# Patient Record
Sex: FEMALE | Race: WHITE | NOT HISPANIC OR LATINO | Employment: FULL TIME | URBAN - METROPOLITAN AREA
[De-identification: names, ages, dates, MRNs, and addresses within clinical notes are randomized per-mention and may not be internally consistent; named-entity substitution may affect disease eponyms.]

---

## 2017-01-25 ENCOUNTER — OFFICE VISIT (OUTPATIENT)
Dept: DERMATOLOGY | Facility: CLINIC | Age: 32
End: 2017-01-25
Payer: COMMERCIAL

## 2017-01-25 DIAGNOSIS — Z85.820 HISTORY OF MELANOMA: ICD-10-CM

## 2017-01-25 DIAGNOSIS — D22.9 MULTIPLE NEVI: ICD-10-CM

## 2017-01-25 DIAGNOSIS — Z12.83 SCREENING, MALIGNANT NEOPLASM, SKIN: ICD-10-CM

## 2017-01-25 DIAGNOSIS — D23.9 DERMATOFIBROMA: ICD-10-CM

## 2017-01-25 DIAGNOSIS — L90.5 SCAR CONDITIONS/SKIN FIBROSIS: Primary | ICD-10-CM

## 2017-01-25 PROCEDURE — 99213 OFFICE O/P EST LOW 20 MIN: CPT | Mod: S$GLB,,, | Performed by: DERMATOLOGY

## 2017-01-25 PROCEDURE — 99999 PR PBB SHADOW E&M-EST. PATIENT-LVL II: CPT | Mod: PBBFAC,,, | Performed by: DERMATOLOGY

## 2017-01-25 NOTE — PATIENT INSTRUCTIONS
Monitoring Moles  Moles, also called nevi, are small, pigmented (colored) marks on the skin. They have no known purpose. Many moles appear before age 30, but they also increase frequently as people age. Moles most often are benign (not cancer) and harmless. But some become cancerous. Thats why you need to watch the moles on your body and tell your health care provider about any concern you.    What are moles?  Moles are a type of pigmented mayelin. Freckles, which often are sprinkled across the bridge of the nose, the cheeks, and the arms, are another type of pigmented mayelin. Moles can appear on any part of the body. There are many types, sizes, and shapes of moles. Most moles are solid brown. In most cases, they are flat or dome-shaped, smooth, and have well-defined edges. Freckles are flat.  Why worry about moles?  Most moles are benign and dont require treatment. You can have moles removed if you dont like the way they look or feel. But moles that appear after you are 30 or that change in certain ways may become a problem. These moles may turn into melanoma, a type of skin cancer. Melanoma is one of the fastest growing cancers in the [place], but it is often curable if caught early. But this disease can be life-threatening, particularly when not diagnosed early. The more moles someone has, the higher the risk. Risk is also higher for those who have had more lifetime exposure to the sun, severe blistering sunburns, exposure to tanning beds, a prior personal history of cancer, and those with a family history of skin cancer. To manage your risk, its smart to check your moles for changes and ask your health care provider to perform a thorough skin exam when you have a physical exam. To do this, you first need to learn where your moles are. Then, be sure to check your moles each month.    Checking your moles  You can check many of your moles each month. You can do this right after you shower and before you get  dressed. Check your body from head to toe. Then, make a list of your moles. If you find any new moles or changes in your moles, call your health care provider. To check your moles, youll need:  · A full-length mirror  · A stool or chair to sit on while you check your feet  If you have a lot of moles, take digital photos of them each month. Make sure to take photos both up close and from a distance. These can help you see if any moles change over time.  When to seek medical treatment  See your health care provider if your moles hurt, itch, ooze, bleed, thicken, become crusty, or show other changes. Also, be sure to call your health care provider if your moles show any of the following signs of melanoma:  · A change in size, shape, color, or elevation  · Asymmetry (when the sides dont match)  · Ragged, notched, or blurred borders  · Varied colors within the same mole  · Size is larger than 5 mm or 6 mm in diameter (the size of a pencil eraser)  © 3937-8764 [employer]. [address] [place] [address]. All rights reserved. This information is not intended as a substitute for professional medical care. Always follow your healthcare professional's instructions.

## 2017-01-25 NOTE — MR AVS SNAPSHOT
[facility] - Dermatology  [address] [facility] [place] [address]  Phone: [phone]  Fax: [phone]                  Samanta Zimmerman   2017 1:15 PM   Office Visit    Description:  Female : 1985   Provider:  Lala Dangelo MD   Department:  [facility] - Dermatology           Reason for Visit     Follow-up           Diagnoses this Visit        Comments    Scar conditions/skin fibrosis    -  Primary     History of melanoma         Screening, malignant neoplasm, skin         Multiple nevi         Dermatofibroma                To Do List           Goals (5 Years of Data)     None      Follow-Up and Disposition     Return pt is planning to move, recommend pt f/u for skin exam in 3 months.  Pt acknowledged understanding. .      [facility]     [facility] On Call Nurse Care Line -  Assistance  Registered nurses in the [facility] provide clinical advisement, health education, appointment booking, and other advisory services.  Call for this free service at [phone].             Medications           Message regarding Medications     Verify the changes and/or additions to your medication regime listed below are the same as discussed with your clinician today.  If any of these changes or additions are incorrect, please notify your healthcare provider.             Verify that the below list of medications is an accurate representation of the medications you are currently taking.  If none reported, the list may be blank. If incorrect, please contact your healthcare provider. Carry this list with you in case of emergency.           Current Medications     alprazolam (XANAX) 0.25 MG tablet Take 1-2 tablets (0.25-0.5 mg total) by mouth daily as needed for Anxiety.    fish oil-omega-3 fatty acids 300-1,000 mg capsule Take 2 g by mouth once daily.    fluticasone (FLONASE) 50 mcg/actuation nasal spray 2 sprays by Each Nare route once daily.    hydrocodone-chlorpheniramine (TUSSIONEX) 10-8  mg/5 mL suspension Take 5 mLs by mouth every 12 (twelve) hours as needed.    iron-vitamin C 100-250 mg, ICAR-C, (ICAR-C) 100-250 mg Tab Take 1 tablet by mouth once daily.    L norgest/e.estradiol-e.estrad 0.15 mg-30 mcg (84)/10 mcg (7) 3MPk Take 1 tablet by mouth once daily.    lisinopril (PRINIVIL,ZESTRIL) 2.5 MG tablet Take 1 tablet (2.5 mg total) by mouth once daily.    naratriptan (AMERGE) 2.5 MG tablet 2.5 mg at onset of headache, may repeat in 4 hours if needed    omeprazole (PRILOSEC) 20 MG capsule TAKE 1 CAPSULE BY MOUTH EVERY DAY    ondansetron (ZOFRAN) 4 MG tablet TAKE ONE TABLET BY MOUTH EVERY 12 HOURS AS NEEDED FOR NAUSEA    topiramate (TOPAMAX) 50 MG tablet Take 1 tablet (50 mg total) by mouth 2 (two) times daily.           Clinical Reference Information           Vital Signs - Last Recorded     LMP                   10/11/2016           Allergies as of 1/25/2017     No Known Allergies      Immunizations Administered on Date of Encounter - 1/25/2017     None      Instructions      Monitoring Moles  Moles, also called nevi, are small, pigmented (colored) marks on the skin. They have no known purpose. Many moles appear before age 30, but they also increase frequently as people age. Moles most often are benign (not cancer) and harmless. But some become cancerous. Thats why you need to watch the moles on your body and tell your health care provider about any concern you.    What are moles?  Moles are a type of pigmented mayelin. Freckles, which often are sprinkled across the bridge of the nose, the cheeks, and the arms, are another type of pigmented mayelin. Moles can appear on any part of the body. There are many types, sizes, and shapes of moles. Most moles are solid brown. In most cases, they are flat or dome-shaped, smooth, and have well-defined edges. Freckles are flat.  Why worry about moles?  Most moles are benign and dont require treatment. You can have moles removed if you dont like the way they look  or feel. But moles that appear after you are 30 or that change in certain ways may become a problem. These moles may turn into melanoma, a type of skin cancer. Melanoma is one of the fastest growing cancers in the [place], but it is often curable if caught early. But this disease can be life-threatening, particularly when not diagnosed early. The more moles someone has, the higher the risk. Risk is also higher for those who have had more lifetime exposure to the sun, severe blistering sunburns, exposure to tanning beds, a prior personal history of cancer, and those with a family history of skin cancer. To manage your risk, its smart to check your moles for changes and ask your health care provider to perform a thorough skin exam when you have a physical exam. To do this, you first need to learn where your moles are. Then, be sure to check your moles each month.    Checking your moles  You can check many of your moles each month. You can do this right after you shower and before you get dressed. Check your body from head to toe. Then, make a list of your moles. If you find any new moles or changes in your moles, call your health care provider. To check your moles, youll need:  · A full-length mirror  · A stool or chair to sit on while you check your feet  If you have a lot of moles, take digital photos of them each month. Make sure to take photos both up close and from a distance. These can help you see if any moles change over time.  When to seek medical treatment  See your health care provider if your moles hurt, itch, ooze, bleed, thicken, become crusty, or show other changes. Also, be sure to call your health care provider if your moles show any of the following signs of melanoma:  · A change in size, shape, color, or elevation  · Asymmetry (when the sides dont match)  · Ragged, notched, or blurred borders  · Varied colors within the same mole  · Size is larger than 5 mm or 6 mm in diameter (the size of  a pencil eraser)  © 6064-8619 [employer]. [address] [place] [address], [place] [address]. All rights reserved. This information is not intended as a substitute for professional medical care. Always follow your healthcare professional's instructions.

## 2017-01-25 NOTE — PROGRESS NOTES
Subjective:       Patient ID:  Samanta Zimmerman is a 31 y.o. 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 Brian on 6/10/16, last seen on 9/8/16. This is a high risk patient here to check for the development of new lesions. Denies lymphadenopathy, weight loss, or night sweats.         Review of Systems   Constitutional: Negative for fever, chills, weight loss, fatigue, night sweats and malaise.   Gastrointestinal: Negative for indigestion.   Skin: Positive for daily sunscreen use and activity-related sunscreen use.   Hematologic/Lymphatic: Negative for adenopathy. Does not bruise/bleed easily.        Objective:    Physical Exam   Constitutional: She appears well-developed and well-nourished. No distress.   Lymphadenopathy:     She has no cervical adenopathy.     She has no axillary adenopathy.        Right: No inguinal adenopathy present.        Left: No inguinal adenopathy present.   Neurological: She is alert and oriented to person, place, and time. She is not disoriented.   Psychiatric: She has a normal mood and affect.   Skin:   Areas Examined (abnormalities noted in diagram):   Scalp / Hair Palpated and Inspected  Head / Face Inspection Performed  Neck Inspection Performed  Chest / Axilla Inspection Performed  Abdomen Inspection Performed  Genitals / Buttocks / Groin Inspection Performed  Back Inspection Performed  RUE Inspected  LUE Inspection Performed  RLE Inspected  LLE Inspection Performed  Nails and Digits Inspection Performed                   Diagram Legend     Firm pink to brown papule c/w dermatofibroma      Evenly pigmented macule c/w junctional nevus     Flesh colored to evenly pigmented papule c/w intradermal nevus      Surgical scar with no sign of skin cancer recurrence       Assessment / Plan:        Scar conditions/skin  fibrosis  History of melanoma  Screening, malignant neoplasm, skin  Scar of the left superior helix, 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 Brian on 6/10/16 . No evidence of recurrence on physical exam today. No cervical, axillary or inguinal lymphadenopathy appreciated on physical exam. No fevers, chills, weight loss, lymphadenopathy or night sweats subjectively per patient. Continue routine skin surveillance. Daily sunscreen advised.    Multiple nevi  Reassurance given.  Discussed ABCDEF of melanoma and changes for patient to look for.  AAD Handout given. Discussed importance of daily use of sunscreen.      Dermatofibroma  Reassurance given.  Lesions are benign.           Return pt is planning to move, recommend pt f/u for skin exam in 3 months.  Pt acknowledged understanding. .

## 2017-01-26 RX ORDER — ALPRAZOLAM 0.25 MG/1
.25-.5 TABLET ORAL DAILY PRN
Qty: 20 TABLET | Refills: 0 | OUTPATIENT
Start: 2017-01-26

## 2017-01-30 ENCOUNTER — PATIENT MESSAGE (OUTPATIENT)
Dept: INTERNAL MEDICINE | Facility: CLINIC | Age: 32
End: 2017-01-30

## 2017-01-30 RX ORDER — ALPRAZOLAM 0.25 MG/1
.25-.5 TABLET ORAL DAILY PRN
Qty: 30 TABLET | Refills: 0 | Status: SHIPPED | OUTPATIENT
Start: 2017-01-30 | End: 2018-06-08 | Stop reason: SDUPTHER

## 2017-01-31 DIAGNOSIS — G43.919 INTRACTABLE MIGRAINE WITHOUT STATUS MIGRAINOSUS, UNSPECIFIED MIGRAINE TYPE: ICD-10-CM

## 2017-02-01 RX ORDER — TOPIRAMATE 50 MG/1
50 TABLET, FILM COATED ORAL 2 TIMES DAILY
Qty: 180 TABLET | Refills: 3 | Status: SHIPPED | OUTPATIENT
Start: 2017-02-01 | End: 2019-01-09 | Stop reason: CLARIF

## 2017-08-31 DIAGNOSIS — N94.6 DYSMENORRHEA: ICD-10-CM

## 2017-09-01 RX ORDER — LEVONORGESTREL / ETHINYL ESTRADIOL AND ETHINYL ESTRADIOL 150-30(84)
KIT ORAL
Qty: 84 EACH | Refills: 0 | Status: SHIPPED | OUTPATIENT
Start: 2017-09-01 | End: 2018-06-29 | Stop reason: SDUPTHER

## 2018-01-30 ENCOUNTER — TELEPHONE (OUTPATIENT)
Dept: OPHTHALMOLOGY | Facility: CLINIC | Age: 33
End: 2018-01-30

## 2018-02-21 ENCOUNTER — OFFICE VISIT (OUTPATIENT)
Dept: DERMATOLOGY | Facility: CLINIC | Age: 33
End: 2018-02-21
Payer: COMMERCIAL

## 2018-02-21 DIAGNOSIS — Z85.820 HISTORY OF MELANOMA: ICD-10-CM

## 2018-02-21 DIAGNOSIS — R19.09 SWELLING OF INGUINAL REGION: ICD-10-CM

## 2018-02-21 DIAGNOSIS — L90.5 SCAR CONDITION AND FIBROSIS OF SKIN: Primary | ICD-10-CM

## 2018-02-21 DIAGNOSIS — Z12.83 SCREENING, MALIGNANT NEOPLASM, SKIN: ICD-10-CM

## 2018-02-21 PROCEDURE — 99999 PR PBB SHADOW E&M-EST. PATIENT-LVL III: CPT | Mod: PBBFAC,,, | Performed by: DERMATOLOGY

## 2018-02-21 PROCEDURE — 99213 OFFICE O/P EST LOW 20 MIN: CPT | Mod: S$GLB,,, | Performed by: DERMATOLOGY

## 2018-02-21 NOTE — PROGRESS NOTES
Subjective:       Patient ID:  Samanta Zimmerman is a 33 y.o. 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 Brian on 6/10/16, last seen on 1/25/17. She was seen by a dermatologist at [facility] in the interim. This is a high risk patient here to check for the development of new lesions. Denies lymphadenopathy, weight loss, or night sweats.        Review of Systems   Constitutional: Negative for fever, chills, weight loss, fatigue, night sweats and malaise.   HENT: Negative for headaches.    Respiratory: Negative for cough and shortness of breath.    Gastrointestinal: Negative for indigestion.   Skin: Positive for daily sunscreen use and activity-related sunscreen use. Negative for recent sunburn and wears hat.   Neurological: Negative for headaches.   Hematologic/Lymphatic: Negative for adenopathy. Does not bruise/bleed easily.        Objective:    Physical Exam   Constitutional: She appears well-developed and well-nourished. No distress.   Lymphadenopathy:     She has no cervical adenopathy.     She has no axillary adenopathy.        Right: Inguinal (approx 2 cm swelling noted, possibly of inguinal ligament) adenopathy present.        Left: No inguinal adenopathy present.   Neurological: She is alert and oriented to person, place, and time. She is not disoriented.   Psychiatric: She has a normal mood and affect.   Skin:   Areas Examined (abnormalities noted in diagram):   Scalp / Hair Palpated and Inspected  Head / Face Inspection Performed  Neck Inspection Performed  Chest / Axilla Inspection Performed  Abdomen Inspection Performed  Genitals / Buttocks / Groin Inspection Performed  Back Inspection Performed  RUE Inspected  LUE Inspection Performed  RLE Inspected  LLE Inspection Performed  Nails and Digits Inspection Performed               Diagram Legend     Erythematous scaling macule/papule c/w actinic keratosis       Vascular papule c/w angioma      Pigmented verrucoid papule/plaque c/w seborrheic keratosis      Yellow umbilicated papule c/w sebaceous hyperplasia      Irregularly shaped tan macule c/w lentigo     1-2 mm smooth white papules consistent with Milia      Movable subcutaneous cyst with punctum c/w epidermal inclusion cyst      Subcutaneous movable cyst c/w pilar cyst      Firm pink to brown papule c/w dermatofibroma      Pedunculated fleshy papule(s) c/w skin tag(s)      Evenly pigmented macule c/w junctional nevus     Mildly variegated pigmented, slightly irregular-bordered macule c/w mildly atypical nevus      Flesh colored to evenly pigmented papule c/w intradermal nevus       Pink pearly papule/plaque c/w basal cell carcinoma      Erythematous hyperkeratotic cursted plaque c/w SCC      Surgical scar with no sign of skin cancer recurrence      Open and closed comedones      Inflammatory papules and pustules      Verrucoid papule consistent consistent with wart     Erythematous eczematous patches and plaques     Dystrophic onycholytic nail with subungual debris c/w onychomycosis     Umbilicated papule    Erythematous-base heme-crusted tan verrucoid plaque consistent with inflamed seborrheic keratosis     Erythematous Silvery Scaling Plaque c/w Psoriasis     See annotation      Assessment / Plan:        Scar condition and fibrosis of skin  History of melanoma  Screening, malignant neoplasm, skin  Scar of the left superior helix, 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 Brian on 6/10/16 . No evidence of recurrence on physical exam today. No cervical, axillary or inguinal lymphadenopathy appreciated on physical exam. No fevers, chills, weight loss, lymphadenopathy or night sweats subjectively per patient. Continue routine skin surveillance. Daily sunscreen  advised.    Swelling of inguinal region  -     Ambulatory Referral to Hematology / Oncology  -     Will refer to hem-onc for further eval.              Follow-up in about 6 months (around 8/21/2018).

## 2018-02-21 NOTE — PATIENT INSTRUCTIONS
Monitoring Moles  Moles, also called nevi, are small, pigmented (colored) marks on the skin. They have no known purpose. Many moles appear before age 30, but they also increase frequently as people age. Moles most often are benign (not cancer) and harmless. But some become cancerous. Thats why you need to watch the moles on your body and tell your health care provider about any concern you.    What are moles?  Moles are a type of pigmented mayelin. Freckles, which often are sprinkled across the bridge of the nose, the cheeks, and the arms, are another type of pigmented mayelin. Moles can appear on any part of the body. There are many types, sizes, and shapes of moles. Most moles are solid brown. In most cases, they are flat or dome-shaped, smooth, and have well-defined edges. Freckles are flat.  Why worry about moles?  Most moles are benign and dont require treatment. You can have moles removed if you dont like the way they look or feel. But moles that appear after you are 30 or that change in certain ways may become a problem. These moles may turn into melanoma, a type of skin cancer. Melanoma is one of the fastest growing cancers in the [place], but it is often curable if caught early. But this disease can be life-threatening, particularly when not diagnosed early. The more moles someone has, the higher the risk. Risk is also higher for those who have had more lifetime exposure to the sun, severe blistering sunburns, exposure to tanning beds, a prior personal history of cancer, and those with a family history of skin cancer. To manage your risk, its smart to check your moles for changes and ask your health care provider to perform a thorough skin exam when you have a physical exam. To do this, you first need to learn where your moles are. Then, be sure to check your moles each month.    Checking your moles  You can check many of your moles each month. You can do this right after you shower and before you get  dressed. Check your body from head to toe. Then, make a list of your moles. If you find any new moles or changes in your moles, call your health care provider. To check your moles, youll need:  · A full-length mirror  · A stool or chair to sit on while you check your feet  If you have a lot of moles, take digital photos of them each month. Make sure to take photos both up close and from a distance. These can help you see if any moles change over time.  When to seek medical treatment  See your health care provider if your moles hurt, itch, ooze, bleed, thicken, become crusty, or show other changes. Also, be sure to call your health care provider if your moles show any of the following signs of melanoma:  · A change in size, shape, color, or elevation  · Asymmetry (when the sides dont match)  · Ragged, notched, or blurred borders  · Varied colors within the same mole  · Size is larger than 5 mm or 6 mm in diameter (the size of a pencil eraser)  © 5096-1765 [employer]. [address] [place] [address]. All rights reserved. This information is not intended as a substitute for professional medical care. Always follow your healthcare professional's instructions.

## 2018-03-07 ENCOUNTER — INITIAL CONSULT (OUTPATIENT)
Dept: HEMATOLOGY/ONCOLOGY | Facility: CLINIC | Age: 33
End: 2018-03-07
Payer: COMMERCIAL

## 2018-03-07 ENCOUNTER — LAB VISIT (OUTPATIENT)
Dept: LAB | Facility: HOSPITAL | Age: 33
End: 2018-03-07
Attending: INTERNAL MEDICINE
Payer: COMMERCIAL

## 2018-03-07 ENCOUNTER — PATIENT MESSAGE (OUTPATIENT)
Dept: NEPHROLOGY | Facility: CLINIC | Age: 33
End: 2018-03-07

## 2018-03-07 VITALS
OXYGEN SATURATION: 100 % | HEIGHT: 62 IN | WEIGHT: 179.44 LBS | SYSTOLIC BLOOD PRESSURE: 120 MMHG | RESPIRATION RATE: 18 BRPM | BODY MASS INDEX: 33.02 KG/M2 | HEART RATE: 84 BPM | TEMPERATURE: 99 F | DIASTOLIC BLOOD PRESSURE: 80 MMHG

## 2018-03-07 DIAGNOSIS — C43.9 DESMOPLASTIC MALIGNANT MELANOMA: ICD-10-CM

## 2018-03-07 DIAGNOSIS — C43.9 DESMOPLASTIC MALIGNANT MELANOMA: Primary | ICD-10-CM

## 2018-03-07 DIAGNOSIS — Q61.19 AUTOSOMAL RECESSIVE POLYCYSTIC KIDNEYS: ICD-10-CM

## 2018-03-07 LAB
ALBUMIN SERPL BCP-MCNC: 3.8 G/DL
ALP SERPL-CCNC: 39 U/L
ALT SERPL W/O P-5'-P-CCNC: 7 U/L
ANION GAP SERPL CALC-SCNC: 8 MMOL/L
AST SERPL-CCNC: 14 U/L
BASOPHILS # BLD AUTO: 0.02 K/UL
BASOPHILS NFR BLD: 0.4 %
BILIRUB SERPL-MCNC: 0.4 MG/DL
BUN SERPL-MCNC: 10 MG/DL
CALCIUM SERPL-MCNC: 9 MG/DL
CHLORIDE SERPL-SCNC: 109 MMOL/L
CO2 SERPL-SCNC: 22 MMOL/L
CREAT SERPL-MCNC: 0.8 MG/DL
DIFFERENTIAL METHOD: ABNORMAL
EOSINOPHIL # BLD AUTO: 0.1 K/UL
EOSINOPHIL NFR BLD: 1.5 %
ERYTHROCYTE [DISTWIDTH] IN BLOOD BY AUTOMATED COUNT: 12.7 %
EST. GFR  (AFRICAN AMERICAN): >60 ML/MIN/1.73 M^2
EST. GFR  (NON AFRICAN AMERICAN): >60 ML/MIN/1.73 M^2
GLUCOSE SERPL-MCNC: 98 MG/DL
HCT VFR BLD AUTO: 38.7 %
HGB BLD-MCNC: 13.2 G/DL
LYMPHOCYTES # BLD AUTO: 2 K/UL
LYMPHOCYTES NFR BLD: 37.5 %
MCH RBC QN AUTO: 28.4 PG
MCHC RBC AUTO-ENTMCNC: 34.1 G/DL
MCV RBC AUTO: 83 FL
MONOCYTES # BLD AUTO: 0.2 K/UL
MONOCYTES NFR BLD: 4.3 %
NEUTROPHILS # BLD AUTO: 3 K/UL
NEUTROPHILS NFR BLD: 56.3 %
PLATELET # BLD AUTO: 277 K/UL
PMV BLD AUTO: 9.6 FL
POTASSIUM SERPL-SCNC: 3.9 MMOL/L
PROT SERPL-MCNC: 7.4 G/DL
RBC # BLD AUTO: 4.65 M/UL
SODIUM SERPL-SCNC: 139 MMOL/L
WBC # BLD AUTO: 5.3 K/UL

## 2018-03-07 PROCEDURE — 36415 COLL VENOUS BLD VENIPUNCTURE: CPT

## 2018-03-07 PROCEDURE — 99244 OFF/OP CNSLTJ NEW/EST MOD 40: CPT | Mod: S$GLB,,, | Performed by: INTERNAL MEDICINE

## 2018-03-07 PROCEDURE — 99999 PR PBB SHADOW E&M-EST. PATIENT-LVL III: CPT | Mod: PBBFAC,,, | Performed by: INTERNAL MEDICINE

## 2018-03-07 PROCEDURE — 80053 COMPREHEN METABOLIC PANEL: CPT

## 2018-03-07 PROCEDURE — 85025 COMPLETE CBC W/AUTO DIFF WBC: CPT

## 2018-03-07 NOTE — PROGRESS NOTES
OUTPATIENT    MAIN COMPLAINT:  We are asked by Dr. Lala Dangelo to evaluate this 33-year-old    lady in regards to her history of operated melanoma of the left ear.    HISTORY OF PRESENT ILLNESS:  This is a 33-year-old  lady who had a   desmoplastic melanoma removed from the left superior helix (Breslow depth of   0.67 mm) by Dr. Brian in [facility] on 2016.    The patient also had a sentinel lymph node biopsy.  [place] lymph node biopsy   were negative.  The patient also has been seen by Dermatology at [facility].    The patient was seen on 2018.  On her physical examination, Dr. Dangelo felt   that may have been some swelling of the right groin.  The patient herself feels   that the area is unchanged.    ALLERGIES:  None.    MEDICATIONS:  See MedCard.    PREVIOUS SURGERIES:  Removal of melanoma of the left ear in 2016, deviated   septum.    SOCIAL HISTORY:  She does not smoke or drink.  She is not .  She denies   drinking.  She lives in [place].  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    FAMILY HISTORY:  Maternal uncle had cancer of the pancreas?  Maternal   grandmother had cancer of some kind.  Paternal grandmother had diabetes.  No   heart attacks.    PAST MEDICAL HISTORY:  1.  Status post removal of melanoma of the left ear in 2016.  2.  History of polycystic kidney disease.  3.  Migraine.    REVIEW OF SYSTEMS:  GENERAL:  No weight loss or fatigue.  EYES:  No vision problems or excessive lacrimation.  OROPHARYNX:  No difficulty or pain on swallowing.  ENDOCRINE:  No heat or cold intolerance.  LUNGS:  No shortness of breath or coughing.  CARDIOVASCULAR:  No chest pains or palpitation.  GASTROINTESTINAL:  No nausea, vomiting, or diarrhea.  GENITOURINARY:   0.  Has a history of polycystic kidney disease and is on   lisinopril as a protective measures.  She says scans in the past have been done   without IV dye.   She denies any gynecological lesions.  SKIN:  No blisters or ecchymosis, status post removal of melanoma, left ear.  MUSCULOSKELETAL:  No neck, hip, or back pain.  NEUROLOGIC:  Chronic headaches and migraines.    PHYSICAL EXAMINATION:  GENERAL:  She was well groomed.  She interacted normally during the interview.  VITAL SIGNS:  Blood pressure 120/80, weight 179 pounds, height 62 inches, pulse   84, respirations 18, temperature 98.7, and oxygen saturation 100%.  EYES:  No jaundice or paleness.  OROPHARYNX:  No ulcers.  No exudates.  ENDOCRINE:  No palpable thyroid.  LYMPHATICS:  No cervical or supraclavicular adenopathy.  NECK:  No jugular venous distension or masses.  LUNGS:  Clear and well ventilated without rales, wheezes, or rhonchi.  CARDIOVASCULAR:  The heart was rhythmic.  No murmurs or gallops.  ABDOMEN:  Soft.  No obvious hepatosplenomegaly, guarding, or rebound.  BREASTS:  The right inguinal area was examined carefully and I was unable to   feel any enlarged lymph nodes.  No obvious hernias.  EXTREMITIES:  No edema.  Good dorsalis pedis and posterior tibialis pulses.  SKIN:  Examination of the left ear show changes from the previous surgery.  No   suspicious moles.    DISCUSSION:  The patient has been told that my examination is essentially   unremarkable.  However, I think it would be reasonable to do a CT of the chest,   abdomen, and pelvis with interest in the right groin.    The patient will discuss with her nephrologist if she can receive IV contrast.    If not, we will do without contrast.  CBC, CMP, and pregnancy test today.      RDF/HN  dd: 03/07/2018 08:49:49 (CST)  td: 03/07/2018 10:53:54 (CST)  Doc ID   #[id number]  Job ID #[id number]    CC: Lala Jacob M.D.

## 2018-03-07 NOTE — LETTER
March 7, 2018      Lala Dangelo MD  [address]  [facility] [address]           [place] [facility]  [address] [place] [id number]  Phone: [phone]  Fax: [phone]          Patient: Samanta Zimmerman   MR Number: [id number]   YOB: 1985   Date of Visit: 3/7/2018       Dear Dr. Lala Dangelo:    Thank you for referring Samanta Zimmerman to me for evaluation. Attached you will find relevant portions of my assessment and plan of care.    If you have questions, please do not hesitate to call me. I look forward to following Samanta Zimmerman along with you.    Sincerely,    Buddy Go MD    Enclosure  CC:  No Recipients    If you would like to receive this communication electronically, please contact [email or web address] or [phone] to request more information on [facility] access.    For providers and/or their staff who would like to refer a patient to [facility], please contact us through our one-stop-shop provider referral line, [facility], at [phone].    If you feel you have received this communication in error or would no longer like to receive these types of communications, please e-mail [email or web address]

## 2018-06-06 ENCOUNTER — OFFICE VISIT (OUTPATIENT)
Dept: INTERNAL MEDICINE | Facility: CLINIC | Age: 33
End: 2018-06-06
Payer: COMMERCIAL

## 2018-06-06 VITALS
WEIGHT: 172.63 LBS | SYSTOLIC BLOOD PRESSURE: 126 MMHG | TEMPERATURE: 98 F | DIASTOLIC BLOOD PRESSURE: 70 MMHG | OXYGEN SATURATION: 97 % | HEART RATE: 96 BPM | HEIGHT: 62 IN | BODY MASS INDEX: 31.77 KG/M2

## 2018-06-06 DIAGNOSIS — G43.009 MIGRAINE WITHOUT AURA AND WITHOUT STATUS MIGRAINOSUS, NOT INTRACTABLE: ICD-10-CM

## 2018-06-06 DIAGNOSIS — Z00.00 WELLNESS EXAMINATION: Primary | ICD-10-CM

## 2018-06-06 DIAGNOSIS — E78.1 HYPERTRIGLYCERIDEMIA: ICD-10-CM

## 2018-06-06 DIAGNOSIS — Z23 NEED FOR VACCINATION AGAINST STREPTOCOCCUS PNEUMONIAE: ICD-10-CM

## 2018-06-06 DIAGNOSIS — N94.6 DYSMENORRHEA: ICD-10-CM

## 2018-06-06 DIAGNOSIS — Q61.3 POLYCYSTIC KIDNEY DISEASE: ICD-10-CM

## 2018-06-06 DIAGNOSIS — Q61.19 AUTOSOMAL RECESSIVE POLYCYSTIC KIDNEYS: ICD-10-CM

## 2018-06-06 PROCEDURE — 99999 PR PBB SHADOW E&M-EST. PATIENT-LVL V: CPT | Mod: PBBFAC,,, | Performed by: FAMILY MEDICINE

## 2018-06-06 PROCEDURE — 99395 PREV VISIT EST AGE 18-39: CPT | Mod: S$GLB,,, | Performed by: FAMILY MEDICINE

## 2018-06-06 NOTE — PROGRESS NOTES
Subjective:       Patient ID: Samanta Zimmerman is a 33 y.o. female.    Chief Complaint: Annual Exam    32 yo female with polycystic kidney disease, h/o melanoma of the left ear with no complications since removal. She recently saw Dr. Go for eval of inguinal lymphadenopathy; she reports he was not impressed with exam findings and ordered a CT abd/pelvis which she did not get due to expense of co-pay (around $700). She has been seen by renal physician in [place] where she was living previously and will be due for follow-up in August of this year. No new recommendations were made at that time. She has chronic migraines and tension headaches and takes topomax daily but with bothersome side effects--she has decreased from twice daily to once daily and is interested in stopping because she does not find it helpful. Headaches seem to have worsened with start of OCPs for treatment of menorrhagia and dysmenorrhea.       does not have any pertinent problems on file.  Past Medical History:   Diagnosis Date    Autosomal recessive polycystic kidneys     GERD (gastroesophageal reflux disease)     Melanoma 04/25/2016    left superior helix (breslow dept 0.67mm)    Migraine headache      Past Surgical History:   Procedure Laterality Date    LYMPH NODE BIOPSY      MALIGNANT SKIN LESION EXCISION  06/10/2016    NASAL SEPTUM SURGERY      RENAL EXPLORATION  2011     Family History   Problem Relation Age of Onset    Hypertension Father     Kidney disease Father     Diabetes Paternal Grandmother     Thrombophilia Neg Hx     Breast cancer Neg Hx     Colon cancer Neg Hx     Ovarian cancer Neg Hx     Eczema Neg Hx     Lupus Neg Hx     Psoriasis Neg Hx     Melanoma Neg Hx      Social History     Social History    Marital status: Single     Spouse name: N/A    Number of children: 0    Years of education: N/A     Occupational History    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 No    Breast-Feeding No     Social History Narrative    No narrative on file     Review of Systems   Constitutional: Negative for activity change and unexpected weight change.   HENT: Positive for postnasal drip and sneezing. Negative for hearing loss, rhinorrhea and trouble swallowing.    Eyes: Negative for discharge and visual disturbance.   Respiratory: Negative for chest tightness and wheezing.    Cardiovascular: Negative for chest pain and palpitations.   Gastrointestinal: Negative for blood in stool, constipation, diarrhea and vomiting.   Endocrine: Negative for polydipsia and polyuria.   Genitourinary: Negative for difficulty urinating, dysuria, hematuria and menstrual problem.   Musculoskeletal: Negative for arthralgias, joint swelling and neck pain.   Allergic/Immunologic: Positive for environmental allergies.        Has had allergy injections, takes Zyrtec daily. Has had sinus surgery with symptom improvement.   Neurological: Positive for headaches. Negative for weakness.   Psychiatric/Behavioral: Negative for confusion and dysphoric mood.       Objective:     Vitals:    06/06/18 1304   BP: 126/70   Pulse: 96   Temp: 98.1 °F (36.7 °C)        Physical Exam   Constitutional: She is oriented to person, place, and time. She appears well-developed and well-nourished.   HENT:   Head: Normocephalic and atraumatic.   Right Ear: External ear normal.   Left Ear: External ear normal.   Nose: Nose normal.   Eyes: Conjunctivae and EOM are normal. Pupils are equal, round, and reactive to light. No scleral icterus.   Cardiovascular: Normal rate, regular rhythm, normal heart sounds and intact distal pulses.    Pulmonary/Chest: Effort normal and breath sounds normal. She has no wheezes.  She has no rales.   Abdominal: Soft. Bowel sounds are normal. She exhibits no mass. There is no tenderness.   Musculoskeletal: Normal range of motion. She exhibits no edema or deformity.   Neurological: She is alert and oriented to person, place, and time.   Normal gait. No speech abnormality   Skin: Skin is warm and dry. No erythema.   Psychiatric: She has a normal mood and affect. Her behavior is normal. Judgment and thought content normal.   Nursing note and vitals reviewed.      Assessment:       1. Wellness examination    2. Hypertriglyceridemia    3. Polycystic kidney disease    4. Need for vaccination against Streptococcus pneumoniae    5. Migraine without aura and without status migrainosus, not intractable    6. Autosomal recessive polycystic kidneys    7. Dysmenorrhea        Plan:           Problem List Items Addressed This Visit     Autosomal recessive polycystic kidneys    Current Assessment & Plan     Will schedule follow-up with her nephrologist; stable renal function on recent labs. On low dose lisinopril         Dysmenorrhea    Current Assessment & Plan     Improved on OCP therapy; scheduled for annual exam with Dr. Park and she will discuss with him possibility of changing OCP due to headaches         Migraine headache    Current Assessment & Plan     Discussed gradual wean from topomax for trial off of this medication. Also discussed changing OCP since she thinks this may have contributed to increased headache frequency. Will discuss with Dr. Park. Referred for wellness visit.         Hypertriglyceridemia    Current Assessment & Plan     Recently had insurance physical with elevation in triglycerides noted (200s); she was not completely fasting--had coffee with creamer that morning. Will repeat--briefly discussed lifestyle measures to improve cholesterol profile.          Relevant Orders    Lipid panel    Wellness examination - Primary    Relevant Orders    TSH    Vitamin D    Ambulatory  referral to Obstetrics / Gynecology    Need for vaccination against Streptococcus pneumoniae    Current Assessment & Plan     Pneum13 today and pneum23 in 2 months.         Relevant Orders    (In Office Administered) Pneumococcal Conjugate Vaccine (13 Valent) (IM)    (In Office Administered) Pneumococcal Polysaccharide Vaccine (23 Valent) (SQ/IM)      Other Visit Diagnoses     Polycystic kidney disease        Relevant Orders    Ambulatory referral to Nephrology

## 2018-06-06 NOTE — ASSESSMENT & PLAN NOTE
Discussed gradual wean from topomax for trial off of this medication. Also discussed changing OCP since she thinks this may have contributed to increased headache frequency. Will discuss with Dr. Park. Referred for wellness visit.

## 2018-06-06 NOTE — PATIENT INSTRUCTIONS
"Look into the DASH diet; consider adding weight training to your fitness routine. I like www.[employer] which is a free home work-out web-site.     Your cholesterol results show that your triglycerides are mildly elevated. The same lifestyle modifications that will improve your blood sugar will also improve this. I have included information on this below:    What are high triglycerides? - Triglycerides are fat-like substances in the blood. Everyone has them, but some people have too much of them. This can cause high levels of triglycerides in the blood, also called "high triglycerides."  Compared with people who have normal triglycerides, people with high triglycerides can have a higher risk of heart attacks, strokes, and other health problems.  People with very high triglycerides can get inflammation in the pancreas. The pancreas is an organ that makes hormones and fluids to help the body break down food. When the pancreas gets inflamed, it can cause serious health problems.  What should my triglyceride level be? - Ask your doctor or nurse what your triglyceride level should be. In general, levels are:  -Normal - Less than 150 mg/dL (If you live outside the [place], triglycerides are measured differently. The normal level is less than 1.7 mmol/L.)  -A little bit high - 150 to 499 mg/dL (1.7 to 5.6 mmol/L)   -Moderately high - 500 to 886 mg/dL (5.6 to 10.0 mmol/L)  -Very high - Greater than 886 mg/dL (10.0 mmol/L)  Am I at higher risk for heart attack or stroke?   - Yes. Having high triglycerides increases your risk of heart attacks and stroke. But this is just 1 of many things that can increase your risk. You are also at higher risk if you:  -Smoke cigarettes  -Have high blood pressure  -Are overweight  -Have a parent, sister, or brother who got heart disease at a young age (Young, in this case, means younger than 55 for men and younger than 65 for women.)  -Are a man - Women are at risk too, but men " have a higher risk.  -Are older  -Have diabetes - Especially if you cannot control your blood sugar well.  Your doctor can talk to you about your personal risk of having a heart attack or stroke. There are things you can do to lower your risk.   Should I take medicine to lower my triglycerides? - Not everyone who has high triglycerides needs medicines to lower them. Your doctor will decide if you need medicine. It depends on your age, family history, and other health concerns.   Medicines can include:  -Medicines to lower triglyceride levels - These include fenofibrate (sample brand names: Antara, Fenoglide), nicotinic acid (sample brand names: Niacor, Niaspan), or fish oil (brand name: Lovaza).  -Statins - These medicines can reduce the risk of a heart attack or stroke. They are used to lower cholesterol levels in the body. Many people with high triglycerides also have high cholesterol.  The medicine you take will depend on your triglyceride levels and other factors. If your triglycerides are very high, you might need more than 1 medicine.  Can I lower my triglycerides without medicines? - Yes, you might be able to lower high triglycerides if you:  -Lose weight (if you are overweight)  -Get regular exercise  -Avoid foods and drinks with a lot of sugar and carbohydrates - These include white bread, fruit juice, soda, and sweets.  -Avoid red meat, butter, fried foods, cheese, oils, and nuts - This can help if your triglycerides are over 500.  -Limit alcohol - In you are a man, have 2 drinks a day or less. If you are a woman, have 1 drink a day or less. If your triglycerides are over 500, ask your doctor or nurse if it is safe to drink alcohol

## 2018-06-06 NOTE — ASSESSMENT & PLAN NOTE
Recently had insurance physical with elevation in triglycerides noted (200s); she was not completely fasting--had coffee with creamer that morning. Will repeat--briefly discussed lifestyle measures to improve cholesterol profile.

## 2018-06-06 NOTE — ASSESSMENT & PLAN NOTE
Improved on OCP therapy; scheduled for annual exam with Dr. Park and she will discuss with him possibility of changing OCP due to headaches

## 2018-06-07 ENCOUNTER — TELEPHONE (OUTPATIENT)
Dept: INTERNAL MEDICINE | Facility: CLINIC | Age: 33
End: 2018-06-07

## 2018-06-07 ENCOUNTER — PATIENT MESSAGE (OUTPATIENT)
Dept: INTERNAL MEDICINE | Facility: CLINIC | Age: 33
End: 2018-06-07

## 2018-06-08 RX ORDER — ALPRAZOLAM 0.25 MG/1
.25-.5 TABLET ORAL DAILY PRN
Qty: 30 TABLET | Refills: 0 | Status: SHIPPED | OUTPATIENT
Start: 2018-06-08 | End: 2019-04-17

## 2018-06-08 RX ORDER — OMEPRAZOLE 20 MG/1
20 CAPSULE, DELAYED RELEASE ORAL DAILY
Qty: 30 CAPSULE | Refills: 6 | Status: SHIPPED | OUTPATIENT
Start: 2018-06-08 | End: 2019-02-10 | Stop reason: SDUPTHER

## 2018-06-29 ENCOUNTER — PATIENT MESSAGE (OUTPATIENT)
Dept: OBSTETRICS AND GYNECOLOGY | Facility: CLINIC | Age: 33
End: 2018-06-29

## 2018-06-29 DIAGNOSIS — N94.6 DYSMENORRHEA: ICD-10-CM

## 2018-06-29 RX ORDER — LEVONORGESTREL / ETHINYL ESTRADIOL AND ETHINYL ESTRADIOL 150-30(84)
1 KIT ORAL DAILY
Qty: 84 EACH | Refills: 0 | Status: SHIPPED | OUTPATIENT
Start: 2018-06-29 | End: 2018-08-01 | Stop reason: ALTCHOICE

## 2018-06-29 NOTE — TELEPHONE ENCOUNTER
Attempted to contact patient. No answer. Left message for patient to return call to clinic to [phone] to let her know that her medication has been refilled. This is the last refill before annual.

## 2018-07-20 DIAGNOSIS — Q61.3 POLYCYSTIC KIDNEY DISEASE: ICD-10-CM

## 2018-07-20 RX ORDER — LISINOPRIL 2.5 MG/1
2.5 TABLET ORAL DAILY
Qty: 90 TABLET | Refills: 3 | Status: SHIPPED | OUTPATIENT
Start: 2018-07-20 | End: 2019-01-09 | Stop reason: CLARIF

## 2018-08-01 ENCOUNTER — OFFICE VISIT (OUTPATIENT)
Dept: OBSTETRICS AND GYNECOLOGY | Facility: CLINIC | Age: 33
End: 2018-08-01
Payer: COMMERCIAL

## 2018-08-01 VITALS
HEIGHT: 62 IN | WEIGHT: 169.75 LBS | DIASTOLIC BLOOD PRESSURE: 80 MMHG | SYSTOLIC BLOOD PRESSURE: 110 MMHG | BODY MASS INDEX: 31.24 KG/M2

## 2018-08-01 DIAGNOSIS — Z12.4 PAP SMEAR FOR CERVICAL CANCER SCREENING: Primary | ICD-10-CM

## 2018-08-01 DIAGNOSIS — N94.6 DYSMENORRHEA: ICD-10-CM

## 2018-08-01 PROCEDURE — 88175 CYTOPATH C/V AUTO FLUID REDO: CPT

## 2018-08-01 PROCEDURE — 99395 PREV VISIT EST AGE 18-39: CPT | Mod: S$GLB,,, | Performed by: OBSTETRICS & GYNECOLOGY

## 2018-08-01 PROCEDURE — 99999 PR PBB SHADOW E&M-EST. PATIENT-LVL III: CPT | Mod: PBBFAC,,, | Performed by: OBSTETRICS & GYNECOLOGY

## 2018-08-01 RX ORDER — NORETHINDRONE ACETATE AND ETHINYL ESTRADIOL .02; 1 MG/1; MG/1
1 TABLET ORAL DAILY
Qty: 63 TABLET | Refills: 3 | Status: SHIPPED | OUTPATIENT
Start: 2018-08-01 | End: 2018-10-11 | Stop reason: SINTOL

## 2018-08-01 NOTE — PROGRESS NOTES
Subjective:       Patient ID: Samanta Zimmerman is a 33 y.o. female.    Chief Complaint:  Annual Exam      History of Present Illness  HPI  Annual Exam-Premenopausal  Patient presents for annual exam. The patient has no complaints today. The patient is sexually active. GYN screening history: last pap: approximate date  and was normal. The patient wears seatbelts: yes. The patient participates in regular exercise: yes. Has the patient ever been transfused or tattooed?: no. The patient reports that there is not domestic violence in her life.  Pt has been doing well on current OCP.  However, pt would like to discuss switching to a lower dose OCP.      GYN & OB History  Patient's last menstrual period was 2018.   Date of Last Pap: 2014    OB History    Para Term  AB Living   0 0 0 0 0 0   SAB TAB Ectopic Multiple Live Births   0 0 0 0               Review of Systems  Review of Systems   Constitutional: Negative for activity change, appetite change, fatigue, fever and unexpected weight change.   Respiratory: Negative for shortness of breath.    Cardiovascular: Negative for chest pain, palpitations and leg swelling.   Gastrointestinal: Negative for abdominal pain, bloating, blood in stool, constipation, diarrhea, nausea and vomiting.   Genitourinary: Negative for dyspareunia, dysuria, flank pain, frequency, genital sores, hematuria, menorrhagia, menstrual problem, pelvic pain, vaginal bleeding, vaginal discharge, vaginal pain, dysmenorrhea, urinary incontinence, postcoital bleeding and vaginal odor.   Musculoskeletal: Negative for back pain.   Neurological: Positive for headaches. Negative for syncope.   Breast: Negative for breast mass, breast pain, nipple discharge and skin changes          Objective:    Physical Exam:   Constitutional: She is oriented to person, place, and time. She appears well-developed and well-nourished. No distress.    HENT:   Head: Normocephalic and  atraumatic.    Eyes: EOM are normal. Pupils are equal, round, and reactive to light.    Neck: Normal range of motion. Neck supple.    Cardiovascular: Normal rate, regular rhythm and normal heart sounds.     Pulmonary/Chest: Effort normal and breath sounds normal. Right breast exhibits no inverted nipple, no mass, no nipple discharge, no skin change, no tenderness, no bleeding and no swelling. Left breast exhibits no inverted nipple, no mass, no nipple discharge, no skin change, no tenderness, no bleeding and no swelling. Breasts are symmetrical.        Abdominal: Soft. Bowel sounds are normal. She exhibits no distension. There is no tenderness.     Genitourinary: Vagina normal and uterus normal. Pelvic exam was performed with patient supine. There is no rash, tenderness, lesion or injury on the right labia. There is no rash, tenderness, lesion or injury on the left labia. Uterus is not deviated, not enlarged and not tender. Cervix is normal. Right adnexum displays no mass, no tenderness and no fullness. Left adnexum displays no mass, no tenderness and no fullness. No erythema, tenderness or bleeding in the vagina. No foreign body in the vagina. No signs of injury around the vagina. No vaginal discharge found. Cervix exhibits no motion tenderness, no discharge and no friability. Additional cervical findings: pap smear done          Musculoskeletal: Normal range of motion and moves all extremeties. She exhibits no edema or tenderness.       Neurological: She is alert and oriented to person, place, and time.    Skin: Skin is warm and dry.    Psychiatric: She has a normal mood and affect. Her behavior is normal. Thought content normal.          Assessment:        1. Pap smear for cervical cancer screening    2. Dysmenorrhea             Plan:      Pap smear for cervical cancer screening  -     Liquid-based pap smear, screening  -     Pt was counseled on cervical/vaginal screening guidelines and recommendations.  Last pap  W[facility]ON on 2014.  If today's pap smear result is negative, next pap smear will be due in 2021.  -     Pt was advised on current breast cancer screening recommendations.  Pt requests to proceed with breast exam today.  -     Follow up with PCP for routine health maintenance needs.    Dysmenorrhea  -     norethindrone-ethinyl estradiol (MICROGESTIN 1/20) 1-20 mg-mcg per tablet; Take 1 tablet by mouth once daily. Skip inactive pills in first two packs and allow for period every 3 months.  Dispense: 90 tablet; Refill: 3  -     Pt doing well on OCP but desires to switch to a lower dose.  Pt was counseled on options and pt desires Microgestin.      Follow-up in about 1 year (around 8/1/2019).

## 2018-09-07 ENCOUNTER — PATIENT MESSAGE (OUTPATIENT)
Dept: OBSTETRICS AND GYNECOLOGY | Facility: CLINIC | Age: 33
End: 2018-09-07

## 2018-09-10 ENCOUNTER — PATIENT MESSAGE (OUTPATIENT)
Dept: NEPHROLOGY | Facility: CLINIC | Age: 33
End: 2018-09-10

## 2018-09-10 DIAGNOSIS — Q61.3 POLYCYSTIC KIDNEY DISEASE: Primary | ICD-10-CM

## 2018-09-10 PROBLEM — Z00.00 WELLNESS EXAMINATION: Status: RESOLVED | Noted: 2018-06-06 | Resolved: 2018-09-10

## 2018-09-11 ENCOUNTER — LAB VISIT (OUTPATIENT)
Dept: LAB | Facility: HOSPITAL | Age: 33
End: 2018-09-11
Attending: INTERNAL MEDICINE
Payer: COMMERCIAL

## 2018-09-11 DIAGNOSIS — Q61.3 POLYCYSTIC KIDNEY DISEASE: ICD-10-CM

## 2018-09-11 LAB
ALBUMIN SERPL BCP-MCNC: 3.8 G/DL
ANION GAP SERPL CALC-SCNC: 9 MMOL/L
BUN SERPL-MCNC: 11 MG/DL
CALCIUM SERPL-MCNC: 9.4 MG/DL
CHLORIDE SERPL-SCNC: 107 MMOL/L
CO2 SERPL-SCNC: 21 MMOL/L
CREAT SERPL-MCNC: 0.6 MG/DL
EST. GFR  (AFRICAN AMERICAN): >60 ML/MIN/1.73 M^2
EST. GFR  (NON AFRICAN AMERICAN): >60 ML/MIN/1.73 M^2
GLUCOSE SERPL-MCNC: 103 MG/DL
PHOSPHATE SERPL-MCNC: 2.3 MG/DL
POTASSIUM SERPL-SCNC: 3.6 MMOL/L
SODIUM SERPL-SCNC: 137 MMOL/L

## 2018-09-11 PROCEDURE — 80069 RENAL FUNCTION PANEL: CPT

## 2018-09-11 PROCEDURE — 36415 COLL VENOUS BLD VENIPUNCTURE: CPT | Mod: PO

## 2018-09-12 ENCOUNTER — OFFICE VISIT (OUTPATIENT)
Dept: NEPHROLOGY | Facility: CLINIC | Age: 33
End: 2018-09-12
Payer: COMMERCIAL

## 2018-09-12 VITALS
WEIGHT: 170.19 LBS | DIASTOLIC BLOOD PRESSURE: 80 MMHG | BODY MASS INDEX: 31.32 KG/M2 | SYSTOLIC BLOOD PRESSURE: 110 MMHG | HEART RATE: 83 BPM | HEIGHT: 62 IN

## 2018-09-12 DIAGNOSIS — Q61.3 POLYCYSTIC KIDNEY DISEASE: Primary | ICD-10-CM

## 2018-09-12 PROCEDURE — 3008F BODY MASS INDEX DOCD: CPT | Mod: CPTII,S$GLB,, | Performed by: INTERNAL MEDICINE

## 2018-09-12 PROCEDURE — 99999 PR PBB SHADOW E&M-EST. PATIENT-LVL III: CPT | Mod: PBBFAC,,, | Performed by: INTERNAL MEDICINE

## 2018-09-12 PROCEDURE — 99214 OFFICE O/P EST MOD 30 MIN: CPT | Mod: S$GLB,,, | Performed by: INTERNAL MEDICINE

## 2018-09-12 NOTE — PROGRESS NOTES
PROGRESS NOTE FOR ESTABLISHED PATIENT     PHYSICIAN REQUESTING THE CONSULT: Self-referred, PMD: Dr. Kathy Jacob    REASON FOR VISIT: Polycystic kidney disease, autosomal recessive      33 y.o. female with history of autosomal recessive polycystic kidney disease, GERD, dysmenorrhea, IBS presents to the renal clinic for evaluation of PCKD (autosomal recessive).      Patient has no complaints today.       Past Medical History   Diagnosis Date    Autosomal recessive polycystic kidneys     GERD (gastroesophageal reflux disease)        Past Surgical History   Procedure Laterality Date    Renal exploration  2011       No Known Allergies    Current Outpatient Medications   Medication Sig Dispense Refill    ALPRAZolam (XANAX) 0.25 MG tablet Take 1-2 tablets (0.25-0.5 mg total) by mouth daily as needed for Anxiety. 30 tablet 0    fish oil-omega-3 fatty acids 300-1,000 mg capsule Take 2 g by mouth once daily.      lisinopril (PRINIVIL,ZESTRIL) 2.5 MG tablet Take 1 tablet (2.5 mg total) by mouth once daily. 90 tablet 3    MAGNESIUM ORAL Take by mouth.      norethindrone-ethinyl estradiol (MICROGESTIN 1/20) 1-20 mg-mcg per tablet Take 1 tablet by mouth once daily. Skip inactive pills in first two packs and allow for period every 3 months. 63 tablet 3    omeprazole (PRILOSEC) 20 MG capsule Take 1 capsule (20 mg total) by mouth once daily. 30 capsule 6    ondansetron (ZOFRAN) 4 MG tablet TAKE ONE TABLET BY MOUTH EVERY 12 HOURS AS NEEDED FOR NAUSEA 12 tablet 1    topiramate (TOPAMAX) 50 MG tablet Take 1 tablet (50 mg total) by mouth 2 (two) times daily. 180 tablet 3     No current facility-administered medications for this visit.        Family History   Problem Relation Age of Onset    Hypertension Father     Kidney disease Father     Diabetes Paternal Grandmother     Thrombophilia Neg Hx     Breast cancer Neg Hx     Colon cancer Neg Hx     Ovarian cancer Neg Hx        History     Social History    Marital  "Status: Single     Spouse Name: N/A     Number of Children: 0    Years of Education: N/A     Occupational History    Optometry       [facility]-      Social History Main Topics    Smoking status: Never Smoker     Smokeless tobacco: Not on file    Alcohol Use: Yes      Comment: socially    Drug Use: No    Sexually Active: Not Currently     Other Topics Concern    Not on file     Social History Narrative       Review of Systems:  1. GENERAL: patient denies any fever, weight changes, generalized weakness, dizziness.  2. HEENT: patient denies headaches, she denies visual disturbances, swallowing problems, sinus pain, nasal congestion.  3. CARDIOVASCULAR: patient denies chest pain, palpitations.  4. PULMONARY: patient denies SOB, coughing, hemoptysis, wheezing.  5. GASTROINTESTINAL: patient denies abdominal pain, vomiting, nausea, diarrhea.  6. GENITOURINARY: patient denies dysuria, hematuria, hesitancy, frequency.  7. EXTREMITIES: patient denies LE edema, LE cramping.  8. DERMATOLOGY: patient denies rashes, ulcers.  9. NEURO: patient denies tremors, extremity weakness, extremity numbness/tingling.  10. MUSCULOSKELETAL: patient denies joint pain, joint swelling.  11. HEMATOLOGY: patient denies rectal or gum bleeding.  12: PSYCH: patient denies anxiety, depression.      PHYSICAL EXAM:  /80   Pulse 83   Ht 5' 2" (1.575 m)   Wt 77.2 kg (170 lb 3.1 oz)   LMP 08/28/2018 (Exact Date)   BMI 31.13 kg/m²     GENERAL: Pleasant young lady presents to clinic with non-labored breathing.  HEENT: PERRL, no nasal discharge, no icterus, no oral exudates, moist mucosal membranes.  NECK: no thyroid mass, no lymphadenopathy.  HEART: RRR S1/S2, no rubs, good peripheral pulses.  LUNGS: CTA bilaterally, no wheezing, breathing is nonlabored.  ABDOMEN: soft, nontender, not distended, bowel sounds are present, no abdominal hernia.  EXTREM: no LE edema.  SKIN: no rashes, skin is warm and dry.  NEURO: A & O x 3, no obvious " focal signs.    LABORATORY RESULTS:  Lab Results   Component Value Date    CREATININE 0.6 09/11/2018    BUN 11 09/11/2018     09/11/2018    K 3.6 09/11/2018     09/11/2018    CO2 21 (L) 09/11/2018     Lab Results   Component Value Date    PTH 44 08/13/2014    CALCIUM 9.4 09/11/2018    PHOS 2.3 (L) 09/11/2018     Lab Results   Component Value Date    ALBUMIN 3.8 09/11/2018     Lab Results   Component Value Date    WBC 5.30 03/07/2018    HGB 13.2 03/07/2018    HCT 38.7 03/07/2018    MCV 83 03/07/2018     03/07/2018     Renal ultrasound from 9/2/14:  Right kidney 11.4 cm, left kidney 14 cm, bilateral renal cysts and liver cysts.     Urinalysis (9/11/18): no protein, no blood.       ASSESSMENT AND PLAN:  31 y.o. year old female with history of autosomal recessive polycystic kidney disease presents to the renal clinic for evaluation of renal insufficiency.    1. Polycystic kidney disease, autosomal recessive: Patient's renal function has remained stable with creatinine at 0.6. There is no evidence of proteinuria/hematuria.  Patient was advised to hydrate with 60-70 ounces of water per day and to avoid NSAID pain medications such as advil, aleve, motrin, ibuprofen, naprosyn, meloxicam, diclofenac, mobic. Continue Lisinopril. Jynarque addition was discussed and patient will let us know whether she would like to try this medication.  Her renal function will be monitored closely and she will return to the clinic in 6 months for follow up.   Repeat renal ultrasound will be done in about 12-18 months.     2. Electrolytes: Within normal limits.    3. Acid base status: mild acidosis, monitor.     4. Volume: Euvolemic.    5. Hypertension: Good BP control.     6. Medications: Reviewed. Continue Lisinopril.

## 2018-10-08 ENCOUNTER — PATIENT MESSAGE (OUTPATIENT)
Dept: OBSTETRICS AND GYNECOLOGY | Facility: CLINIC | Age: 33
End: 2018-10-08

## 2018-10-08 DIAGNOSIS — N94.6 DYSMENORRHEA: ICD-10-CM

## 2018-10-08 NOTE — TELEPHONE ENCOUNTER
Please advise.    It looks like pt. used to get L norgest/e.estradiol-e.estrad 0.15 mg-30 mcg (84)/10 mcg (7)

## 2018-10-10 ENCOUNTER — OFFICE VISIT (OUTPATIENT)
Dept: DERMATOLOGY | Facility: CLINIC | Age: 33
End: 2018-10-10
Payer: COMMERCIAL

## 2018-10-10 VITALS — BODY MASS INDEX: 31.32 KG/M2 | WEIGHT: 170.19 LBS | HEIGHT: 62 IN

## 2018-10-10 DIAGNOSIS — L90.5 SCAR CONDITION AND FIBROSIS OF SKIN: Primary | ICD-10-CM

## 2018-10-10 DIAGNOSIS — Z12.83 SCREENING, MALIGNANT NEOPLASM, SKIN: ICD-10-CM

## 2018-10-10 DIAGNOSIS — D22.9 MULTIPLE NEVI: ICD-10-CM

## 2018-10-10 DIAGNOSIS — Z85.820 HISTORY OF MELANOMA: ICD-10-CM

## 2018-10-10 PROCEDURE — 99999 PR PBB SHADOW E&M-EST. PATIENT-LVL III: CPT | Mod: PBBFAC,,, | Performed by: DERMATOLOGY

## 2018-10-10 PROCEDURE — 99213 OFFICE O/P EST LOW 20 MIN: CPT | Mod: S$GLB,,, | Performed by: DERMATOLOGY

## 2018-10-10 PROCEDURE — 3008F BODY MASS INDEX DOCD: CPT | Mod: CPTII,S$GLB,, | Performed by: DERMATOLOGY

## 2018-10-10 NOTE — PATIENT INSTRUCTIONS
Monitoring Moles  Moles, also called nevi, are small, pigmented (colored) marks on the skin. They have no known purpose. Many moles appear before age 30, but they also increase frequently as people age. Moles most often are benign (not cancer) and harmless. But some become cancerous. Thats why you need to watch the moles on your body and tell your health care provider about any concern you.    What are moles?  Moles are a type of pigmented mayelin. Freckles, which often are sprinkled across the bridge of the nose, the cheeks, and the arms, are another type of pigmented mayelin. Moles can appear on any part of the body. There are many types, sizes, and shapes of moles. Most moles are solid brown. In most cases, they are flat or dome-shaped, smooth, and have well-defined edges. Freckles are flat.  Why worry about moles?  Most moles are benign and dont require treatment. You can have moles removed if you dont like the way they look or feel. But moles that appear after you are 30 or that change in certain ways may become a problem. These moles may turn into melanoma, a type of skin cancer. Melanoma is one of the fastest growing cancers in the [place], but it is often curable if caught early. But this disease can be life-threatening, particularly when not diagnosed early. The more moles someone has, the higher the risk. Risk is also higher for those who have had more lifetime exposure to the sun, severe blistering sunburns, exposure to tanning beds, a prior personal history of cancer, and those with a family history of skin cancer. To manage your risk, its smart to check your moles for changes and ask your health care provider to perform a thorough skin exam when you have a physical exam. To do this, you first need to learn where your moles are. Then, be sure to check your moles each month.    Checking your moles  You can check many of your moles each month. You can do this right after you shower and before you get  dressed. Check your body from head to toe. Then, make a list of your moles. If you find any new moles or changes in your moles, call your health care provider. To check your moles, youll need:  · A full-length mirror  · A stool or chair to sit on while you check your feet  If you have a lot of moles, take digital photos of them each month. Make sure to take photos both up close and from a distance. These can help you see if any moles change over time.  When to seek medical treatment  See your health care provider if your moles hurt, itch, ooze, bleed, thicken, become crusty, or show other changes. Also, be sure to call your health care provider if your moles show any of the following signs of melanoma:  · A change in size, shape, color, or elevation  · Asymmetry (when the sides dont match)  · Ragged, notched, or blurred borders  · Varied colors within the same mole  · Size is larger than 5 mm or 6 mm in diameter (the size of a pencil eraser)  © 4421-3649 [employer]. [address] [place] [address]. All rights reserved. This information is not intended as a substitute for professional medical care. Always follow your healthcare professional's instructions.

## 2018-10-10 NOTE — PROGRESS NOTES
Subjective:       Patient ID:  Samanta Zimmerman is a 33 y.o. 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 Brian on 6/10/16), last seen on 2/21/18. This is a high risk patient here to check for the development of new lesions. Denies lymphadenopathy, weight loss, or night sweats.        Review of Systems   Constitutional: Negative for fever, chills, weight loss, fatigue, night sweats and malaise.   HENT: Negative for headaches.    Respiratory: Negative for cough and shortness of breath.    Gastrointestinal: Negative for nausea and indigestion.   Skin: Positive for activity-related sunscreen use. Negative for recent sunburn and wears hat.   Neurological: Negative for headaches.   Hematologic/Lymphatic: Negative for adenopathy. Does not bruise/bleed easily.        Objective:    Physical Exam   Constitutional: She appears well-developed and well-nourished. No distress.   Lymphadenopathy:     She has no cervical adenopathy.     She has no axillary adenopathy.        Right: No inguinal adenopathy present.        Left: No inguinal adenopathy present.   Neurological: She is alert and oriented to person, place, and time. She is not disoriented.   Psychiatric: She has a normal mood and affect.   Skin:   Areas Examined (abnormalities noted in diagram):   Scalp / Hair Palpated and Inspected  Head / Face Inspection Performed  Neck Inspection Performed  Chest / Axilla Inspection Performed  Abdomen Inspection Performed  Genitals / Buttocks / Groin Inspection Performed  Back Inspection Performed  RUE Inspected  LUE Inspection Performed  RLE Inspected  LLE Inspection Performed  Nails and Digits Inspection Performed                   Diagram Legend     Erythematous scaling macule/papule c/w actinic keratosis       Vascular papule c/w angioma       Pigmented verrucoid papule/plaque c/w seborrheic keratosis      Yellow umbilicated papule c/w sebaceous hyperplasia      Irregularly shaped tan macule c/w lentigo     1-2 mm smooth white papules consistent with Milia      Movable subcutaneous cyst with punctum c/w epidermal inclusion cyst      Subcutaneous movable cyst c/w pilar cyst      Firm pink to brown papule c/w dermatofibroma      Pedunculated fleshy papule(s) c/w skin tag(s)      Evenly pigmented macule c/w junctional nevus     Mildly variegated pigmented, slightly irregular-bordered macule c/w mildly atypical nevus      Flesh colored to evenly pigmented papule c/w intradermal nevus       Pink pearly papule/plaque c/w basal cell carcinoma      Erythematous hyperkeratotic cursted plaque c/w SCC      Surgical scar with no sign of skin cancer recurrence      Open and closed comedones      Inflammatory papules and pustules      Verrucoid papule consistent consistent with wart     Erythematous eczematous patches and plaques     Dystrophic onycholytic nail with subungual debris c/w onychomycosis     Umbilicated papule    Erythematous-base heme-crusted tan verrucoid plaque consistent with inflamed seborrheic keratosis     Erythematous Silvery Scaling Plaque c/w Psoriasis     See annotation      Assessment / Plan:        Scar condition and fibrosis of skin  Screening, malignant neoplasm, skin  History of melanoma  Scar of the left superior helix, hx of MM (Breslow depth 0.67 mm, excised on 6/10/16). No evidence of recurrence on physical exam today. No lymphadenopathy appreciated of cervical, axillary, or inguinal lymph nodes. Continue routine skin surveillance (q 6 months). Reviewed ABCDEF of melanoma. Daily sunscreen advised    Multiple nevi  Reassurance given.  Discussed ABCDEF of melanoma and changes for patient to look for.  AAD Handout given. Discussed importance of daily use of sunscreen which is broad-spectrum and has a minimum SPF of 30.           Follow-up  in about 6 months (around 4/10/2019).

## 2018-10-11 RX ORDER — LEVONORGESTREL / ETHINYL ESTRADIOL AND ETHINYL ESTRADIOL 150-30(84)
1 KIT ORAL DAILY
Qty: 91 EACH | Refills: 3 | Status: SHIPPED | OUTPATIENT
Start: 2018-10-11 | End: 2019-03-20

## 2018-11-21 ENCOUNTER — HOSPITAL ENCOUNTER (OUTPATIENT)
Dept: RADIOLOGY | Facility: HOSPITAL | Age: 33
Discharge: HOME | End: 2018-11-21
Attending: PODIATRIST
Payer: COMMERCIAL

## 2018-11-21 ENCOUNTER — OFFICE VISIT (OUTPATIENT)
Dept: PODIATRY | Facility: CLINIC | Age: 33
End: 2018-11-21
Payer: COMMERCIAL

## 2018-11-21 VITALS
HEIGHT: 62 IN | WEIGHT: 179.88 LBS | HEART RATE: 88 BPM | BODY MASS INDEX: 33.1 KG/M2 | SYSTOLIC BLOOD PRESSURE: 122 MMHG | DIASTOLIC BLOOD PRESSURE: 88 MMHG

## 2018-11-21 DIAGNOSIS — M21.41 PES PLANUS OF BOTH FEET: ICD-10-CM

## 2018-11-21 DIAGNOSIS — M77.8 EXTENSOR TENDONITIS OF FOOT: Primary | ICD-10-CM

## 2018-11-21 DIAGNOSIS — M79.672 PAIN IN BOTH FEET: ICD-10-CM

## 2018-11-21 DIAGNOSIS — M79.671 PAIN IN BOTH FEET: ICD-10-CM

## 2018-11-21 DIAGNOSIS — M21.42 PES PLANUS OF BOTH FEET: ICD-10-CM

## 2018-11-21 PROCEDURE — 3008F BODY MASS INDEX DOCD: CPT | Mod: CPTII,S$GLB,, | Performed by: PODIATRIST

## 2018-11-21 PROCEDURE — 99999 PR PBB SHADOW E&M-EST. PATIENT-LVL III: CPT | Mod: PBBFAC,,, | Performed by: PODIATRIST

## 2018-11-21 PROCEDURE — 73630 X-RAY EXAM OF FOOT: CPT | Mod: 26,50,, | Performed by: RADIOLOGY

## 2018-11-21 PROCEDURE — 73630 X-RAY EXAM OF FOOT: CPT | Mod: 50,TC,FY,PO

## 2018-11-21 PROCEDURE — 99203 OFFICE O/P NEW LOW 30 MIN: CPT | Mod: S$GLB,,, | Performed by: PODIATRIST

## 2018-11-28 ENCOUNTER — OFFICE VISIT (OUTPATIENT)
Dept: OTOLARYNGOLOGY | Facility: CLINIC | Age: 33
End: 2018-11-28
Payer: COMMERCIAL

## 2018-11-28 VITALS
WEIGHT: 180.31 LBS | DIASTOLIC BLOOD PRESSURE: 72 MMHG | BODY MASS INDEX: 32.98 KG/M2 | SYSTOLIC BLOOD PRESSURE: 112 MMHG | HEART RATE: 75 BPM | RESPIRATION RATE: 17 BRPM | TEMPERATURE: 98 F

## 2018-11-28 DIAGNOSIS — J30.89 NON-SEASONAL ALLERGIC RHINITIS, UNSPECIFIED TRIGGER: Primary | ICD-10-CM

## 2018-11-28 PROCEDURE — 3008F BODY MASS INDEX DOCD: CPT | Mod: CPTII,S$GLB,, | Performed by: ORTHOPAEDIC SURGERY

## 2018-11-28 PROCEDURE — 99999 PR PBB SHADOW E&M-EST. PATIENT-LVL III: CPT | Mod: PBBFAC,,, | Performed by: ORTHOPAEDIC SURGERY

## 2018-11-28 PROCEDURE — 99214 OFFICE O/P EST MOD 30 MIN: CPT | Mod: S$GLB,,, | Performed by: ORTHOPAEDIC SURGERY

## 2018-11-28 RX ORDER — AZELASTINE 1 MG/ML
1 SPRAY, METERED NASAL 2 TIMES DAILY
Qty: 30 ML | Refills: 12 | Status: SHIPPED | OUTPATIENT
Start: 2018-11-28 | End: 2019-04-17

## 2018-11-28 RX ORDER — MUPIROCIN 20 MG/G
OINTMENT TOPICAL 2 TIMES DAILY
Qty: 15 G | Refills: 3 | Status: SHIPPED | OUTPATIENT
Start: 2018-11-28 | End: 2018-12-13

## 2018-11-28 NOTE — PROGRESS NOTES
Subjective:       Patient ID: Samanta Zimmerman is a 33 y.o. female.    Chief Complaint: Sinusitis (congestion, nose bleeds, sinus headaches)    Patient is a very pleasant 33 year old female here to see me today in followup for evaluation of allergies and chronic sinusitis.  She has a history of FESS 12/2015 and started immunotherapy 10/2015 and continued through 12/2016.  She did note an improvement in her allergy symptoms especially when she first stopped her injections, but they are now returning.  She does find improvement with taking Zyrtec, but had to stop her Flonase due to elevated IOP.  She also has a history of melanoma of the ear, and was treated with local excision and neck dissection, nodes negative.      Review of Systems   Constitutional: Negative for chills, fatigue, fever and unexpected weight change.   HENT: Positive for congestion, nosebleeds, postnasal drip and rhinorrhea. Negative for dental problem, ear discharge, ear pain, facial swelling, hearing loss, sinus pressure, sneezing, sore throat, tinnitus, trouble swallowing and voice change.    Eyes: Negative for redness, itching and visual disturbance.   Respiratory: Negative for cough, choking, shortness of breath and wheezing.    Cardiovascular: Negative for chest pain and palpitations.   Gastrointestinal: Negative for abdominal pain.        No reflux.   Musculoskeletal: Negative for gait problem.   Skin: Negative for rash.   Allergic/Immunologic: Positive for environmental allergies.   Neurological: Negative for dizziness, light-headedness and headaches.       Objective:      Physical Exam   Constitutional: She is oriented to person, place, and time. She appears well-developed and well-nourished. No distress.   HENT:   Head: Normocephalic and atraumatic.   Right Ear: Tympanic membrane, external ear and ear canal normal.   Left Ear: Tympanic membrane, external ear and ear canal normal.   Nose: Mucosal edema and rhinorrhea present. No  nasal deformity or septal deviation. No epistaxis. Right sinus exhibits no maxillary sinus tenderness and no frontal sinus tenderness. Left sinus exhibits no maxillary sinus tenderness and no frontal sinus tenderness.   Mouth/Throat: Uvula is midline, oropharynx is clear and moist and mucous membranes are normal. Mucous membranes are not pale and not dry. No dental caries. No oropharyngeal exudate or posterior oropharyngeal erythema.   Dried blood on head of inferior turbinates bilaterally, site of excision left ear well healed with no evidence of residual or recurrent disease   Eyes: Conjunctivae, EOM and lids are normal. Pupils are equal, round, and reactive to light. Right eye exhibits no chemosis. Left eye exhibits no chemosis. Right conjunctiva is not injected. Left conjunctiva is not injected. No scleral icterus. Right eye exhibits normal extraocular motion and no nystagmus. Left eye exhibits normal extraocular motion and no nystagmus.   Neck: Trachea normal and phonation normal. No tracheal tenderness present. No tracheal deviation present. No thyroid mass and no thyromegaly present.   Cardiovascular: Intact distal pulses.   Pulmonary/Chest: Effort normal. No stridor. No respiratory distress.   Abdominal: She exhibits no distension.   Lymphadenopathy:        Head (right side): No submental, no submandibular, no preauricular, no posterior auricular and no occipital adenopathy present.        Head (left side): No submental, no submandibular, no preauricular, no posterior auricular and no occipital adenopathy present.     She has no cervical adenopathy.   Neurological: She is alert and oriented to person, place, and time. No cranial nerve deficit.   Skin: Skin is warm and dry. No rash noted. No erythema.   Psychiatric: She has a normal mood and affect. Her behavior is normal.       Assessment:       1. Non-seasonal allergic rhinitis, unspecified trigger        Plan:       1.  Allergic rhinitis:  She did note  benefit from Flonase, but unfortunately had to stop that medication due to elevated intra-ocular pressure.  I would recommend a trial of intranasal Astelin.  Prescription sent to her pharmacy.  I would also recommend she start with saline spray to her nose several times daily to help with moisturization.  I did send a prescription for Bactroban to be used twice daily to both nostrils for the next 2 weeks.  She is also considering resuming immunotherapy, though at this point she thinks she would prefer to do sublingual rather than subcutaneous.  Risks and benefits of sublingual immunotherapy were discussed in detail, including the fact that sublingual as an off-label use of FDA approved ingredients.  She will consider her options, and will call if she would like to proceed with sublingual immunotherapy.

## 2018-12-03 NOTE — PROGRESS NOTES
Subjective:     Patient ID: Smaanta Zimmerman is a 33 y.o. female.    Chief Complaint: Foot Pain (both feet left foot hurts the worst )    Samanta is a 33 y.o. female who presents to the podiatry clinic  with complaint of  bilateral foot pain. Onset of the symptoms was several months ago. Precipitating event: none known. Current symptoms include: ability to bear weight, but with some pain. Aggravating factors: any weight bearing, standing and walking. Symptoms have been intermittent. Patient has had no prior foot problems. Evaluation to date: none. Treatment to date: tennis shoes. Patients rates pain 4/10 on pain scale.            Patient Active Problem List   Diagnosis    Autosomal recessive polycystic kidneys    GERD (gastroesophageal reflux disease)    Dysmenorrhea    Liver cyst    Migraine headache    Allergic rhinitis    Pinealoma    Myofascial pain    Arm weakness-rotator cuff weakness    Chronic maxillary sinusitis    Nasal septal deviation    Desmoplastic malignant melanoma    Maxillary sinusitis    Cough    Hypertriglyceridemia    Need for vaccination against Streptococcus pneumoniae          Medication List           Accurate as of 11/21/18 11:59 PM. If you have any questions, ask your nurse or doctor.               CONTINUE taking these medications    ALPRAZolam 0.25 MG tablet  Commonly known as:  XANAX  Take 1-2 tablets (0.25-0.5 mg total) by mouth daily as needed for Anxiety.     CAMRESE 0.15 mg-30 mcg (84)/10 mcg (7) 3mpk  Generic drug:  L norgest/e.estradiol-e.estrad  Take 1 tablet by mouth once daily.     fish oil-omega-3 fatty acids 300-1,000 mg capsule     lisinopril 2.5 MG tablet  Commonly known as:  PRINIVIL,ZESTRIL  Take 1 tablet (2.5 mg total) by mouth once daily.     MAGNESIUM ORAL     omeprazole 20 MG capsule  Commonly known as:  PRILOSEC  Take 1 capsule (20 mg total) by mouth once daily.     ondansetron 4 MG tablet  Commonly known as:  ZOFRAN  TAKE ONE TABLET BY  MOUTH EVERY 12 HOURS AS NEEDED FOR NAUSEA     topiramate 50 MG tablet  Commonly known as:  TOPAMAX  Take 1 tablet (50 mg total) by mouth 2 (two) times daily.            Review of patient's allergies indicates:   Allergen Reactions    Singulair [montelukast]      Itching all over       Past Surgical History:   Procedure Laterality Date    BIOPSY-LYMPH NODE Left 6/10/2016    [facility]    ETHMOIDECTOMY Bilateral 12/11/2015    [facility]    EXCISION-SKIN Left 6/10/2016    [facility]    LYMPH NODE BIOPSY      MALIGNANT SKIN LESION EXCISION  06/10/2016    NASAL SEPTUM SURGERY      RENAL EXPLORATION  2011    SEPTOPLASTY N/A 12/11/2015    [facility]    SINUS SURGERY FUNCTIONAL ENDOSCOPIC Bilateral 12/11/2015    [facility] OR       Family History   Problem Relation Age of Onset    Hypertension Father     Kidney disease Father     Diabetes Paternal Grandmother     Thrombophilia Neg Hx     Breast cancer Neg Hx     Colon cancer Neg Hx     Ovarian cancer Neg Hx     Eczema Neg Hx     Lupus Neg Hx     Psoriasis Neg Hx     Melanoma Neg Hx        Social History     Socioeconomic History    Marital status:      Spouse name: Not on file    Number of children: 0    Years of education: Not on file    Highest education level: Not on file   Social Needs    Financial resource strain: Not on file    Food insecurity - worry: Not on file    Food insecurity - inability: Not on file    Transportation needs - medical: Not on file    Transportation needs - non-medical: Not on file   Occupational History    Occupation: Optometry      Employer: [employer]     Comment: [employer] [place]   Tobacco Use    Smoking status: Never Smoker    Smokeless tobacco: Never Used   Substance and Sexual Activity    Alcohol use: Yes     Comment:  "socially    Drug use: No    Sexual activity: Not Currently   Other Topics Concern    Are you pregnant or think you may be? No    Breast-feeding No   Social History Narrative    Not on file       Vitals:    11/21/18 1349   BP: 122/88   Pulse: 88   Weight: 81.6 kg (179 lb 14.3 oz)   Height: 5' 2" (1.575 m)   PainSc: 0-No pain       Hemoglobin A1C   Date Value Ref Range Status   02/16/2015 5.1 4.5 - 6.2 % Final       Review of Systems   Constitutional: Negative for chills and fever.   Respiratory: Negative for shortness of breath.    Cardiovascular: Negative for chest pain, palpitations, orthopnea, claudication and leg swelling.   Gastrointestinal: Negative for diarrhea, nausea and vomiting.   Musculoskeletal: Negative for joint pain.   Skin: Negative for rash.   Neurological: Negative for dizziness, tingling, sensory change, focal weakness and weakness.   Psychiatric/Behavioral: Negative.              Objective:       PHYSICAL EXAM: Apperance: Alert and orient in no distress,well developed, and with good attention to grooming and body habits  Patient presents ambulating in tennis shoes.   Lower Extremity Physical Exam:  VASCULAR: Dorsalis pedis pulses 2/4 bilateral and Posterior Tibial pulses 2/4 bilateral. Capillary fill time <4 seconds bilateral. No edema observed bilateral. Varicosities absent bilateral. Skin temperature of the lower extremities is warm to warm, proximal to distal. Hair growth WNL bilateral.  DERMATOLOGICAL: No skin rashes, subcutaneous nodules, lesions, or ulcers observed bilateral.  NEUROLOGICAL: Light touch, sharp-dull, proprioception all present and equal bilaterally.    MUSCULOSKELETAL: Muscle strength is 5/5 for foot inverters, everters, plantarflexors, and dorsiflexors. Muscle tone is normal. (+) tenderness on palpation of bilateral dorsal midfoot. Early pes planus noted bilateral.         Assessment:       Encounter Diagnoses   Name Primary?    Extensor tendonitis of foot Yes    Pain " in both feet     Pes planus of both feet          Plan:   Extensor tendonitis of foot    Pain in both feet  -     X-Ray Foot Complete Bilateral; Future; Expected date: 11/21/2018    Pes planus of both feet      I counseled the patient on her conditions, regarding findings of my examination, my impressions, and usual treatment plan.   I explained to the patient that etiology and treatment options for arch pain including ice message, new shoegear, orthotic inserts, NSAIDs, rest, strappings and tapings, stretching/strengthening including number and amounts of time each application.    Patient shown proper execution of stretching /strengthening exercise and instructed on correct number and amounts of time each stretch.    Ordered bilateral foot x-rays to be completed today.   The patient and I reviewed the types of shoes she should be wearing, my recommendation includes generally the best time of the day for a shoe fitting is the afternoon, shoes with a wide toe box, very good cushion, and tennis shoes with removable inner soles.  The patient and I reviewed my recommendations for over-the-counter orthotic inserts. Patient instructed to try over-the-counter orthotics before custom orthotics.   Patient to return in 2 month(s) or sooner if needed.                  Kaylin Fox DPM  [facility]

## 2018-12-12 DIAGNOSIS — Z31.49 PROCREATION MANAGEMENT INVESTIGATION AND TESTING: ICD-10-CM

## 2018-12-12 DIAGNOSIS — Z11.9 ENCOUNTER FOR SCREENING EXAMINATION FOR INFECTIOUS DISEASE: ICD-10-CM

## 2018-12-12 DIAGNOSIS — Z11.59 SPECIAL SCREENING EXAMINATION FOR VIRAL DISEASE: Primary | ICD-10-CM

## 2018-12-14 ENCOUNTER — LAB VISIT (OUTPATIENT)
Dept: LAB | Facility: HOSPITAL | Age: 33
End: 2018-12-14
Attending: OBSTETRICS & GYNECOLOGY
Payer: COMMERCIAL

## 2018-12-14 DIAGNOSIS — Z11.59 SPECIAL SCREENING EXAMINATION FOR VIRAL DISEASE: ICD-10-CM

## 2018-12-14 DIAGNOSIS — Z31.49 PROCREATION MANAGEMENT INVESTIGATION AND TESTING: ICD-10-CM

## 2018-12-14 DIAGNOSIS — Z11.9 ENCOUNTER FOR SCREENING EXAMINATION FOR INFECTIOUS DISEASE: ICD-10-CM

## 2018-12-14 LAB
25(OH)D3+25(OH)D2 SERPL-MCNC: 68 NG/ML
ABO + RH BLD: NORMAL
BASOPHILS # BLD AUTO: 0.03 K/UL
BASOPHILS NFR BLD: 0.5 %
C TRACH RRNA SPEC QL PROBE: NEGATIVE
DIFFERENTIAL METHOD: NORMAL
EOSINOPHIL # BLD AUTO: 0.1 K/UL
EOSINOPHIL NFR BLD: 1.8 %
ERYTHROCYTE [DISTWIDTH] IN BLOOD BY AUTOMATED COUNT: 12 %
HBV SURFACE AG SERPL QL IA: NEGATIVE
HCT VFR BLD AUTO: 38 % (ref 36–46)
HCT VFR BLD AUTO: 38.1 %
HGB BLD-MCNC: 12.7 G/DL
HGB BLD-MCNC: 12.7 G/DL (ref 12–16)
HIV 1+2 AB+HIV1 P24 AG SERPL QL IA: NEGATIVE
IMM GRANULOCYTES # BLD AUTO: 0.02 K/UL
IMM GRANULOCYTES NFR BLD AUTO: 0.4 %
LYMPHOCYTES # BLD AUTO: 2.2 K/UL
LYMPHOCYTES NFR BLD: 39.3 %
MCH RBC QN AUTO: 28.7 PG
MCHC RBC AUTO-ENTMCNC: 33.3 G/DL
MCV RBC AUTO: 86 FL
MCV RBC AUTO: 86 FL (ref 82–108)
MONOCYTES # BLD AUTO: 0.3 K/UL
MONOCYTES NFR BLD: 4.8 %
N GONORRHOEAE, AMPLIFIED DNA: NEGATIVE
NEUTROPHILS # BLD AUTO: 2.9 K/UL
NEUTROPHILS NFR BLD: 53.2 %
NRBC BLD-RTO: 0 /100 WBC
PLATELET # BLD AUTO: 296 K/?L (ref 150–399)
PLATELET # BLD AUTO: 296 K/UL
PMV BLD AUTO: 9.9 FL
PROLACTIN SERPL IA-MCNC: 7.8 NG/ML
RBC # BLD AUTO: 4.43 M/UL
RH BLD: NORMAL
RPR: NORMAL
RUBELLA IMMUNE STATUS: REACTIVE
T4 FREE SERPL-MCNC: 0.88 NG/DL
THYROPEROXIDASE IGG SERPL-ACNC: <6 IU/ML
TSH SERPL DL<=0.005 MIU/L-ACNC: 2.13 UIU/ML
TSH SERPL DL<=0.005 MIU/L-ACNC: 2.14 UIU/ML (ref 0.41–5.9)
WBC # BLD AUTO: 5.47 K/UL

## 2018-12-14 PROCEDURE — 84443 ASSAY THYROID STIM HORMONE: CPT

## 2018-12-14 PROCEDURE — 86901 BLOOD TYPING SEROLOGIC RH(D): CPT | Mod: 91

## 2018-12-14 PROCEDURE — 87340 HEPATITIS B SURFACE AG IA: CPT

## 2018-12-14 PROCEDURE — 82306 VITAMIN D 25 HYDROXY: CPT

## 2018-12-14 PROCEDURE — 86645 CMV ANTIBODY IGM: CPT

## 2018-12-14 PROCEDURE — 85025 COMPLETE CBC W/AUTO DIFF WBC: CPT

## 2018-12-14 PROCEDURE — 86644 CMV ANTIBODY: CPT

## 2018-12-14 PROCEDURE — 86901 BLOOD TYPING SEROLOGIC RH(D): CPT

## 2018-12-14 PROCEDURE — 84439 ASSAY OF FREE THYROXINE: CPT

## 2018-12-14 PROCEDURE — 86787 VARICELLA-ZOSTER ANTIBODY: CPT

## 2018-12-14 PROCEDURE — 36415 COLL VENOUS BLD VENIPUNCTURE: CPT | Mod: PO

## 2018-12-14 PROCEDURE — 83520 IMMUNOASSAY QUANT NOS NONAB: CPT

## 2018-12-14 PROCEDURE — 86762 RUBELLA ANTIBODY: CPT

## 2018-12-14 PROCEDURE — 86705 HEP B CORE ANTIBODY IGM: CPT

## 2018-12-14 PROCEDURE — 84146 ASSAY OF PROLACTIN: CPT

## 2018-12-14 PROCEDURE — 86703 HIV-1/HIV-2 1 RESULT ANTBDY: CPT

## 2018-12-14 PROCEDURE — 86376 MICROSOMAL ANTIBODY EACH: CPT

## 2018-12-14 PROCEDURE — 86803 HEPATITIS C AB TEST: CPT

## 2018-12-14 PROCEDURE — 86592 SYPHILIS TEST NON-TREP QUAL: CPT

## 2018-12-15 LAB
RPR SER QL: NORMAL
VARICELLA INTERPRETATION: POSITIVE
VARICELLA ZOSTER IGG: 2.2 ISR

## 2018-12-17 LAB
CMV IGM SERPL IA-ACNC: <8 U/ML
HBV CORE IGM SERPL QL IA: NEGATIVE
HBV SURFACE AG SERPL QL IA: NEGATIVE
HCV AB SERPL QL IA: NEGATIVE
HIV 1+2 AB+HIV1 P24 AG SERPL QL IA: NEGATIVE
RUBV IGG SER-ACNC: 23.9 IU/ML
RUBV IGG SER-IMP: REACTIVE

## 2018-12-18 LAB — CMV IGG SERPL QL IA: REACTIVE

## 2018-12-19 LAB — MIS SERPL-MCNC: 3.8 NG/ML (ref 0.9–9.5)

## 2019-01-03 ENCOUNTER — TELEPHONE (OUTPATIENT)
Dept: PHARMACY | Facility: CLINIC | Age: 34
End: 2019-01-03

## 2019-01-08 NOTE — TELEPHONE ENCOUNTER
Patient informed of approval for Progesterone x 2 vials ($45) and Crinone ($15.90).  Patient needs meds to be shipped out by 1/16/19.  Will follow up and set up shipment as soon as inventory in confirmed.  Pt verbalized understanding.

## 2019-01-08 NOTE — TELEPHONE ENCOUNTER
Progesterone x 2 vials ($45) and Crinone ($15.90) scheduled to ship on 1/10/19 to arrive at patient's home on 19.  Name and  confirmed.  [facility] and address verified.  Patient declined consultation stating provider had reviewed the medications with her already.   All questions answered and addressed to patients satisfaction.  Advised to call [facility] and provider if any issues arise.  Pt verbalized understanding.

## 2019-01-10 ENCOUNTER — ANESTHESIA (OUTPATIENT)
Dept: SURGERY | Facility: OTHER | Age: 34
End: 2019-01-10
Payer: COMMERCIAL

## 2019-01-10 ENCOUNTER — HOSPITAL ENCOUNTER (OUTPATIENT)
Facility: OTHER | Age: 34
Discharge: HOME | End: 2019-01-10
Attending: OBSTETRICS & GYNECOLOGY | Admitting: OBSTETRICS & GYNECOLOGY
Payer: COMMERCIAL

## 2019-01-10 ENCOUNTER — ANESTHESIA EVENT (OUTPATIENT)
Dept: SURGERY | Facility: OTHER | Age: 34
End: 2019-01-10
Payer: COMMERCIAL

## 2019-01-10 VITALS
HEIGHT: 62 IN | OXYGEN SATURATION: 95 % | TEMPERATURE: 98 F | WEIGHT: 180 LBS | DIASTOLIC BLOOD PRESSURE: 75 MMHG | RESPIRATION RATE: 16 BRPM | HEART RATE: 92 BPM | BODY MASS INDEX: 33.13 KG/M2 | SYSTOLIC BLOOD PRESSURE: 120 MMHG

## 2019-01-10 DIAGNOSIS — N84.0 UTERINE POLYP: Primary | ICD-10-CM

## 2019-01-10 DIAGNOSIS — Z98.890 STATUS POST HYSTEROSCOPY: ICD-10-CM

## 2019-01-10 LAB
B-HCG UR QL: NEGATIVE
CTP QC/QA: YES

## 2019-01-10 PROCEDURE — 37000009 HC ANESTHESIA EA ADD 15 MINS: Performed by: OBSTETRICS & GYNECOLOGY

## 2019-01-10 PROCEDURE — 81025 URINE PREGNANCY TEST: CPT | Performed by: STUDENT IN AN ORGANIZED HEALTH CARE EDUCATION/TRAINING PROGRAM

## 2019-01-10 PROCEDURE — 27201423 OPTIME MED/SURG SUP & DEVICES STERILE SUPPLY: Performed by: OBSTETRICS & GYNECOLOGY

## 2019-01-10 PROCEDURE — 88305 TISSUE EXAM BY PATHOLOGIST: CPT | Mod: 26,,, | Performed by: PATHOLOGY

## 2019-01-10 PROCEDURE — 63600175 PHARM REV CODE 636 W HCPCS: Performed by: NURSE ANESTHETIST, CERTIFIED REGISTERED

## 2019-01-10 PROCEDURE — 71000015 HC POSTOP RECOV 1ST HR: Performed by: OBSTETRICS & GYNECOLOGY

## 2019-01-10 PROCEDURE — 36000706: Performed by: OBSTETRICS & GYNECOLOGY

## 2019-01-10 PROCEDURE — 88305 TISSUE SPECIMEN TO PATHOLOGY - SURGERY: ICD-10-PCS | Mod: 26,,, | Performed by: PATHOLOGY

## 2019-01-10 PROCEDURE — 37000008 HC ANESTHESIA 1ST 15 MINUTES: Performed by: OBSTETRICS & GYNECOLOGY

## 2019-01-10 PROCEDURE — 36000707: Performed by: OBSTETRICS & GYNECOLOGY

## 2019-01-10 PROCEDURE — 71000016 HC POSTOP RECOV ADDL HR: Performed by: OBSTETRICS & GYNECOLOGY

## 2019-01-10 PROCEDURE — 25000003 PHARM REV CODE 250: Performed by: NURSE ANESTHETIST, CERTIFIED REGISTERED

## 2019-01-10 PROCEDURE — 25000003 PHARM REV CODE 250: Performed by: ANESTHESIOLOGY

## 2019-01-10 PROCEDURE — 88305 TISSUE EXAM BY PATHOLOGIST: CPT | Performed by: PATHOLOGY

## 2019-01-10 PROCEDURE — 71000033 HC RECOVERY, INTIAL HOUR: Performed by: OBSTETRICS & GYNECOLOGY

## 2019-01-10 RX ORDER — OXYCODONE HYDROCHLORIDE 5 MG/1
5 TABLET ORAL
Status: DISCONTINUED | OUTPATIENT
Start: 2019-01-10 | End: 2019-01-10 | Stop reason: HOSPADM

## 2019-01-10 RX ORDER — PROPOFOL 10 MG/ML
VIAL (ML) INTRAVENOUS
Status: DISCONTINUED | OUTPATIENT
Start: 2019-01-10 | End: 2019-01-10

## 2019-01-10 RX ORDER — NAPROXEN 500 MG/1
500 TABLET ORAL 3 TIMES DAILY PRN
Qty: 30 TABLET | Refills: 0 | Status: SHIPPED | OUTPATIENT
Start: 2019-01-10 | End: 2019-03-20

## 2019-01-10 RX ORDER — SODIUM CHLORIDE 0.9 % (FLUSH) 0.9 %
3 SYRINGE (ML) INJECTION
Status: DISCONTINUED | OUTPATIENT
Start: 2019-01-10 | End: 2019-01-10 | Stop reason: HOSPADM

## 2019-01-10 RX ORDER — ACETAMINOPHEN 10 MG/ML
INJECTION, SOLUTION INTRAVENOUS
Status: DISCONTINUED | OUTPATIENT
Start: 2019-01-10 | End: 2019-01-10

## 2019-01-10 RX ORDER — SODIUM CHLORIDE, SODIUM LACTATE, POTASSIUM CHLORIDE, CALCIUM CHLORIDE 600; 310; 30; 20 MG/100ML; MG/100ML; MG/100ML; MG/100ML
INJECTION, SOLUTION INTRAVENOUS CONTINUOUS PRN
Status: DISCONTINUED | OUTPATIENT
Start: 2019-01-10 | End: 2019-01-10

## 2019-01-10 RX ORDER — HYDROCODONE BITARTRATE AND ACETAMINOPHEN 5; 325 MG/1; MG/1
1 TABLET ORAL EVERY 4 HOURS PRN
Qty: 5 TABLET | Refills: 0 | Status: SHIPPED | OUTPATIENT
Start: 2019-01-10 | End: 2019-03-20

## 2019-01-10 RX ORDER — MEPERIDINE HYDROCHLORIDE 50 MG/ML
12.5 INJECTION INTRAMUSCULAR; INTRAVENOUS; SUBCUTANEOUS ONCE AS NEEDED
Status: DISCONTINUED | OUTPATIENT
Start: 2019-01-10 | End: 2019-01-10 | Stop reason: HOSPADM

## 2019-01-10 RX ORDER — ONDANSETRON 2 MG/ML
INJECTION INTRAMUSCULAR; INTRAVENOUS
Status: DISCONTINUED | OUTPATIENT
Start: 2019-01-10 | End: 2019-01-10

## 2019-01-10 RX ORDER — FENTANYL CITRATE 50 UG/ML
INJECTION, SOLUTION INTRAMUSCULAR; INTRAVENOUS
Status: DISCONTINUED | OUTPATIENT
Start: 2019-01-10 | End: 2019-01-10

## 2019-01-10 RX ORDER — HYDROMORPHONE HYDROCHLORIDE 2 MG/ML
0.4 INJECTION, SOLUTION INTRAMUSCULAR; INTRAVENOUS; SUBCUTANEOUS EVERY 5 MIN PRN
Status: DISCONTINUED | OUTPATIENT
Start: 2019-01-10 | End: 2019-01-10 | Stop reason: HOSPADM

## 2019-01-10 RX ORDER — DEXAMETHASONE SODIUM PHOSPHATE 4 MG/ML
INJECTION, SOLUTION INTRA-ARTICULAR; INTRALESIONAL; INTRAMUSCULAR; INTRAVENOUS; SOFT TISSUE
Status: DISCONTINUED | OUTPATIENT
Start: 2019-01-10 | End: 2019-01-10

## 2019-01-10 RX ORDER — LIDOCAINE HCL/PF 100 MG/5ML
SYRINGE (ML) INTRAVENOUS
Status: DISCONTINUED | OUTPATIENT
Start: 2019-01-10 | End: 2019-01-10

## 2019-01-10 RX ORDER — ONDANSETRON 2 MG/ML
4 INJECTION INTRAMUSCULAR; INTRAVENOUS DAILY PRN
Status: DISCONTINUED | OUTPATIENT
Start: 2019-01-10 | End: 2019-01-10 | Stop reason: HOSPADM

## 2019-01-10 RX ORDER — MIDAZOLAM HYDROCHLORIDE 1 MG/ML
INJECTION INTRAMUSCULAR; INTRAVENOUS
Status: DISCONTINUED | OUTPATIENT
Start: 2019-01-10 | End: 2019-01-10

## 2019-01-10 RX ORDER — NAPROXEN 500 MG/1
500 TABLET ORAL ONCE
Status: COMPLETED | OUTPATIENT
Start: 2019-01-10 | End: 2019-01-10

## 2019-01-10 RX ORDER — FENTANYL CITRATE 50 UG/ML
25 INJECTION, SOLUTION INTRAMUSCULAR; INTRAVENOUS EVERY 5 MIN PRN
Status: DISCONTINUED | OUTPATIENT
Start: 2019-01-10 | End: 2019-01-10 | Stop reason: HOSPADM

## 2019-01-10 RX ADMIN — MIDAZOLAM HYDROCHLORIDE 2 MG: 1 INJECTION, SOLUTION INTRAMUSCULAR; INTRAVENOUS at 12:01

## 2019-01-10 RX ADMIN — ONDANSETRON 4 MG: 2 INJECTION INTRAMUSCULAR; INTRAVENOUS at 01:01

## 2019-01-10 RX ADMIN — DEXAMETHASONE SODIUM PHOSPHATE 8 MG: 4 INJECTION, SOLUTION INTRAMUSCULAR; INTRAVENOUS at 01:01

## 2019-01-10 RX ADMIN — PROPOFOL 40 MG: 10 INJECTION, EMULSION INTRAVENOUS at 01:01

## 2019-01-10 RX ADMIN — LIDOCAINE HYDROCHLORIDE 100 MG: 20 INJECTION, SOLUTION INTRAVENOUS at 01:01

## 2019-01-10 RX ADMIN — NAPROXEN 500 MG: 500 TABLET ORAL at 04:01

## 2019-01-10 RX ADMIN — FENTANYL CITRATE 50 MCG: 50 INJECTION, SOLUTION INTRAMUSCULAR; INTRAVENOUS at 01:01

## 2019-01-10 RX ADMIN — ACETAMINOPHEN 1000 MG: 10 INJECTION, SOLUTION INTRAVENOUS at 01:01

## 2019-01-10 RX ADMIN — PROPOFOL 50 MG: 10 INJECTION, EMULSION INTRAVENOUS at 01:01

## 2019-01-10 RX ADMIN — SODIUM CHLORIDE, SODIUM LACTATE, POTASSIUM CHLORIDE, AND CALCIUM CHLORIDE: 600; 310; 30; 20 INJECTION, SOLUTION INTRAVENOUS at 12:01

## 2019-01-10 RX ADMIN — FENTANYL CITRATE 100 MCG: 50 INJECTION, SOLUTION INTRAMUSCULAR; INTRAVENOUS at 01:01

## 2019-01-10 RX ADMIN — CARBOXYMETHYLCELLULOSE SODIUM 2 DROP: 2.5 SOLUTION/ DROPS OPHTHALMIC at 01:01

## 2019-01-10 RX ADMIN — PROPOFOL 200 MG: 10 INJECTION, EMULSION INTRAVENOUS at 01:01

## 2019-01-10 NOTE — H&P
[facility]  Obstetrics & Gynecology  History & Physical    Patient Name: Samanta Zimmerman  MRN: [id number]  Admission Date: 1/10/2019  Primary Care Provider: Kathy Jauregui MD    Subjective:     Chief Complaint/Reason for Admission: Surgical procedure    History of Present Illness:  Samanta Zimmerman is a 33 y.o.  who presents for scheduled hysteroscopy with Dr. Sifuentes.        Obstetric History       T0      L0     SAB0   TAB0   Ectopic0   Multiple0   Live Births0         Past Medical History:   Diagnosis Date    Autosomal recessive polycystic kidneys     GERD (gastroesophageal reflux disease)     Melanoma 2016    left superior helix (breslow dept 0.67mm)    Migraine headache     PONV (postoperative nausea and vomiting)     post op nausea     Past Surgical History:   Procedure Laterality Date    BIOPSY-LYMPH NODE Left 6/10/2016    Performed [facility] 2ND F[facility]    ETHMOIDECTOMY Bilateral 2015    [facility]    EXCISION-SKIN Left 6/10/2016    Performed by [facility] at [facility] OR 2ND FLR    LYMPH NODE BIOPSY      MALIGNANT SKIN LESION EXCISION  06/10/2016    NASAL SEPTUM SURGERY      RENAL EXPLORATION      SEPTOPLASTY N/A 2015    Performed [facility] at [facility]    SINUS SURGERY FUNCTIONAL ENDOSCOPIC Bilateral 2015    Performed by [facility] at [facility] OR       [facility] Medications   Medication Sig    prenat.vits,yonatan,min-iron-folic (PRENATAL VITAMIN) Tab Take by mouth once daily.    ALPRAZolam (XANAX) 0.25 MG tablet Take 1-2 tablets (0.25-0.5 mg total) by mouth daily as needed for Anxiety.    azelastine (ASTELIN) 137 mcg (0.1 %) nasal spray use 1 spray (137 mcg total) in each nostril 2 (two) times daily.    estradiol (ESTRACE) 2 MG tablet Take one tablet by mouth twice daily spaced 12 hours apart    fish oil-omega-3 fatty acids 300-1,000 mg  capsule Take 2 g by mouth once daily.    L norgest/e.estradiol-e.estrad (SEASONIQUE) 0.15 mg-30 mcg (84)/10 mcg (7) 3MPk Take 1 tablet by mouth once daily.    MAGNESIUM ORAL Take by mouth once daily.     norethindrone-ethinyl estradiol (ORTHO-NOVUM 1-35 TAB,NORTREL 1-35 TAB) 1-35 mg-mcg per tablet Take one tablet daily. Do not take placebos/sugar pills    omeprazole (PRILOSEC) 20 MG capsule Take 1 capsule (20 mg total) by mouth once daily.    ondansetron (ZOFRAN) 4 MG tablet TAKE ONE TABLET BY MOUTH EVERY 12 HOURS AS NEEDED FOR NAUSEA    progesterone (PROGESTERONE IN OIL) 50 mg/mL injection Inject 1 mL (50mg) into the muscle once daily.    progesterone micronized (CRINONE) 8 % Gel Use one application vaginally on the morning of embryo transfer       Review of patient's allergies indicates:   Allergen Reactions    Singulair [montelukast]      Itching all over        Family History     Problem Relation (Age of Onset)    Diabetes Paternal Grandmother    Hypertension Father    Kidney disease Father        Tobacco Use    Smoking status: Never Smoker    Smokeless tobacco: Never Used   Substance and Sexual Activity    Alcohol use: Yes     Comment: socially    Drug use: No    Sexual activity: Not Currently     Review of Systems   Constitutional: Negative for appetite change, chills, fatigue and fever.   Respiratory: Negative for cough and shortness of breath.    Cardiovascular: Negative for chest pain and leg swelling.   Gastrointestinal: Negative for abdominal pain, bloating, constipation, diarrhea, nausea and vomiting.   Genitourinary: Negative for dysuria, hematuria, pelvic pain, vaginal discharge and vaginal pain.   Breast: Negative for mass, nipple discharge and skin changes     Objective:     Vital Signs (Most Recent):    Vital Signs (24h Range):        Weight: 81.6 kg (180 lb)  Body mass index is 32.92 kg/m².    No LMP recorded.    Physical Exam:   Constitutional: She is oriented to person, place, and  time. She appears well-developed and well-nourished. No distress.    HENT:   Head: Normocephalic and atraumatic.    Eyes: Conjunctivae and EOM are normal.    Neck: Normal range of motion. Neck supple. No thyromegaly present.    Cardiovascular: Normal rate and regular rhythm.     Pulmonary/Chest: Effort normal. No respiratory distress.        Abdominal: Soft. She exhibits no distension. There is no tenderness.             Musculoskeletal: Normal range of motion and moves all extremeties. She exhibits no edema or tenderness.       Neurological: She is alert and oriented to person, place, and time.    Skin: Skin is warm and dry. No rash noted.    Psychiatric: She has a normal mood and affect. Her behavior is normal.       Laboratory:  UPT pending    Diagnostic Results:  N/A    Assessment/Plan:     * Uterine polyp    - To OR as scheduled for hysteroscopic polypectomy  - Risks, benefits, alternatives discussed; consents for procedure and blood products signed         Terri Alfaro MD  Obstetrics & Gynecology  [facility]

## 2019-01-10 NOTE — BRIEF OP NOTE
[facility]  Brief Operative Note     SUMMARY     Surgery Date: 1/10/2019     Surgeon(s) and Role:     * Jolanta Sifuentes MD - Primary    Assisting Surgeon: None    Pre-op Diagnosis:  Endometrial polyp [N84.0]    Post-op Diagnosis:  Post-Op Diagnosis Codes:     * Endometrial polyp [N84.0]    Procedure(s) (LRB):  POLYPECTOMY, UTERUS, HYSTEROSCOPIC (N/A)    Anesthesia: General    Findings/Key Components:   1. Retroverted uterus, bilateral ostea visualized   2. Cervix dilated to allow 5mm hysteroscope  3. Survey of uterine cavity revealed one small anterior fundal polyp, one small polyp near the right ostea, and one small posterior fundal poly, all removed using the polyp graspers under hysteroscopic visualization. Sent to pathology. Following removal of the polyps, uterine cavity appeared grossly normal.  4. Good hemostasis of the cervix and uterus following the procedure.    Estimated Blood Loss: minimal (< 5 cc)         Specimens:   Specimen (12h ago, onward)    Start     Ordered    01/10/19 1331  Specimen to Pathology - Surgery  Once     Comments:  1. Endometrial polyp     Start Status   01/10/19 1331 Collected (01/10/19 1337)       01/10/19 1335          Discharge Note    SUMMARY     Admit Date: 1/10/2019    Discharge Date and Time:  01/10/2019 1:56 PM    Hospital Course (synopsis of major diagnoses, care, treatment, and services provided during the course of the hospital stay): Patient presented for scheduled procedure. Patient was brought to OR for scheduled hysteroscopic polypectomy. Please see OP note for further details. Tolerated procedure well and patient was taken to recovery in a stable condition. Prior to discharge patient was able to void, ambulate, tolerate PO and pain was well controlled with PO meds. Patient was given routine post-op instructions for which patient voiced understanding. Patient was subsequently discharged home.     Final Diagnosis: Post-Op Diagnosis Codes:     *  Endometrial polyp [N84.0]    Disposition: Home or Self Care    Follow Up/Patient Instructions:     Medications:  Reconciled Home Medications:      Medication List      START taking these medications    HYDROcodone-acetaminophen 5-325 mg per tablet  Commonly known as:  NORCO  Take 1 tablet by mouth every 4 (four) hours as needed for Pain.     naproxen 500 MG tablet  Commonly known as:  NAPROSYN  Take 1 tablet (500 mg total) by mouth 3 (three) times daily as needed.        CONTINUE taking these medications    ALPRAZolam 0.25 MG tablet  Commonly known as:  XANAX  Take 1-2 tablets (0.25-0.5 mg total) by mouth daily as needed for Anxiety.     azelastine 137 mcg (0.1 %) nasal spray  Commonly known as:  ASTELIN  use 1 spray (137 mcg total) in each nostril 2 (two) times daily.     CAMRESE 0.15 mg-30 mcg (84)/10 mcg (7) 3mpk  Generic drug:  L norgest/e.estradiol-e.estrad  Take 1 tablet by mouth once daily.     CRINONE 8 % Gel  Generic drug:  progesterone micronized  Use one application vaginally on the morning of embryo transfer     estradiol 2 MG tablet  Commonly known as:  ESTRACE  Take one tablet by mouth twice daily spaced 12 hours apart     fish oil-omega-3 fatty acids 300-1,000 mg capsule  Take 2 g by mouth once daily.     MAGNESIUM ORAL  Take by mouth once daily.     NORTREL 1/35 (28) 1-35 mg-mcg per tablet  Generic drug:  norethindrone-ethinyl estradiol  Take one tablet daily. Do not take placebos/sugar pills     omeprazole 20 MG capsule  Commonly known as:  PRILOSEC  Take 1 capsule (20 mg total) by mouth once daily.     ondansetron 4 MG tablet  Commonly known as:  ZOFRAN  TAKE ONE TABLET BY MOUTH EVERY 12 HOURS AS NEEDED FOR NAUSEA     PRENATAL VITAMIN Tab  Generic drug:  prenat.vits,yonatan,min-iron-folic  Take by mouth once daily.     PROGESTERONE IN OIL 50 mg/mL injection  Generic drug:  progesterone  Inject 1 mL (50mg) into the muscle once daily.          Discharge Procedure Orders   Other restrictions (specify):    Order Comments: Pelvic rest for 1-2 weeks. Nothing in vagina -no sex, tampons, douching, etc.    No baths (showers only) for 1 weeks     Notify your health care provider if you experience any of the following:  temperature >100.4     Notify your health care provider if you experience any of the following:  persistent nausea and vomiting or diarrhea     Notify your health care provider if you experience any of the following:  severe uncontrolled pain     Notify your health care provider if you experience any of the following:   Order Comments: Vaginal bleeding soaking 1-2 pads per hour for 2 or more hours    Foul smelling vaginal discharge     Follow-up Information     Go to Jolanta Sifuentes MD.    Specialties:  Obstetrics, Obstetrics and Gynecology  Why:  Follow up visit (we will call you to set up your follow up in the next 1-2 days)  Contact information:  [address]  [place] [address] [phone]

## 2019-01-10 NOTE — DISCHARGE SUMMARY
[facility]  Brief Operative Note/ Discharge Summary     SUMMARY     Surgery Date: 1/10/2019     Surgeon(s) and Role:     * Jolanta Sifuentes MD - Primary    Assisting Surgeon: None    Pre-op Diagnosis:  Endometrial polyp [N84.0]    Post-op Diagnosis:  Post-Op Diagnosis Codes:     * Endometrial polyp [N84.0]    Procedure(s) (LRB):  POLYPECTOMY, UTERUS, HYSTEROSCOPIC (N/A)    Anesthesia: General    Findings/Key Components:   1. Retroverted uterus, bilateral ostea visualized   2. Cervix dilated to allow 5mm hysteroscope  3. Survey of uterine cavity revealed one small anterior fundal polyp, one small polyp near the right ostea, and one small posterior fundal poly, all removed using the polyp graspers under hysteroscopic visualization. Sent to pathology. Following removal of the polyps, uterine cavity appeared grossly normal.  4. Good hemostasis of the cervix and uterus following the procedure.    Estimated Blood Loss: minimal (< 5 cc)         Specimens:   Specimen (12h ago, onward)    Start     Ordered    01/10/19 1331  Specimen to Pathology - Surgery  Once     Comments:  1. Endometrial polyp     Start Status   01/10/19 1331 Collected (01/10/19 1337)       01/10/19 1335          Discharge Note    SUMMARY     Admit Date: 1/10/2019    Discharge Date and Time:  01/10/2019 1:56 PM    Hospital Course (synopsis of major diagnoses, care, treatment, and services provided during the course of the hospital stay): Patient presented for scheduled procedure. Patient was brought to OR for scheduled hysteroscopic polypectomy. Please see OP note for further details. Tolerated procedure well and patient was taken to recovery in a stable condition. Prior to discharge patient was able to void, ambulate, tolerate PO and pain was well controlled with PO meds. Patient was given routine post-op instructions for which patient voiced understanding. Patient was subsequently discharged home.     Final Diagnosis: Post-Op  Diagnosis Codes:     * Endometrial polyp [N84.0]    Disposition: Home or Self Care    Follow Up/Patient Instructions:     Medications:  Reconciled Home Medications:      Medication List      START taking these medications    HYDROcodone-acetaminophen 5-325 mg per tablet  Commonly known as:  NORCO  Take 1 tablet by mouth every 4 (four) hours as needed for Pain.     naproxen 500 MG tablet  Commonly known as:  NAPROSYN  Take 1 tablet (500 mg total) by mouth 3 (three) times daily as needed.        CONTINUE taking these medications    ALPRAZolam 0.25 MG tablet  Commonly known as:  XANAX  Take 1-2 tablets (0.25-0.5 mg total) by mouth daily as needed for Anxiety.     azelastine 137 mcg (0.1 %) nasal spray  Commonly known as:  ASTELIN  use 1 spray (137 mcg total) in each nostril 2 (two) times daily.     CAMRESE 0.15 mg-30 mcg (84)/10 mcg (7) 3mpk  Generic drug:  L norgest/e.estradiol-e.estrad  Take 1 tablet by mouth once daily.     CRINONE 8 % Gel  Generic drug:  progesterone micronized  Use one application vaginally on the morning of embryo transfer     estradiol 2 MG tablet  Commonly known as:  ESTRACE  Take one tablet by mouth twice daily spaced 12 hours apart     fish oil-omega-3 fatty acids 300-1,000 mg capsule  Take 2 g by mouth once daily.     MAGNESIUM ORAL  Take by mouth once daily.     NORTREL 1/35 (28) 1-35 mg-mcg per tablet  Generic drug:  norethindrone-ethinyl estradiol  Take one tablet daily. Do not take placebos/sugar pills     omeprazole 20 MG capsule  Commonly known as:  PRILOSEC  Take 1 capsule (20 mg total) by mouth once daily.     ondansetron 4 MG tablet  Commonly known as:  ZOFRAN  TAKE ONE TABLET BY MOUTH EVERY 12 HOURS AS NEEDED FOR NAUSEA     PRENATAL VITAMIN Tab  Generic drug:  prenat.vits,yonatan,min-iron-folic  Take by mouth once daily.     PROGESTERONE IN OIL 50 mg/mL injection  Generic drug:  progesterone  Inject 1 mL (50mg) into the muscle once daily.          Discharge Procedure Orders   Other  restrictions (specify):   Order Comments: Pelvic rest for 1-2 weeks. Nothing in vagina -no sex, tampons, douching, etc.    No baths (showers only) for 1 weeks     Notify your health care provider if you experience any of the following:  temperature >100.4     Notify your health care provider if you experience any of the following:  persistent nausea and vomiting or diarrhea     Notify your health care provider if you experience any of the following:  severe uncontrolled pain     Notify your health care provider if you experience any of the following:   Order Comments: Vaginal bleeding soaking 1-2 pads per hour for 2 or more hours    Foul smelling vaginal discharge     Follow-up Information     Go to Jolanta Sifuentes MD.    Specialties:  Obstetrics, Obstetrics and Gynecology  Why:  Follow up visit (we will call you to set up your follow up in the next 1-2 days)  Contact information:  [address]  [facility] [address] [phone]

## 2019-01-10 NOTE — OP NOTE
Operative Report     Procedure:  Hysteroscopic polypectomy     Date of Surgery: 01/10/2019     Surgeon: My Sifuentes MD     Assistant: Sushma K Reddy, MD - PGY-2     Pre-Op Diagnosis:   1. Uterine polyps    Post-op Diagnosis:  same     EBL: minimal (< 5 cc)      IVF: ~ 1000 cc     Urine Output: 0 cc (bladder not drained)     H-scope Fluid Deficit: 220 cc      Specimen: Endometrial polyp     Findings:   1. Retroverted uterus, bilateral ostea visualized   2. Cervix dilated to allow 5mm hysteroscope  3. Survey of uterine cavity revealed one small anterior fundal polyp, one small polyp near the right ostea, and one small posterior fundal polyp, all removed using the polyp graspers under hysteroscopic visualization. Sent to pathology. Following removal of the polyps, uterine cavity appeared grossly normal.  4. Good hemostasis of the cervix and uterus following the procedure.     Procedure in Detail:  The patient was taken to the operating room where general anesthesia was obtained, the patient was placed in the lithotomy position, with her legs in the Yellofin stirrups. The patient was then prepped and draped in the normal sterile fashion and her bladder was drained using in-and-out catheterization. The speculum was placed in the vagina and the cervix was visualized. A single-tooth tenaculum was placed on the anterior aspect of the cervix. The cervix was dilated using Jabier dilators to allow introduction of the 5mm hysteroscope. During dilation, the uterus was noted to be slightly retroverted. The hysteroscope was white balanced and the line cleared of air.. The hysteroscope was then inserted into the cervical os and the uterine cavity was directly visualized, as well as both ostea. Survey of uterine cavity revealed one small anterior fundal polyp, one small polyp near the right ostea, and one small posterior fundal polyp. All three polyps were removed using the polyp graspers under hysteroscopic visualization and  specimens were sent to pathology. Finally, a scratch biopsy was performed. The hysteroscope was then removed from the uterus and the uterus was noted to be hemostatic.  The single tooth tenaculum was then removed and the cervix was noted to be hemostatic.   Sponge, lap and needle counts were correct x 2.  The patient tolerated the procedure well and was taken to recovery in stable condition.     Sushma K. Reddy, MD  PGY-2, OBGYN

## 2019-01-10 NOTE — TRANSFER OF CARE
"Anesthesia Transfer of Care Note    Patient: Samanat Zimmerman    Procedure(s) Performed: Procedure(s) (LRB):  POLYPECTOMY, UTERUS, HYSTEROSCOPIC (N/A)    Patient location: PACU    Anesthesia Type: general    Transport from OR: Transported from OR on 2-3 L/min O2 by NC with adequate spontaneous ventilation. Continuous SpO2 monitoring in transport    Post pain: adequate analgesia    Post assessment: no apparent anesthetic complications    Post vital signs: stable    Level of consciousness: awake and alert    Nausea/Vomiting: no nausea/vomiting    Complications: none    Transfer of care protocol was followed      Last vitals:   Visit Vitals  /82 (BP Location: Left arm, Patient Position: Lying)   Pulse 88   Temp 36.7 °C (98 °F) (Oral)   Resp 16   Ht 5' 2" (1.575 m)   Wt 81.6 kg (179 lb 16 oz)   SpO2 100%   Breastfeeding? No   BMI 32.92 kg/m²     "

## 2019-01-10 NOTE — ANESTHESIA POSTPROCEDURE EVALUATION
"Anesthesia Post Evaluation    Patient: Samanta Zimmerman    Procedure(s) Performed: Procedure(s) (LRB):  POLYPECTOMY, UTERUS, HYSTEROSCOPIC (N/A)    Final Anesthesia Type: general  Patient location during evaluation: PACU  Patient participation: Yes- Able to Participate  Level of consciousness: awake and alert  Post-procedure vital signs: reviewed and stable  Pain management: adequate  Airway patency: patent  PONV status at discharge: No PONV  Anesthetic complications: no      Cardiovascular status: hemodynamically stable  Respiratory status: unassisted  Hydration status: euvolemic  Follow-up not needed.        Visit Vitals  /77   Pulse 83   Temp 36.8 °C (98.2 °F)   Resp 16   Ht 5' 2" (1.575 m)   Wt 81.6 kg (179 lb 16 oz)   SpO2 98%   Breastfeeding? No   BMI 32.92 kg/m²       Pain/Annette Score: Annette Score: 10 (1/10/2019  2:21 PM)        "

## 2019-01-10 NOTE — SUBJECTIVE & OBJECTIVE
Past Medical History:   Diagnosis Date    Autosomal recessive polycystic kidneys     GERD (gastroesophageal reflux disease)     Melanoma 04/25/2016    left superior helix (breslow dept 0.67mm)    Migraine headache     PONV (postoperative nausea and vomiting)     post op nausea     Past Surgical History:   Procedure Laterality Date    BIOPSY-LYMPH NODE Left 6/10/2016    [facility] 2ND FLR    ETHMOIDECTOMY Bilateral 12/11/2015    Performed by [facility]ya [facility]al [facility] at [facility] OR    EXCISION-SKIN Left 6/10/2016    Performed by [facility] at [facility] OR 2ND FLR    LYMPH NODE BIOPSY      MALIGNANT SKIN LESION EXCISION  06/10/2016    NASAL SEPTUM SURGERY      RENAL EXPLORATION  2011    SEPTOPLASTY N/A 12/11/2015    Performed [facility] at [facility] OR    SINUS SURGERY FUNCTIONAL ENDOSCOPIC Bilateral 12/11/2015    Performed by [facility] at [facility] OR     Family History     Problem Relation (Age of Onset)    Diabetes Paternal Grandmother    Hypertension Father    Kidney disease Father        Tobacco Use    Smoking status: Never Smoker    Smokeless tobacco: Never Used   Substance and Sexual Activity    Alcohol use: Yes     Comment: socially    Drug use: No    Sexual activity: Not Currently       PTA Medications   Medication Sig    prenat.vits,yonatan,min-iron-folic (PRENATAL VITAMIN) Tab Take by mouth once daily.    ALPRAZolam (XANAX) 0.25 MG tablet Take 1-2 tablets (0.25-0.5 mg total) by mouth daily as needed for Anxiety.    azelastine (ASTELIN) 137 mcg (0.1 %) nasal spray use 1 spray (137 mcg total) in each nostril 2 (two) times daily.    estradiol (ESTRACE) 2 MG tablet Take one tablet by mouth twice daily spaced 12 hours apart    fish oil-omega-3 fatty acids 300-1,000 mg capsule Take 2 g by mouth once daily.    L norgest/e.estradiol-e.estrad (SEASONIQUE) 0.15 mg-30 mcg (84)/10 mcg (7) 3MPk Take 1 tablet by mouth once daily.    MAGNESIUM ORAL  "Take by mouth once daily.     norethindrone-ethinyl estradiol (ORTHO-NOVUM 1-35 TAB,NORTREL 1-35 TAB) 1-35 mg-mcg per tablet Take one tablet daily. Do not take placebos/sugar pills    omeprazole (PRILOSEC) 20 MG capsule Take 1 capsule (20 mg total) by mouth once daily.    ondansetron (ZOFRAN) 4 MG tablet TAKE ONE TABLET BY MOUTH EVERY 12 HOURS AS NEEDED FOR NAUSEA    progesterone (PROGESTERONE IN OIL) 50 mg/mL injection Inject 1 mL (50mg) into the muscle once daily.    progesterone micronized (CRINONE) 8 % Gel Use one application vaginally on the morning of embryo transfer       Review of patient's allergies indicates:   Allergen Reactions    Singulair [montelukast]      Itching all over       Review of Systems   Objective:     Vital Signs (Most Recent):    Vital Signs (24h Range):        Weight: 81.6 kg (180 lb)  Body mass index is 32.92 kg/m².    Physical Exam    Laboratory:  {[facility]:02159}    Diagnostic Results:  {Results; Diagnostics:[id number]::"***"}  "

## 2019-01-10 NOTE — DISCHARGE INSTRUCTIONS

## 2019-01-10 NOTE — SUBJECTIVE & OBJECTIVE
Obstetric History       T0      L0     SAB0   TAB0   Ectopic0   Multiple0   Live Births0         Past Medical History:   Diagnosis Date    Autosomal recessive polycystic kidneys     GERD (gastroesophageal reflux disease)     Melanoma 2016    left superior helix (breslow dept 0.67mm)    Migraine headache     PONV (postoperative nausea and vomiting)     post op nausea     Past Surgical History:   Procedure Laterality Date    BIOPSY-LYMPH NODE Left 6/10/2016    Performed by [facility] at [facility] OR 2ND FLR    ETHMOIDECTOMY Bilateral 2015    [facility]    EXCISION-SKIN Left 6/10/2016    Performed by [facility] at [facility] OR 2ND FLR    LYMPH NODE BIOPSY      MALIGNANT SKIN LESION EXCISION  06/10/2016    NASAL SEPTUM SURGERY      RENAL EXPLORATION      SEPTOPLASTY N/A 2015    [facility] at [facility] OR    SINUS SURGERY FUNCTIONAL ENDOSCOPIC Bilateral 2015    Per[facility] by [facility] at [facility] OR       [facility] Medications   Medication Sig    prenat.vits,yonatan,min-iron-folic (PRENATAL VITAMIN) Tab Take by mouth once daily.    ALPRAZolam (XANAX) 0.25 MG tablet Take 1-2 tablets (0.25-0.5 mg total) by mouth daily as needed for Anxiety.    azelastine (ASTELIN) 137 mcg (0.1 %) nasal spray use 1 spray (137 mcg total) in each nostril 2 (two) times daily.    estradiol (ESTRACE) 2 MG tablet Take one tablet by mouth twice daily spaced 12 hours apart    fish oil-omega-3 fatty acids 300-1,000 mg capsule Take 2 g by mouth once daily.    L norgest/e.estradiol-e.estrad (SEASONIQUE) 0.15 mg-30 mcg (84)/10 mcg (7) 3MPk Take 1 tablet by mouth once daily.    MAGNESIUM ORAL Take by mouth once daily.     norethindrone-ethinyl estradiol (ORTHO-NOVUM 1-35 TAB,NORTREL 1-35 TAB) 1-35 mg-mcg per tablet Take one tablet daily. Do not take placebos/sugar pills    omeprazole (PRILOSEC) 20 MG capsule Take 1 capsule  (20 mg total) by mouth once daily.    ondansetron (ZOFRAN) 4 MG tablet TAKE ONE TABLET BY MOUTH EVERY 12 HOURS AS NEEDED FOR NAUSEA    progesterone (PROGESTERONE IN OIL) 50 mg/mL injection Inject 1 mL (50mg) into the muscle once daily.    progesterone micronized (CRINONE) 8 % Gel Use one application vaginally on the morning of embryo transfer       Review of patient's allergies indicates:   Allergen Reactions    Singulair [montelukast]      Itching all over        Family History     Problem Relation (Age of Onset)    Diabetes Paternal Grandmother    Hypertension Father    Kidney disease Father        Tobacco Use    Smoking status: Never Smoker    Smokeless tobacco: Never Used   Substance and Sexual Activity    Alcohol use: Yes     Comment: socially    Drug use: No    Sexual activity: Not Currently     Review of Systems   Constitutional: Negative for appetite change, chills, fatigue and fever.   Respiratory: Negative for cough and shortness of breath.    Cardiovascular: Negative for chest pain and leg swelling.   Gastrointestinal: Negative for abdominal pain, bloating, constipation, diarrhea, nausea and vomiting.   Genitourinary: Negative for dysuria, hematuria, pelvic pain, vaginal discharge and vaginal pain.   Breast: Negative for mass, nipple discharge and skin changes     Objective:     Vital Signs (Most Recent):    Vital Signs (24h Range):        Weight: 81.6 kg (180 lb)  Body mass index is 32.92 kg/m².    No LMP recorded.    Physical Exam:   Constitutional: She is oriented to person, place, and time. She appears well-developed and well-nourished. No distress.    HENT:   Head: Normocephalic and atraumatic.    Eyes: Conjunctivae and EOM are normal.    Neck: Normal range of motion. Neck supple. No thyromegaly present.    Cardiovascular: Normal rate and regular rhythm.     Pulmonary/Chest: Effort normal. No respiratory distress.        Abdominal: Soft. She exhibits no distension. There is no tenderness.              Musculoskeletal: Normal range of motion and moves all extremeties. She exhibits no edema or tenderness.       Neurological: She is alert and oriented to person, place, and time.    Skin: Skin is warm and dry. No rash noted.    Psychiatric: She has a normal mood and affect. Her behavior is normal.       Laboratory:  UPT pending    Diagnostic Results:  N/A

## 2019-01-10 NOTE — ASSESSMENT & PLAN NOTE
- To OR as scheduled for hysteroscopic polypectomy  - Risks, benefits, alternatives discussed; consents for procedure and blood products signed

## 2019-01-10 NOTE — PLAN OF CARE
Samanta Patricia Zimmerman has met all discharge criteria from Phase II. Vital Signs are stable, ambulating  without difficulty. Discharge instructions given, patient verbalized understanding. Discharged from facility via wheelchair in stable condition.

## 2019-01-10 NOTE — ANESTHESIA PREPROCEDURE EVALUATION
01/10/2019  Samanta Zimmerman is a 33 y.o., female.    Anesthesia Evaluation    I have reviewed the Patient Summary Reports.    I have reviewed the Nursing Notes.   I have reviewed the Medications.     Review of Systems  Anesthesia Hx:  Hx of Anesthetic complications (post op nausea)    Social:  Non-Smoker    Pulmonary:  Pulmonary Normal    Renal/:   Chronic Renal Disease, CRI    Hepatic/GI:   GERD, well controlled Liver Disease,    Neurological:   Headaches    Endocrine:  Endocrine Normal        Physical Exam  General:  Obesity    Airway/Jaw/Neck:  Airway Findings: Mouth Opening: Normal Tongue: Normal  General Airway Assessment: Adult  Mallampati: II  TM Distance: Normal, at least 6 cm  Jaw/Neck Findings:     Neck ROM: Normal ROM      Dental:  Dental Findings: In tact             Anesthesia Plan  Type of Anesthesia, risks & benefits discussed:  Anesthesia Type:  general  Patient's Preference:   Intra-op Monitoring Plan:   Intra-op Monitoring Plan Comments:   Post Op Pain Control Plan:   Post Op Pain Control Plan Comments:   Induction:   IV  Beta Blocker:         Informed Consent: Patient understands risks and agrees with Anesthesia plan.  Questions answered. Anesthesia consent signed with patient.  ASA Score: 2     Day of Surgery Review of History & Physical:    H&P update referred to the surgeon.         Ready For Surgery From Anesthesia Perspective.

## 2019-01-10 NOTE — HPI
Samanta Zimmerman is a  who presents for hysteroscopy D&C for polypectomy per Dr. Sifuentes. No complaints currently.

## 2019-01-23 ENCOUNTER — LAB VISIT (OUTPATIENT)
Dept: LAB | Facility: HOSPITAL | Age: 34
End: 2019-01-23
Attending: INTERNAL MEDICINE
Payer: COMMERCIAL

## 2019-01-23 ENCOUNTER — OFFICE VISIT (OUTPATIENT)
Dept: NEPHROLOGY | Facility: CLINIC | Age: 34
End: 2019-01-23
Payer: COMMERCIAL

## 2019-01-23 VITALS
BODY MASS INDEX: 34.16 KG/M2 | WEIGHT: 185.63 LBS | HEIGHT: 62 IN | DIASTOLIC BLOOD PRESSURE: 80 MMHG | SYSTOLIC BLOOD PRESSURE: 130 MMHG | HEART RATE: 70 BPM

## 2019-01-23 DIAGNOSIS — Q61.3 POLYCYSTIC KIDNEY DISEASE: ICD-10-CM

## 2019-01-23 DIAGNOSIS — Z71.89 ENCOUNTER FOR MEDICATION REVIEW AND COUNSELING: ICD-10-CM

## 2019-01-23 DIAGNOSIS — Q61.3 POLYCYSTIC KIDNEY DISEASE: Primary | ICD-10-CM

## 2019-01-23 DIAGNOSIS — I10 ESSENTIAL HYPERTENSION: ICD-10-CM

## 2019-01-23 LAB
CREAT UR-MCNC: 45 MG/DL
PROT UR-MCNC: 11 MG/DL
PROT/CREAT UR: 0.24 MG/G{CREAT}

## 2019-01-23 PROCEDURE — 3008F BODY MASS INDEX DOCD: CPT | Mod: CPTII,S$GLB,, | Performed by: INTERNAL MEDICINE

## 2019-01-23 PROCEDURE — 3008F PR BODY MASS INDEX (BMI) DOCUMENTED: ICD-10-PCS | Mod: CPTII,S$GLB,, | Performed by: INTERNAL MEDICINE

## 2019-01-23 PROCEDURE — 99215 OFFICE O/P EST HI 40 MIN: CPT | Mod: S$GLB,,, | Performed by: INTERNAL MEDICINE

## 2019-01-23 PROCEDURE — 99999 PR PBB SHADOW E&M-EST. PATIENT-LVL III: CPT | Mod: PBBFAC,,, | Performed by: INTERNAL MEDICINE

## 2019-01-23 PROCEDURE — 3075F PR MOST RECENT SYSTOLIC BLOOD PRESS GE 130-139MM HG: ICD-10-PCS | Mod: CPTII,S$GLB,, | Performed by: INTERNAL MEDICINE

## 2019-01-23 PROCEDURE — 3075F SYST BP GE 130 - 139MM HG: CPT | Mod: CPTII,S$GLB,, | Performed by: INTERNAL MEDICINE

## 2019-01-23 PROCEDURE — 3079F DIAST BP 80-89 MM HG: CPT | Mod: CPTII,S$GLB,, | Performed by: INTERNAL MEDICINE

## 2019-01-23 PROCEDURE — 99999 PR PBB SHADOW E&M-EST. PATIENT-LVL III: ICD-10-PCS | Mod: PBBFAC,,, | Performed by: INTERNAL MEDICINE

## 2019-01-23 PROCEDURE — 3079F PR MOST RECENT DIASTOLIC BLOOD PRESSURE 80-89 MM HG: ICD-10-PCS | Mod: CPTII,S$GLB,, | Performed by: INTERNAL MEDICINE

## 2019-01-23 PROCEDURE — 82570 ASSAY OF URINE CREATININE: CPT

## 2019-01-23 PROCEDURE — 99215 PR OFFICE/OUTPT VISIT, EST, LEVL V, 40-54 MIN: ICD-10-PCS | Mod: S$GLB,,, | Performed by: INTERNAL MEDICINE

## 2019-01-23 NOTE — PROGRESS NOTES
NEPHROLOGY CLINIC FOLLOWUP NOTE    REASON FOR FOLLOWUP AND CHIEF COMPLAINT:  History of polycystic kidney disease.    HISTORY OF PRESENT ILLNESS:  Ms. Samanta Zimmerman is a 33-year-old    female whom I am meeting for the first time today.  Previous records were   reviewed.  The patient was previously seen by Dr. Melgar in the Renal Clinic.    The patient pertinently has a history of polycystic kidney disease, which she   appears to have acquired through the paternal side of the family, her last serum   creatinine about four months ago was normal at 0.6 mg/dL.  She presents to the   clinic today essentially to report that she is planning to have in vitro   fertilization done using her significant other as the surrogate donor of the   egg.  Therefore, it is not expected that Ms. Zimmerman will use her own eggs in this   pregnancy in order to eliminate the chances of passing the genes responsible   for polycystic kidney disease.  Her question is that whether it is okay to stop   the ACE inhibitor she was on, I believe, lisinopril.      The above issues were discussed with the patient, the ACE inhibitor has already   been stopped.  I advised her that it is best that in anticipation of this   pregnancy, which will occur within the next two weeks.  The ACE inhibitor will   be stopped.  Her blood pressure is well controlled today and has been well   controlled under our care.  She reported that she had a systolic blood pressure   of 160 at [facility] this morning.  Her blood pressure today in   our clinic is well controlled at 130/80.      In addition to above, other issues were discussed today.  I advised the patient   that there was a report in the [place] Medical journal in late 2018 showing a   small 14% association with been taking ACE inhibitors and lung cancer.  This was   an observational study and not to cause an effect study.  Discussed in layman's   language and therefore I advised the  patient that after her pregnancy is done   with or if she is returning to ACE inhibitors.  It should be an angiotensin   receptor blocker rather than an ACE inhibitor that should be used.  We also   briefly discussed the availability of using Jynarque, which is available now to   treat polycystic kidney disease.  The mechanism of action of this medication   explained to her this would not be used during her pregnancy, but rather   afterwards the effect is to reduce the rate of growth of cysts in PCKD that   contribute to final renal demise.    PAST MEDICAL HISTORY:  1.  Polycystic kidney disease.  2.  Dysmenorrhea.  3.  Migraine headaches.  4.  Maxillary sinusitis.  5.  Hypertriglyceridemia.  6.  Liver cysts.  7.  GERD.  8.  Allergic rhinitis.    PAST SURGICAL HISTORY:  Reviewed.    FAMILY HISTORY:  Reviewed.    ALLERGIES:  Reviewed, to Singulair.    MEDICATIONS:  Fully reviewed, not on ACE inhibitors at this point.    REVIEW OF SYSTEMS:  No recent hospitalizations.  GENERAL:  Negative.  HEAD, EYES, EARS, NOSE, AND THROAT:  Negative.  CARDIAC:  Negative.  PULMONARY:  Negative.  GASTROINTESTINAL:  Negative.  GENITOURINARY:  Negative.  PSYCHOLOGICAL:  Negative.  NEUROLOGICAL:  Negative.  ENDOCRINE:  Negative.  HEMATOLOGIC AND ONCOLOGIC:  Negative.  INFECTIOUS DISEASE:  Negative.  The rest of the review of systems negative.    PHYSICAL EXAMINATION:  VITAL SIGNS:  Blood pressure is 130/80, blood pressure consistently has been   well controlled in our clinic including recent previous measurements of 120/75   and 112/72, pulse 70, weight is 84.2.  GENERAL:  She is cooperative, pleasant.  Ambulating by herself, in no acute   distress.    HEENT:  Mucous membranes moist.  NECK:  No JVD.  HEART:  Regular rate and rhythm, S1 and S2 audible.  No rubs.  CHEST:  Clear to auscultation.  No rales or wheezes.  Breathing symmetric,   unlabored.  ABDOMEN:  Soft and nontender.  EXTREMITIES:  No edema.    There are no recent labs.  Labs  was repeated today including BMP and urine   protein to creatinine ratio.     ASSESSMENT AND PLAN:  This is a 33-year-old female with polycystic kidney   disease presents for followup.  The impression is as follows:  1.  Renal.  As of four months ago serum creatinine was normal.  Labs will be   repeated today.  The patient appears to have polycystic kidney disease with   evidence of paternal inheritance including her father, paternal grandfather, her   sister, and paternal aunt all have had polycystic kidney disease.  2.  Long-term treatment with Jynarque/Tolvaptan after pregnancy was discussed   with the patient.  Opportunity for questions and discussion provided.  3.  Hypertension.  Blood pressure is well controlled.  I recommend continue to   hold ACE inhibitors and angiotensin receptor blockers throughout her pregnancy.    At this point, she does not appear to need an antihypertensive medication if   during her pregnancy her blood pressure is not controlled to the goal of   120/70-80, then methyldopa would be an appropriate medication at a dose of 250   mg p.o. b.i.d.   We will bring the patient back to our clinic in February 2019   for reevaluation of the need for blood pressure management.    PLAN AND RECOMMENDATION:  As detailed above.  Opportunity for question and   discussion provided.    Total time spent 40 minutes.  The patient was new to me.  Time was needed to   review the records, the patient evaluation, documentation and face-to-face   discussion with the patient.  More than 50% of the time was spent on counseling   and coordination of care.  Level V visit.      KRISTYN  dd: 01/23/2019 16:44:00 (CST)  td: 01/24/2019 03:49:59 (CST)  Doc ID   #[id number]  Job ID #[id number]    CC: Jolanta Sifuentes M.D.    [id number]

## 2019-01-30 DIAGNOSIS — Z32.00 PREGNANCY EXAMINATION OR TEST, PREGNANCY UNCONFIRMED: Primary | ICD-10-CM

## 2019-01-30 DIAGNOSIS — Z32.01 PREGNANCY EXAMINATION OR TEST, POSITIVE RESULT: ICD-10-CM

## 2019-01-30 PROBLEM — Z98.890 STATUS POST HYSTEROSCOPY: Status: RESOLVED | Noted: 2019-01-10 | Resolved: 2019-01-30

## 2019-01-30 PROBLEM — N84.0 UTERINE POLYP: Status: RESOLVED | Noted: 2019-01-10 | Resolved: 2019-01-30

## 2019-02-11 RX ORDER — OMEPRAZOLE 20 MG/1
20 CAPSULE, DELAYED RELEASE ORAL DAILY
Qty: 90 CAPSULE | Refills: 3 | Status: SHIPPED | OUTPATIENT
Start: 2019-02-11 | End: 2020-03-19 | Stop reason: SDUPTHER

## 2019-02-12 ENCOUNTER — TELEPHONE (OUTPATIENT)
Dept: PHARMACY | Facility: CLINIC | Age: 34
End: 2019-02-12

## 2019-02-13 ENCOUNTER — LAB VISIT (OUTPATIENT)
Dept: LAB | Facility: HOSPITAL | Age: 34
End: 2019-02-13
Attending: INTERNAL MEDICINE
Payer: COMMERCIAL

## 2019-02-13 DIAGNOSIS — Q61.3 POLYCYSTIC KIDNEY DISEASE: ICD-10-CM

## 2019-02-13 LAB
ALBUMIN SERPL BCP-MCNC: 3.5 G/DL
ANION GAP SERPL CALC-SCNC: 11 MMOL/L
BUN SERPL-MCNC: 14 MG/DL
CALCIUM SERPL-MCNC: 9.5 MG/DL
CHLORIDE SERPL-SCNC: 103 MMOL/L
CO2 SERPL-SCNC: 24 MMOL/L
CREAT SERPL-MCNC: 0.6 MG/DL
EST. GFR  (AFRICAN AMERICAN): >60 ML/MIN/1.73 M^2
EST. GFR  (NON AFRICAN AMERICAN): >60 ML/MIN/1.73 M^2
GLUCOSE SERPL-MCNC: 74 MG/DL
PHOSPHATE SERPL-MCNC: 3.6 MG/DL
POTASSIUM SERPL-SCNC: 3.6 MMOL/L
SODIUM SERPL-SCNC: 138 MMOL/L

## 2019-02-13 PROCEDURE — 80069 RENAL FUNCTION PANEL: CPT

## 2019-02-13 PROCEDURE — 36415 COLL VENOUS BLD VENIPUNCTURE: CPT

## 2019-02-15 ENCOUNTER — LAB VISIT (OUTPATIENT)
Dept: LAB | Facility: HOSPITAL | Age: 34
End: 2019-02-15
Attending: OBSTETRICS & GYNECOLOGY
Payer: COMMERCIAL

## 2019-02-15 DIAGNOSIS — Z32.00 PREGNANCY EXAMINATION OR TEST, PREGNANCY UNCONFIRMED: ICD-10-CM

## 2019-02-15 LAB
HCG INTACT+B SERPL-ACNC: 176 MIU/ML
PROGEST SERPL-MCNC: 22.4 NG/ML

## 2019-02-15 PROCEDURE — 84144 ASSAY OF PROGESTERONE: CPT

## 2019-02-15 PROCEDURE — 84702 CHORIONIC GONADOTROPIN TEST: CPT

## 2019-02-15 PROCEDURE — 36415 COLL VENOUS BLD VENIPUNCTURE: CPT

## 2019-02-18 ENCOUNTER — LAB VISIT (OUTPATIENT)
Dept: LAB | Facility: HOSPITAL | Age: 34
End: 2019-02-18
Attending: OBSTETRICS & GYNECOLOGY
Payer: COMMERCIAL

## 2019-02-18 DIAGNOSIS — Z32.00 PREGNANCY EXAMINATION OR TEST, PREGNANCY UNCONFIRMED: ICD-10-CM

## 2019-02-18 LAB
HCG INTACT+B SERPL-ACNC: 697 MIU/ML
PROGEST SERPL-MCNC: 18.5 NG/ML

## 2019-02-18 PROCEDURE — 36415 COLL VENOUS BLD VENIPUNCTURE: CPT

## 2019-02-18 PROCEDURE — 84144 ASSAY OF PROGESTERONE: CPT

## 2019-02-18 PROCEDURE — 84702 CHORIONIC GONADOTROPIN TEST: CPT

## 2019-03-07 ENCOUNTER — TELEPHONE (OUTPATIENT)
Dept: PHARMACY | Facility: CLINIC | Age: 34
End: 2019-03-07

## 2019-03-07 NOTE — TELEPHONE ENCOUNTER
Patient returned phone call back [facility] medication refill for Progesterone 50mg/mL (20mL/20) $45.00/004- Patient scheduled to have medication ship out on Fri 3/8 to arrive on Sat 3/9 address confirmed & [facility]. Patient informed no new medications, conditions or allergies since last talked to [facility]. Patient have only enough medication remaining for the weekend & no missed dose. Patient declined questions for the clinical pharmacist. Patient voiced understanding.     NEW ADDRESS:   [address] [place] [address]

## 2019-03-20 ENCOUNTER — OFFICE VISIT (OUTPATIENT)
Dept: PODIATRY | Facility: CLINIC | Age: 34
End: 2019-03-20
Payer: COMMERCIAL

## 2019-03-20 VITALS
HEIGHT: 62 IN | WEIGHT: 185.19 LBS | HEART RATE: 79 BPM | BODY MASS INDEX: 34.08 KG/M2 | DIASTOLIC BLOOD PRESSURE: 90 MMHG | SYSTOLIC BLOOD PRESSURE: 127 MMHG

## 2019-03-20 DIAGNOSIS — M54.50 LUMBAR SPINE PAIN: ICD-10-CM

## 2019-03-20 DIAGNOSIS — M25.561 ACUTE BILATERAL KNEE PAIN: ICD-10-CM

## 2019-03-20 DIAGNOSIS — M21.41 BILATERAL PES PLANUS: ICD-10-CM

## 2019-03-20 DIAGNOSIS — M24.572 CONTRACTURE, LEFT ANKLE: ICD-10-CM

## 2019-03-20 DIAGNOSIS — M21.42 BILATERAL PES PLANUS: ICD-10-CM

## 2019-03-20 DIAGNOSIS — M25.551 HIP PAIN, BILATERAL: ICD-10-CM

## 2019-03-20 DIAGNOSIS — M25.552 HIP PAIN, BILATERAL: ICD-10-CM

## 2019-03-20 DIAGNOSIS — M72.2 PLANTAR FASCIITIS, LEFT: Primary | ICD-10-CM

## 2019-03-20 DIAGNOSIS — M25.562 ACUTE BILATERAL KNEE PAIN: ICD-10-CM

## 2019-03-20 DIAGNOSIS — M79.672 PAIN IN LEFT FOOT: ICD-10-CM

## 2019-03-20 PROCEDURE — 99999 PR PBB SHADOW E&M-EST. PATIENT-LVL III: CPT | Mod: PBBFAC,,, | Performed by: PODIATRIST

## 2019-03-20 PROCEDURE — 3074F PR MOST RECENT SYSTOLIC BLOOD PRESSURE < 130 MM HG: ICD-10-PCS | Mod: CPTII,S$GLB,, | Performed by: PODIATRIST

## 2019-03-20 PROCEDURE — 3080F PR MOST RECENT DIASTOLIC BLOOD PRESSURE >= 90 MM HG: ICD-10-PCS | Mod: CPTII,S$GLB,, | Performed by: PODIATRIST

## 2019-03-20 PROCEDURE — 3074F SYST BP LT 130 MM HG: CPT | Mod: CPTII,S$GLB,, | Performed by: PODIATRIST

## 2019-03-20 PROCEDURE — 99214 PR OFFICE/OUTPT VISIT, EST, LEVL IV, 30-39 MIN: ICD-10-PCS | Mod: S$GLB,,, | Performed by: PODIATRIST

## 2019-03-20 PROCEDURE — 3080F DIAST BP >= 90 MM HG: CPT | Mod: CPTII,S$GLB,, | Performed by: PODIATRIST

## 2019-03-20 PROCEDURE — 3008F PR BODY MASS INDEX (BMI) DOCUMENTED: ICD-10-PCS | Mod: CPTII,S$GLB,, | Performed by: PODIATRIST

## 2019-03-20 PROCEDURE — 99214 OFFICE O/P EST MOD 30 MIN: CPT | Mod: S$GLB,,, | Performed by: PODIATRIST

## 2019-03-20 PROCEDURE — 3008F BODY MASS INDEX DOCD: CPT | Mod: CPTII,S$GLB,, | Performed by: PODIATRIST

## 2019-03-20 PROCEDURE — 99999 PR PBB SHADOW E&M-EST. PATIENT-LVL III: ICD-10-PCS | Mod: PBBFAC,,, | Performed by: PODIATRIST

## 2019-03-20 NOTE — PROGRESS NOTES
Subjective:       Patient ID: Samanta Zimmerman is a 34 y.o. female.    Chief Complaint: Foot Pain (left foot pain rated at 7/10 in the morning and after prolonged standing/walking.)    HPI: Samanta Zimmerman presents to the office today, with complaints of bilateral plantar foot pain, left greater than right.  Patient states her pains are approximately 7/10 left.  Patient is in [employer] employ the [facility] clinic.  Patient states her pains are recalcitrant toTylenol.  She is currently 8 weeks pregnant, and thus her medication Kyra is are limited.  She also has polycystic kidney disease, thus NSAIDs are not an option. Patient states antalgic gait pattern with prolonged walking standing. Pains are most pronounced in the morning.  Denies recent trauma. Lisbeth Crow MD is her primary care provider.  Also has a history of lumbar spine pathology with resultant chronic pain.  At this time, states ipsilateral, left, knee and hip pain.  Denies any recent trauma or strain.    Review of patient's allergies indicates:   Allergen Reactions    Singulair [montelukast]      Itching all over       Past Medical History:   Diagnosis Date    Autosomal recessive polycystic kidneys     GERD (gastroesophageal reflux disease)     Melanoma 04/25/2016    left superior helix (breslow dept 0.67mm)    Migraine headache     PONV (postoperative nausea and vomiting)     post op nausea       Family History   Problem Relation Age of Onset    Hypertension Father     Kidney disease Father     Diabetes Paternal Grandmother     Thrombophilia Neg Hx     Breast cancer Neg Hx     Colon cancer Neg Hx     Ovarian cancer Neg Hx     Eczema Neg Hx     Lupus Neg Hx     Psoriasis Neg Hx     Melanoma Neg Hx        Social History     Socioeconomic History    Marital status:      Spouse name: Not on file    Number of children: 0    Years of education: Not on file    Highest education level:  Not on file   Social Needs    Financial resource strain: Not on file    Food insecurity - worry: Not on file    Food insecurity - inability: Not on file    Transportation needs - medical: Not on file    Transportation needs - non-medical: Not on file   Occupational History    Occupation: Optometry      Employer: [employer]     Comment: [employer] [facility]-    Tobacco Use    Smoking status: Never Smoker    Smokeless tobacco: Never Used   Substance and Sexual Activity    Alcohol use: Yes     Comment: socially    Drug use: No    Sexual activity: Not Currently   Other Topics Concern    Are you pregnant or think you may be? No    Breast-feeding No   Social History Narrative    Not on file       Past Surgical History:   Procedure Laterality Date    BIOPSY-LYMPH NODE Left 6/10/2016    [facility] 2ND FLR    ETHMOIDECTOMY Bilateral 12/11/2015    [facility]    EXCISION-SKIN Left 6/10/2016    [facility] by [facility] at [facility] OR 2ND FLR    LYMPH NODE BIOPSY      MALIGNANT SKIN LESION EXCISION  06/10/2016    NASAL SEPTUM SURGERY      POLYPECTOMY, UTERUS, HYSTEROSCOPIC N/A 1/10/2019    [facility]    RENAL EXPLORATION  2011    SEPTOPLASTY N/A 12/11/2015    [facility]    SINUS SURGERY FUNCTIONAL ENDOSCOPIC Bilateral 12/11/2015    [facility] at [facility] OR       Review of Systems   Constitutional: Negative for chills, fatigue and fever.   HENT: Negative for hearing loss.    Eyes: Negative for photophobia and visual disturbance.   Respiratory: Negative for cough, chest tightness, shortness of breath and wheezing.    Cardiovascular: Negative for chest pain and palpitations.   Gastrointestinal: Negative for constipation, diarrhea, nausea and vomiting.   Endocrine: Negative for cold intolerance and heat intolerance.   Genitourinary: Negative for flank  "pain.   Musculoskeletal: Positive for gait problem. Negative for neck pain and neck stiffness.   Skin: Negative for wound.   Neurological: Negative for light-headedness and headaches.   Psychiatric/Behavioral: Negative for sleep disturbance.          Objective:   BP (!) 127/90   Pulse 79   Ht 5' 2" (1.575 m)   Wt 84 kg (185 lb 3 oz)   BMI 33.87 kg/m²     X-Ray Foot Complete Bilateral  Narrative: EXAMINATION:  XR FOOT COMPLETE 3 VIEW BILATERAL    CLINICAL HISTORY:  Pain in right foot    TECHNIQUE:  AP, lateral, and oblique views of both feet were performed.    COMPARISON:  None    FINDINGS:  No acute fracture or dislocation.  Small dorsal and plantar calcaneal enthesophytes noted bilaterally.  The joint spaces appear to be relatively well maintained bilaterally.  Impression: 1.  As above    Electronically signed by: Marco Clifton DO  Date:    11/21/2018  Time:    15:03         LOWER EXTREMITY PHYSICAL EXAMINATION  VASCULAR: On the left and right foot, the dorsalis pedis pulse is 2/4 and the posterior tibial pulse is 2/4. Capillary refill time is less than 3 seconds. Hair growth is present on the dorsum of the foot and at the digits. No rubor is present. Proximal to distal temperature is warm to warm.    DERMATOLOGY: Skin is supple, dry and intact. No ecchymosis is noted. No hypertrophic skin formation. No erythema or cellulitis is noted.     NEUROLOGY: Proprioception is intact, bilateral. Sensation to light touch is intact. Negative Tinel's Sign and negative Valleix sign. No neurological sensations with compression of the area of Zapata's Nerve in the area of the Abductor Hallucis muscle belly.    ORTHOPEDIC:  Mild tenderness to palpation of the area around the plantar medial calcaneal tubercle on the left and right foot. Pains are characterized as sharp and stabbing-like with direct palpation of the area. There is also no pain to palpation of the immediate plantar aspect of the heel, and no pains to the lateral " band of the fascia. No edema is noted. No fullness is noted. No pains or defects are noted within the plantar fascia at the arch. No plantar fibromas are noted. No defects are noted within the ligament. Dorsiflexion of the MTPJs with simultaneous palpation of the fascia at the arch, worsens the pains. No pains with medial to lateral compression of the heel.  Equinus contracture appreciated.  Antalgic gait pattern is noted.     Assessment:     1. Plantar fasciitis, left    2. Pain in left foot    3. Contracture, left ankle    4. Bilateral pes planus    5. Acute bilateral knee pain    6. Hip pain, bilateral    7. Lumbar spine pain        Plan:     Plantar fasciitis, left  Pain in left foot  Contracture, left ankle  -     Ambulatory consult to Physical Therapy    Thorough discussion is had with the patient today, concerning the diagnosis, its etiology, and the treatment algorithm at present.  XRAYS are reviewed in detail with the patient. All questions and concerns regarding findings and its/their implications are outlined and discussed.    Patient is 8 weeks pregnant, thus medication options are limited.  No cortisone injection at this time at patient request.    Did discuss proper and supportive shoe gear in detail and at length with the patient.  These are shoes with firm and robust arch support; medial counter.  Shoes which only bend at the metatarsophalangeal joint and which are rigid in the midfoot and hindfoot. Patient urged to purchase running type or cross training type shoes gear which are designed for pronation control.    Prescription is written for Orthotist evaluation at [facility], [address] [place] [id number], for lower extremity orthotic(s) management and/or shoe gear management.        Bilateral pes planus  -     Ambulatory consult to Physical Therapy    Did discuss proper and supportive shoe gear in detail and at length with the patient.  These are shoes with firm and robust arch  support; medial counter.  Shoes which only bend at the metatarsophalangeal joint and which are rigid in the midfoot and hindfoot. Patient urged to purchase running type or cross training type shoes gear which are designed for pronation control.    Prescription is written for Orthotist evaluation at [facility], [address] [place] [id number], for lower extremity orthotic(s) management and/or shoe gear management.     Acute bilateral knee pain  Hip pain, bilateral  Lumbar spine pain  -     Ambulatory consult to Physical Therapy        Future Appointments   Date Time Provider Department Center   4/3/2019  9:20 AM SPECIMEN, [facility] SPECLAB [facility]   4/3/2019 10:25 AM LABORATORY, [facility] LAB [facility]   4/10/2019  1:00 PM Lala Dangelo MD [facility] DERM [facility]   4/10/2019  2:30 PM Malvin Nagy MD [facility] NEPHRO [facility]   4/17/2019  2:40 PM Shagufta Padgett CNM [facility] OBGYN [facility]

## 2019-03-21 ENCOUNTER — PATIENT MESSAGE (OUTPATIENT)
Dept: PODIATRY | Facility: CLINIC | Age: 34
End: 2019-03-21

## 2019-03-26 ENCOUNTER — TELEPHONE (OUTPATIENT)
Dept: PHARMACY | Facility: CLINIC | Age: 34
End: 2019-03-26

## 2019-03-26 NOTE — TELEPHONE ENCOUNTER
Patient returned phone call back [facility] medication refill for Progesterone 50mg/mL (20mL/20) $45.00/004- Patient scheduled to have medication ship out on Wed 3/27 to arrive on Thurs 3/27 address confirmed & [facility]. Patient informed no new medications, conditions or allergies since last talked to [facility]. Patient has 2 doses left, no missed dose. Patient declined questions for the clinical pharmacist. Patient voiced understanding. - SO

## 2019-03-27 ENCOUNTER — CLINICAL SUPPORT (OUTPATIENT)
Dept: REHABILITATION | Facility: HOSPITAL | Age: 34
End: 2019-03-27
Attending: PODIATRIST
Payer: COMMERCIAL

## 2019-03-27 DIAGNOSIS — M24.572 CONTRACTURE OF LEFT ANKLE: ICD-10-CM

## 2019-03-27 DIAGNOSIS — M25.552 HIP PAIN, BILATERAL: ICD-10-CM

## 2019-03-27 DIAGNOSIS — M54.5 LOW BACK PAIN, UNSPECIFIED BACK PAIN LATERALITY, UNSPECIFIED CHRONICITY, WITH SCIATICA PRESENCE UNSPECIFIED: ICD-10-CM

## 2019-03-27 DIAGNOSIS — M21.41 FLAT FEET: ICD-10-CM

## 2019-03-27 DIAGNOSIS — M25.562 ACUTE PAIN OF BOTH KNEES: ICD-10-CM

## 2019-03-27 DIAGNOSIS — M79.672 LEFT FOOT PAIN: ICD-10-CM

## 2019-03-27 DIAGNOSIS — M25.561 ACUTE PAIN OF BOTH KNEES: ICD-10-CM

## 2019-03-27 DIAGNOSIS — M25.551 HIP PAIN, BILATERAL: ICD-10-CM

## 2019-03-27 DIAGNOSIS — M21.42 FLAT FEET: ICD-10-CM

## 2019-03-27 DIAGNOSIS — M72.2 PLANTAR FASCIITIS: Primary | ICD-10-CM

## 2019-03-27 PROCEDURE — 97161 PT EVAL LOW COMPLEX 20 MIN: CPT

## 2019-03-27 PROCEDURE — 97535 SELF CARE MNGMENT TRAINING: CPT

## 2019-03-27 NOTE — PROGRESS NOTES
"PHYSICAL THERAPY INITIAL OUTPATIENT EVALUATION    Referring Provider:  Dr. Yariel Galindo    Diagnosis:       ICD-10-CM ICD-9-CM    1. Plantar fasciitis M72.2 728.71    2. Left foot pain M79.672 729.5    3. Contracture of left ankle M24.572 718.47    4. Flat feet M21.41 734     M21.42     5. Acute pain of both knees M25.561 338.19     M25.562 719.46    6. Hip pain, bilateral M25.551 719.45     M25.552     7. Low back pain, unspecified back pain laterality, unspecified chronicity, with sciatica presence unspecified M54.5 724.2             Orders:  Evaluate and Treat    Date of Initial Evaluation:  3/27/19    Orders :  19    Visit # 1     History of Present Illness: Pt reports a history of left hip pain and B plantar fasciitis (L>R) which started last summer. Pt states that she has always had low arches B; however, has noticed that her left arch has collapsed significantly in the last year. Plantar fasciitis pain is worse first thing in the morning or after sitting for more than 10 minutes, states she feels like she has to stretch her foot back out again. She recently went to a podiatrist whom told her to get good arch supports for her foot, this seemed to make the pain worse, thus she went to another MD who prescribed PT and is going to give her cortisone shots in L foot. Pt states that she prefers to sleep on her stomach; however, she is no longer able to because she is 10 weeks pregnant. She currently alternates between sleeping on her back and sides; she has discomfort in her knee when sleeping on her sides, this eases some if she places a pillow between her knees. Pt also reports that she occasionally has low back pain, states that she "pulled a muscle" in her low back last week which caused shooting pain down into her L LE, this pain has eased some and is not as much of a concern at this point.       SUBJECTIVE:  Patient reports she is anxious to get her pain under control now that she is pregnant, " she would like to prevent her pain from worsening as her body changes and she gains more weight as the baby grows.     Past Medical History:   Diagnosis Date    Autosomal recessive polycystic kidneys     GERD (gastroesophageal reflux disease)     Melanoma 04/25/2016    left superior helix (breslow dept 0.67mm)    Migraine headache     PONV (postoperative nausea and vomiting)     post op nausea       Patient Active Problem List   Diagnosis    Autosomal recessive polycystic kidneys    GERD (gastroesophageal reflux disease)    Dysmenorrhea    Liver cyst    Migraine headache    Allergic rhinitis    Pinealoma    Myofascial pain    Arm weakness-rotator cuff weakness    Chronic maxillary sinusitis    Nasal septal deviation    Desmoplastic malignant melanoma    Maxillary sinusitis    Cough    Hypertriglyceridemia    Need for vaccination against Streptococcus pneumoniae         Current Outpatient Medications:     ALPRAZolam (XANAX) 0.25 MG tablet, Take 1-2 tablets (0.25-0.5 mg total) by mouth daily as needed for Anxiety., Disp: 30 tablet, Rfl: 0    azelastine (ASTELIN) 137 mcg (0.1 %) nasal spray, use 1 spray (137 mcg total) in each nostril 2 (two) times daily., Disp: 30 mL, Rfl: 12    estradiol (ESTRACE) 2 MG tablet, Take one tablet by mouth twice daily spaced 12 hours apart, Disp: 60 tablet, Rfl: 3    fish oil-omega-3 fatty acids 300-1,000 mg capsule, Take 2 g by mouth once daily., Disp: , Rfl:     MAGNESIUM ORAL, Take by mouth once daily. , Disp: , Rfl:     omeprazole (PRILOSEC) 20 MG capsule, Take 1 capsule (20 mg total) by mouth once daily., Disp: 90 capsule, Rfl: 3    ondansetron (ZOFRAN) 4 MG tablet, TAKE ONE TABLET BY MOUTH EVERY 12 HOURS AS NEEDED FOR NAUSEA, Disp: 12 tablet, Rfl: 1    prenat.vits,yonatan,min-iron-folic (PRENATAL VITAMIN) Tab, Take by mouth once daily., Disp: , Rfl:     progesterone (PROGESTERONE IN OIL) 50 mg/mL injection, Inject 1 mL (50mg) into the muscle once daily.,  "Disp: 20 mL, Rfl: 4    progesterone (PROGESTERONE IN OIL) 50 mg/mL injection, Inject 50 mg/ml into the muscle daily. Please send supplies for injection., Disp: 20 mL, Rfl: 4    OBJECTIVE:  Pain:   Hip pain described as deep aching in the front of her hip. Pain increases with sitting with L LE in figure 4 (L hip in flexion, abd and ER; with knee flexed, and L foot under the R thigh).     Medial knee pain described as achey and throbbing. Comes and goes throughout the day. IS aggravated with certain positions such as laying on her side in bed at night.     B foot pain described as tight and aching, located at the heel and medial side of plantar surface of foot. Pain is worst first thing in the morning or after sitting down for a duration of 10 or more minutes.     Sensation:  intact to light touch    Ankle ROM   Right (degrees) Left (degrees)   Plantar Flexion WFL WFL   Dorsiflexion WFL WFL   Inversion X X   Eversion X X   Hip ROM  WFL   Pain with full hip IR ("stretch") WFL   Pain will full hip IR ("stretch")   Knee ROM WFL WFL     Joint Mobility   Right    Left   Patella Medial Glide 3/6 3/6   Patella Lateral Glide  3/6 3/6   Patella Superior [place]  3/6 3/6   Patella Inferior [place]  3/6 3/6       Strength   Right    Left   Anterior Tibialis 5/5 5/5   Gastrocnemius/Soleus 20/20 heel riase 12/20 heel raise, hip pain    Posterior Tibialis X X   Peroneals X X   Psoas 4+/5 4-/5   Quadriceps 5/5 4+/5  discomfort   Hamstrings 5/5 5/5   Hip IR 4/5 4/5  discomfort   Hip ER 4/5 4/5  discomfort   Gluteus Medius 4-/5 4-/5   X= not tested, test next session             Function:      LOWER EXTREMITY FUNCTIONAL SCALE           EVAL    1. Any of your usual work, housework or school activities   3/4  2. Your usual hobbies, sporting     3/4  3. Getting in and out of tub      4/4  4. Walking between rooms      4/4  5. Putting on shoes or socks      3/4  6. Squatting        3/4  7. Lifting an object from the ground      4/4  8. " Performing light activities around the home   4/4  9. Performing heavy activities around the home   2/4  10. Getting in and out of car      2/4  11. Walking 2 blocks       3/4  12.Walking a mile       4/4  13. Getting up and down 1 flight of stairs    3/4  14. Standing for 1 hour      1/4  15. Sitting for an hour       3/4  16. Running on even ground      3/4  17. Running on uneven ground     2/4  18. Making sharp turns when running fast    3/4  19. Hopping        4/4  20. Rolling over in bed       3/4    Patient reports 76.25% ability based on score of the Lower Extremity Functional Scale.    Palpation:  Increased tone and tenderness noted in B intrinsics of foot, gastrocnemius, soleus, posterior tibialis, peroneus longus and brevis, hip adductors; L TFL and sartorius    Gait Analysis: Pt demonstrates trandelenberg B, flat arches (L>R), limited pronation and supination    Other: Pt demonstrates forward head and rounded shoulders posture in seated position     ASSESSMENT:  The patient is a 34 y.o. year old female who presents to physical therapy with complaints of B low back, L hip, L knee, and B plantarfasciitis (L>R).  The patient presents with impairments which include decreased LE strength, increased tone and tenderness of LE musculature, impaired gait mechanics .  These impairments are limiting patient's ability to perform routine exercise, get in and out of her car, stand for long periods of time, and perform housekeeping activities.  Patient's prognosis is good due to motivation to improve condition, age, as well as personal factors and comorbidities listed below.  Patient will benefit from skilled physical therapy intervention to improve LE strength, decrease tone and tenderness of LE musculature, and improve gait mechanics.    Co-morbidities which may impact the plan of care and potentially impede the patient's progress in therapy include: chronicity of condition and occupation.     Based on patient's  evolving clinical presentation, 2 co-morbidities, and examination of 1 body systems, patient presents with low complexity.    Short Term Goals:  (4 weeks)  1.  Recent s/s trend is improving in order to progress towards LTG's.  2.  Patient will improve AROM to 1/2 of LTG in order to progress towards functional activities.   3.  Patient will patient will be independent with HEP in order to further progress and return to maximal function.   4.  Improve postural awareness.  5.  Patient will report improved function indicated by score of 85% ability on the Lower Extremity Functional Scale.     Long Term Goals:  (8  weeks)  1.  Patient will return to normal ADL, recreational, and work related activities with less pain and limitation.   2.  Patient will improve AROM to stated goals in order to return to maximal functional potential.  3.  Patient will improve strength to stated goals of appropriate musculature in order to improve functional independence  4.  Patient will demonstrate normal gait mechanics in order to minimize any compensation and return to PLOF.  5. Patient will report improved function indicated by score of greater than 92% ability on the Lower Extremity Functional Scale    TREATMENT PROVIDED:    Initial evaluation completed.      Self Care:  (8 minutes)    Pt educated on their current condition, related impairments, and plan of care to improve function and QOL.     PLAN:  Patient will benefit from physical therapy (2) x/week for (8) weeks including manual therapy, neuromuscular re-education, therapeutic exercise, functional activities, modalities, and patient education.  Talk about cortisone shots     Thank you for this referral.    These services are reasonable and necessary for the conditions set forth above while under my care.

## 2019-03-28 ENCOUNTER — OFFICE VISIT (OUTPATIENT)
Dept: PODIATRY | Facility: CLINIC | Age: 34
End: 2019-03-28
Payer: COMMERCIAL

## 2019-03-28 VITALS
RESPIRATION RATE: 18 BRPM | SYSTOLIC BLOOD PRESSURE: 126 MMHG | DIASTOLIC BLOOD PRESSURE: 94 MMHG | HEART RATE: 91 BPM | BODY MASS INDEX: 34.3 KG/M2 | HEIGHT: 62 IN | WEIGHT: 186.38 LBS

## 2019-03-28 DIAGNOSIS — M79.672 PAIN IN LEFT FOOT: ICD-10-CM

## 2019-03-28 DIAGNOSIS — M24.572 CONTRACTURE, LEFT ANKLE: ICD-10-CM

## 2019-03-28 DIAGNOSIS — M72.2 PLANTAR FASCIITIS, LEFT: Primary | ICD-10-CM

## 2019-03-28 PROCEDURE — 99999 PR PBB SHADOW E&M-EST. PATIENT-LVL III: ICD-10-PCS | Mod: PBBFAC,,, | Performed by: PODIATRIST

## 2019-03-28 PROCEDURE — 99499 UNLISTED E&M SERVICE: CPT | Mod: S$GLB,,, | Performed by: PODIATRIST

## 2019-03-28 PROCEDURE — 99999 PR PBB SHADOW E&M-EST. PATIENT-LVL III: CPT | Mod: PBBFAC,,, | Performed by: PODIATRIST

## 2019-03-28 PROCEDURE — 20550 NJX 1 TENDON SHEATH/LIGAMENT: CPT | Mod: LT,S$GLB,, | Performed by: PODIATRIST

## 2019-03-28 PROCEDURE — 99499 NO LOS: ICD-10-PCS | Mod: S$GLB,,, | Performed by: PODIATRIST

## 2019-03-28 PROCEDURE — 20550 PR INJECT TENDON SHEATH/LIGAMENT: ICD-10-PCS | Mod: LT,S$GLB,, | Performed by: PODIATRIST

## 2019-03-28 RX ORDER — DEXAMETHASONE SODIUM PHOSPHATE 4 MG/ML
4 INJECTION, SOLUTION INTRA-ARTICULAR; INTRALESIONAL; INTRAMUSCULAR; INTRAVENOUS; SOFT TISSUE ONCE
Status: COMPLETED | OUTPATIENT
Start: 2019-03-28 | End: 2019-03-28

## 2019-03-28 RX ORDER — METHYLPREDNISOLONE ACETATE 40 MG/ML
40 INJECTION, SUSPENSION INTRA-ARTICULAR; INTRALESIONAL; INTRAMUSCULAR; SOFT TISSUE
Status: COMPLETED | OUTPATIENT
Start: 2019-03-28 | End: 2019-03-28

## 2019-03-28 RX ADMIN — METHYLPREDNISOLONE ACETATE 40 MG: 40 INJECTION, SUSPENSION INTRA-ARTICULAR; INTRALESIONAL; INTRAMUSCULAR; SOFT TISSUE at 01:03

## 2019-03-28 RX ADMIN — DEXAMETHASONE SODIUM PHOSPHATE 4 MG: 4 INJECTION, SOLUTION INTRA-ARTICULAR; INTRALESIONAL; INTRAMUSCULAR; INTRAVENOUS; SOFT TISSUE at 01:03

## 2019-03-28 NOTE — PROGRESS NOTES
Subjective:       Patient ID: Samanta Zimmerman is a 34 y.o. female.    Chief Complaint: Injections    HPI: Samanta Zimmerman presents to the office today, for follow-up concerning plantar fasciitis of the left foot. At this time, pains are 7/10 and are described as moderate in nature. States analgic gait pattern. States mildly improved post static dyskinesia. States walking and standing is not as painful as prior, but persists somewhat. To this juncture, patient has had no cortisone injections to the left foot. States Tylenol for pain management. Patient's Primary Care Provider is Lisbeth Crow MD.     Review of patient's allergies indicates:   Allergen Reactions    Singulair [montelukast]      Itching all over       Past Medical History:   Diagnosis Date    Autosomal recessive polycystic kidneys     GERD (gastroesophageal reflux disease)     Melanoma 04/25/2016    left superior helix (breslow dept 0.67mm)    Migraine headache     PONV (postoperative nausea and vomiting)     post op nausea       Family History   Problem Relation Age of Onset    Hypertension Father     Kidney disease Father     Diabetes Paternal Grandmother     Thrombophilia Neg Hx     Breast cancer Neg Hx     Colon cancer Neg Hx     Ovarian cancer Neg Hx     Eczema Neg Hx     Lupus Neg Hx     Psoriasis Neg Hx     Melanoma Neg Hx        Social History     Socioeconomic History    Marital status:      Spouse name: Not on file    Number of children: 0    Years of education: Not on file    Highest education level: Not on file   Occupational History    Occupation: Optometry      Employer: [employer]     Comment: [employer]- [place]   Social Needs    Financial resource strain: Not on file    Food insecurity:     Worry: Not on file     Inability: Not on file    Transportation needs:     Medical: Not on file     Non-medical: Not on file   Tobacco Use    Smoking status: Never Smoker    Smokeless tobacco: Never  Used   Substance and Sexual Activity    Alcohol use: Yes     Comment: socially    Drug use: No    Sexual activity: Not Currently   Lifestyle    Physical activity:     Days per week: Not on file     Minutes per session: Not on file    Stress: Not on file   Relationships    Social connections:     Talks on phone: Not on file     Gets together: Not on file     Attends [employer] service: Not on file     Active member of club or organization: Not on file     Attends meetings of clubs or organizations: Not on file     Relationship status: Not on file    Intimate partner violence:     Fear of current or ex partner: Not on file     Emotionally abused: Not on file     Physically abused: Not on file     Forced sexual activity: Not on file   Other Topics Concern    Are you pregnant or think you may be? No    Breast-feeding No   Social History Narrative    Not on file       Past Surgical History:   Procedure Laterality Date    BIOPSY-LYMPH NODE Left 6/10/2016    [facility] 2ND [facility]    ETHMOIDECTOMY Bilateral 12/11/2015    Performed [facility]al [facility] at [facility] OR    EXCISION-SKIN Left 6/10/2016    [facility] 2ND [facility]    LYMPH NODE BIOPSY      MALIGNANT SKIN LESION EXCISION  06/10/2016    NASAL SEPTUM SURGERY      POLYPECTOMY, UTERUS, HYSTEROSCOPIC N/A 1/10/2019    [facility]    RENAL EXPLORATION  2011    SEPTOPLASTY N/A 12/11/2015    [facility]    SINUS SURGERY FUNCTIONAL ENDOSCOPIC Bilateral 12/11/2015    [facility] OR       Review of Systems   Constitutional: Negative for chills, fatigue and fever.   HENT: Negative for hearing loss.    Eyes: Negative for photophobia and visual disturbance.   Respiratory: Negative for cough, chest tightness, shortness of breath and wheezing.    Cardiovascular: Negative for chest pain and palpitations.  "  Gastrointestinal: Negative for constipation, diarrhea, nausea and vomiting.   Endocrine: Negative for cold intolerance and heat intolerance.   Genitourinary: Negative for flank pain.   Musculoskeletal: Positive for gait problem. Negative for neck pain and neck stiffness.   Skin: Negative for wound.   Neurological: Negative for light-headedness and headaches.   Psychiatric/Behavioral: Negative for sleep disturbance.         Objective:   BP (!) 126/94   Pulse 91   Resp 18   Ht 5' 2" (1.575 m)   Wt 84.5 kg (186 lb 6.4 oz)   BMI 34.09 kg/m²     X-Ray Foot Complete Bilateral  Narrative: EXAMINATION:  XR FOOT COMPLETE 3 VIEW BILATERAL    CLINICAL HISTORY:  Pain in right foot    TECHNIQUE:  AP, lateral, and oblique views of both feet were performed.    COMPARISON:  None    FINDINGS:  No acute fracture or dislocation.  Small dorsal and plantar calcaneal enthesophytes noted bilaterally.  The joint spaces appear to be relatively well maintained bilaterally.  Impression: 1.  As above    Electronically signed by: Marco Clifton DO  Date:    11/21/2018  Time:    15:03      LOWER EXTREMITY PHYSICAL EXAMINATION    NEUROLOGY: Negative Tinel's Sign and negative Valleix Sign. No neurological sensations with compression of the area of Zapata's Nerve in the area of the Abductor Hallucis muscle belly.    ORTHOPEDIC:  Moderate tenderness to palpation of the area around the plantar medial calcaneal tubercle on the left foot. Pains are characterized as sharp and stabbing-like with direct palpation of the area. There is also no pain to palpation of the immediate plantar aspect of the heel, and no pains to the lateral band of the fascia. No edema is noted. No fullness is noted. No pains or defects are noted within the plantar fascia at the arch. No plantar fibromas are noted. No defects are noted within the ligament. No pains with medial to lateral compression of the heel. Equinus contracture is noted. Antalgic gait pattern is noted. "     DERMATOLOGY: No ecchymosis is noted.  Skin is supple, dry and intact. Skin is supple.  No hyperkeratosis noted. No calluses.  No open wounds or ulcerations are noted.  No palpable plantar fibromas noted.    VASCULAR: On the left foot, the dorsalis pedis pulse is 2/4 and the posterior tibial pulse is 2/4. Capillary refill time is less than 3 seconds. Hair growth is present on the dorsum of the foot and at the digits. No rubor is present. Proximal to distal temperature is warm to warm.    Assessment:     1. Plantar fasciitis, left    2. Pain in left foot    3. Contracture, left ankle          Plan:     Plantar fasciitis, left  -     methylPREDNISolone acetate injection 40 mg  -     dexamethasone injection 4 mg    Pain in left foot  -     methylPREDNISolone acetate injection 40 mg  -     dexamethasone injection 4 mg    Contracture, left ankle        Following sterile preparation with alcohol swab and/or betadine paint, injection was given into and around the area of the left plantar medial calcaneal tubercle, using 25-gauge needle. Injection consisted of approximately 0.5cc of Dexamethasone Sodium Phosphate, 0.5cc of Depo-Medrol (40mg/dL) and 0.5cc of 1% Lidocaine w/ or w/o Epinephrine or 0.25% or 0.50% Marcaine plain. Bandage application thereafter. Patient tolerated procedure well, and without complications or complaints. Patient educated that the area of pathology, might actually be slightly more painful, due to the injection, over the course of the next one to two days.                Future Appointments   Date Time Provider Department Center   4/3/2019  9:20 AM SPECIMEN, [facility] SPECLAB [facility]   4/3/2019 10:25 AM LABORATORY, [facility] LAB [facility]   4/10/2019  1:00 PM Lala Dangelo MD [facility] DERM [facility]   4/10/2019  2:30 PM Malvin Nagy MD [facility] NEPHRO [facility]   4/17/2019  2:40 PM Shagufta Padgett CNM [facility] OBGYN [facility]

## 2019-04-01 ENCOUNTER — PATIENT MESSAGE (OUTPATIENT)
Dept: OBSTETRICS AND GYNECOLOGY | Facility: CLINIC | Age: 34
End: 2019-04-01

## 2019-04-03 ENCOUNTER — CLINICAL SUPPORT (OUTPATIENT)
Dept: REHABILITATION | Facility: HOSPITAL | Age: 34
End: 2019-04-03
Payer: COMMERCIAL

## 2019-04-03 DIAGNOSIS — M79.672 LEFT FOOT PAIN: ICD-10-CM

## 2019-04-03 DIAGNOSIS — M25.551 HIP PAIN, BILATERAL: ICD-10-CM

## 2019-04-03 DIAGNOSIS — M21.42 FLAT FEET: ICD-10-CM

## 2019-04-03 DIAGNOSIS — M25.552 HIP PAIN, BILATERAL: ICD-10-CM

## 2019-04-03 DIAGNOSIS — M25.562 ACUTE PAIN OF BOTH KNEES: ICD-10-CM

## 2019-04-03 DIAGNOSIS — M72.2 PLANTAR FASCIITIS: Primary | ICD-10-CM

## 2019-04-03 DIAGNOSIS — M25.561 ACUTE PAIN OF BOTH KNEES: ICD-10-CM

## 2019-04-03 DIAGNOSIS — M21.41 FLAT FEET: ICD-10-CM

## 2019-04-03 DIAGNOSIS — M24.572 CONTRACTURE OF LEFT ANKLE: ICD-10-CM

## 2019-04-03 DIAGNOSIS — M54.5 LOW BACK PAIN, UNSPECIFIED BACK PAIN LATERALITY, UNSPECIFIED CHRONICITY, WITH SCIATICA PRESENCE UNSPECIFIED: ICD-10-CM

## 2019-04-03 PROCEDURE — 97110 THERAPEUTIC EXERCISES: CPT

## 2019-04-03 PROCEDURE — 97535 SELF CARE MNGMENT TRAINING: CPT

## 2019-04-03 PROCEDURE — 97140 MANUAL THERAPY 1/> REGIONS: CPT

## 2019-04-03 NOTE — PROGRESS NOTES
"  Physical Therapy Daily Treatment Note     Name: Samanta Zimmerman  Clinic Number: [id number]    Therapy Diagnosis:   Encounter Diagnoses   Name Primary?    Plantar fasciitis Yes    Contracture of left ankle     Flat feet     Acute pain of both knees     Hip pain, bilateral     Low back pain, unspecified back pain laterality, unspecified chronicity, with sciatica presence unspecified     Left foot pain      Physician: Yariel Galindo DPM    Visit Date: 4/3/2019    Physician Orders: Evaluate and Treat  Medical Diagnosis: Plantar fasciitis   Evaluation Date: 3/27/19  Plan of Care Certification Period: 6/25/19  Visit #/Visits authorized: 2/20    Time In: 9:01  Time Out: 10:05  Total Billable Time: 64 minutes    Precautions: None    Subjective     History of Present Illness: Pt reports a history of left hip pain and B plantar fasciitis (L>R) which started last summer. Pt states that she has always had low arches B; however, has noticed that her left arch has collapsed significantly in the last year. Plantar fasciitis pain is worse first thing in the morning or after sitting for more than 10 minutes, states she feels like she has to stretch her foot back out again. She recently went to a podiatrist whom told her to get good arch supports for her foot, this seemed to make the pain worse, thus she went to another MD who prescribed PT and is going to give her cortisone shots in L foot. Pt states that she prefers to sleep on her stomach; however, she is no longer able to because she is 10 weeks pregnant. She currently alternates between sleeping on her back and sides; she has discomfort in her knee when sleeping on her sides, this eases some if she places a pillow between her knees. Pt also reports that she occasionally has low back pain, states that she "pulled a muscle" in her low back last week which caused shooting pain down into her L LE, this pain has eased some and is not as much of a concern at this " "point.     Pt reports: She received a cortisone shot in her left foot on the day following her initial evaluation. The injection site was sore for a few days but she does feel that the shot has helped to decrease her pain.   No HEP given on initial evaluation   Response to previous treatment: No previous treatment   Functional change: No noted change     Pain: 3/10 with walking or anything that puts pressure on the injection site on the bottom of her foot (shoe inserts)   Location: bilateral plantar surface of feet, tightness in L achilles tendon      Objective   Objective information below is from initial evaluation unless bolded today.    Ankle ROM    Right (degrees) Left (degrees)   Plantar Flexion WFL WFL   Dorsiflexion WFL WFL   Inversion WFL WFL   Eversion WFL WFL   Hip ROM  WFL   Pain with full hip IR ("stretch") WFL   Pain will full hip IR ("stretch")   Knee ROM WFL WFL      Joint Mobility    Right     Left   Patella Medial Glide 3/6 3/6   Patella Lateral Glide  3/6 3/6   Patella Superior [place]  3/6 3/6   Patella Inferior [place]  3/6 3/6         Strength    Right     Left   Anterior Tibialis 5/5 5/5   Gastrocnemius/Soleus 20/20 heel riase 12/20 heel raise, hip pain    Posterior Tibialis 4+/5 4+/5   Peroneals 4+/5 4+/5   Psoas 4+/5 4-/5   Quadriceps 5/5 4+/5  discomfort   Hamstrings 5/5 5/5   Hip IR 4/5 4/5  discomfort   Hip ER 4/5 4/5  discomfort   Gluteus Medius 4-/5 4-/5   X= not tested, test next session       Treatment:   Samanta received therapeutic exercises to develop strength, endurance and core stabilization for 20 minutes including:  Toe Yoga: 2 x 10 each   Clamshells: 3 x 10 B  Bridges: GTB, 3 x 10  Toe Out Heel Raies: 3 x 8  Woodpeckers: 2 x 15     Samanta received the following manual therapy techniques: Soft tissue Mobilization were applied to the: plantar foot B, L gastrocnemius, L soleus, L posterior tibialis, and L peroneal muscles  for 25 minutes.    Home Exercises Provided and Patient " Education Provided     Education provided:   Patient educated on biomechanical justification for therapeutic exercise and importance of compliance with HEP in order to improve overall imparirments and QOL     Written Home Exercises Provided: yes.  Exercises were reviewed and Samanta was able to demonstrate them prior to the end of the session.  Samanta demonstrated good  understanding of the education provided.     See EMR under Patient Instructions for exercises provided 4/3/2019.    Assessment     Pt was able to tolerate therapeutic exercise with reports of fatigue    Samanta is progressing well towards her goals.   Pt prognosis is Good.     Pt will continue to benefit from skilled outpatient physical therapy to address the deficits listed in the problem list box on initial evaluation, provide pt/family education and to maximize pt's level of independence in the home and community environment.     Pt's spiritual, cultural and educational needs considered and pt agreeable to plan of care and goals.     Goals:   Short Term Goals:  (4 weeks)  1.  Recent s/s trend is improving in order to progress towards LTG's.  2.  Patient will improve AROM to 1/2 of LTG in order to progress towards functional activities.   3.  Patient will patient will be independent with HEP in order to further progress and return to maximal function.   4.  Improve postural awareness.  5.  Patient will report improved function indicated by score of 85% ability on the Lower Extremity Functional Scale.      Long Term Goals:  (8  weeks)  1.  Patient will return to normal ADL, recreational, and work related activities with less pain and limitation.   2.  Patient will improve AROM to stated goals in order to return to maximal functional potential.  3.  Patient will improve strength to stated goals of appropriate musculature in order to improve functional independence  4.  Patient will demonstrate normal gait mechanics in order to minimize any  compensation and return to PLOF.  5. Patient will report improved function indicated by score of greater than 92% ability on the Lower Extremity Functional Scale    Plan     Therapeutic exercises to add next session: STAR, lateral resistors (maintain arch of foot), maintain arch of foot with bridges. Gait training while maintaining arches of foot     Prerna Casillas, PT

## 2019-04-10 ENCOUNTER — CLINICAL SUPPORT (OUTPATIENT)
Dept: REHABILITATION | Facility: HOSPITAL | Age: 34
End: 2019-04-10
Payer: COMMERCIAL

## 2019-04-10 ENCOUNTER — OFFICE VISIT (OUTPATIENT)
Dept: NEPHROLOGY | Facility: CLINIC | Age: 34
End: 2019-04-10
Payer: COMMERCIAL

## 2019-04-10 ENCOUNTER — OFFICE VISIT (OUTPATIENT)
Dept: DERMATOLOGY | Facility: CLINIC | Age: 34
End: 2019-04-10
Payer: COMMERCIAL

## 2019-04-10 VITALS
SYSTOLIC BLOOD PRESSURE: 126 MMHG | BODY MASS INDEX: 34.56 KG/M2 | WEIGHT: 187.81 LBS | DIASTOLIC BLOOD PRESSURE: 80 MMHG | HEART RATE: 84 BPM | HEIGHT: 62 IN

## 2019-04-10 DIAGNOSIS — M79.672 LEFT FOOT PAIN: ICD-10-CM

## 2019-04-10 DIAGNOSIS — M54.5 LOW BACK PAIN, UNSPECIFIED BACK PAIN LATERALITY, UNSPECIFIED CHRONICITY, WITH SCIATICA PRESENCE UNSPECIFIED: ICD-10-CM

## 2019-04-10 DIAGNOSIS — M24.572 CONTRACTURE OF LEFT ANKLE: ICD-10-CM

## 2019-04-10 DIAGNOSIS — M25.561 ACUTE PAIN OF BOTH KNEES: ICD-10-CM

## 2019-04-10 DIAGNOSIS — Z12.83 SCREENING, MALIGNANT NEOPLASM, SKIN: ICD-10-CM

## 2019-04-10 DIAGNOSIS — M21.41 FLAT FEET: ICD-10-CM

## 2019-04-10 DIAGNOSIS — Q61.3 POLYCYSTIC KIDNEY DISEASE: Primary | ICD-10-CM

## 2019-04-10 DIAGNOSIS — M25.562 ACUTE PAIN OF BOTH KNEES: ICD-10-CM

## 2019-04-10 DIAGNOSIS — M25.552 HIP PAIN, BILATERAL: ICD-10-CM

## 2019-04-10 DIAGNOSIS — Z71.89 ENCOUNTER FOR MEDICATION REVIEW AND COUNSELING: ICD-10-CM

## 2019-04-10 DIAGNOSIS — M21.42 FLAT FEET: ICD-10-CM

## 2019-04-10 DIAGNOSIS — M25.551 HIP PAIN, BILATERAL: ICD-10-CM

## 2019-04-10 DIAGNOSIS — L90.5 SCAR CONDITIONS/SKIN FIBROSIS: Primary | ICD-10-CM

## 2019-04-10 DIAGNOSIS — I10 ESSENTIAL HYPERTENSION: ICD-10-CM

## 2019-04-10 DIAGNOSIS — Z85.828 HISTORY OF SKIN CANCER: ICD-10-CM

## 2019-04-10 DIAGNOSIS — M72.2 PLANTAR FASCIITIS: Primary | ICD-10-CM

## 2019-04-10 DIAGNOSIS — D22.9 MULTIPLE NEVI: ICD-10-CM

## 2019-04-10 PROCEDURE — 99213 PR OFFICE/OUTPT VISIT, EST, LEVL III, 20-29 MIN: ICD-10-PCS | Mod: S$GLB,,, | Performed by: DERMATOLOGY

## 2019-04-10 PROCEDURE — 97140 MANUAL THERAPY 1/> REGIONS: CPT

## 2019-04-10 PROCEDURE — 97535 SELF CARE MNGMENT TRAINING: CPT

## 2019-04-10 PROCEDURE — 99214 PR OFFICE/OUTPT VISIT, EST, LEVL IV, 30-39 MIN: ICD-10-PCS | Mod: S$GLB,,, | Performed by: INTERNAL MEDICINE

## 2019-04-10 PROCEDURE — 99999 PR PBB SHADOW E&M-EST. PATIENT-LVL III: CPT | Mod: PBBFAC,,, | Performed by: INTERNAL MEDICINE

## 2019-04-10 PROCEDURE — 3008F PR BODY MASS INDEX (BMI) DOCUMENTED: ICD-10-PCS | Mod: CPTII,S$GLB,, | Performed by: INTERNAL MEDICINE

## 2019-04-10 PROCEDURE — 99213 OFFICE O/P EST LOW 20 MIN: CPT | Mod: S$GLB,,, | Performed by: DERMATOLOGY

## 2019-04-10 PROCEDURE — 99214 OFFICE O/P EST MOD 30 MIN: CPT | Mod: S$GLB,,, | Performed by: INTERNAL MEDICINE

## 2019-04-10 PROCEDURE — 99999 PR PBB SHADOW E&M-EST. PATIENT-LVL III: ICD-10-PCS | Mod: PBBFAC,,, | Performed by: DERMATOLOGY

## 2019-04-10 PROCEDURE — 99999 PR PBB SHADOW E&M-EST. PATIENT-LVL III: ICD-10-PCS | Mod: PBBFAC,,, | Performed by: INTERNAL MEDICINE

## 2019-04-10 PROCEDURE — 99999 PR PBB SHADOW E&M-EST. PATIENT-LVL III: CPT | Mod: PBBFAC,,, | Performed by: DERMATOLOGY

## 2019-04-10 PROCEDURE — 97110 THERAPEUTIC EXERCISES: CPT

## 2019-04-10 PROCEDURE — 3008F BODY MASS INDEX DOCD: CPT | Mod: CPTII,S$GLB,, | Performed by: INTERNAL MEDICINE

## 2019-04-10 NOTE — PROGRESS NOTES
"  Physical Therapy Daily Treatment Note     Name: Samanta Zimmerman  Clinic Number: [id number]    Therapy Diagnosis:   Encounter Diagnoses   Name Primary?    Plantar fasciitis Yes    Contracture of left ankle     Flat feet     Acute pain of both knees     Hip pain, bilateral     Left foot pain     Low back pain, unspecified back pain laterality, unspecified chronicity, with sciatica presence unspecified      Physician: Yariel Galindo DPM    Visit Date: 4/10/2019    Physician Orders: Evaluate and Treat  Medical Diagnosis: Plantar fasciitis   Evaluation Date: 3/27/19  Plan of Care Certification Period: 6/25/19  Visit #/Visits authorized: 3/20    Precautions: Standard    Subjective     History of Present Illness: Pt reports a history of left hip pain and B plantar fasciitis (L>R) which started last summer. Pt states that she has always had low arches B; however, has noticed that her left arch has collapsed significantly in the last year. Plantar fasciitis pain is worse first thing in the morning or after sitting for more than 10 minutes, states she feels like she has to stretch her foot back out again. She recently went to a podiatrist whom told her to get good arch supports for her foot, this seemed to make the pain worse, thus she went to another MD who prescribed PT and is going to give her cortisone shots in L foot. Pt states that she prefers to sleep on her stomach; however, she is no longer able to because she is 10 weeks pregnant. She currently alternates between sleeping on her back and sides; she has discomfort in her knee when sleeping on her sides, this eases some if she places a pillow between her knees. Pt also reports that she occasionally has low back pain, states that she "pulled a muscle" in her low back last week which caused shooting pain down into her L LE, this pain has eased some and is not as much of a concern at this point.     Pt reports: Pt reports compliance with HEP. States her " "calf muscles feel very tight following heel raises so she has been stretching a lot. She continues to have sharp pain in her heel when she wakes up in the morning, pain eases after a few minutes.    Response to previous treatment: Pt reported no soreness and decreased tension in calf following last appt.   Functional change: No noted change     Pain: 2/10 pain with walking walking.   Location: Sharp pain at left heel and tension in L calf muscles       Objective   Objective information below is from initial evaluation unless bolded today.    Ankle ROM    Right (degrees) Left (degrees)   Plantar Flexion WFL WFL   Dorsiflexion WFL WFL   Inversion WFL WFL   Eversion WFL WFL   Hip ROM  WFL   Pain with full hip IR ("stretch") WFL   Pain will full hip IR ("stretch")   Knee ROM WFL WFL      Joint Mobility    Right     Left   Patella Medial Glide 3/6 3/6   Patella Lateral Glide  3/6 3/6   Patella Superior [place]ia  3/6 3/6   Patella Inferior [place]ia  3/6 3/6         Strength    Right     Left   Anterior Tibialis 5/5 5/5   Gastrocnemius/Soleus 20/20 heel riase 12/20 heel raise, hip pain    Posterior Tibialis 4+/5 4+/5   Peroneals 4+/5 4+/5   Psoas 4+/5 4-/5   Quadriceps 5/5 4+/5  discomfort   Hamstrings 5/5 5/5   Hip IR 4/5 4/5  discomfort   Hip ER 4/5 4/5  discomfort   Gluteus Medius 4-/5 4-/5   X= not tested, test next session       Treatment:   Samanta received therapeutic exercises to develop strength, endurance and core stabilization for 25 minutes including:  Toe Yoga: 2 x 20 each in standing  Clamshells: 3 x 30 B  Bridges: 3 x 8 with marches  Lateral Resistors: red, 2 laps  Star: 5x B while maintaining arch of foot     Samanta received the following manual therapy techniques: Soft tissue Mobilization were applied to the: L intrinsics of foot, L gastrocnemius, L soleus, L posterior tibialis, and L peroneal muscles for 30 minutes.    Home Exercises Provided and Patient Education Provided     Education provided: "   Patient educated on biomechanical justification for therapeutic exercise and importance of compliance with HEP in order to improve overall imparirments and QOL. Pt educated on use of ankle brace to wear at night time in order to maintain the position of the ankle in neutral and decrease heel and calf pain in the mornings.    Written Home Exercises Provided: yes.  Exercises were reviewed and Samanta was able to demonstrate them prior to the end of the session.  Samanta demonstrated good  understanding of the education provided.     See EMR under Patient Instructions for exercises provided prior visit.    Assessment     Pt tolerated therapeutic exercise with minimal cueing to maintain proper form, no pain and reports of increased fatigue. Pt tolerated manual therapy with reports of decreased tension and pain in calf musculature following intervention.     Samanta is progressing well towards her goals.   Pt prognosis is Good.     Pt will continue to benefit from skilled outpatient physical therapy to address the deficits listed in the problem list box on initial evaluation, provide pt/family education and to maximize pt's level of independence in the home and community environment.     Pt's spiritual, cultural and educational needs considered and pt agreeable to plan of care and goals.     Goals:   Short Term Goals:  (4 weeks)  1.  Recent s/s trend is improving in order to progress towards LTG's.  2.  Patient will improve AROM to 1/2 of LTG in order to progress towards functional activities.   3.  Patient will patient will be independent with HEP in order to further progress and return to maximal function.   4.  Improve postural awareness.  5.  Patient will report improved function indicated by score of 85% ability on the Lower Extremity Functional Scale.      Long Term Goals:  (8  weeks)  1.  Patient will return to normal ADL, recreational, and work related activities with less pain and limitation.   2.   Patient will improve AROM to stated goals in order to return to maximal functional potential.  3.  Patient will improve strength to stated goals of appropriate musculature in order to improve functional independence  4.  Patient will demonstrate normal gait mechanics in order to minimize any compensation and return to PLOF.  5. Patient will report improved function indicated by score of greater than 92% ability on the Lower Extremity Functional Scale    Plan     Therapeutic exercises to add next session: Gait training while maintaining arches of foot     Prerna Casillas, PT

## 2019-04-10 NOTE — PATIENT INSTRUCTIONS
Monitoring Moles  Moles, also called nevi, are small, pigmented (colored) marks on the skin. They have no known purpose. Many moles appear before age 30, but they also increase frequently as people age. Moles most often are benign (not cancer) and harmless. But some become cancerous. Thats why you need to watch the moles on your body and tell your health care provider about any concern you.    What are moles?  Moles are a type of pigmented mayelin. Freckles, which often are sprinkled across the bridge of the nose, the cheeks, and the arms, are another type of pigmented mayelin. Moles can appear on any part of the body. There are many types, sizes, and shapes of moles. Most moles are solid brown. In most cases, they are flat or dome-shaped, smooth, and have well-defined edges. Freckles are flat.  Why worry about moles?  Most moles are benign and dont require treatment. You can have moles removed if you dont like the way they look or feel. But moles that appear after you are 30 or that change in certain ways may become a problem. These moles may turn into melanoma, a type of skin cancer. Melanoma is one of the fastest growing cancers in the [place], but it is often curable if caught early. But this disease can be life-threatening, particularly when not diagnosed early. The more moles someone has, the higher the risk. Risk is also higher for those who have had more lifetime exposure to the sun, severe blistering sunburns, exposure to tanning beds, a prior personal history of cancer, and those with a family history of skin cancer. To manage your risk, its smart to check your moles for changes and ask your health care provider to perform a thorough skin exam when you have a physical exam. To do this, you first need to learn where your moles are. Then, be sure to check your moles each month.    Checking your moles  You can check many of your moles each month. You can do this right after you shower and before you get  dressed. Check your body from head to toe. Then, make a list of your moles. If you find any new moles or changes in your moles, call your health care provider. To check your moles, youll need:  · A full-length mirror  · A stool or chair to sit on while you check your feet  If you have a lot of moles, take digital photos of them each month. Make sure to take photos both up close and from a distance. These can help you see if any moles change over time.  When to seek medical treatment  See your health care provider if your moles hurt, itch, ooze, bleed, thicken, become crusty, or show other changes. Also, be sure to call your health care provider if your moles show any of the following signs of melanoma:  · A change in size, shape, color, or elevation  · Asymmetry (when the sides dont match)  · Ragged, notched, or blurred borders  · Varied colors within the same mole  · Size is larger than 5 mm or 6 mm in diameter (the size of a pencil eraser)  © 9761-8714 [employer]. [address] [place] [address]. All rights reserved. This information is not intended as a substitute for professional medical care. Always follow your healthcare professional's instructions.

## 2019-04-10 NOTE — PROGRESS NOTES
Subjective:       Patient ID:  Samanta Zimmerman is a 34 y.o. 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 Brian on 6/10/16), last seen on 10/10/18. This is a high risk patient here to check for the development of new lesions. Denies lymphadenopathy, weight loss, or night sweats.      Review of Systems   Constitutional: Negative for fever, chills, weight loss, fatigue, night sweats and malaise.   HENT: Negative for headaches.    Respiratory: Negative for cough and shortness of breath.    Gastrointestinal: Negative for indigestion.   Skin: Positive for daily sunscreen use and activity-related sunscreen use. Negative for recent sunburn and wears hat.   Neurological: Negative for headaches.   Hematologic/Lymphatic: Negative for adenopathy. Does not bruise/bleed easily.        Objective:    Physical Exam   Constitutional: She appears well-developed and well-nourished. No distress.   HENT:   Mouth/Throat: Gums normal.  Lips normal.  Normal dentition.   Eyes: Lids are normal.  No conjunctival no injection.   Lymphadenopathy: No supraclavicular adenopathy is present.     She has no cervical adenopathy.     She has no axillary adenopathy.     She has no inguinal adenopathy.   Neurological: She is alert and oriented to person, place, and time. She is not disoriented.   Psychiatric: She has a normal mood and affect.   Skin:   Areas Examined (abnormalities noted in diagram):   Scalp / Hair Palpated and Inspected  Head / Face Inspection Performed  Neck Inspection Performed  Chest / Axilla Inspection Performed  Abdomen Inspection Performed  Genitals / Buttocks / Groin Inspection Performed  Back Inspection Performed  RUE Inspected  LUE Inspection Performed  RLE Inspected  LLE Inspection Performed  Nails and Digits Inspection Performed                    Diagram Legend     Erythematous scaling macule/papule c/w actinic keratosis       Vascular papule c/w angioma      Pigmented verrucoid papule/plaque c/w seborrheic keratosis      Yellow umbilicated papule c/w sebaceous hyperplasia      Irregularly shaped tan macule c/w lentigo     1-2 mm smooth white papules consistent with Milia      Movable subcutaneous cyst with punctum c/w epidermal inclusion cyst      Subcutaneous movable cyst c/w pilar cyst      Firm pink to brown papule c/w dermatofibroma      Pedunculated fleshy papule(s) c/w skin tag(s)      Evenly pigmented macule c/w junctional nevus     Mildly variegated pigmented, slightly irregular-bordered macule c/w mildly atypical nevus      Flesh colored to evenly pigmented papule c/w intradermal nevus       Pink pearly papule/plaque c/w basal cell carcinoma      Erythematous hyperkeratotic cursted plaque c/w SCC      Surgical scar with no sign of skin cancer recurrence      Open and closed comedones      Inflammatory papules and pustules      Verrucoid papule consistent consistent with wart     Erythematous eczematous patches and plaques     Dystrophic onycholytic nail with subungual debris c/w onychomycosis     Umbilicated papule    Erythematous-base heme-crusted tan verrucoid plaque consistent with inflamed seborrheic keratosis     Erythematous Silvery Scaling Plaque c/w Psoriasis     See annotation      Assessment / Plan:        Scar conditions/skin fibrosis  History of melanoma  History of skin cancer  Scar of the left superior helix, hx of MM (Breslow depth 0.67 mm, excised on 6/10/16). No evidence of recurrence on physical exam today. No lymphadenopathy appreciated of cervical, axillary, or inguinal lymph nodes. Continue routine skin surveillance (q 6 months). Reviewed ABCDEF of melanoma. Daily sunscreen advised    Multiple nevi  Reassurance given.  Discussed ABCDEF of melanoma and changes for patient to look for.  AAD Handout given. Discussed importance  of daily use of sunscreen which is broad-spectrum and has a minimum SPF of 30.             Follow up in about 6 months (around 10/10/2019).

## 2019-04-10 NOTE — PROGRESS NOTES
NEPHROLOGY CLINIC FOLLOWUP NOTE:  Date of clinic visit: 4/10/19     REASON FOR FOLLOWUP AND CHIEF COMPLAINT:  History of polycystic kidney disease.     HISTORY OF PRESENT ILLNESS:  Ms. Samanta Zimmerman is a 33-year-old  female with a h/o of PCKD who presents for f/u. Pt is now pregnant at 12 weeks of gestation by using IVF using donor egg from her significant other. She was on lisinopril, which was already stopped before the conception occurred. Pt was last seen by us 2 months ago. She reports slight palpitation from time to time and occasional morning nausea. No acute or new c/o's today. Generally she feels well.      To review, on her last visit we also briefly discussed the availability of using Jynarque (after the pregnancy is successfully completed), which is available now to treat polycystic kidney disease. This medicine slows down the growth of the renal cysts.     PAST MEDICAL HISTORY:  1.  Polycystic kidney disease. Was acquired paternally   2.  Dysmenorrhea.  3.  Migraine headaches.  4.  Maxillary sinusitis.  5.  Hypertriglyceridemia.  6.  Liver cysts.  7.  GERD.  8.  Allergic rhinitis.     PAST SURGICAL HISTORY:  Reviewed.     FAMILY HISTORY:  Reviewed.     ALLERGIES:  Reviewed, to Singulair.     MEDICATIONS:  Fully reviewed, not on ACE inhibitors at this point.     REVIEW OF SYSTEMS:  No recent hospitalizations.  GENERAL:  Negative.  HEAD, EYES, EARS, NOSE, AND THROAT:  Negative.  CARDIAC:  Negative.  PULMONARY:  Negative.  GASTROINTESTINAL:  Negative.  GENITOURINARY:  Negative.  PSYCHOLOGICAL:  Negative.  NEUROLOGICAL:  Negative.  ENDOCRINE:  Negative.  HEMATOLOGIC AND ONCOLOGIC:  Negative.  INFECTIOUS DISEASE:  Negative.  The rest of the review of systems negative.     PHYSICAL EXAMINATION:  VITAL SIGNS:  Blood pressure is 126/80, pulse 84, weight is 85.2 Kg, last visit 84.2.  GENERAL:  She is cooperative, pleasant.   Ambulating by herself, in no acute distress.    HEENT:  Mucous membranes  moist.  NECK:  No JVD.  HEART:  Regular rate and rhythm, S1 and S2 audible.  No rubs.  CHEST:  Clear to auscultation.  No rales or wheezes.  Breathing symmetric, unlabored.  ABDOMEN:  Soft and nontender.  EXTREMITIES:  trace edema.     Labs reviewed:  BMP  Lab Results   Component Value Date     04/03/2019    K 4.2 04/03/2019     04/03/2019    CO2 25 04/03/2019    BUN 12 04/03/2019    CREATININE 0.6 04/03/2019    CALCIUM 9.7 04/03/2019    ANIONGAP 9 04/03/2019    ESTGFRAFRICA >60.0 04/03/2019    EGFRNONAA >60.0 04/03/2019     Lab Results   Component Value Date    WBC 5.47 12/14/2018    HGB 12.7 12/14/2018    HCT 38.1 12/14/2018    MCV 86 12/14/2018     12/14/2018     U/a: no protein, no blood, no leukocytes     ASSESSMENT AND PLAN:  This is a 33-year-old female with polycystic kidney disease presents for followup. Pt is now 12 weeks pregnant.   The impression is as follows:    1.  Renal. Normal s Cr. Stable and normal renal function  K normal  Mild hyponatremia due to physiologic effect of pregnancy  Mild hypoalbuminemia due to the same  H/o of PCKD    2.  Long-term treatment with Jynarque/Tolvaptan AFTER pregnancy was discussed with the patient.     3.  Hypertension.  Blood pressure is well controlled at this point  Goal for BP is <130/80  Recommend use of methyldopa 250 mg po bid to tid, if BP above goal persistently     4. Pregnancy: 12 weeks of gestation.  S/p IVF using egg from partner. Will not pass on PCKD gene    PLAN AND RECOMMENDATION:  As detailed above.    Opportunity for question and discussion provided.  Total time spent 25 minutes.  The patient was new to me.  Time was needed to   review the records, the patient evaluation, documentation and face-to-face   discussion with the patient.  More than 50% of the time was spent on counseling   and coordination of care.  Level IV visit.  RTC 1 month for close f/u.    Malvin Nagy MD

## 2019-04-16 ENCOUNTER — PATIENT MESSAGE (OUTPATIENT)
Dept: OBSTETRICS AND GYNECOLOGY | Facility: CLINIC | Age: 34
End: 2019-04-16

## 2019-04-17 ENCOUNTER — INITIAL PRENATAL (OUTPATIENT)
Dept: OBSTETRICS AND GYNECOLOGY | Facility: CLINIC | Age: 34
End: 2019-04-17
Payer: COMMERCIAL

## 2019-04-17 ENCOUNTER — PATIENT MESSAGE (OUTPATIENT)
Dept: ADMINISTRATIVE | Facility: OTHER | Age: 34
End: 2019-04-17

## 2019-04-17 VITALS — DIASTOLIC BLOOD PRESSURE: 66 MMHG | WEIGHT: 188.5 LBS | SYSTOLIC BLOOD PRESSURE: 124 MMHG | BODY MASS INDEX: 34.48 KG/M2

## 2019-04-17 DIAGNOSIS — Z67.91 RH NEGATIVE STATE IN ANTEPARTUM PERIOD: ICD-10-CM

## 2019-04-17 DIAGNOSIS — C43.9 DESMOPLASTIC MALIGNANT MELANOMA: ICD-10-CM

## 2019-04-17 DIAGNOSIS — Z34.90 NORMAL INTRAUTERINE PREGNANCY, ANTEPARTUM: ICD-10-CM

## 2019-04-17 DIAGNOSIS — O26.899 RH NEGATIVE STATE IN ANTEPARTUM PERIOD: ICD-10-CM

## 2019-04-17 PROBLEM — Z23 NEED FOR VACCINATION AGAINST STREPTOCOCCUS PNEUMONIAE: Status: RESOLVED | Noted: 2018-06-06 | Resolved: 2019-04-17

## 2019-04-17 PROCEDURE — 99999 PR PBB SHADOW E&M-EST. PATIENT-LVL III: CPT | Mod: PBBFAC,,, | Performed by: ADVANCED PRACTICE MIDWIFE

## 2019-04-17 PROCEDURE — 99999 PR PBB SHADOW E&M-EST. PATIENT-LVL III: ICD-10-PCS | Mod: PBBFAC,,, | Performed by: ADVANCED PRACTICE MIDWIFE

## 2019-04-17 PROCEDURE — 99213 PR OFFICE/OUTPT VISIT, EST, LEVL III, 20-29 MIN: ICD-10-PCS | Mod: S$GLB,,, | Performed by: ADVANCED PRACTICE MIDWIFE

## 2019-04-17 PROCEDURE — 3008F PR BODY MASS INDEX (BMI) DOCUMENTED: ICD-10-PCS | Mod: CPTII,S$GLB,, | Performed by: ADVANCED PRACTICE MIDWIFE

## 2019-04-17 PROCEDURE — 99213 OFFICE O/P EST LOW 20 MIN: CPT | Mod: S$GLB,,, | Performed by: ADVANCED PRACTICE MIDWIFE

## 2019-04-17 PROCEDURE — 3008F BODY MASS INDEX DOCD: CPT | Mod: CPTII,S$GLB,, | Performed by: ADVANCED PRACTICE MIDWIFE

## 2019-04-17 NOTE — PROGRESS NOTES
New OB.    Consent signed.    Infertility records provided and reviewed.   Lab results are not available, Anjelica will fax to us but I was able to review the results on her phone and they were all normal  Rh-negative status noted.     States maternity 21 done, it was reassuring, sex is to be revealed soon.    Gonorrhea and chlamydia  Negative  Last Pap 08/01/2018-negative.    Continue with prenatal vitamins

## 2019-04-17 NOTE — PATIENT INSTRUCTIONS
Adapting to Pregnancy: Second Trimester  Keep up the healthy habits you started in your first trimester. You might be a little more tired than normal. So plan your day wisely. Look at the tips below and choose the ones that suit your lifestyle.    If you have any questions, check with your healthcare provider.   If you work  If you can, adjust your work with your employer to fit your needs. Try these tips:  · If you stand for long periods, find ways to do some tasks while sitting. Also, try to stand with 1 foot resting on a low stool or ledge. Shift your weight from foot to foot often. Wear low-heeled shoes.  · If you sit, keep your knees level with your hips. Rest your feet on a firm surface. Sit tall with support for your low back.  · If you work long hours, ask about adjusting your schedule. Try taking shorter breaks more often.  When you travel  The second trimester may be the best time for any travel. Talk to your healthcare provider about any special plans you may need to make. Always:  · Wear a seat belt. Fasten the lap part under your belly. Wear the shoulder part also.  · Take breaks often during long trips by car or plane. Move around to stretch your legs.  · Drink plenty of fluids on flights. The air in plane cabins is very dry.  · Avoid hot climates or high altitudes if you are not used to them.  · Avoid places where the food and water might make you sick.  · Make sure you are up-to-date on all immunizations, including the flu vaccine. This is especially important when traveling overseas.  Taking time to relax  Find time to rest and relax at work or at home:  · Take short time-outs daily. Do relaxation exercises.  · Breathe deeply during stressful times.  · Try not to take on too much. Plan tasks for times when you have the most energy.  · Take naps when you can. Or just sit and relax.  · After week 16, avoid lying on your back for more than a few minutes. Instead, lie on your side. Switch sides  often.  Continuing as lovers  Unless your healthcare provider tells you otherwise, there is no reason to stop having sex now. Blood supply increases to the pelvic area in the second trimester. Because of this, sex might be more enjoyable. Try different positions and see whats best. Also, talk to your partner about any changes in desire. Spotting may happen after sex. Be sure to let your healthcare provider know if there is heavy bleeding.  Keeping your environment safe  You can still clean house and use scented products. Just take some simple precautions:  · Wear gloves when using cleaning fluids.  · Open windows to let in fresh air. Use a fan if you paint.  · Avoid secondhand smoke.  · Dont breathe fumes from nail polish, hair spray, cleansers, or other chemicals.  Date Last Reviewed: 8/16/2015 © 2000-2017 "[employer] [address] [place] [address]. All rights reserved. This information is not intended as a substitute for professional medical care. Always follow your healthcare professional's instructions.        Pregnancy: Your Second Trimester Changes    Each day, you and your baby are changing and growing together. Heres a quick look at whats happening to both of you.  How You Are Changing  Even when you dont notice it, your body is adapting to meet the needs of your growing baby. The changes in your body might also affect your moods.  Your body  Your uterus expands as baby grows. As the weeks go by, you will feel more pressure on your bladder, stomach, and other organs. You may notice some skin color changes on your forehead, nose, or cheeks. Freckles may darken, and moles may grow. You may notice a darker line on your abdomen between your belly button and pubic bone in the midline.  Your moods  The second trimester is often easier than the first. Still, be prepared for mood swings. These are due to the increase in hormones (chemicals that affect the way organs work) produced by  your body. These mood swings are a normal part of pregnancy.  How your baby is growing       Month 4  Babys heartbeat may be heard with a Doppler (hand-held ultrasound device) by 9 to 10 weeks. Eyebrows, eyelashes and fingernails begin to form.  Month 5  You may feel your baby move. After a growth spurt, your baby nears 10 inches. Month 6  Babys fingerprints have formed. Your baby weighs about 1  to 2 pounds and is about 12 inches long.   Date Last Reviewed: 8/16/2015  © 1644-2377 [employer]. [address] [place] [address], [place] [address]. All rights reserved. This information is not intended as a substitute for professional medical care. Always follow your healthcare professional's instructions.

## 2019-04-22 ENCOUNTER — PATIENT MESSAGE (OUTPATIENT)
Dept: OBSTETRICS AND GYNECOLOGY | Facility: CLINIC | Age: 34
End: 2019-04-22

## 2019-04-23 ENCOUNTER — TELEPHONE (OUTPATIENT)
Dept: PODIATRY | Facility: CLINIC | Age: 34
End: 2019-04-23

## 2019-04-23 ENCOUNTER — PATIENT MESSAGE (OUTPATIENT)
Dept: PODIATRY | Facility: CLINIC | Age: 34
End: 2019-04-23

## 2019-04-24 ENCOUNTER — OFFICE VISIT (OUTPATIENT)
Dept: PODIATRY | Facility: CLINIC | Age: 34
End: 2019-04-24
Payer: COMMERCIAL

## 2019-04-24 VITALS
BODY MASS INDEX: 35.7 KG/M2 | RESPIRATION RATE: 17 BRPM | HEART RATE: 77 BPM | HEIGHT: 62 IN | DIASTOLIC BLOOD PRESSURE: 79 MMHG | WEIGHT: 194 LBS | SYSTOLIC BLOOD PRESSURE: 130 MMHG

## 2019-04-24 DIAGNOSIS — M24.572 CONTRACTURE, LEFT ANKLE: ICD-10-CM

## 2019-04-24 DIAGNOSIS — M72.2 PLANTAR FASCIITIS, LEFT: Primary | ICD-10-CM

## 2019-04-24 DIAGNOSIS — M79.672 PAIN IN LEFT FOOT: ICD-10-CM

## 2019-04-24 PROCEDURE — 20550 PR INJECT TENDON SHEATH/LIGAMENT: ICD-10-PCS | Mod: LT,S$GLB,, | Performed by: PODIATRIST

## 2019-04-24 PROCEDURE — 99499 NO LOS: ICD-10-PCS | Mod: S$GLB,,, | Performed by: PODIATRIST

## 2019-04-24 PROCEDURE — 99999 PR PBB SHADOW E&M-EST. PATIENT-LVL III: CPT | Mod: PBBFAC,,, | Performed by: PODIATRIST

## 2019-04-24 PROCEDURE — 20550 NJX 1 TENDON SHEATH/LIGAMENT: CPT | Mod: LT,S$GLB,, | Performed by: PODIATRIST

## 2019-04-24 PROCEDURE — 99499 UNLISTED E&M SERVICE: CPT | Mod: S$GLB,,, | Performed by: PODIATRIST

## 2019-04-24 PROCEDURE — 99999 PR PBB SHADOW E&M-EST. PATIENT-LVL III: ICD-10-PCS | Mod: PBBFAC,,, | Performed by: PODIATRIST

## 2019-04-24 RX ORDER — DEXAMETHASONE SODIUM PHOSPHATE 4 MG/ML
4 INJECTION, SOLUTION INTRA-ARTICULAR; INTRALESIONAL; INTRAMUSCULAR; INTRAVENOUS; SOFT TISSUE ONCE
Status: COMPLETED | OUTPATIENT
Start: 2019-04-24 | End: 2019-04-24

## 2019-04-24 RX ORDER — TRIAMCINOLONE ACETONIDE 40 MG/ML
40 INJECTION, SUSPENSION INTRA-ARTICULAR; INTRAMUSCULAR ONCE
Status: COMPLETED | OUTPATIENT
Start: 2019-04-24 | End: 2019-04-24

## 2019-04-24 RX ADMIN — DEXAMETHASONE SODIUM PHOSPHATE 4 MG: 4 INJECTION, SOLUTION INTRA-ARTICULAR; INTRALESIONAL; INTRAMUSCULAR; INTRAVENOUS; SOFT TISSUE at 12:04

## 2019-04-24 RX ADMIN — TRIAMCINOLONE ACETONIDE 40 MG: 40 INJECTION, SUSPENSION INTRA-ARTICULAR; INTRAMUSCULAR at 12:04

## 2019-04-24 NOTE — PROGRESS NOTES
Subjective:       Patient ID: Samanta Zimmerman is a 34 y.o. female.    Chief Complaint: Foot Problem (second injection left, pain 10/10, ambulation without ismael, non diabetic, wear tennis, PCP Dr. Crow)      HPI: Samanta Zimmerman presents to the office today, for follow-up concerning plantar fasciitis of the left foot. At this time, pains are 10/10 and are described as moderate to severe in nature. States persistent the analgic gait pattern. States mildly improved post static dyskinesia.  Pains are approximately 50% improved.  States walking and standing is not as painful as prior, but persists somewhat. To this juncture, patient has had 1 cortisone injections to the left foot. States no NSAIDs due to being 14 weeks pregnant. Patient's Primary Care Provider is Lisbeth Crow MD.     Review of patient's allergies indicates:   Allergen Reactions    Singulair [montelukast]      Itching all over       Past Medical History:   Diagnosis Date    Autosomal recessive polycystic kidneys     GERD (gastroesophageal reflux disease)     History of uterine fibroid     Melanoma 04/25/2016    left superior helix (breslow dept 0.67mm)    Migraine headache     PONV (postoperative nausea and vomiting)     post op nausea       Family History   Problem Relation Age of Onset    Hypertension Father     Kidney disease Father     Diabetes Paternal Grandmother     Cancer Maternal Grandmother     Thrombophilia Neg Hx     Breast cancer Neg Hx     Colon cancer Neg Hx     Ovarian cancer Neg Hx     Eczema Neg Hx     Lupus Neg Hx     Psoriasis Neg Hx     Melanoma Neg Hx     Uterine cancer Neg Hx     Cervical cancer Neg Hx        Social History     Socioeconomic History    Marital status:      Spouse name: Not on file    Number of children: 0    Years of education: Not on file    Highest education level: Not on file   Occupational History    Occupation: Optometry      Employer: [employer]      Comment: [employer] [facility] [place]   Social Needs    Financial resource strain: Not on file    Food insecurity:     Worry: Not on file     Inability: Not on file    Transportation needs:     Medical: Not on file     Non-medical: Not on file   Tobacco Use    Smoking status: Never Smoker    Smokeless tobacco: Never Used   Substance and Sexual Activity    Alcohol use: Not Currently     Comment: socially    Drug use: No    Sexual activity: Yes     Partners: Female   Lifestyle    Physical activity:     Days per week: Not on file     Minutes per session: Not on file    Stress: Not on file   Relationships    Social connections:     Talks on phone: Not on file     Gets together: Not on file     Attends [employer] service: Not on file     Active member of club or organization: Not on file     Attends meetings of clubs or organizations: Not on file     Relationship status: Not on file   Other Topics Concern    Are you pregnant or think you may be? No    Breast-feeding No   Social History Narrative    Not on file       Past Surgical History:   Procedure Laterality Date    BIOPSY-LYMPH NODE Left 6/10/2016    [facility] 2ND [facility]    ETHMOIDECTOMY Bilateral 12/11/2015    [facility]    EXCISION-SKIN Left 6/10/2016    [facility]ND [facility]    LYMPH NODE BIOPSY      MALIGNANT SKIN LESION EXCISION  06/10/2016    NASAL SEPTUM SURGERY      POLYPECTOMY, UTERUS, HYSTEROSCOPIC N/A 1/10/2019    [facility]    RENAL EXPLORATION  2011    SEPTOPLASTY N/A 12/11/2015    [facility] OR    SINUS SURGERY FUNCTIONAL ENDOSCOPIC Bilateral 12/11/2015    [facility] at [facility] OR       Review of Systems   Constitutional: Negative for chills, fatigue and fever.   HENT: Negative for hearing loss.    Eyes: Negative for photophobia and visual disturbance.  "  Respiratory: Negative for cough, chest tightness, shortness of breath and wheezing.    Cardiovascular: Negative for chest pain and palpitations.   Gastrointestinal: Negative for constipation, diarrhea, nausea and vomiting.   Endocrine: Negative for cold intolerance and heat intolerance.   Genitourinary: Negative for flank pain.   Musculoskeletal: Positive for gait problem. Negative for neck pain and neck stiffness.   Skin: Negative for wound.   Neurological: Negative for light-headedness and headaches.   Psychiatric/Behavioral: Negative for sleep disturbance.         Objective:   /79 (BP Location: Right arm, Patient Position: Sitting, BP Method: Medium (Automatic))   Pulse 77   Resp 17   Ht 5' 2" (1.575 m)   Wt 88 kg (194 lb 0.1 oz)   LMP 01/18/2019   BMI 35.48 kg/m²     X-Ray Foot Complete Bilateral  Narrative: EXAMINATION:  XR FOOT COMPLETE 3 VIEW BILATERAL    CLINICAL HISTORY:  Pain in right foot    TECHNIQUE:  AP, lateral, and oblique views of both feet were performed.    COMPARISON:  None    FINDINGS:  No acute fracture or dislocation.  Small dorsal and plantar calcaneal enthesophytes noted bilaterally.  The joint spaces appear to be relatively well maintained bilaterally.  Impression: 1.  As above    Electronically signed by: Marco Clifton DO  Date:    11/21/2018  Time:    15:03      LOWER EXTREMITY PHYSICAL EXAMINATION    NEUROLOGY: Negative Tinel's Sign and negative Valleix Sign. No neurological sensations with compression of the area of Zapata's Nerve in the area of the Abductor Hallucis muscle belly.    ORTHOPEDIC:  Mild to moderate tenderness to palpation of the area around the plantar medial calcaneal tubercle on the left foot. Pains are characterized as sharp and stabbing-like with direct palpation of the area. There is also no pain to palpation of the immediate plantar aspect of the heel, and mild pains to the lateral band of the fascia. No edema is noted. No fullness is noted. No pains " or defects are noted within the plantar fascia at the arch. No plantar fibromas are noted. No defects are noted within the ligament. No pains with medial to lateral compression of the heel. Equinus contracture is noted. Antalgic gait pattern is noted.     DERMATOLOGY: No ecchymosis is noted.  Skin is supple, dry and intact. Skin is supple.  No hyperkeratosis noted. No calluses.  No open wounds or ulcerations are noted.  No palpable plantar fibromas noted.    VASCULAR: On the left foot, the dorsalis pedis pulse is 2/4 and the posterior tibial pulse is 2/4. Capillary refill time is less than 3 seconds. Hair growth is present on the dorsum of the foot and at the digits. No rubor is present. Proximal to distal temperature is warm to warm.      Assessment:     1. Plantar fasciitis, left    2. Pain in left foot    3. Contracture, left ankle          Plan:     Plantar fasciitis, left  -     dexamethasone injection 4 mg  -     triamcinolone acetonide injection 40 mg    Pain in left foot  -     dexamethasone injection 4 mg  -     triamcinolone acetonide injection 40 mg    Contracture, left ankle        Following sterile preparation with alcohol swab and/or betadine paint, injection was given into and around the area of the left plantar medial calcaneal tubercle, using 25-gauge needle. Injection consisted of approximately 0.5cc of Dexamethasone Sodium Phosphate, 0.5cc of Kenalog-40 and 0.5cc of 1% Lidocaine w/ or w/o Epinephrine or 0.25% or 0.50% Marcaine plain. Bandage application thereafter. Patient tolerated procedure well, and without complications or complaints. Patient educated that the area of pathology, might actually be slightly more painful, due to the injection, over the course of the next one to two days.              Future Appointments   Date Time Provider Department Center   5/15/2019  1:00 PM Shagufta Padgett CNM [facility] [place]   6/5/2019  7:30 [facility]   6/5/2019   8:20 AM Shagufta Padgett CNM [facility] [place]   9/25/2019  1:00 PM Lala Dangelo MD [facility] [place]

## 2019-05-09 ENCOUNTER — TELEPHONE (OUTPATIENT)
Dept: OBSTETRICS AND GYNECOLOGY | Facility: CLINIC | Age: 34
End: 2019-05-09

## 2019-05-09 ENCOUNTER — PATIENT MESSAGE (OUTPATIENT)
Dept: OBSTETRICS AND GYNECOLOGY | Facility: CLINIC | Age: 34
End: 2019-05-09

## 2019-05-09 NOTE — TELEPHONE ENCOUNTER
Called to address question, no answer left message on machine  advising natural bug resistant options  including plants in the yard to repel insects and natural oils or ointments to be applied to the skin if any further questions to call

## 2019-05-15 ENCOUNTER — ROUTINE PRENATAL (OUTPATIENT)
Dept: OBSTETRICS AND GYNECOLOGY | Facility: CLINIC | Age: 34
End: 2019-05-15
Payer: COMMERCIAL

## 2019-05-15 VITALS
BODY MASS INDEX: 34.96 KG/M2 | WEIGHT: 191.13 LBS | SYSTOLIC BLOOD PRESSURE: 124 MMHG | DIASTOLIC BLOOD PRESSURE: 76 MMHG

## 2019-05-15 DIAGNOSIS — Z34.90 NORMAL INTRAUTERINE PREGNANCY, ANTEPARTUM: Primary | ICD-10-CM

## 2019-05-15 DIAGNOSIS — O09.529 ANTEPARTUM MULTIGRAVIDA OF ADVANCED MATERNAL AGE: ICD-10-CM

## 2019-05-15 PROCEDURE — 0502F PR SUBSEQUENT PRENATAL CARE: ICD-10-PCS | Mod: CPTII,S$GLB,, | Performed by: ADVANCED PRACTICE MIDWIFE

## 2019-05-15 PROCEDURE — 99999 PR PBB SHADOW E&M-EST. PATIENT-LVL III: ICD-10-PCS | Mod: PBBFAC,,, | Performed by: ADVANCED PRACTICE MIDWIFE

## 2019-05-15 PROCEDURE — 99999 PR PBB SHADOW E&M-EST. PATIENT-LVL III: CPT | Mod: PBBFAC,,, | Performed by: ADVANCED PRACTICE MIDWIFE

## 2019-05-15 PROCEDURE — 0502F SUBSEQUENT PRENATAL CARE: CPT | Mod: CPTII,S$GLB,, | Performed by: ADVANCED PRACTICE MIDWIFE

## 2019-05-15 RX ORDER — CETIRIZINE HYDROCHLORIDE 10 MG/1
TABLET ORAL
COMMUNITY

## 2019-06-04 ENCOUNTER — DOCUMENTATION ONLY (OUTPATIENT)
Dept: REHABILITATION | Facility: HOSPITAL | Age: 34
End: 2019-06-04

## 2019-06-04 NOTE — PROGRESS NOTES
Outpatient Therapy Discharge Summary     Name: Samanta Zimmerman  Clinic Number: [id number]     Therapy Diagnosis:        Encounter Diagnoses   Name Primary?    Plantar fasciitis Yes    Contracture of left ankle      Flat feet      Acute pain of both knees      Hip pain, bilateral      Left foot pain      Low back pain, unspecified back pain laterality, unspecified chronicity, with sciatica presence unspecified        Physician: Yariel Galindo DPM    Physician Orders: Evaluate and Treat  Medical Diagnosis: Plantar fasciitis   Evaluation Date: 3/27/19    Date of Last visit: 4/10/19  Total Visits Received: 3  Cancelled Visits: 3  No Show Visits: 0    Assessment    Goals:   Short Term Goals:  (4 weeks)  1.  Recent s/s trend is improving in order to progress towards LTG's.  2.  Patient will improve AROM to 1/2 of LTG in order to progress towards functional activities.   3.  Patient will patient will be independent with HEP in order to further progress and return to maximal function.   4.  Improve postural awareness.  5.  Patient will report improved function indicated by score of 85% ability on the Lower Extremity Functional Scale.      Long Term Goals:  (8  weeks)  1.  Patient will return to normal ADL, recreational, and work related activities with less pain and limitation.   2.  Patient will improve AROM to stated goals in order to return to maximal functional potential.  3.  Patient will improve strength to stated goals of appropriate musculature in order to improve functional independence  4.  Patient will demonstrate normal gait mechanics in order to minimize any compensation and return to PLOF.  5. Patient will report improved function indicated by score of greater than 92% ability on the Lower Extremity Functional Scale    Discharge reason: Patient has not attended therapy since 4/10/19    Plan   This patient is discharged from Physical Therapy    Prerna Casillas PT, DPT

## 2019-06-05 ENCOUNTER — ROUTINE PRENATAL (OUTPATIENT)
Dept: OBSTETRICS AND GYNECOLOGY | Facility: CLINIC | Age: 34
End: 2019-06-05
Payer: COMMERCIAL

## 2019-06-05 ENCOUNTER — PROCEDURE VISIT (OUTPATIENT)
Dept: OBSTETRICS AND GYNECOLOGY | Facility: CLINIC | Age: 34
End: 2019-06-05
Payer: COMMERCIAL

## 2019-06-05 VITALS — BODY MASS INDEX: 35.12 KG/M2 | DIASTOLIC BLOOD PRESSURE: 64 MMHG | SYSTOLIC BLOOD PRESSURE: 116 MMHG | WEIGHT: 192 LBS

## 2019-06-05 DIAGNOSIS — O09.529 ANTEPARTUM MULTIGRAVIDA OF ADVANCED MATERNAL AGE: Primary | ICD-10-CM

## 2019-06-05 DIAGNOSIS — Z36.89 ENCOUNTER FOR FETAL ANATOMIC SURVEY: ICD-10-CM

## 2019-06-05 DIAGNOSIS — Z34.90 NORMAL INTRAUTERINE PREGNANCY, ANTEPARTUM: ICD-10-CM

## 2019-06-05 PROCEDURE — 76805 PR US, OB 14+WKS, TRANSABD, SINGLE GESTATION: ICD-10-PCS | Mod: S$GLB,,, | Performed by: OBSTETRICS & GYNECOLOGY

## 2019-06-05 PROCEDURE — 99999 PR PBB SHADOW E&M-EST. PATIENT-LVL III: ICD-10-PCS | Mod: PBBFAC,,, | Performed by: ADVANCED PRACTICE MIDWIFE

## 2019-06-05 PROCEDURE — 0502F PR SUBSEQUENT PRENATAL CARE: ICD-10-PCS | Mod: CPTII,S$GLB,, | Performed by: ADVANCED PRACTICE MIDWIFE

## 2019-06-05 PROCEDURE — 99999 PR PBB SHADOW E&M-EST. PATIENT-LVL III: CPT | Mod: PBBFAC,,, | Performed by: ADVANCED PRACTICE MIDWIFE

## 2019-06-05 PROCEDURE — 0502F SUBSEQUENT PRENATAL CARE: CPT | Mod: CPTII,S$GLB,, | Performed by: ADVANCED PRACTICE MIDWIFE

## 2019-06-05 PROCEDURE — 76805 OB US >/= 14 WKS SNGL FETUS: CPT | Mod: S$GLB,,, | Performed by: OBSTETRICS & GYNECOLOGY

## 2019-06-05 NOTE — PATIENT INSTRUCTIONS
If You Are Rh Negative     A Rho(D) immune globulin injection protects against Rh disease in this and future pregnancies.   If youre Rh negative, ask your healthcare provider about getting treated with Rho(D) immune globulin. Even if you miscarry or dont deliver the baby, you will still need treatment. The health of any baby you have in the future depends on it.  When are you treated?  If your blood has not formed Rh antibodies, youll be treated during week 28 of your pregnancy. You also may be treated any time theres a chance that fetal blood has mixed with yours. For example, this might be after an amniocentesis, a prenatal test. Or it might be if you have vaginal bleeding earlier than 28 weeks. Treatment is an injection of a medicine called Rho(D) immune globulin. Rho(D) immune globulin stops Rh antibodies from forming. It wont harm you or the fetus. After you give birth, your babys blood will be tested. If its Rh positive, youll be given Rho(D) immune globulin again within 3 days. If its Rh negative, you wont need Rho(D) immune globulin until your next pregnancy.  Preventing future problems  Your chance of forming Rh antibodies increases with each pregnancy. This is true even for an ectopic pregnancy (the fertilized egg is outside the uterus). It is also true for pregnancies that end in miscarriage or . In these cases, you will most likely get a Rho(D) immune globulin injection. This is because your body can make Rh antibodies even if you dont deliver a baby. Rh antibodies can cause problems in future pregnancies.  If you have Rh antibodies  If antibodies have already formed (sensitization), Rho(D) immune globulin cant protect the fetus. You and the fetus will need special care during pregnancy. Your healthcare provider will explain the details to you.   Date Last Reviewed: 2016  © 1269-7175 [employer]. [address] [place] [address], [place] [address]. All rights reserved. This  information is not intended as a substitute for professional medical care. Always follow your healthcare professional's instructions.

## 2019-06-05 NOTE — PROGRESS NOTES
Anatomy ultrasound, breech, anterior placenta, three-vessel cord with normal insertion, normal fluid, EFW 12 oz at 23 percentile, normal female anatomy with sub opt a heart and spine -will repeat next visit -reassuring  A negative- discussed completely    Patient viewed [employer] video, Learn Your Baby and was provided with [facility] handouts: Baby Led Feeding and Rooming In. Discussed benefits of rooming-in 24 hours a day, benefits of cue-based feeding, the impact of feeding frequency on milk supply, and basic breastfeeding management. Encouraged patient to attend [employer] Prenatal [employer]feeding Class and to download the [employer] mobile mariah if she has not already done so. Patient verbalizes understanding.

## 2019-06-06 NOTE — PROGRESS NOTES
Unable to transfer prenatal labs in the usual fashion, will dictate results here space dated 12/14/2018  A negative, negative gonorrhea and chlamydia hemoglobin 12.7, hematocrit 38.1, hepatitis B negative, hepatitis-C negative, HIV negative, RPR nonreactive, rubella immune, TSH 2.135, varicella positive antibody, cytomegalovirus   Negative,

## 2019-06-24 ENCOUNTER — PATIENT MESSAGE (OUTPATIENT)
Dept: OBSTETRICS AND GYNECOLOGY | Facility: CLINIC | Age: 34
End: 2019-06-24

## 2019-06-26 ENCOUNTER — TELEPHONE (OUTPATIENT)
Dept: OBSTETRICS AND GYNECOLOGY | Facility: CLINIC | Age: 34
End: 2019-06-26

## 2019-06-26 NOTE — TELEPHONE ENCOUNTER
Called to address question, advised to continue with good nutritious well-balanced diet and also perhaps relaxing soaking the path of the nighttime.  Low back  Massage.  Hope these comfort measures are helpful

## 2019-07-08 ENCOUNTER — PATIENT MESSAGE (OUTPATIENT)
Dept: OBSTETRICS AND GYNECOLOGY | Facility: CLINIC | Age: 34
End: 2019-07-08

## 2019-07-10 ENCOUNTER — ROUTINE PRENATAL (OUTPATIENT)
Dept: OBSTETRICS AND GYNECOLOGY | Facility: CLINIC | Age: 34
End: 2019-07-10
Payer: COMMERCIAL

## 2019-07-10 ENCOUNTER — PROCEDURE VISIT (OUTPATIENT)
Dept: OBSTETRICS AND GYNECOLOGY | Facility: CLINIC | Age: 34
End: 2019-07-10
Payer: COMMERCIAL

## 2019-07-10 VITALS
SYSTOLIC BLOOD PRESSURE: 124 MMHG | DIASTOLIC BLOOD PRESSURE: 66 MMHG | WEIGHT: 199.06 LBS | BODY MASS INDEX: 36.41 KG/M2

## 2019-07-10 DIAGNOSIS — Z67.91 RH NEGATIVE STATE IN ANTEPARTUM PERIOD: ICD-10-CM

## 2019-07-10 DIAGNOSIS — O09.529 ANTEPARTUM MULTIGRAVIDA OF ADVANCED MATERNAL AGE: ICD-10-CM

## 2019-07-10 DIAGNOSIS — Z34.90 NORMAL INTRAUTERINE PREGNANCY, ANTEPARTUM: Primary | ICD-10-CM

## 2019-07-10 DIAGNOSIS — O26.899 RH NEGATIVE STATE IN ANTEPARTUM PERIOD: ICD-10-CM

## 2019-07-10 PROCEDURE — 76816 OB US FOLLOW-UP PER FETUS: CPT | Mod: S$GLB,,, | Performed by: OBSTETRICS & GYNECOLOGY

## 2019-07-10 PROCEDURE — 99999 PR PBB SHADOW E&M-EST. PATIENT-LVL II: ICD-10-PCS | Mod: PBBFAC,,, | Performed by: ADVANCED PRACTICE MIDWIFE

## 2019-07-10 PROCEDURE — 99999 PR PBB SHADOW E&M-EST. PATIENT-LVL II: CPT | Mod: PBBFAC,,, | Performed by: ADVANCED PRACTICE MIDWIFE

## 2019-07-10 PROCEDURE — 0502F PR SUBSEQUENT PRENATAL CARE: ICD-10-PCS | Mod: CPTII,S$GLB,, | Performed by: ADVANCED PRACTICE MIDWIFE

## 2019-07-10 PROCEDURE — 0502F SUBSEQUENT PRENATAL CARE: CPT | Mod: CPTII,S$GLB,, | Performed by: ADVANCED PRACTICE MIDWIFE

## 2019-07-10 PROCEDURE — 76816 PR  US,PREGNANT UTERUS,F/U,TRANSABD APP: ICD-10-PCS | Mod: S$GLB,,, | Performed by: OBSTETRICS & GYNECOLOGY

## 2019-07-10 NOTE — PROGRESS NOTES
Follow-up ultrasound- anterior placenta three-vessel cord normal fluid EFW 1 lb 11 oz, face heart spine appear normal-anatomy complete-reassuring.    Twenty-eight week labs next visit  A negative -RhoGAM workup at 28 weeks per protocol.    Doing well

## 2019-07-30 ENCOUNTER — PATIENT MESSAGE (OUTPATIENT)
Dept: OBSTETRICS AND GYNECOLOGY | Facility: CLINIC | Age: 34
End: 2019-07-30

## 2019-07-31 ENCOUNTER — LAB VISIT (OUTPATIENT)
Dept: LAB | Facility: HOSPITAL | Age: 34
End: 2019-07-31
Attending: ADVANCED PRACTICE MIDWIFE
Payer: COMMERCIAL

## 2019-07-31 ENCOUNTER — ROUTINE PRENATAL (OUTPATIENT)
Dept: OBSTETRICS AND GYNECOLOGY | Facility: CLINIC | Age: 34
End: 2019-07-31
Payer: COMMERCIAL

## 2019-07-31 VITALS
DIASTOLIC BLOOD PRESSURE: 78 MMHG | WEIGHT: 199.06 LBS | BODY MASS INDEX: 36.41 KG/M2 | SYSTOLIC BLOOD PRESSURE: 124 MMHG

## 2019-07-31 DIAGNOSIS — O09.529 ANTEPARTUM MULTIGRAVIDA OF ADVANCED MATERNAL AGE: Primary | ICD-10-CM

## 2019-07-31 DIAGNOSIS — Z34.90 NORMAL INTRAUTERINE PREGNANCY, ANTEPARTUM: ICD-10-CM

## 2019-07-31 DIAGNOSIS — O26.899 RH NEGATIVE STATE IN ANTEPARTUM PERIOD: ICD-10-CM

## 2019-07-31 DIAGNOSIS — Z67.91 RH NEGATIVE STATE IN ANTEPARTUM PERIOD: ICD-10-CM

## 2019-07-31 LAB
ABO + RH BLD: NORMAL
BASOPHILS # BLD AUTO: 0.01 K/UL (ref 0–0.2)
BASOPHILS NFR BLD: 0.1 % (ref 0–1.9)
BLD GP AB SCN CELLS X3 SERPL QL: NORMAL
DIFFERENTIAL METHOD: ABNORMAL
EOSINOPHIL # BLD AUTO: 0 K/UL (ref 0–0.5)
EOSINOPHIL NFR BLD: 0.5 % (ref 0–8)
ERYTHROCYTE [DISTWIDTH] IN BLOOD BY AUTOMATED COUNT: 12.6 % (ref 11.5–14.5)
GLUCOSE SERPL-MCNC: 138 MG/DL (ref 70–140)
HCT VFR BLD AUTO: 32.2 % (ref 37–48.5)
HGB BLD-MCNC: 10.8 G/DL (ref 12–16)
IMM GRANULOCYTES # BLD AUTO: 0.03 K/UL (ref 0–0.04)
IMM GRANULOCYTES NFR BLD AUTO: 0.4 % (ref 0–0.5)
LYMPHOCYTES # BLD AUTO: 1.4 K/UL (ref 1–4.8)
LYMPHOCYTES NFR BLD: 17.8 % (ref 18–48)
MCH RBC QN AUTO: 29.3 PG (ref 27–31)
MCHC RBC AUTO-ENTMCNC: 33.5 G/DL (ref 32–36)
MCV RBC AUTO: 88 FL (ref 82–98)
MONOCYTES # BLD AUTO: 0.3 K/UL (ref 0.3–1)
MONOCYTES NFR BLD: 3.4 % (ref 4–15)
NEUTROPHILS # BLD AUTO: 5.9 K/UL (ref 1.8–7.7)
NEUTROPHILS NFR BLD: 77.8 % (ref 38–73)
NRBC BLD-RTO: 0 /100 WBC
PLATELET # BLD AUTO: 264 K/UL (ref 150–350)
PMV BLD AUTO: 11.1 FL (ref 9.2–12.9)
RBC # BLD AUTO: 3.68 M/UL (ref 4–5.4)
WBC # BLD AUTO: 7.59 K/UL (ref 3.9–12.7)

## 2019-07-31 PROCEDURE — 86850 RBC ANTIBODY SCREEN: CPT

## 2019-07-31 PROCEDURE — 99999 PR PBB SHADOW E&M-EST. PATIENT-LVL III: ICD-10-PCS | Mod: PBBFAC,,, | Performed by: ADVANCED PRACTICE MIDWIFE

## 2019-07-31 PROCEDURE — 99999 PR PBB SHADOW E&M-EST. PATIENT-LVL III: CPT | Mod: PBBFAC,,, | Performed by: ADVANCED PRACTICE MIDWIFE

## 2019-07-31 PROCEDURE — 86703 HIV-1/HIV-2 1 RESULT ANTBDY: CPT

## 2019-07-31 PROCEDURE — 82950 GLUCOSE TEST: CPT

## 2019-07-31 PROCEDURE — 0502F PR SUBSEQUENT PRENATAL CARE: ICD-10-PCS | Mod: CPTII,S$GLB,, | Performed by: ADVANCED PRACTICE MIDWIFE

## 2019-07-31 PROCEDURE — 85025 COMPLETE CBC W/AUTO DIFF WBC: CPT

## 2019-07-31 PROCEDURE — 36415 COLL VENOUS BLD VENIPUNCTURE: CPT

## 2019-07-31 PROCEDURE — 86592 SYPHILIS TEST NON-TREP QUAL: CPT

## 2019-07-31 PROCEDURE — 0502F SUBSEQUENT PRENATAL CARE: CPT | Mod: CPTII,S$GLB,, | Performed by: ADVANCED PRACTICE MIDWIFE

## 2019-07-31 NOTE — PATIENT INSTRUCTIONS
Adapting to Pregnancy: Third Trimester    Although common during pregnancy, some discomforts may seem worse in the final weeks. Simple lifestyle changes can help. Take care of yourself. And ask your partner to help out with small tasks.  Limiting leg problems  Ways to combat leg issues:  · Wear support hose all day.  · Avoid snug shoes and clothes that bind, like tight pants and socks with elastic tops.  · Sit with your feet and legs raised often.  Caring for your breasts  Tips to follow include:  · Wash with plain water. Avoid using harsh soaps or rubbing alcohol. They may cause dryness.  · Wear a nursing bra for extra support. It can also hide any leaks from your nipples.  Controlling hemorrhoids  Ways to avoid hemorrhoids include:  · Eat foods that are high in fiber. Also, exercise and drink enough fluids. This will reduce constipation and hemorrhoids.  · Sleep and nap on your side. This limits pressure on the veins of your rectum.  · Try not to stand or sit for long periods.  Controlling back pain  As your body changes during pregnancy, your back must work in new ways. Back pain is due to many causes. Physical changes in your body can strain your back and its supporting muscles. Also, hormones (chemicals that carry messages throughout the body) increase during pregnancy. This can affect how your muscles and joints work together. All of these changes can lead to pain. Pain may be felt in the upper or lower back. Pain is also common in the pelvis. Some pregnant women have sciatica. This is pain caused by pressure on the sciatic nerve running down the back of the leg. Ask your healthcare provider for specific tips and exercises to help control your back pain.  Tips to help you rest  Good rest and sleep will help you feel better. Here are some ideas:  · Ask your partner to massage your shoulders, neck, or back.  · Limit the errands you do each day.  · Lie down in the afternoon or after work for a few  minutes.  · Take a warm bath before you go to sleep.  · Drink warm milk or teas without caffeine.  · Avoid coffee, black tea, and cola.  Stopping heartburn  · Avoid spicy or acidic foods.  · Eat small amounts more often. Eat slowly. · Wait 2 hours after eating before lying down.  · Sleep with your upper body raised 6 inches.   Managing mood swings  Ways to manage mood swings include:  · Know that mood changes are normal.  · Exercise often, but get plenty of rest.  · Address any concerns and limit stress. Talking to your partner, other women, or your healthcare provider may help.  Dealing with urinary frequency  Tips to deal with having to urinate often include:  · Drink plenty of water all day. If you drink a lot in the evening, though, you may have to get up more in the night.  · Limit coffee, black tea, and cola.  Date Last Reviewed: 8/16/2015  © 3707-1058 [employer]. [address] [place] [address]. All rights reserved. This information is not intended as a substitute for professional medical care. Always follow your healthcare professional's instructions.        Pregnancy: Your Third Trimester Changes  As the baby grows, your body changes too. You may also see signs that your body is getting ready for labor. Be patient. Within a few more weeks, your baby will be born.    How you are changing  Your body is preparing for the birth of your baby. Some of the most common changes are listed below. If you have any questions or concerns, ask your healthcare provider:  · Youll gain more weight from fluids, extra blood, and fat deposits.  · Your breasts will grow as your body gets ready to feed the baby. They may be more tender. You may also notice a slight yellow or white discharge from the nipples.  · Discharge from your vagina may increase. This is normal.  · You might see some skin color changes on your forehead, cheeks, or nose. Most of these will go away after you deliver.  How your baby is  growing    Month 7  Your baby can open and close his or her eyes, and weighs around 4 pounds. If born prematurely (too early), your baby would likely survive with special care.   Month 8  Your baby is building up body fat, and weighs around 6 pounds.    Month 9  Your baby weighs nearly 7 pounds and is about 18 to 20 inches long. In other words, any day now...   Date Last Reviewed: 8/16/2015  © 6951-6214 [employer]. [address] [place] [address], [place] [address]. All rights reserved. This information is not intended as a substitute for professional medical care. Always follow your healthcare professional's instructions.

## 2019-07-31 NOTE — PROGRESS NOTES
28 week lab today with RhoGAM workup -give RhoGAM per protocol -order placed.    Complaining of bilateral carpal tunnel syndrome - discussed.  Advised wrist splints at night time.  Baby friendly defer to next visit secondary to testing in progress  Consider Tdap next visit

## 2019-07-31 NOTE — NURSING
Verified pt by two identifiers. Allergies and medications reviewed.   Rhogam given IM to left ventrogluteal using aseptic technique. No discomfort noted, pt tolerated well.   Pt advised to wait 15 min in office to monitor for any reactions. Pt verbalized understanding.

## 2019-08-01 LAB
HIV 1+2 AB+HIV1 P24 AG SERPL QL IA: NEGATIVE
RPR SER QL: NORMAL

## 2019-08-09 ENCOUNTER — OFFICE VISIT (OUTPATIENT)
Dept: ORTHOPEDICS | Facility: CLINIC | Age: 34
End: 2019-08-09
Payer: COMMERCIAL

## 2019-08-09 ENCOUNTER — PATIENT MESSAGE (OUTPATIENT)
Dept: OBSTETRICS AND GYNECOLOGY | Facility: CLINIC | Age: 34
End: 2019-08-09

## 2019-08-09 ENCOUNTER — PATIENT MESSAGE (OUTPATIENT)
Dept: ORTHOPEDICS | Facility: CLINIC | Age: 34
End: 2019-08-09

## 2019-08-09 VITALS
HEIGHT: 62 IN | DIASTOLIC BLOOD PRESSURE: 86 MMHG | RESPIRATION RATE: 18 BRPM | SYSTOLIC BLOOD PRESSURE: 124 MMHG | HEART RATE: 93 BPM | WEIGHT: 199 LBS | BODY MASS INDEX: 36.62 KG/M2

## 2019-08-09 DIAGNOSIS — M65.311 TRIGGER FINGER OF RIGHT THUMB: Primary | ICD-10-CM

## 2019-08-09 PROCEDURE — 99203 PR OFFICE/OUTPT VISIT, NEW, LEVL III, 30-44 MIN: ICD-10-PCS | Mod: S$GLB,,, | Performed by: ORTHOPAEDIC SURGERY

## 2019-08-09 PROCEDURE — 99999 PR PBB SHADOW E&M-EST. PATIENT-LVL III: CPT | Mod: PBBFAC,,, | Performed by: ORTHOPAEDIC SURGERY

## 2019-08-09 PROCEDURE — 3008F PR BODY MASS INDEX (BMI) DOCUMENTED: ICD-10-PCS | Mod: CPTII,S$GLB,, | Performed by: ORTHOPAEDIC SURGERY

## 2019-08-09 PROCEDURE — 99999 PR PBB SHADOW E&M-EST. PATIENT-LVL III: ICD-10-PCS | Mod: PBBFAC,,, | Performed by: ORTHOPAEDIC SURGERY

## 2019-08-09 PROCEDURE — 99203 OFFICE O/P NEW LOW 30 MIN: CPT | Mod: S$GLB,,, | Performed by: ORTHOPAEDIC SURGERY

## 2019-08-09 PROCEDURE — 3008F BODY MASS INDEX DOCD: CPT | Mod: CPTII,S$GLB,, | Performed by: ORTHOPAEDIC SURGERY

## 2019-08-09 NOTE — PROGRESS NOTES
Subjective:     Patient ID: Samanta Zimmerman is a 34 y.o. female.    Chief Complaint: Pain and Swelling of the Left Hand; Pain and Swelling of the Right Hand; Pain of the Left Wrist; and Pain of the Right Wrist    Here for right thumb issue, no injury, 29 weeks pregnant.    Hand Pain    The pain is present in the left wrist, left hand, right wrist and right hand. This is a new problem. The current episode started 1 to 4 weeks ago. The problem occurs constantly. The problem has been gradually worsening. The quality of the pain is described as aching, burning, shooting, throbbing, tightness and sharp. The pain is at a severity of 8/10. Associated symptoms include joint locking, joint swelling and a limited range of motion. Pertinent negatives include no fever or numbness. The symptoms are aggravated by activity, twisting, touching and lying down. She has tried brace/corset, OTC pain meds and cold for the symptoms. The treatment provided no relief. Physical therapy was not tried.      Past Medical History:   Diagnosis Date    Autosomal recessive polycystic kidneys     GERD (gastroesophageal reflux disease)     History of uterine fibroid     Melanoma 04/25/2016    left superior helix (breslow dept 0.67mm)    Migraine headache     PONV (postoperative nausea and vomiting)     post op nausea     Past Surgical History:   Procedure Laterality Date    BIOPSY-LYMPH NODE Left 6/10/2016    Performed by [facility] at [facility] OR 2ND FLR    ETHMOIDECTOMY Bilateral 12/11/2015    [facility]    EXCISION-SKIN Left 6/10/2016    Performed by Mike [facility] at [facility] OR 2ND FLR    LYMPH NODE BIOPSY      MALIGNANT SKIN LESION EXCISION  06/10/2016    NASAL SEPTUM SURGERY      POLYPECTOMY, UTERUS, HYSTEROSCOPIC N/A 1/10/2019    Per[facility] at [facility] OR    RENAL EXPLORATION  2011    SEPTOPLASTY N/A 12/11/2015    [facility]    SINUS SURGERY FUNCTIONAL ENDOSCOPIC Bilateral 12/11/2015    Performed by [facility]al [facility] at [facility] OR     Family History   Problem Relation Age of Onset    Hypertension Father     Kidney disease Father     Diabetes Paternal Grandmother     Cancer Maternal Grandmother     Thrombophilia Neg Hx     Breast cancer Neg Hx     Colon cancer Neg Hx     Ovarian cancer Neg Hx     Eczema Neg Hx     Lupus Neg Hx     Psoriasis Neg Hx     Melanoma Neg Hx     Uterine cancer Neg Hx     Cervical cancer Neg Hx      Social History     Socioeconomic History    Marital status:      Spouse name: Not on file    Number of children: 0    Years of education: Not on file    Highest education level: Not on file   Occupational History    Occupation: Optometry      Employer: [employer]     Comment: [employer] [facility] [place]   Social Needs    Financial resource strain: Not on file    Food insecurity:     Worry: Not on file     Inability: Not on file    Transportation needs:     Medical: Not on file     Non-medical: Not on file   Tobacco Use    Smoking status: Never Smoker    Smokeless tobacco: Never Used   Substance and Sexual Activity    Alcohol use: Not Currently     Comment: socially    Drug use: No    Sexual activity: Yes     Partners: Female   Lifestyle    Physical activity:     Days per week: Not on file     Minutes per session: Not on file    Stress: Not on file   Relationships    Social connections:     Talks on phone: Not on file     Gets together: Not on file     Attends [employer] service: Not on file     Active member of club or organization: Not on file     Attends meetings of clubs or organizations: Not on file     Relationship status: Not on file   Other Topics Concern    Are you pregnant or think you may be? No    Breast-feeding No   Social History Narrative    Not on file     Medication List with Changes/Refills   Current Medications    CETIRIZINE (ZYRTEC) 10 MG TABLET        FISH OIL-OMEGA-3  FATTY ACIDS 300-1,000 MG CAPSULE    Take 2 g by mouth once daily.    LACTOBACILLUS RHAMNOSUS GG (CULTURELLE) 10 BILLION CELL CAPSULE        MAGNESIUM ORAL    Take by mouth once daily.     OMEPRAZOLE (PRILOSEC) 20 MG CAPSULE    Take 1 capsule (20 mg total) by mouth once daily.    ONDANSETRON (ZOFRAN) 4 MG TABLET    TAKE ONE TABLET BY MOUTH EVERY 12 HOURS AS NEEDED FOR NAUSEA    PRENAT.VITS,JUSTO,MIN-IRON-FOLIC (PRENATAL VITAMIN) TAB    Take by mouth once daily.     Review of patient's allergies indicates:   Allergen Reactions    Singulair [montelukast]      Itching all over     Review of Systems   Constitution: Negative for chills and fever.   HENT: Negative for ear discharge and hearing loss.    Eyes: Negative for blurred vision and visual disturbance.   Cardiovascular: Negative for chest pain and leg swelling.   Respiratory: Negative for cough and shortness of breath.    Endocrine: Negative for polyuria.   Hematologic/Lymphatic: Negative for bleeding problem.   Skin: Negative for rash.   Musculoskeletal: Negative for back pain, joint pain, joint swelling, muscle cramps and muscle weakness.   Gastrointestinal: Negative for nausea and vomiting.   Genitourinary: Negative for hematuria.   Neurological: Negative for loss of balance, numbness and paresthesias.   Psychiatric/Behavioral: Negative for altered mental status.       Objective:   Body mass index is 36.4 kg/m².  Vitals:    08/09/19 1139   BP: 124/86   Pulse: 93   Resp: 18                General    Vitals reviewed.  Constitutional: She is oriented to person, place, and time. She appears well-developed and well-nourished. No distress.   HENT:   Right Ear: External ear normal.   Left Ear: External ear normal.   Nose: Nose normal.   Eyes: Pupils are equal, round, and reactive to light. Right eye exhibits no discharge. Left eye exhibits no discharge.   Neck: Normal range of motion.   Pulmonary/Chest: Effort normal. No respiratory distress.   Abdominal: Soft.    Neurological: She is alert and oriented to person, place, and time. No cranial nerve deficit. She exhibits normal muscle tone. Coordination normal.   Psychiatric: She has a normal mood and affect. Her behavior is normal. Judgment and thought content normal.             Right Hand/Wrist Exam     Inspection   Scars: Wrist - absent   Effusion: Wrist - absent   Bruising: Wrist - absent   Deformity: Wrist - deformity     Range of Motion     Wrist   Extension:  50 normal   Flexion:  70 normal   Abduction: 20 normal  Adduction: 35 normal    Tests   Phalens Sign: negative  Tinel's sign (median nerve): negative  Finkelstein's test: negative  Carpal Tunnel Compression Test: negative  Cubital Tunnel Compression Test: negative      Other     Neuorologic Exam    Median Distribution: normal  Ulnar Distribution: normal  Radial Distribution: normal      Left Hand/Wrist Exam     Inspection   Scars: Wrist - absent   Effusion: Wrist - absent   Bruising: Wrist - absent   Deformity: Wrist - absent     Range of Motion     Wrist   Extension:  50 normal   Flexion:  70 normal   Abduction: 20 normal  Adduction: 35 normal    Tests   Phalens Sign: negative  Tinel's sign (median nerve): negative  Finkelstein's test: negative  Carpal Tunnel Compression Test: negative  Cubital Tunnel Compression Test: negative      Other     Sensory Exam  Median Distribution: normal  Ulnar Distribution: normal  Radial Distribution: normal      Right Elbow Exam     Inspection   Scars: absent  Effusion: absent  Bruising: absent  Deformity: absent  Atrophy: absent    Range of Motion   Extension:  0 normal   Flexion:  130 normal   Pronation: normal   Supination:  80 normal     Tests   Varus: negative  Valgus: negative  Tinel's sign (cubital tunnel): negative  Tennis Elbow: negative  Golfer's Elbow: negative  Radial Capitellar Grind: negative    Other   Sensation: normal    Comments:  Tender to palpation over right thumb A1 pulley, no triggering      Left Elbow  Exam     Inspection   Scars: absent  Effusion: absent  Bruising: absent  Deformity: absent  Atrophy: absent    Range of Motion   Extension:  0 normal   Flexion:  130 normal   Pronation: normal   Supination:  80 normal     Tests   Varus: negative  Tinel's sign (cubital tunnel): negative  Tennis Elbow: negative  Golfer's Elbow: negative  Radial Capitellar Grind: negative    Other   Sensation: normal        Muscle Strength   Right Upper Extremity   Wrist extension: 5/5/5   Wrist flexion: 5/5/5   : 5/5/5   Pinch Mechanism: 5/5  Elbow Pronation:  5/5   Elbow Supination:  5/5   Elbow Extension: 5/5  Elbow Flexion: 5/5  Intrinsics: 5/5  EPL (Extensor Pollicis Longus): 5/5  Left Upper Extremity  Wrist extension: 5/5/5   Wrist flexion: 5/5/5   :  5/5/5   Pinch Mechanism: 5/5  Elbow Pronation:  5/5   Elbow Supination:  5/5   Elbow Extension: 5/5  Elbow Flexion: 5/5  Intrinsics: 5/5  EPL (Extensor Pollicis Longus): 5/5    Vascular Exam     Right Pulses      Radial:                    2+      Left Pulses      Radial:                    2+      Capillary Refill  Right Hand: normal capillary refill  Left Hand: normal capillary refill    Edema  Right Forearm: absent  Left Forearm: absent      Relevant imaging results reviewed and interpreted by me, discussed with the patient and / or family today none  Assessment:     Encounter Diagnosis   Name Primary?    Trigger finger of right thumb Yes        Plan:     We reviewed with Samanta today, the pathology and natural history of her diagnosis. We had an extensive discussion as to the conservative treatment and management of their condition. We also discussed the variety of treatment options to include medication, physical therapy, diagnostic testing as well as other treatments.The decision was made to go forward with:    -Night splinting    -defer to OB regarding any oral steroids-they can send medrol in if they approve    -OT      Disclaimer: This note was prepared using a  voice recognition system and is likely to have sound alike errors within the text.

## 2019-08-14 ENCOUNTER — ROUTINE PRENATAL (OUTPATIENT)
Dept: OBSTETRICS AND GYNECOLOGY | Facility: CLINIC | Age: 34
End: 2019-08-14
Payer: COMMERCIAL

## 2019-08-14 VITALS — SYSTOLIC BLOOD PRESSURE: 120 MMHG | DIASTOLIC BLOOD PRESSURE: 68 MMHG | BODY MASS INDEX: 37.1 KG/M2 | WEIGHT: 202.81 LBS

## 2019-08-14 DIAGNOSIS — O09.529 ANTEPARTUM MULTIGRAVIDA OF ADVANCED MATERNAL AGE: ICD-10-CM

## 2019-08-14 DIAGNOSIS — Z67.91 RH NEGATIVE STATE IN ANTEPARTUM PERIOD: ICD-10-CM

## 2019-08-14 DIAGNOSIS — Z34.90 NORMAL INTRAUTERINE PREGNANCY, ANTEPARTUM: Primary | ICD-10-CM

## 2019-08-14 DIAGNOSIS — O26.899 RH NEGATIVE STATE IN ANTEPARTUM PERIOD: ICD-10-CM

## 2019-08-14 PROCEDURE — 0502F SUBSEQUENT PRENATAL CARE: CPT | Mod: CPTII,S$GLB,, | Performed by: ADVANCED PRACTICE MIDWIFE

## 2019-08-14 PROCEDURE — 0502F PR SUBSEQUENT PRENATAL CARE: ICD-10-PCS | Mod: CPTII,S$GLB,, | Performed by: ADVANCED PRACTICE MIDWIFE

## 2019-08-14 PROCEDURE — 99999 PR PBB SHADOW E&M-EST. PATIENT-LVL III: CPT | Mod: PBBFAC,,, | Performed by: ADVANCED PRACTICE MIDWIFE

## 2019-08-14 PROCEDURE — 99999 PR PBB SHADOW E&M-EST. PATIENT-LVL III: ICD-10-PCS | Mod: PBBFAC,,, | Performed by: ADVANCED PRACTICE MIDWIFE

## 2019-08-14 RX ORDER — METHYLPREDNISOLONE 4 MG/1
TABLET ORAL
Qty: 1 PACKAGE | Refills: 0 | Status: ON HOLD | OUTPATIENT
Start: 2019-08-14 | End: 2019-09-24 | Stop reason: HOSPADM

## 2019-08-14 NOTE — PATIENT INSTRUCTIONS
Pregnancy: Your Third Trimester Changes  As the baby grows, your body changes too. You may also see signs that your body is getting ready for labor. Be patient. Within a few more weeks, your baby will be born.    How you are changing  Your body is preparing for the birth of your baby. Some of the most common changes are listed below. If you have any questions or concerns, ask your healthcare provider:  · Youll gain more weight from fluids, extra blood, and fat deposits.  · Your breasts will grow as your body gets ready to feed the baby. They may be more tender. You may also notice a slight yellow or white discharge from the nipples.  · Discharge from your vagina may increase. This is normal.  · You might see some skin color changes on your forehead, cheeks, or nose. Most of these will go away after you deliver.  How your baby is growing    Month 7  Your baby can open and close his or her eyes, and weighs around 4 pounds. If born prematurely (too early), your baby would likely survive with special care.   Month 8  Your baby is building up body fat, and weighs around 6 pounds.    Month 9  Your baby weighs nearly 7 pounds and is about 18 to 20 inches long. In other words, any day now...   Date Last Reviewed: 8/16/2015 © 2000-2017 [employer]. [address] [place] [address], [place] [address]. All rights reserved. This information is not intended as a substitute for professional medical care. Always follow your healthcare professional's instructions.        Adapting to Pregnancy: Third Trimester    Although common during pregnancy, some discomforts may seem worse in the final weeks. Simple lifestyle changes can help. Take care of yourself. And ask your partner to help out with small tasks.  Limiting leg problems  Ways to combat leg issues:  · Wear support hose all day.  · Avoid snug shoes and clothes that bind, like tight pants and socks with elastic tops.  · Sit with your feet and legs raised often.  Caring  for your breasts  Tips to follow include:  · Wash with plain water. Avoid using harsh soaps or rubbing alcohol. They may cause dryness.  · Wear a nursing bra for extra support. It can also hide any leaks from your nipples.  Controlling hemorrhoids  Ways to avoid hemorrhoids include:  · Eat foods that are high in fiber. Also, exercise and drink enough fluids. This will reduce constipation and hemorrhoids.  · Sleep and nap on your side. This limits pressure on the veins of your rectum.  · Try not to stand or sit for long periods.  Controlling back pain  As your body changes during pregnancy, your back must work in new ways. Back pain is due to many causes. Physical changes in your body can strain your back and its supporting muscles. Also, hormones (chemicals that carry messages throughout the body) increase during pregnancy. This can affect how your muscles and joints work together. All of these changes can lead to pain. Pain may be felt in the upper or lower back. Pain is also common in the pelvis. Some pregnant women have sciatica. This is pain caused by pressure on the sciatic nerve running down the back of the leg. Ask your healthcare provider for specific tips and exercises to help control your back pain.  Tips to help you rest  Good rest and sleep will help you feel better. Here are some ideas:  · Ask your partner to massage your shoulders, neck, or back.  · Limit the errands you do each day.  · Lie down in the afternoon or after work for a few minutes.  · Take a warm bath before you go to sleep.  · Drink warm milk or teas without caffeine.  · Avoid coffee, black tea, and cola.  Stopping heartburn  · Avoid spicy or acidic foods.  · Eat small amounts more often. Eat slowly. · Wait 2 hours after eating before lying down.  · Sleep with your upper body raised 6 inches.   Managing mood swings  Ways to manage mood swings include:  · Know that mood changes are normal.  · Exercise often, but get plenty of  rest.  · Address any concerns and limit stress. Talking to your partner, other women, or your healthcare provider may help.  Dealing with urinary frequency  Tips to deal with having to urinate often include:  · Drink plenty of water all day. If you drink a lot in the evening, though, you may have to get up more in the night.  · Limit coffee, black tea, and cola.  Date Last Reviewed: 8/16/2015  © 5891-2277 [employer]. [address] [place] [address]. All rights reserved. This information is not intended as a substitute for professional medical care. Always follow your healthcare professional's instructions.

## 2019-08-14 NOTE — PROGRESS NOTES
"Passed glucose  Orthopedic evaluation of bilateral wrist discomfort - note reviewed.  Right trigger thumb with thumb splint being worn and some relief.  Medrol dose pack was advised with OB approval.  Prescription sent today.  Consider PT.    Breastfeeding discussed.    Birth plan discussed    Patient viewed [employer] video, Fall in Love and was provided with [facility] handouts: Benefits of Breastfeeding, "Exclusive Breastfeeding," and Skin to Skin with Your Baby. Discussed benefits of skin to skin, the magic first hour, delaying routine procedures, benefits of breastfeeding, and the importance of the first early feeding, and the importance of exclusive breastfeeding. Encouraged patient to attend [facility]s Prenatal Breastfeeding Class and to download the [employer] mobile mariah if she has not already done so.  Patient verbalizes understanding.  "

## 2019-08-15 ENCOUNTER — PATIENT MESSAGE (OUTPATIENT)
Dept: OBSTETRICS AND GYNECOLOGY | Facility: CLINIC | Age: 34
End: 2019-08-15

## 2019-08-16 ENCOUNTER — TELEPHONE (OUTPATIENT)
Dept: ORTHOPEDICS | Facility: CLINIC | Age: 34
End: 2019-08-16

## 2019-08-16 NOTE — TELEPHONE ENCOUNTER
Patient canceled her appointment that was scheduled for 9/27/2019 @11:40 AM . Patient states that her condition improved. Patient will schedule if she needs anything. -CODY

## 2019-08-29 ENCOUNTER — PATIENT MESSAGE (OUTPATIENT)
Dept: ORTHOPEDICS | Facility: CLINIC | Age: 34
End: 2019-08-29

## 2019-09-03 ENCOUNTER — LAB VISIT (OUTPATIENT)
Dept: LAB | Facility: HOSPITAL | Age: 34
End: 2019-09-03
Attending: ADVANCED PRACTICE MIDWIFE
Payer: COMMERCIAL

## 2019-09-03 ENCOUNTER — PATIENT MESSAGE (OUTPATIENT)
Dept: OBSTETRICS AND GYNECOLOGY | Facility: CLINIC | Age: 34
End: 2019-09-03

## 2019-09-03 ENCOUNTER — ROUTINE PRENATAL (OUTPATIENT)
Dept: OBSTETRICS AND GYNECOLOGY | Facility: CLINIC | Age: 34
End: 2019-09-03
Payer: COMMERCIAL

## 2019-09-03 VITALS
DIASTOLIC BLOOD PRESSURE: 78 MMHG | BODY MASS INDEX: 39.44 KG/M2 | SYSTOLIC BLOOD PRESSURE: 128 MMHG | WEIGHT: 215.63 LBS

## 2019-09-03 DIAGNOSIS — R03.0 ELEVATED BP WITHOUT DIAGNOSIS OF HYPERTENSION: ICD-10-CM

## 2019-09-03 DIAGNOSIS — Z34.90 NORMAL INTRAUTERINE PREGNANCY, ANTEPARTUM: Primary | ICD-10-CM

## 2019-09-03 PROCEDURE — 0502F SUBSEQUENT PRENATAL CARE: CPT | Mod: CPTII,S$GLB,, | Performed by: ADVANCED PRACTICE MIDWIFE

## 2019-09-03 PROCEDURE — 85025 COMPLETE CBC W/AUTO DIFF WBC: CPT

## 2019-09-03 PROCEDURE — 99999 PR PBB SHADOW E&M-EST. PATIENT-LVL III: CPT | Mod: PBBFAC,,, | Performed by: ADVANCED PRACTICE MIDWIFE

## 2019-09-03 PROCEDURE — 99999 PR PBB SHADOW E&M-EST. PATIENT-LVL III: ICD-10-PCS | Mod: PBBFAC,,, | Performed by: ADVANCED PRACTICE MIDWIFE

## 2019-09-03 PROCEDURE — 84156 ASSAY OF PROTEIN URINE: CPT

## 2019-09-03 PROCEDURE — 36415 COLL VENOUS BLD VENIPUNCTURE: CPT

## 2019-09-03 PROCEDURE — 0502F PR SUBSEQUENT PRENATAL CARE: ICD-10-PCS | Mod: CPTII,S$GLB,, | Performed by: ADVANCED PRACTICE MIDWIFE

## 2019-09-03 PROCEDURE — 80053 COMPREHEN METABOLIC PANEL: CPT

## 2019-09-03 NOTE — PROGRESS NOTES
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    Blood pressure 128/78, denies headaches nausea vomiting visual disturbances.  Lower limb edema is noted and slightly hyperreflexic.  13 lb weight gain since 08/14.  PIH labs today, rest this evening and review tomorrow  Consider Tdap tomorrow

## 2019-09-03 NOTE — PATIENT INSTRUCTIONS
Understanding Preeclampsia  Preeclampsia is pregnancy-related hypertension that develops after 20 weeks' gestation. It can lead to health risks for you and your baby. No one knows what causes preeclampsia. But it is known that the only cure is delivery.     Your blood pressure will be monitored regularly throughout your pregnancy to help check for preeclampsia.   Signs and symptoms  A common sign of preeclampsia is high blood pressure. Other signs and symptoms may include:  · Rapid weight gain  · Protein in your urine  · Headache  · Abdominal pain on your right side  · Vision problems (flashes or spots)  · Edema (swelling) in your face or hands (this also commonly happens near the end of normal pregnancies, even without preeclampsia)  Tests you may have  Your healthcare provider will want to check your blood pressure throughout your pregnancy. If your blood pressure is high, you may have the following tests:  · Urine tests to look for protein  · Blood tests to confirm preeclampsia  · Fetal monitoring to ensure that your baby is healthy  Treating preeclampsia  A daily low dose of aspirin may be prescribed to those at risk for preeclampsia. Preeclampsia almost always ends soon after you give birth. Until then, your healthcare provider can help manage your condition. If your symptoms are mild, you may need bed rest at home. If your symptoms are severe, you will be hospitalized. Hospital treatment includes:  · Complete bed rest to help control blood pressure  · Magnesium IV (intravenous) drip during labor to prevent seizures  · Induced labor or surgical delivery by  section  When to call your healthcare provider  Call your healthcare provider if swelling, weight gain, or other symptoms come on quickly or are severe. Some cases of preeclampsia are more severe than others. Your signs and symptoms also may change or worsen as you get closer to your due date.  Whos at risk?  Preeclampsia can happen in any  pregnant woman. Factors that increase the risk include:  · Previous pregnancies. Preeclampsia, intrauterine growth retardation (IUGR),  birth, placental abruption, or fetal death  · Medical history of mother. Diabetes, high blood pressure, obesity, kidney disease, autoimmune disease (for example lupus), or family history of preeclampsia  · Current pregnancy. First pregnancy, multiple fetuses, over the age of 40 years, or in vitro fertilization  Dangers of preeclampsia  If not treated, preeclampsia can cause problems for you and your baby. The placenta (organ that nourishes your baby) may tear away from the uterine wall. This can lead to fetal distress (the baby is at risk for health problems) and premature delivery. Preeclampsia can also cause these health problems:  · Kidney failure or other organ damage  · Seizures  · Stroke  Once you give birth  In most cases, preeclampsia goes away on its own soon after you give birth. Within days of delivery, your blood pressure, swelling, and other signs should decrease.  Date Last Reviewed: 2016  © 2272-0005 [employer]. [address] [place] [address], [place] [address]. All rights reserved. This information is not intended as a substitute for professional medical care. Always follow your healthcare professional's instructions.        Understanding Preeclampsia  Preeclampsia is pregnancy-related hypertension that develops after 20 weeks' gestation. It can lead to health risks for you and your baby. No one knows what causes preeclampsia. But it is known that the only cure is delivery.     Your blood pressure will be monitored regularly throughout your pregnancy to help check for preeclampsia.   Signs and symptoms  A common sign of preeclampsia is high blood pressure. Other signs and symptoms may include:  · Rapid weight gain  · Protein in your urine  · Headache  · Abdominal pain on your right side  · Vision problems (flashes or spots)  · Edema (swelling) in  your face or hands (this also commonly happens near the end of normal pregnancies, even without preeclampsia)  Tests you may have  Your healthcare provider will want to check your blood pressure throughout your pregnancy. If your blood pressure is high, you may have the following tests:  · Urine tests to look for protein  · Blood tests to confirm preeclampsia  · Fetal monitoring to ensure that your baby is healthy  Treating preeclampsia  A daily low dose of aspirin may be prescribed to those at risk for preeclampsia. Preeclampsia almost always ends soon after you give birth. Until then, your healthcare provider can help manage your condition. If your symptoms are mild, you may need bed rest at home. If your symptoms are severe, you will be hospitalized. Hospital treatment includes:  · Complete bed rest to help control blood pressure  · Magnesium IV (intravenous) drip during labor to prevent seizures  · Induced labor or surgical delivery by  section  When to call your healthcare provider  Call your healthcare provider if swelling, weight gain, or other symptoms come on quickly or are severe. Some cases of preeclampsia are more severe than others. Your signs and symptoms also may change or worsen as you get closer to your due date.  Whos at risk?  Preeclampsia can happen in any pregnant woman. Factors that increase the risk include:  · Previous pregnancies. Preeclampsia, intrauterine growth retardation (IUGR),  birth, placental abruption, or fetal death  · Medical history of mother. Diabetes, high blood pressure, obesity, kidney disease, autoimmune disease (for example lupus), or family history of preeclampsia  · Current pregnancy. First pregnancy, multiple fetuses, over the age of 40 years, or in vitro fertilization  Dangers of preeclampsia  If not treated, preeclampsia can cause problems for you and your baby. The placenta (organ that nourishes your baby) may tear away from the uterine wall. This can  lead to fetal distress (the baby is at risk for health problems) and premature delivery. Preeclampsia can also cause these health problems:  · Kidney failure or other organ damage  · Seizures  · Stroke  Once you give birth  In most cases, preeclampsia goes away on its own soon after you give birth. Within days of delivery, your blood pressure, swelling, and other signs should decrease.  Date Last Reviewed: 6/1/2016  © 6350-9461 [employer]. [address] [place] [address]. All rights reserved. This information is not intended as a substitute for professional medical care. Always follow your healthcare professional's instructions.        Kick Counts    Its normal to worry about your babys health. One way you can know your babys doing well is to record the babys movements once a day. This is called a kick count. Remember to take your kick count records to all your appointments with your healthcare provider.  How to count kicks  Here are tips for counting kicks:  · Choose a time when the baby is active, such as after a meal.   · Sit comfortably or lie on your side.   · The first time the baby moves, write down the time.   · Count each movement until the baby has moved 10 times. This can take from 20 minutes to 2 hours.   · Try to do it at the same time each day.  When to call your healthcare provider  Call your healthcare provider right away if you notice any of the following:  · Your baby moves fewer than 10 times in 2 hours while youre doing kick counts.  · Your baby moves much less often than on the days before.  · You have not felt your baby move all day.  Date Last Reviewed: 8/5/2015  © 2730-3966 [employer]. [address] [place] [address]. All rights reserved. This information is not intended as a substitute for professional medical care. Always follow your healthcare professional's instructions.

## 2019-09-04 ENCOUNTER — ROUTINE PRENATAL (OUTPATIENT)
Dept: OBSTETRICS AND GYNECOLOGY | Facility: CLINIC | Age: 34
End: 2019-09-04
Payer: COMMERCIAL

## 2019-09-04 VITALS — BODY MASS INDEX: 39.4 KG/M2 | DIASTOLIC BLOOD PRESSURE: 88 MMHG | SYSTOLIC BLOOD PRESSURE: 144 MMHG | WEIGHT: 215.38 LBS

## 2019-09-04 DIAGNOSIS — O16.3 ELEVATED BLOOD PRESSURE AFFECTING PREGNANCY IN THIRD TRIMESTER, ANTEPARTUM: ICD-10-CM

## 2019-09-04 DIAGNOSIS — O09.529 ANTEPARTUM MULTIGRAVIDA OF ADVANCED MATERNAL AGE: Primary | ICD-10-CM

## 2019-09-04 LAB
ALBUMIN SERPL BCP-MCNC: 2.9 G/DL (ref 3.5–5.2)
ALP SERPL-CCNC: 118 U/L (ref 55–135)
ALT SERPL W/O P-5'-P-CCNC: 10 U/L (ref 10–44)
ANION GAP SERPL CALC-SCNC: 11 MMOL/L (ref 8–16)
AST SERPL-CCNC: 16 U/L (ref 10–40)
BASOPHILS # BLD AUTO: 0.02 K/UL (ref 0–0.2)
BASOPHILS NFR BLD: 0.2 % (ref 0–1.9)
BILIRUB SERPL-MCNC: 0.2 MG/DL (ref 0.1–1)
BUN SERPL-MCNC: 10 MG/DL (ref 6–20)
CALCIUM SERPL-MCNC: 9.7 MG/DL (ref 8.7–10.5)
CHLORIDE SERPL-SCNC: 106 MMOL/L (ref 95–110)
CO2 SERPL-SCNC: 21 MMOL/L (ref 23–29)
CREAT SERPL-MCNC: 0.6 MG/DL (ref 0.5–1.4)
CREAT UR-MCNC: 62 MG/DL (ref 15–325)
DIFFERENTIAL METHOD: ABNORMAL
EOSINOPHIL # BLD AUTO: 0.1 K/UL (ref 0–0.5)
EOSINOPHIL NFR BLD: 0.6 % (ref 0–8)
ERYTHROCYTE [DISTWIDTH] IN BLOOD BY AUTOMATED COUNT: 12.7 % (ref 11.5–14.5)
EST. GFR  (AFRICAN AMERICAN): >60 ML/MIN/1.73 M^2
EST. GFR  (NON AFRICAN AMERICAN): >60 ML/MIN/1.73 M^2
GLUCOSE SERPL-MCNC: 69 MG/DL (ref 70–110)
HCT VFR BLD AUTO: 34.4 % (ref 37–48.5)
HGB BLD-MCNC: 11 G/DL (ref 12–16)
IMM GRANULOCYTES # BLD AUTO: 0.03 K/UL (ref 0–0.04)
IMM GRANULOCYTES NFR BLD AUTO: 0.3 % (ref 0–0.5)
LYMPHOCYTES # BLD AUTO: 1.9 K/UL (ref 1–4.8)
LYMPHOCYTES NFR BLD: 20.6 % (ref 18–48)
MCH RBC QN AUTO: 28.9 PG (ref 27–31)
MCHC RBC AUTO-ENTMCNC: 32 G/DL (ref 32–36)
MCV RBC AUTO: 90 FL (ref 82–98)
MONOCYTES # BLD AUTO: 0.5 K/UL (ref 0.3–1)
MONOCYTES NFR BLD: 5.7 % (ref 4–15)
NEUTROPHILS # BLD AUTO: 6.7 K/UL (ref 1.8–7.7)
NEUTROPHILS NFR BLD: 72.6 % (ref 38–73)
NRBC BLD-RTO: 0 /100 WBC
PLATELET # BLD AUTO: 226 K/UL (ref 150–350)
PMV BLD AUTO: 12.4 FL (ref 9.2–12.9)
POTASSIUM SERPL-SCNC: 4.3 MMOL/L (ref 3.5–5.1)
PROT SERPL-MCNC: 6.6 G/DL (ref 6–8.4)
PROT UR-MCNC: 12 MG/DL (ref 0–15)
PROT/CREAT UR: 0.19 MG/G{CREAT} (ref 0–0.2)
RBC # BLD AUTO: 3.81 M/UL (ref 4–5.4)
SODIUM SERPL-SCNC: 138 MMOL/L (ref 136–145)
WBC # BLD AUTO: 9.26 K/UL (ref 3.9–12.7)

## 2019-09-04 PROCEDURE — 0502F SUBSEQUENT PRENATAL CARE: CPT | Mod: CPTII,S$GLB,, | Performed by: ADVANCED PRACTICE MIDWIFE

## 2019-09-04 PROCEDURE — 99999 PR PBB SHADOW E&M-EST. PATIENT-LVL III: CPT | Mod: PBBFAC,,, | Performed by: ADVANCED PRACTICE MIDWIFE

## 2019-09-04 PROCEDURE — 0502F PR SUBSEQUENT PRENATAL CARE: ICD-10-PCS | Mod: CPTII,S$GLB,, | Performed by: ADVANCED PRACTICE MIDWIFE

## 2019-09-04 PROCEDURE — 99999 PR PBB SHADOW E&M-EST. PATIENT-LVL III: ICD-10-PCS | Mod: PBBFAC,,, | Performed by: ADVANCED PRACTICE MIDWIFE

## 2019-09-05 ENCOUNTER — TELEPHONE (OUTPATIENT)
Dept: OBSTETRICS AND GYNECOLOGY | Facility: CLINIC | Age: 34
End: 2019-09-05

## 2019-09-05 ENCOUNTER — PATIENT MESSAGE (OUTPATIENT)
Dept: OBSTETRICS AND GYNECOLOGY | Facility: CLINIC | Age: 34
End: 2019-09-05

## 2019-09-05 DIAGNOSIS — O09.529 ANTEPARTUM MULTIGRAVIDA OF ADVANCED MATERNAL AGE: Primary | ICD-10-CM

## 2019-09-05 DIAGNOSIS — Z36.89 ENCOUNTER FOR ULTRASOUND TO CHECK FETAL GROWTH: ICD-10-CM

## 2019-09-05 DIAGNOSIS — O16.3 ELEVATED BLOOD PRESSURE AFFECTING PREGNANCY IN THIRD TRIMESTER, ANTEPARTUM: ICD-10-CM

## 2019-09-05 NOTE — PROGRESS NOTES
Blood pressure 144/80 asymptomatic.  A slight headache this morning was resolved with eating breakfast.  PIH labs are still pending.  Will follow-up as indicated PIH discussed and information provided.  Work hours cut and note provided

## 2019-09-07 ENCOUNTER — HOSPITAL ENCOUNTER (OUTPATIENT)
Facility: HOSPITAL | Age: 34
Discharge: HOME | End: 2019-09-07
Attending: OBSTETRICS & GYNECOLOGY | Admitting: OBSTETRICS & GYNECOLOGY
Payer: COMMERCIAL

## 2019-09-07 VITALS
WEIGHT: 215.38 LBS | HEART RATE: 94 BPM | TEMPERATURE: 99 F | SYSTOLIC BLOOD PRESSURE: 139 MMHG | DIASTOLIC BLOOD PRESSURE: 100 MMHG | BODY MASS INDEX: 39.4 KG/M2 | OXYGEN SATURATION: 98 %

## 2019-09-07 DIAGNOSIS — O16.3 ELEVATED BLOOD PRESSURE AFFECTING PREGNANCY IN THIRD TRIMESTER, ANTEPARTUM: ICD-10-CM

## 2019-09-07 DIAGNOSIS — O13.3 GESTATIONAL HYPERTENSION, THIRD TRIMESTER: Primary | ICD-10-CM

## 2019-09-07 PROBLEM — Z78.9 CONCEIVED BY IN VITRO FERTILIZATION: Status: ACTIVE | Noted: 2019-09-07

## 2019-09-07 LAB
ABO + RH BLD: NORMAL
ALBUMIN SERPL BCP-MCNC: 2.5 G/DL (ref 3.5–5.2)
ALP SERPL-CCNC: 101 U/L (ref 55–135)
ALT SERPL W/O P-5'-P-CCNC: 11 U/L (ref 10–44)
ANION GAP SERPL CALC-SCNC: 10 MMOL/L (ref 8–16)
AST SERPL-CCNC: 12 U/L (ref 10–40)
BASOPHILS # BLD AUTO: 0.01 K/UL (ref 0–0.2)
BASOPHILS NFR BLD: 0.1 % (ref 0–1.9)
BILIRUB SERPL-MCNC: 0.1 MG/DL (ref 0.1–1)
BLD GP AB SCN CELLS X3 SERPL QL: NORMAL
BUN SERPL-MCNC: 6 MG/DL (ref 6–20)
CALCIUM SERPL-MCNC: 8.9 MG/DL (ref 8.7–10.5)
CHLORIDE SERPL-SCNC: 108 MMOL/L (ref 95–110)
CO2 SERPL-SCNC: 20 MMOL/L (ref 23–29)
CREAT SERPL-MCNC: 0.6 MG/DL (ref 0.5–1.4)
CREAT UR-MCNC: 16 MG/DL (ref 15–325)
DIFFERENTIAL METHOD: ABNORMAL
EOSINOPHIL # BLD AUTO: 0.1 K/UL (ref 0–0.5)
EOSINOPHIL NFR BLD: 1.3 % (ref 0–8)
ERYTHROCYTE [DISTWIDTH] IN BLOOD BY AUTOMATED COUNT: 12.9 % (ref 11.5–14.5)
EST. GFR  (AFRICAN AMERICAN): >60 ML/MIN/1.73 M^2
EST. GFR  (NON AFRICAN AMERICAN): >60 ML/MIN/1.73 M^2
GLUCOSE SERPL-MCNC: 99 MG/DL (ref 70–110)
HCT VFR BLD AUTO: 31 % (ref 37–48.5)
HGB BLD-MCNC: 10.7 G/DL (ref 12–16)
LYMPHOCYTES # BLD AUTO: 2 K/UL (ref 1–4.8)
LYMPHOCYTES NFR BLD: 27.9 % (ref 18–48)
MCH RBC QN AUTO: 29.2 PG (ref 27–31)
MCHC RBC AUTO-ENTMCNC: 34.5 G/DL (ref 32–36)
MCV RBC AUTO: 85 FL (ref 82–98)
MONOCYTES # BLD AUTO: 0.5 K/UL (ref 0.3–1)
MONOCYTES NFR BLD: 6.9 % (ref 4–15)
NEUTROPHILS # BLD AUTO: 4.6 K/UL (ref 1.8–7.7)
NEUTROPHILS NFR BLD: 64.2 % (ref 38–73)
PLATELET # BLD AUTO: 200 K/UL (ref 150–350)
PMV BLD AUTO: 10.6 FL (ref 9.2–12.9)
POTASSIUM SERPL-SCNC: 3.8 MMOL/L (ref 3.5–5.1)
PROT SERPL-MCNC: 6.2 G/DL (ref 6–8.4)
PROT UR-MCNC: <7 MG/DL (ref 0–15)
PROT/CREAT UR: NORMAL MG/G{CREAT} (ref 0–0.2)
RBC # BLD AUTO: 3.67 M/UL (ref 4–5.4)
SODIUM SERPL-SCNC: 138 MMOL/L (ref 136–145)
WBC # BLD AUTO: 7.14 K/UL (ref 3.9–12.7)

## 2019-09-07 PROCEDURE — 99211 OFF/OP EST MAY X REQ PHY/QHP: CPT | Mod: 25

## 2019-09-07 PROCEDURE — 86077 PATHOLOGIST INTERPRETATION AB/XM: ICD-10-PCS | Mod: ,,, | Performed by: PATHOLOGY

## 2019-09-07 PROCEDURE — 99213 PR OFFICE/OUTPT VISIT, EST, LEVL III, 20-29 MIN: ICD-10-PCS | Mod: 25,,, | Performed by: MIDWIFE

## 2019-09-07 PROCEDURE — 86901 BLOOD TYPING SEROLOGIC RH(D): CPT

## 2019-09-07 PROCEDURE — 84156 ASSAY OF PROTEIN URINE: CPT

## 2019-09-07 PROCEDURE — 99213 OFFICE O/P EST LOW 20 MIN: CPT | Mod: 25,,, | Performed by: MIDWIFE

## 2019-09-07 PROCEDURE — 59025 FETAL NON-STRESS TEST: CPT

## 2019-09-07 PROCEDURE — 86077 PHYS BLOOD BANK SERV XMATCH: CPT | Mod: ,,, | Performed by: PATHOLOGY

## 2019-09-07 PROCEDURE — 86870 RBC ANTIBODY IDENTIFICATION: CPT

## 2019-09-07 PROCEDURE — 80053 COMPREHEN METABOLIC PANEL: CPT

## 2019-09-07 PROCEDURE — 59025 OBTAIN FETAL NONSTRESS TEST (NST): ICD-10-PCS | Mod: 26,,, | Performed by: MIDWIFE

## 2019-09-07 PROCEDURE — 85025 COMPLETE CBC W/AUTO DIFF WBC: CPT

## 2019-09-07 PROCEDURE — 59025 FETAL NON-STRESS TEST: CPT | Mod: 26,,, | Performed by: MIDWIFE

## 2019-09-07 RX ORDER — ONDANSETRON 8 MG/1
8 TABLET, ORALLY DISINTEGRATING ORAL EVERY 8 HOURS PRN
Status: DISCONTINUED | OUTPATIENT
Start: 2019-09-07 | End: 2019-09-08 | Stop reason: HOSPADM

## 2019-09-07 RX ORDER — ACETAMINOPHEN 500 MG
500 TABLET ORAL EVERY 6 HOURS PRN
Status: DISCONTINUED | OUTPATIENT
Start: 2019-09-07 | End: 2019-09-08 | Stop reason: HOSPADM

## 2019-09-08 LAB — BLOOD GROUP ANTIBODIES SERPL: NORMAL

## 2019-09-08 NOTE — DISCHARGE SUMMARY
[facility] -   Obstetrics  Discharge Summary      Patient Name: Samanta Zimmerman  MRN: [id number]  Admission Date: 2019  Hospital Length of Stay: 0 days  Discharge Date and Time:  2019 10:25 PM  Attending Physician: Penelope Hart, *   Discharging Provider: Scooby Ochoa CNM   Primary Care Provider: Lisbeth Crow MD    HPI: , 33w 1d gestation, presents to unit w/ c/o elevated blood pressure, swelling and headache, pt was worked up 4 days ago and work up normal    FHT: Cat 1 (reassuring)  TOCO:  no ctx's    * No surgery found *     Hospital Course:   Observe  CBS  CMP  PCR  NST  BP Series  Spoke to Dr Hart, report given including exam, labs, and blood pressures, ok to d/c home, modified bed rest, stop working, return if headache is unrelieved by tylenol and BP in severe range 160/110, pt voiced understanding         Final Active Diagnoses:    Diagnosis Date Noted POA    PRINCIPAL PROBLEM:  Gestational hypertension, third trimester [O13.3] 2019 Yes    Conceived by in vitro fertilization [Z78.9] 2019 Yes    Elevated blood pressure affecting pregnancy in third trimester, antepartum [O16.3] 2019 Yes      Problems Resolved During this Admission:        Labs:   CMP   Recent Labs   Lab 19  2100      K 3.8      CO2 20*   GLU 99   BUN 6   CREATININE 0.6   CALCIUM 8.9   PROT 6.2   ALBUMIN 2.5*   BILITOT 0.1   ALKPHOS 101   AST 12   ALT 11   ANIONGAP 10   ESTGFRAFRICA >60   EGFRNONAA >60   , CBC   Recent Labs   Lab 19  2100   WBC 7.14   HGB 10.7*   HCT 31.0*       and All labs within the past 24 hours have been reviewed      Immunizations     None          This patient has no babies on file.  Pending Diagnostic Studies:     None          Discharged Condition: stable    Disposition: Home or Self Care    Follow Up:  Follow-up Information     Britney Amezcua MD On 2019.    Specialties:  Obstetrics, Obstetrics and  Gynecology  Contact information:  [address]  [place] [address]  [phone]                 Patient Instructions:      Diet Adult Regular     Other restrictions (specify):   Order Comments: Modified bed rest, stop working     Notify your health care provider if you experience any of the following:  severe persistent headache     Notify your health care provider if you experience any of the following:   Order Comments: /110     Medications:  Current Discharge Medication List      CONTINUE these medications which have NOT CHANGED    Details   cetirizine (ZYRTEC) 10 MG tablet       fish oil-omega-3 fatty acids 300-1,000 mg capsule Take 2 g by mouth once daily.      Lactobacillus rhamnosus GG (CULTURELLE) 10 billion cell capsule       MAGNESIUM ORAL Take by mouth once daily.       methylPREDNISolone (MEDROL DOSEPACK) 4 mg tablet Take as directed  Qty: 1 Package, Refills: 0      omeprazole (PRILOSEC) 20 MG capsule Take 1 capsule (20 mg total) by mouth once daily.  Qty: 90 capsule, Refills: 3      ondansetron (ZOFRAN) 4 MG tablet TAKE ONE TABLET BY MOUTH EVERY 12 HOURS AS NEEDED FOR NAUSEA  Qty: 12 tablet, Refills: 1      prenat.vits,yonatan,min-iron-folic (PRENATAL VITAMIN) Tab Take by mouth once daily.             Scooby Ochoa CNM  Obstetrics  [facility]

## 2019-09-08 NOTE — H&P
[facility]  Obstetrics  History & Physical    Patient Name: Samanta Zimmerman  MRN: [id number]  Admission Date: 2019  Primary Care Provider: Lisbeth Crow MD    Subjective:     Principal Problem:Elevated blood pressure affecting pregnancy in third trimester, antepartum    History of Present Illness:  , 33w 1d gestation, presents to unit w/ c/o elevated blood pressure, swelling and headache, pt was worked up 4 days ago and work up normal    Obstetric HPI:  Patient reports edema, headache, and elevated blood pressure at home, active fetal movement, No vaginal bleeding , No loss of fluid     This pregnancy has been complicated by IVF conception, autosomal recessive polycystic kidneys, desmoplastic malignant melanoma, 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    OB History    Para Term  AB Living   1 0 0 0 0 0   SAB TAB Ectopic Multiple Live Births   0 0 0 0 0      # Outcome Date GA Lbr Epi/2nd Weight Sex Delivery Anes PTL Lv   1 Current              Past Medical History:   Diagnosis Date    Autosomal recessive polycystic kidneys     GERD (gastroesophageal reflux disease)     History of uterine fibroid     Melanoma 2016    left superior helix (breslow dept 0.67mm)    Migraine headache     PONV (postoperative nausea and vomiting)     post op nausea     Past Surgical History:   Procedure Laterality Date    BIOPSY-LYMPH NODE Left 6/10/2016    [facility] 2ND F[facility]    ETHMOIDECTOMY Bilateral 2015    [facility]    EXCISION-SKIN Left 6/10/2016    [facility] at [facility] 2ND FLR    LYMPH NODE BIOPSY      MALIGNANT SKIN LESION EXCISION  06/10/2016    NASAL SEPTUM SURGERY      POLYPECTOMY, UTERUS, HYSTEROSCOPIC N/A 1/10/2019    [facility]    RENAL EXPLORATION      SEPTOPLASTY N/A 2015    [facility]    SINUS SURGERY  FUNCTIONAL ENDOSCOPIC Bilateral 12/11/2015    [facility]       [facility] Medications   Medication Sig    cetirizine (ZYRTEC) 10 MG tablet     fish oil-omega-3 fatty acids 300-1,000 mg capsule Take 2 g by mouth once daily.    Lactobacillus rhamnosus GG (CULTURELLE) 10 billion cell capsule     MAGNESIUM ORAL Take by mouth once daily.     methylPREDNISolone (MEDROL DOSEPACK) 4 mg tablet Take as directed    omeprazole (PRILOSEC) 20 MG capsule Take 1 capsule (20 mg total) by mouth once daily.    ondansetron (ZOFRAN) 4 MG tablet TAKE ONE TABLET BY MOUTH EVERY 12 HOURS AS NEEDED FOR NAUSEA    prenat.vits,yonatan,min-iron-folic (PRENATAL VITAMIN) Tab Take by mouth once daily.       Review of patient's allergies indicates:   Allergen Reactions    Singulair [montelukast]      Itching all over        Family History     Problem Relation (Age of Onset)    Cancer Maternal Grandmother    Diabetes Paternal Grandmother    Hypertension Father    Kidney disease Father        Tobacco Use    Smoking status: Never Smoker    Smokeless tobacco: Never Used   Substance and Sexual Activity    Alcohol use: Not Currently     Comment: socially    Drug use: No    Sexual activity: Yes     Partners: Female     Review of Systems   Cardiovascular: Positive for leg swelling.   Gastrointestinal: Negative for abdominal pain.   Genitourinary: Negative for vaginal bleeding and vaginal discharge.   Musculoskeletal: Negative for back pain.   Neurological: Positive for headaches.        Not at time of exam, upon arrival, took 2 ES Tylenol prior to coming in      Objective:     Vital Signs (Most Recent):    Vital Signs (24h Range):        Weight: 97.7 kg (215 lb 6.2 oz)  Body mass index is 39.4 kg/m².    FHT: Cat 1 (reassuring)  TOCO:  occasional    Physical Exam:   Constitutional: She is oriented to person, place, and time. She appears well-developed and well-nourished.    HENT:   Head: Normocephalic.     Neck: Normal  range of motion.     Pulmonary/Chest: Effort normal.        Abdominal: Soft.             Musculoskeletal: Normal range of motion and moves all extremeties. She exhibits edema.   + 1 pitting edema in BLE       Neurological: She is alert and oriented to person, place, and time. She displays abnormal reflex.   Brisk reflexes, no clonus    Skin: Skin is warm and dry.    Psychiatric: She has a normal mood and affect. Her behavior is normal. Judgment and thought content normal.       Cervix:  Dilation:  deferred  Effacement:    Station:   Presentation:      Significant Labs:  Lab Results   Component Value Date    GROUPTRH A NEG 2019    HEPBSAG Negative 2018       I have personallly reviewed all pertinent lab results from the last 24 hours.  Recent Lab Results     None        Assessment/Plan:     34 y.o. female  at 33w1d for:    * Elevated blood pressure affecting pregnancy in third trimester, antepartum  Observe  NST  CBC  CMP  PCR  BP series        Scooby Ochoa CNM  Obstetrics  [facility]

## 2019-09-08 NOTE — NURSING
Gave patient AVS and work excuse. Educated on  discharge information. Educated on nutrition, hydration, home medications, fetal kick counts, activity, s/s of OB emergencies, and reasons to notify OB provider (including vaginal bleeding like a period, rupture of membranes, constant and strong abdominal pain or tenderness that does not go away, contractions every 3-5 min for 1-2 hours that are increasing in strength, changes in urination such as hematuria/oliguria/dysuria/urgency/frequency, temp greater than 100.4 F). Pre-eclampsia precautions reviewed. Patient verbalized understanding.

## 2019-09-08 NOTE — HPI
, 33w 1d gestation, presents to unit w/ c/o elevated blood pressure, swelling and headache, pt was worked up 4 days ago and work up normal

## 2019-09-08 NOTE — SUBJECTIVE & OBJECTIVE
Obstetric HPI:  Patient reports edema, headache, and elevated blood pressure at home, active fetal movement, No vaginal bleeding , No loss of fluid     This pregnancy has been complicated by IVF conception, autosomal recessive polycystic kidneys, desmoplastic malignant melanoma, rh negative    OB History    Para Term  AB Living   1 0 0 0 0 0   SAB TAB Ectopic Multiple Live Births   0 0 0 0 0      # Outcome Date GA Lbr Epi/2nd Weight Sex Delivery Anes PTL Lv   1 Current              Past Medical History:   Diagnosis Date    Autosomal recessive polycystic kidneys     GERD (gastroesophageal reflux disease)     History of uterine fibroid     Melanoma 2016    left superior helix (breslow dept 0.67mm)    Migraine headache     PONV (postoperative nausea and vomiting)     post op nausea     Past Surgical History:   Procedure Laterality Date    BIOPSY-LYMPH NODE Left 6/10/2016    [facility] 2ND FLR    ETHMOIDECTOMY Bilateral 2015    [facility]    EXCISION-SKIN Left 6/10/2016    [facility] 2ND FLR    LYMPH NODE BIOPSY      MALIGNANT SKIN LESION EXCISION  06/10/2016    NASAL SEPTUM SURGERY      POLYPECTOMY, UTERUS, HYSTEROSCOPIC N/A 1/10/2019    Performed [facility] OR    RENAL EXPLORATION      SEPTOPLASTY N/A 2015    [facility] OR    SINUS SURGERY FUNCTIONAL ENDOSCOPIC Bilateral 2015    [facility] at [facility] OR       [facility] Medications   Medication Sig    cetirizine (ZYRTEC) 10 MG tablet     fish oil-omega-3 fatty acids 300-1,000 mg capsule Take 2 g by mouth once daily.    Lactobacillus rhamnosus GG (CULTURELLE) 10 billion cell capsule     MAGNESIUM ORAL Take by mouth once daily.     methylPREDNISolone (MEDROL DOSEPACK) 4 mg tablet Take as directed    omeprazole (PRILOSEC) 20 MG capsule Take  1 capsule (20 mg total) by mouth once daily.    ondansetron (ZOFRAN) 4 MG tablet TAKE ONE TABLET BY MOUTH EVERY 12 HOURS AS NEEDED FOR NAUSEA    prenat.vits,yonatan,min-iron-folic (PRENATAL VITAMIN) Tab Take by mouth once daily.       Review of patient's allergies indicates:   Allergen Reactions    Singulair [montelukast]      Itching all over        Family History     Problem Relation (Age of Onset)    Cancer Maternal Grandmother    Diabetes Paternal Grandmother    Hypertension Father    Kidney disease Father        Tobacco Use    Smoking status: Never Smoker    Smokeless tobacco: Never Used   Substance and Sexual Activity    Alcohol use: Not Currently     Comment: socially    Drug use: No    Sexual activity: Yes     Partners: Female     Review of Systems   Cardiovascular: Positive for leg swelling.   Gastrointestinal: Negative for abdominal pain.   Genitourinary: Negative for vaginal bleeding and vaginal discharge.   Musculoskeletal: Negative for back pain.   Neurological: Positive for headaches.        Not at time of exam, upon arrival, took 2 ES Tylenol prior to coming in      Objective:     Vital Signs (Most Recent):    Vital Signs (24h Range):        Weight: 97.7 kg (215 lb 6.2 oz)  Body mass index is 39.4 kg/m².    FHT: Cat 1 (reassuring)  TOCO:  occasional    Physical Exam:   Constitutional: She is oriented to person, place, and time. She appears well-developed and well-nourished.    HENT:   Head: Normocephalic.     Neck: Normal range of motion.     Pulmonary/Chest: Effort normal.        Abdominal: Soft.             Musculoskeletal: Normal range of motion and moves all extremeties. She exhibits edema.   + 1 pitting edema in BLE       Neurological: She is alert and oriented to person, place, and time. She displays abnormal reflex.   Brisk reflexes, no clonus    Skin: Skin is warm and dry.    Psychiatric: She has a normal mood and affect. Her behavior is normal. Judgment and thought content normal.        Cervix:  Dilation:  deferred  Effacement:    Station:   Presentation:      Significant Labs:  Lab Results   Component Value Date    [facility] 07/31/2019    HEPBSAG Negative 12/14/2018       I have personallly reviewed all pertinent lab results from the last 24 hours.  Recent Lab Results     None

## 2019-09-08 NOTE — DISCHARGE INSTRUCTIONS

## 2019-09-08 NOTE — HOSPITAL COURSE
Observe  CBS  CMP  PCR  NST  BP Series  Spoke to Dr Hart, report given including exam, labs, and blood pressures, ok to d/c home, modified bed rest, stop working, return if headache is unrelieved by tylenol and BP in severe range 160/110, pt voiced understanding

## 2019-09-08 NOTE — PROCEDURES
Samanta Zimmerman is a 34 y.o. female patient.    Temp: 99.1 °F (37.3 °C) (09/07/19 2055)  Pulse: 94 (09/07/19 2205)  BP: (!) 139/100 (09/07/19 2205)  SpO2: 98 % (09/07/19 2134)  Weight: 97.7 kg (215 lb 6.2 oz) (09/07/19 1939)       Obtain Fetal nonstress test (NST)  Date/Time: 9/7/2019 10:25 PM  Performed by: Scooby Ochoa CNM  Authorized by: Scooby Ochoa CNM     Nonstress Test:     Variability:  6-25 BPM    Decelerations:  None    Accelerations:  15 bpm    Acoustic Stimulator: No      Baseline:  135    Uterine Irritability: No      Contractions:  Not present  Biophysical Profile:     Nonstress Test Interpretation: reactive      Overall Impression:  Reassuring  Post-procedure:     Patient tolerance:  Patient tolerated the procedure well with no immediate complications        Scooby Ochoa  9/7/2019

## 2019-09-09 ENCOUNTER — PATIENT MESSAGE (OUTPATIENT)
Dept: OBSTETRICS AND GYNECOLOGY | Facility: CLINIC | Age: 34
End: 2019-09-09

## 2019-09-09 LAB — PATHOLOGIST INTERPRETATION AB/XM: NORMAL

## 2019-09-09 RX ORDER — NIFEDIPINE 30 MG/1
30 TABLET, EXTENDED RELEASE ORAL DAILY
Qty: 30 TABLET | Refills: 11 | Status: SHIPPED | OUTPATIENT
Start: 2019-09-09 | End: 2019-10-25 | Stop reason: HOSPADM

## 2019-09-10 ENCOUNTER — TELEPHONE (OUTPATIENT)
Dept: OBSTETRICS AND GYNECOLOGY | Facility: CLINIC | Age: 34
End: 2019-09-10

## 2019-09-10 NOTE — TELEPHONE ENCOUNTER
Called to follow-up on yesterday when I was informed that PIH labs were unremarkable but still experiencing some elevated blood pressures at home.  Procardia 30 mg XL commenced.  Doing well today.  Has appointment with Dr. Amezcua tomorrow

## 2019-09-11 ENCOUNTER — PROCEDURE VISIT (OUTPATIENT)
Dept: OBSTETRICS AND GYNECOLOGY | Facility: CLINIC | Age: 34
End: 2019-09-11
Payer: COMMERCIAL

## 2019-09-11 ENCOUNTER — ROUTINE PRENATAL (OUTPATIENT)
Dept: OBSTETRICS AND GYNECOLOGY | Facility: CLINIC | Age: 34
End: 2019-09-11
Payer: COMMERCIAL

## 2019-09-11 VITALS
SYSTOLIC BLOOD PRESSURE: 138 MMHG | BODY MASS INDEX: 38.71 KG/M2 | DIASTOLIC BLOOD PRESSURE: 84 MMHG | WEIGHT: 211.63 LBS

## 2019-09-11 DIAGNOSIS — O13.3 GESTATIONAL HYPERTENSION, THIRD TRIMESTER: ICD-10-CM

## 2019-09-11 DIAGNOSIS — Z3A.33 PREGNANCY WITH 33 COMPLETED WEEKS GESTATION: Primary | ICD-10-CM

## 2019-09-11 DIAGNOSIS — O09.529 ANTEPARTUM MULTIGRAVIDA OF ADVANCED MATERNAL AGE: ICD-10-CM

## 2019-09-11 DIAGNOSIS — O16.3 ELEVATED BLOOD PRESSURE AFFECTING PREGNANCY IN THIRD TRIMESTER, ANTEPARTUM: ICD-10-CM

## 2019-09-11 PROCEDURE — 90471 FLU VACCINE (QUAD) GREATER THAN OR EQUAL TO 3YO PRESERVATIVE FREE IM: ICD-10-PCS | Mod: S$GLB,,, | Performed by: OBSTETRICS & GYNECOLOGY

## 2019-09-11 PROCEDURE — 0502F SUBSEQUENT PRENATAL CARE: CPT | Mod: CPTII,S$GLB,, | Performed by: OBSTETRICS & GYNECOLOGY

## 2019-09-11 PROCEDURE — 90472 TDAP VACCINE GREATER THAN OR EQUAL TO 7YO IM: ICD-10-PCS | Mod: S$GLB,,, | Performed by: OBSTETRICS & GYNECOLOGY

## 2019-09-11 PROCEDURE — 76816 PR  US,PREGNANT UTERUS,F/U,TRANSABD APP: ICD-10-PCS | Mod: 59,S$GLB,, | Performed by: OBSTETRICS & GYNECOLOGY

## 2019-09-11 PROCEDURE — 76816 OB US FOLLOW-UP PER FETUS: CPT | Mod: 59,S$GLB,, | Performed by: OBSTETRICS & GYNECOLOGY

## 2019-09-11 PROCEDURE — 90472 IMMUNIZATION ADMIN EACH ADD: CPT | Mod: S$GLB,,, | Performed by: OBSTETRICS & GYNECOLOGY

## 2019-09-11 PROCEDURE — 99999 PR PBB SHADOW E&M-EST. PATIENT-LVL III: CPT | Mod: PBBFAC,,, | Performed by: OBSTETRICS & GYNECOLOGY

## 2019-09-11 PROCEDURE — 0502F PR SUBSEQUENT PRENATAL CARE: ICD-10-PCS | Mod: CPTII,S$GLB,, | Performed by: OBSTETRICS & GYNECOLOGY

## 2019-09-11 PROCEDURE — 90686 FLU VACCINE (QUAD) GREATER THAN OR EQUAL TO 3YO PRESERVATIVE FREE IM: ICD-10-PCS | Mod: S$GLB,,, | Performed by: OBSTETRICS & GYNECOLOGY

## 2019-09-11 PROCEDURE — 99999 PR PBB SHADOW E&M-EST. PATIENT-LVL III: ICD-10-PCS | Mod: PBBFAC,,, | Performed by: OBSTETRICS & GYNECOLOGY

## 2019-09-11 PROCEDURE — 76819 FETAL BIOPHYS PROFIL W/O NST: CPT | Mod: 59,S$GLB,, | Performed by: OBSTETRICS & GYNECOLOGY

## 2019-09-11 PROCEDURE — 90715 TDAP VACCINE GREATER THAN OR EQUAL TO 7YO IM: ICD-10-PCS | Mod: S$GLB,,, | Performed by: OBSTETRICS & GYNECOLOGY

## 2019-09-11 PROCEDURE — 90471 IMMUNIZATION ADMIN: CPT | Mod: S$GLB,,, | Performed by: OBSTETRICS & GYNECOLOGY

## 2019-09-11 PROCEDURE — 90715 TDAP VACCINE 7 YRS/> IM: CPT | Mod: S$GLB,,, | Performed by: OBSTETRICS & GYNECOLOGY

## 2019-09-11 PROCEDURE — 90686 IIV4 VACC NO PRSV 0.5 ML IM: CPT | Mod: S$GLB,,, | Performed by: OBSTETRICS & GYNECOLOGY

## 2019-09-11 PROCEDURE — 76819 PR US, OB, FETAL BIOPHYSICAL, W/O NST: ICD-10-PCS | Mod: 59,S$GLB,, | Performed by: OBSTETRICS & GYNECOLOGY

## 2019-09-11 NOTE — NURSING
After using two patient identifiers and reviewing allergies and medications. Pt. Received t dap and flu injections. Pt. Tolerated well. Instructed pt. To wait in clinic for 15 mins. Pt. Voiced understanding.

## 2019-09-11 NOTE — PROGRESS NOTES
Pt here for f/u of BPs. Went to L&D this weekend due to elevated home BPs (140s/100s) w/ headache. preE labs have been normal. Was told to be off work. Urine dip today tr protein. Pt started on procardia 30mg XL yesterday. No headache today, BP improved. Pt concerned about taking leave now w/ less maternity leave once delivers. rec being off for at least remainder of the week. Ok to return Monday-will need to see how she does working w/ BP medicine on board.  BPP 8/8, AGA, vertex, ant placenta

## 2019-09-13 ENCOUNTER — PATIENT MESSAGE (OUTPATIENT)
Dept: OBSTETRICS AND GYNECOLOGY | Facility: CLINIC | Age: 34
End: 2019-09-13

## 2019-09-13 ENCOUNTER — HOSPITAL ENCOUNTER (OUTPATIENT)
Facility: HOSPITAL | Age: 34
Discharge: HOME | End: 2019-09-15
Attending: OBSTETRICS & GYNECOLOGY | Admitting: OBSTETRICS & GYNECOLOGY
Payer: COMMERCIAL

## 2019-09-13 DIAGNOSIS — O13.9 GESTATIONAL HTN: ICD-10-CM

## 2019-09-13 DIAGNOSIS — O14.03 MILD PREECLAMPSIA, THIRD TRIMESTER: ICD-10-CM

## 2019-09-13 LAB
ALBUMIN SERPL BCP-MCNC: 2.5 G/DL (ref 3.5–5.2)
ALP SERPL-CCNC: 107 U/L (ref 55–135)
ALT SERPL W/O P-5'-P-CCNC: 7 U/L (ref 10–44)
ANION GAP SERPL CALC-SCNC: 11 MMOL/L (ref 8–16)
AST SERPL-CCNC: 13 U/L (ref 10–40)
BASOPHILS # BLD AUTO: 0.02 K/UL (ref 0–0.2)
BASOPHILS NFR BLD: 0.2 % (ref 0–1.9)
BILIRUB SERPL-MCNC: 0.2 MG/DL (ref 0.1–1)
BUN SERPL-MCNC: 9 MG/DL (ref 6–20)
CALCIUM SERPL-MCNC: 9.5 MG/DL (ref 8.7–10.5)
CHLORIDE SERPL-SCNC: 107 MMOL/L (ref 95–110)
CO2 SERPL-SCNC: 21 MMOL/L (ref 23–29)
CREAT SERPL-MCNC: 0.7 MG/DL (ref 0.5–1.4)
CREAT UR-MCNC: 115.5 MG/DL (ref 15–325)
DIFFERENTIAL METHOD: ABNORMAL
EOSINOPHIL # BLD AUTO: 0.1 K/UL (ref 0–0.5)
EOSINOPHIL NFR BLD: 0.8 % (ref 0–8)
ERYTHROCYTE [DISTWIDTH] IN BLOOD BY AUTOMATED COUNT: 13.2 % (ref 11.5–14.5)
EST. GFR  (AFRICAN AMERICAN): >60 ML/MIN/1.73 M^2
EST. GFR  (NON AFRICAN AMERICAN): >60 ML/MIN/1.73 M^2
GLUCOSE SERPL-MCNC: 97 MG/DL (ref 70–110)
HCT VFR BLD AUTO: 34 % (ref 37–48.5)
HGB BLD-MCNC: 11.6 G/DL (ref 12–16)
LYMPHOCYTES # BLD AUTO: 2.1 K/UL (ref 1–4.8)
LYMPHOCYTES NFR BLD: 25.3 % (ref 18–48)
MCH RBC QN AUTO: 28.9 PG (ref 27–31)
MCHC RBC AUTO-ENTMCNC: 34.1 G/DL (ref 32–36)
MCV RBC AUTO: 85 FL (ref 82–98)
MONOCYTES # BLD AUTO: 0.5 K/UL (ref 0.3–1)
MONOCYTES NFR BLD: 6.2 % (ref 4–15)
NEUTROPHILS # BLD AUTO: 5.7 K/UL (ref 1.8–7.7)
NEUTROPHILS NFR BLD: 67.9 % (ref 38–73)
PLATELET # BLD AUTO: 216 K/UL (ref 150–350)
PMV BLD AUTO: 10.9 FL (ref 9.2–12.9)
POTASSIUM SERPL-SCNC: 3.7 MMOL/L (ref 3.5–5.1)
PROT SERPL-MCNC: 6.2 G/DL (ref 6–8.4)
PROT UR-MCNC: 48 MG/DL (ref 0–15)
PROT/CREAT UR: 0.42 MG/G{CREAT} (ref 0–0.2)
RBC # BLD AUTO: 4.02 M/UL (ref 4–5.4)
SODIUM SERPL-SCNC: 139 MMOL/L (ref 136–145)
WBC # BLD AUTO: 8.43 K/UL (ref 3.9–12.7)

## 2019-09-13 PROCEDURE — 85025 COMPLETE CBC W/AUTO DIFF WBC: CPT

## 2019-09-13 PROCEDURE — 25000003 PHARM REV CODE 250: Performed by: OBSTETRICS & GYNECOLOGY

## 2019-09-13 PROCEDURE — 84156 ASSAY OF PROTEIN URINE: CPT

## 2019-09-13 PROCEDURE — 80053 COMPREHEN METABOLIC PANEL: CPT

## 2019-09-13 PROCEDURE — 99211 OFF/OP EST MAY X REQ PHY/QHP: CPT | Mod: 25

## 2019-09-13 PROCEDURE — 59025 FETAL NON-STRESS TEST: CPT

## 2019-09-13 RX ORDER — ACETAMINOPHEN 500 MG
500 TABLET ORAL EVERY 6 HOURS PRN
Status: DISCONTINUED | OUTPATIENT
Start: 2019-09-13 | End: 2019-09-15 | Stop reason: HOSPADM

## 2019-09-13 RX ORDER — NIFEDIPINE 30 MG/1
30 TABLET, EXTENDED RELEASE ORAL 2 TIMES DAILY
Status: DISCONTINUED | OUTPATIENT
Start: 2019-09-13 | End: 2019-09-15 | Stop reason: HOSPADM

## 2019-09-13 RX ORDER — ONDANSETRON 8 MG/1
8 TABLET, ORALLY DISINTEGRATING ORAL EVERY 8 HOURS PRN
Status: DISCONTINUED | OUTPATIENT
Start: 2019-09-13 | End: 2019-09-15 | Stop reason: HOSPADM

## 2019-09-13 RX ADMIN — NIFEDIPINE 30 MG: 30 TABLET, FILM COATED, EXTENDED RELEASE ORAL at 10:09

## 2019-09-14 PROBLEM — O14.03 MILD PREECLAMPSIA, THIRD TRIMESTER: Status: ACTIVE | Noted: 2019-09-13

## 2019-09-14 PROCEDURE — 25000003 PHARM REV CODE 250: Performed by: OBSTETRICS & GYNECOLOGY

## 2019-09-14 PROCEDURE — 96372 THER/PROPH/DIAG INJ SC/IM: CPT

## 2019-09-14 PROCEDURE — 59025 FETAL NON-STRESS TEST: CPT

## 2019-09-14 PROCEDURE — 63600175 PHARM REV CODE 636 W HCPCS: Performed by: ADVANCED PRACTICE MIDWIFE

## 2019-09-14 RX ORDER — BETAMETHASONE SODIUM PHOSPHATE AND BETAMETHASONE ACETATE 3; 3 MG/ML; MG/ML
12 INJECTION, SUSPENSION INTRA-ARTICULAR; INTRALESIONAL; INTRAMUSCULAR; SOFT TISSUE
Status: COMPLETED | OUTPATIENT
Start: 2019-09-14 | End: 2019-09-15

## 2019-09-14 RX ADMIN — NIFEDIPINE 30 MG: 30 TABLET, FILM COATED, EXTENDED RELEASE ORAL at 08:09

## 2019-09-14 RX ADMIN — ACETAMINOPHEN 500 MG: 500 TABLET ORAL at 04:09

## 2019-09-14 RX ADMIN — BETAMETHASONE SODIUM PHOSPHATE AND BETAMETHASONE ACETATE 12 MG: 3; 3 INJECTION, SUSPENSION INTRA-ARTICULAR; INTRALESIONAL; INTRAMUSCULAR; SOFT TISSUE at 08:09

## 2019-09-14 RX ADMIN — ACETAMINOPHEN 500 MG: 500 TABLET ORAL at 08:09

## 2019-09-14 NOTE — NURSING
0730: MD at bedside; fetal monitoring for 1 hour BID; regular adult diet; BP q4; okay to transfer to [facility] for observation

## 2019-09-14 NOTE — PROGRESS NOTES
"S:  @34 wks,  Elevated bp's still. No headache, baby "still active", denies cramping. Took am procardia 30 xl today this am.  O: bp's 140's-150's/90;s-100's.       FHr- CAT 1       CTX: very occasional/ mild       Dtr's 2-3+  A:  1. HTN on procardia 30 xl        2. Reassuring FHr  P. Md consulted and will keep patient on overnight observation.       Monitor BPs and for onset of any symptoms.       Will repeat labs if b/p elevates > 150/100's.       Consider Mag sulfate if becomes symptomatic and/or + labs with sx's.       Will continue collaboration with Md.         if s/s labor/ Pre-e.  "

## 2019-09-14 NOTE — PLAN OF CARE
Problem: Adult Inpatient Plan of Care  Goal: Plan of Care Review  Outcome: Ongoing (interventions implemented as appropriate)  Resting in bed, no complaints at this time, VSS. Will continue to monitor.

## 2019-09-14 NOTE — PROGRESS NOTES
Per CNM, okay to come off of continuous monitoring.  Will continue serial Bps then go to q2hour checks once stable.  Reviewed plan with pt.

## 2019-09-14 NOTE — PROGRESS NOTES
S: Overnight observation cont. Intermittent monitoring, off at present to allow to rest. Tracing reactive prior to removal.  O: /97       Labs with normal plts 216k,  AST/ALT-- 13/7  PCR .42  A:  GHTN on procardia 30 mg xl        Increasing PCR  P: Continue serial BP's as ordered       MD collaboration for patient management with plan to increase Procardia in AM.

## 2019-09-14 NOTE — SUBJECTIVE & OBJECTIVE
Obstetric HPI:  Patient reports None contractions, active fetal movement, No vaginal bleeding , No loss of fluid     This pregnancy has been complicated by   Pre e  Advanced maternal age: materniti 20 negative  In vitro fertilization  Hypertriglyceridemia   Hx polycystic kidneys  Hx liver cyst  Hx migraine  Hx pinealoma   Hx malignant melanoma: under care of derm     OB History    Para Term  AB Living   1 0 0 0 0 0   SAB TAB Ectopic Multiple Live Births   0 0 0 0 0      # Outcome Date GA Lbr Epi/2nd Weight Sex Delivery Anes PTL Lv   1 Current              Past Medical History:   Diagnosis Date    Autosomal recessive polycystic kidneys     GERD (gastroesophageal reflux disease)     History of uterine fibroid     Melanoma 2016    left superior helix (breslow dept 0.67mm)    Migraine headache     PONV (postoperative nausea and vomiting)     post op nausea     Past Surgical History:   Procedure Laterality Date    BIOPSY-LYMPH NODE Left 6/10/2016    [facility] 2ND FLR    ETHMOIDECTOMY Bilateral 2015    [facility]    EXCISION-SKIN Left 6/10/2016    [facility] 2ND FLR    LYMPH NODE BIOPSY      MALIGNANT SKIN LESION EXCISION  06/10/2016    NASAL SEPTUM SURGERY      POLYPECTOMY, UTERUS, HYSTEROSCOPIC N/A 1/10/2019    [facility]    RENAL EXPLORATION      SEPTOPLASTY N/A 2015    [facility]    SINUS SURGERY FUNCTIONAL ENDOSCOPIC Bilateral 2015    [facility]       [facility] Medications   Medication Sig    cetirizine (ZYRTEC) 10 MG tablet     fish oil-omega-3 fatty acids 300-1,000 mg capsule Take 2 g by mouth once daily.    Lactobacillus rhamnosus GG (CULTURELLE) 10 billion cell capsule     MAGNESIUM ORAL Take by mouth once daily.     methylPREDNISolone (MEDROL DOSEPACK)  4 mg tablet Take as directed    NIFEdipine (PROCARDIA-XL) 30 MG (OSM) 24 hr tablet Take 1 tablet (30 mg total) by mouth once daily.    omeprazole (PRILOSEC) 20 MG capsule Take 1 capsule (20 mg total) by mouth once daily.    ondansetron (ZOFRAN) 4 MG tablet TAKE ONE TABLET BY MOUTH EVERY 12 HOURS AS NEEDED FOR NAUSEA    prenat.vits,yonatan,min-iron-folic (PRENATAL VITAMIN) Tab Take by mouth once daily.       Review of patient's allergies indicates:   Allergen Reactions    Singulair [montelukast]      Itching all over        Family History     Problem Relation (Age of Onset)    Cancer Maternal Grandmother    Diabetes Paternal Grandmother    Hypertension Father    Kidney disease Father        Tobacco Use    Smoking status: Never Smoker    Smokeless tobacco: Never Used   Substance and Sexual Activity    Alcohol use: Not Currently     Comment: socially    Drug use: No    Sexual activity: Yes     Partners: Female     Review of Systems   Eyes: Negative for visual disturbance.   Gastrointestinal: Negative for abdominal pain.   Genitourinary: Negative for vaginal bleeding and vaginal discharge.   All other systems reviewed and are negative.     Objective:     Vital Signs (Most Recent):  Temp: 98.1 °F (36.7 °C) (09/13/19 1843)  Pulse: 97 (09/14/19 0805)  Resp: 16 (09/14/19 0805)  BP: (!) 137/90 (09/14/19 0805)  SpO2: 99 % (09/14/19 0802) Vital Signs (24h Range):  Temp:  [97.3 °F (36.3 °C)-98.1 °F (36.7 °C)] 98.1 °F (36.7 °C)  Pulse:  [] 97  Resp:  [16-20] 16  SpO2:  [99 %-100 %] 99 %  BP: (129-151)/() 137/90        There is no height or weight on file to calculate BMI.    FHT: NSTs BID       Physical Exam:   Constitutional: She is oriented to person, place, and time. She appears well-developed and well-nourished.    HENT:   Head: Normocephalic.     Neck: Normal range of motion.    Cardiovascular: Normal rate.     Pulmonary/Chest: Effort normal.        Abdominal: Soft.   Non tender               Musculoskeletal: Normal range of motion and moves all extremeties.       Neurological: She is alert and oriented to person, place, and time. She has normal reflexes.    Skin: Skin is warm and dry.    Psychiatric: She has a normal mood and affect. Her behavior is normal. Judgment and thought content normal.       Cervix:  Deferred      Significant Labs:  Lab Results   Component Value Date    GROUPTRH A NEG 09/07/2019    HEPBSAG Negative 12/14/2018       I have personallly reviewed all pertinent lab results from the last 24 hours.

## 2019-09-14 NOTE — H&P
[facility]  Obstetrics  History & Physical    Patient Name: Samanta Zimmerman  MRN: [id number]  Admission Date: 2019  Primary Care Provider: Lisbeth Crow MD    Subjective:     Principal Problem:Mild preeclampsia, third trimester    History of Present Illness:  Arrived from home c/o elevated blood pressures     Obstetric HPI:  Patient reports None contractions, active fetal movement, No vaginal bleeding , No loss of fluid     This pregnancy has been complicated by   Pre e  Advanced maternal age: materniti 20 negative  In vitro fertilization  Hypertriglyceridemia   Hx polycystic kidneys  Hx liver cyst  Hx migraine  Hx pinealoma   Hx malignant melanoma: under care of derm     OB History    Para Term  AB Living   1 0 0 0 0 0   SAB TAB Ectopic Multiple Live Births   0 0 0 0 0      # Outcome Date GA Lbr Epi/2nd Weight Sex Delivery Anes PTL Lv   1 Current              Past Medical History:   Diagnosis Date    Autosomal recessive polycystic kidneys     GERD (gastroesophageal reflux disease)     History of uterine fibroid     Melanoma 2016    left superior helix (breslow dept 0.67mm)    Migraine headache     PONV (postoperative nausea and vomiting)     post op nausea     Past Surgical History:   Procedure Laterality Date    BIOPSY-LYMPH NODE Left 6/10/2016    Performed by [facility] at [facility] OR 2ND FLR    ETHMOIDECTOMY Bilateral 2015    Performed by [facility]al [facility] at [facility] OR    EXCISION-SKIN Left 6/10/2016    Performed by [facility] at [facility] OR 2ND FLR    LYMPH NODE BIOPSY      MALIGNANT SKIN LESION EXCISION  06/10/2016    NASAL SEPTUM SURGERY      POLYPECTOMY, UTERUS, HYSTEROSCOPIC N/A 1/10/2019    Performed by [facility]anta Sifuentes [facility] at [facility] [place] OR    RENAL EXPLORATION      SEPTOPLASTY N/A 2015    Performed by Kiya Leal MD at [facility] OR    SINUS SURGERY FUNCTIONAL ENDOSCOPIC Bilateral 2015    Performed by Kiya  ANA MARÍA [facility] at [facility] OR       [facility] Medications   Medication Sig    cetirizine (ZYRTEC) 10 MG tablet     fish oil-omega-3 fatty acids 300-1,000 mg capsule Take 2 g by mouth once daily.    Lactobacillus rhamnosus GG (CULTURELLE) 10 billion cell capsule     MAGNESIUM ORAL Take by mouth once daily.     methylPREDNISolone (MEDROL DOSEPACK) 4 mg tablet Take as directed    NIFEdipine (PROCARDIA-XL) 30 MG (OSM) 24 hr tablet Take 1 tablet (30 mg total) by mouth once daily.    omeprazole (PRILOSEC) 20 MG capsule Take 1 capsule (20 mg total) by mouth once daily.    ondansetron (ZOFRAN) 4 MG tablet TAKE ONE TABLET BY MOUTH EVERY 12 HOURS AS NEEDED FOR NAUSEA    prenat.vits,yonatan,min-iron-folic (PRENATAL VITAMIN) Tab Take by mouth once daily.       Review of patient's allergies indicates:   Allergen Reactions    Singulair [montelukast]      Itching all over        Family History     Problem Relation (Age of Onset)    Cancer Maternal Grandmother    Diabetes Paternal Grandmother    Hypertension Father    Kidney disease Father        Tobacco Use    Smoking status: Never Smoker    Smokeless tobacco: Never Used   Substance and Sexual Activity    Alcohol use: Not Currently     Comment: socially    Drug use: No    Sexual activity: Yes     Partners: Female     Review of Systems   Eyes: Negative for visual disturbance.   Gastrointestinal: Negative for abdominal pain.   Genitourinary: Negative for vaginal bleeding and vaginal discharge.   All other systems reviewed and are negative.     Objective:     Vital Signs (Most Recent):  Temp: 98.1 °F (36.7 °C) (09/13/19 1843)  Pulse: 97 (09/14/19 0805)  Resp: 16 (09/14/19 0805)  BP: (!) 137/90 (09/14/19 0805)  SpO2: 99 % (09/14/19 0802) Vital Signs (24h Range):  Temp:  [97.3 °F (36.3 °C)-98.1 °F (36.7 °C)] 98.1 °F (36.7 °C)  Pulse:  [] 97  Resp:  [16-20] 16  SpO2:  [99 %-100 %] 99 %  BP: (129-151)/() 137/90        There is no height or weight on file to calculate  BMI.    FHT: NSTs BID       Physical Exam:   Constitutional: She is oriented to person, place, and time. She appears well-developed and well-nourished.    HENT:   Head: Normocephalic.     Neck: Normal range of motion.    Cardiovascular: Normal rate.     Pulmonary/Chest: Effort normal.        Abdominal: Soft.   Non tender              Musculoskeletal: Normal range of motion and moves all extremeties.       Neurological: She is alert and oriented to person, place, and time. She has normal reflexes.    Skin: Skin is warm and dry.    Psychiatric: She has a normal mood and affect. Her behavior is normal. Judgment and thought content normal.       Cervix:  Deferred      Significant Labs:  Lab Results   Component Value Date    GROUPTRH A NEG 2019    HEPBSAG Negative 2018       I have personallly reviewed all pertinent lab results from the last 24 hours.    Assessment/Plan:     34 y.o. female  at 34w1d for:    * Mild preeclampsia, third trimester  PIH work up PCR 0.4  BMZ series   Monitor over night         Zach Mattson CNM  Obstetrics  [facility]

## 2019-09-15 VITALS
DIASTOLIC BLOOD PRESSURE: 87 MMHG | HEART RATE: 99 BPM | RESPIRATION RATE: 18 BRPM | OXYGEN SATURATION: 99 % | SYSTOLIC BLOOD PRESSURE: 126 MMHG | TEMPERATURE: 99 F

## 2019-09-15 PROCEDURE — 59025 FETAL NON-STRESS TEST: CPT

## 2019-09-15 PROCEDURE — 63600175 PHARM REV CODE 636 W HCPCS: Performed by: ADVANCED PRACTICE MIDWIFE

## 2019-09-15 PROCEDURE — 96372 THER/PROPH/DIAG INJ SC/IM: CPT

## 2019-09-15 PROCEDURE — 25000003 PHARM REV CODE 250: Performed by: OBSTETRICS & GYNECOLOGY

## 2019-09-15 RX ADMIN — NIFEDIPINE 30 MG: 30 TABLET, FILM COATED, EXTENDED RELEASE ORAL at 08:09

## 2019-09-15 RX ADMIN — BETAMETHASONE SODIUM PHOSPHATE AND BETAMETHASONE ACETATE 12 MG: 3; 3 INJECTION, SUSPENSION INTRA-ARTICULAR; INTRALESIONAL; INTRAMUSCULAR; SOFT TISSUE at 08:09

## 2019-09-15 NOTE — DISCHARGE SUMMARY
[facility]   Obstetrics  Discharge Summary      Patient Name: Samanta Zimmerman  MRN: [id number]  Admission Date: 9/13/2019  Hospital Length of Stay: 0 days  Discharge Date and Time:  09/15/2019 1:27 PM  Attending Physician: Penelope Hart, *   Discharging Provider: Gavi Boone MD   Primary Care Provider: Lisbeth Crow MD    HPI: Arrived from home c/o elevated blood pressures     FHT: 125 Cat 1 (reassuring)  TOCO:  Q 0 minutes    * No surgery found *     Hospital Course:   Observe   PIH work up  BMZ series          Final Active Diagnoses:    Diagnosis Date Noted POA    PRINCIPAL PROBLEM:  Mild preeclampsia, third trimester [O14.03] 09/13/2019 Yes      Problems Resolved During this Admission:        Labs: All labs within the past 24 hours have been reviewed    Feeding Method: not delivered    Immunizations     Date Immunization Status Dose Route/Site Given by    09/11/19 1420 Influenza - Quadrivalent - PF (6 months and older) Given 0.5 mL Intramuscular/Left deltoid Apple Gant LPN    09/11/19 1420 Tdap Given 0.5 mL Intramuscular/Right deltoid Apple Gant LPN          This patient has no babies on file.  Pending Diagnostic Studies:     None          Discharged Condition: stable    Disposition: Home or Self Care    Follow Up:    Patient Instructions:   No discharge procedures on file.  Medications:  Current Discharge Medication List      CONTINUE these medications which have NOT CHANGED    Details   cetirizine (ZYRTEC) 10 MG tablet       fish oil-omega-3 fatty acids 300-1,000 mg capsule Take 2 g by mouth once daily.      Lactobacillus rhamnosus GG (CULTURELLE) 10 billion cell capsule       MAGNESIUM ORAL Take by mouth once daily.       methylPREDNISolone (MEDROL DOSEPACK) 4 mg tablet Take as directed  Qty: 1 Package, Refills: 0      NIFEdipine (PROCARDIA-XL) 30 MG (OSM) 24 hr tablet Take 1 tablet (30 mg total) by mouth once daily.  Qty: 30 tablet, Refills: 11       omeprazole (PRILOSEC) 20 MG capsule Take 1 capsule (20 mg total) by mouth once daily.  Qty: 90 capsule, Refills: 3      ondansetron (ZOFRAN) 4 MG tablet TAKE ONE TABLET BY MOUTH EVERY 12 HOURS AS NEEDED FOR NAUSEA  Qty: 12 tablet, Refills: 1      prenat.vits,yonatan,min-iron-folic (PRENATAL VITAMIN) Tab Take by mouth once daily.             Gavi Boone MD  Obstetrics  [facility]

## 2019-09-15 NOTE — PLAN OF CARE
Problem: Adult Inpatient Plan of Care  Goal: Plan of Care Review  Outcome: Ongoing (interventions implemented as appropriate)  Pt progressing well. 1 hour NST reactive this shift. VSS. No complaints this shift. Will continue to monitor.

## 2019-09-15 NOTE — SUBJECTIVE & OBJECTIVE
Obstetric HPI:  Patient reports None contractions, active fetal movement, absent vaginal bleeding , absent loss of fluid      Objective:     Vital Signs (Most Recent):  Temp: 98.5 °F (36.9 °C) (09/15/19 0735)  Pulse: 101 (09/15/19 0735)  Resp: 20 (09/15/19 0735)  BP: 120/70 (09/15/19 0735)  SpO2: 99 % (09/14/19 0802) Vital Signs (24h Range):  Temp:  [97.9 °F (36.6 °C)-98.6 °F (37 °C)] 98.5 °F (36.9 °C)  Pulse:  [] 101  Resp:  [20] 20  BP: (120-137)/(70-89) 120/70        There is no height or weight on file to calculate BMI.    FHT: 125Cat 1 (reassuring)  TOCO:  Q 0  minutes    No intake or output data in the 24 hours ending 09/15/19 1313    Cervical Exam:  Dilation:  0  Effacement:  deferred  Station: -3  Presentation: Vertex     Significant Labs:  Recent Lab Results     None          Physical Exam:   Constitutional: She is oriented to person, place, and time. She appears well-developed and well-nourished.    HENT:   Head: Normocephalic and atraumatic.    Eyes: EOM are normal.    Neck: Normal range of motion. Neck supple.          Abdominal: Soft. Bowel sounds are normal.             Musculoskeletal: Normal range of motion and moves all extremeties.   1+edema       Neurological: She is alert and oriented to person, place, and time. She has normal reflexes.    Skin: Skin is warm and dry.    Psychiatric: She has a normal mood and affect. Her behavior is normal.

## 2019-09-15 NOTE — PROGRESS NOTES
[facility]   Obstetrics  Antepartum Progress Note    Patient Name: Samanta Zimmerman  MRN: [id number]  Admission Date: 2019  Hospital Length of Stay: 0 days  Attending Physician: Penelope Hart, *  Primary Care Provider: Lisbeth Crow MD    Subjective:     Principal Problem:Mild preeclampsia, third trimester    HPI:  Arrived from home c/o elevated blood pressures     Hospital Course:  Observe   PIH work up  BMZ series     Obstetric HPI:  Patient reports None contractions, active fetal movement, absent vaginal bleeding , absent loss of fluid      Objective:     Vital Signs (Most Recent):  Temp: 98.5 °F (36.9 °C) (09/15/19 07)  Pulse: 101 (09/15/19 07)  Resp: 20 (09/15/19 0735)  BP: 120/70 (09/15/19 0735)  SpO2: 99 % (19 0802) Vital Signs (24h Range):  Temp:  [97.9 °F (36.6 °C)-98.6 °F (37 °C)] 98.5 °F (36.9 °C)  Pulse:  [] 101  Resp:  [20] 20  BP: (120-137)/(70-89) 120/70        There is no height or weight on file to calculate BMI.    FHT: 125Cat 1 (reassuring)  TOCO:  Q 0  minutes    No intake or output data in the 24 hours ending 09/15/19 1313    Cervical Exam:  Dilation:  0  Effacement:  deferred  Station: -3  Presentation: Vertex     Significant Labs:  Recent Lab Results     None          Physical Exam:   Constitutional: She is oriented to person, place, and time. She appears well-developed and well-nourished.    HENT:   Head: Normocephalic and atraumatic.    Eyes: EOM are normal.    Neck: Normal range of motion. Neck supple.          Abdominal: Soft. Bowel sounds are normal.             Musculoskeletal: Normal range of motion and moves all extremeties.   1+edema       Neurological: She is alert and oriented to person, place, and time. She has normal reflexes.    Skin: Skin is warm and dry.    Psychiatric: She has a normal mood and affect. Her behavior is normal.       Assessment/Plan:     34 y.o. female  at 34w2d for:    * Mild preeclampsia, third  trimester  PIH work up PCR 0.4  BMZ series   Monitor over night       D/C home  F/U office 1 week with BPP          Gavi Boone MD  Obstetrics  [facility]

## 2019-09-18 ENCOUNTER — ROUTINE PRENATAL (OUTPATIENT)
Dept: OBSTETRICS AND GYNECOLOGY | Facility: CLINIC | Age: 34
End: 2019-09-18
Payer: COMMERCIAL

## 2019-09-18 ENCOUNTER — PROCEDURE VISIT (OUTPATIENT)
Dept: OBSTETRICS AND GYNECOLOGY | Facility: CLINIC | Age: 34
End: 2019-09-18
Payer: COMMERCIAL

## 2019-09-18 ENCOUNTER — HOSPITAL ENCOUNTER (INPATIENT)
Facility: HOSPITAL | Age: 34
LOS: 4 days | Discharge: HOME | End: 2019-09-24
Attending: OBSTETRICS & GYNECOLOGY | Admitting: OBSTETRICS & GYNECOLOGY
Payer: COMMERCIAL

## 2019-09-18 VITALS
WEIGHT: 212.06 LBS | SYSTOLIC BLOOD PRESSURE: 162 MMHG | DIASTOLIC BLOOD PRESSURE: 98 MMHG | BODY MASS INDEX: 38.79 KG/M2

## 2019-09-18 DIAGNOSIS — O14.93 PRE-ECLAMPSIA IN THIRD TRIMESTER: ICD-10-CM

## 2019-09-18 DIAGNOSIS — O09.529 ANTEPARTUM MULTIGRAVIDA OF ADVANCED MATERNAL AGE: ICD-10-CM

## 2019-09-18 DIAGNOSIS — Z98.891 STATUS POST PRIMARY LOW TRANSVERSE CESAREAN SECTION: Primary | ICD-10-CM

## 2019-09-18 DIAGNOSIS — O16.3 ELEVATED BLOOD PRESSURE AFFECTING PREGNANCY IN THIRD TRIMESTER, ANTEPARTUM: ICD-10-CM

## 2019-09-18 DIAGNOSIS — O14.13 PREECLAMPSIA, SEVERE, THIRD TRIMESTER: ICD-10-CM

## 2019-09-18 DIAGNOSIS — O16.3 HYPERTENSION AFFECTING PREGNANCY IN THIRD TRIMESTER: ICD-10-CM

## 2019-09-18 DIAGNOSIS — O14.13 PREECLAMPSIA, SEVERE, THIRD TRIMESTER: Primary | ICD-10-CM

## 2019-09-18 LAB
ALBUMIN SERPL BCP-MCNC: 2.8 G/DL (ref 3.5–5.2)
ALP SERPL-CCNC: 114 U/L (ref 55–135)
ALT SERPL W/O P-5'-P-CCNC: 8 U/L (ref 10–44)
ANION GAP SERPL CALC-SCNC: 10 MMOL/L (ref 8–16)
AST SERPL-CCNC: 11 U/L (ref 10–40)
BASOPHILS # BLD AUTO: 0.01 K/UL (ref 0–0.2)
BASOPHILS NFR BLD: 0.1 % (ref 0–1.9)
BILIRUB SERPL-MCNC: 0.2 MG/DL (ref 0.1–1)
BUN SERPL-MCNC: 11 MG/DL (ref 6–20)
CALCIUM SERPL-MCNC: 9.6 MG/DL (ref 8.7–10.5)
CHLORIDE SERPL-SCNC: 107 MMOL/L (ref 95–110)
CO2 SERPL-SCNC: 21 MMOL/L (ref 23–29)
CREAT SERPL-MCNC: 0.6 MG/DL (ref 0.5–1.4)
CREAT UR-MCNC: 41.4 MG/DL (ref 15–325)
DIFFERENTIAL METHOD: ABNORMAL
EOSINOPHIL # BLD AUTO: 0.1 K/UL (ref 0–0.5)
EOSINOPHIL NFR BLD: 0.8 % (ref 0–8)
ERYTHROCYTE [DISTWIDTH] IN BLOOD BY AUTOMATED COUNT: 12.9 % (ref 11.5–14.5)
EST. GFR  (AFRICAN AMERICAN): >60 ML/MIN/1.73 M^2
EST. GFR  (NON AFRICAN AMERICAN): >60 ML/MIN/1.73 M^2
GLUCOSE SERPL-MCNC: 78 MG/DL (ref 70–110)
HCT VFR BLD AUTO: 34.1 % (ref 37–48.5)
HGB BLD-MCNC: 11.8 G/DL (ref 12–16)
HIV1+2 IGG SERPL QL IA.RAPID: NEGATIVE
LYMPHOCYTES # BLD AUTO: 1.9 K/UL (ref 1–4.8)
LYMPHOCYTES NFR BLD: 21.2 % (ref 18–48)
MCH RBC QN AUTO: 28.9 PG (ref 27–31)
MCHC RBC AUTO-ENTMCNC: 34.6 G/DL (ref 32–36)
MCV RBC AUTO: 84 FL (ref 82–98)
MONOCYTES # BLD AUTO: 0.7 K/UL (ref 0.3–1)
MONOCYTES NFR BLD: 7.7 % (ref 4–15)
NEUTROPHILS # BLD AUTO: 6.3 K/UL (ref 1.8–7.7)
NEUTROPHILS NFR BLD: 71.6 % (ref 38–73)
PLATELET # BLD AUTO: 232 K/UL (ref 150–350)
PMV BLD AUTO: 11 FL (ref 9.2–12.9)
POTASSIUM SERPL-SCNC: 4 MMOL/L (ref 3.5–5.1)
PROT SERPL-MCNC: 6.7 G/DL (ref 6–8.4)
PROT UR-MCNC: 26 MG/DL (ref 0–15)
PROT/CREAT UR: 0.63 MG/G{CREAT} (ref 0–0.2)
RBC # BLD AUTO: 4.08 M/UL (ref 4–5.4)
RPR SER QL: NORMAL
SODIUM SERPL-SCNC: 138 MMOL/L (ref 136–145)
WBC # BLD AUTO: 9.04 K/UL (ref 3.9–12.7)

## 2019-09-18 PROCEDURE — 76819 PR US, OB, FETAL BIOPHYSICAL, W/O NST: ICD-10-PCS | Mod: S$GLB,,, | Performed by: OBSTETRICS & GYNECOLOGY

## 2019-09-18 PROCEDURE — 86592 SYPHILIS TEST NON-TREP QUAL: CPT

## 2019-09-18 PROCEDURE — 99999 PR PBB SHADOW E&M-EST. PATIENT-LVL II: ICD-10-PCS | Mod: PBBFAC,,, | Performed by: ADVANCED PRACTICE MIDWIFE

## 2019-09-18 PROCEDURE — 25000003 PHARM REV CODE 250: Performed by: OBSTETRICS & GYNECOLOGY

## 2019-09-18 PROCEDURE — 82570 ASSAY OF URINE CREATININE: CPT

## 2019-09-18 PROCEDURE — 59025 FETAL NON-STRESS TEST: CPT

## 2019-09-18 PROCEDURE — 36415 COLL VENOUS BLD VENIPUNCTURE: CPT

## 2019-09-18 PROCEDURE — 86703 HIV-1/HIV-2 1 RESULT ANTBDY: CPT

## 2019-09-18 PROCEDURE — 76819 FETAL BIOPHYS PROFIL W/O NST: CPT | Mod: S$GLB,,, | Performed by: OBSTETRICS & GYNECOLOGY

## 2019-09-18 PROCEDURE — 0502F PR SUBSEQUENT PRENATAL CARE: ICD-10-PCS | Mod: CPTII,S$GLB,, | Performed by: ADVANCED PRACTICE MIDWIFE

## 2019-09-18 PROCEDURE — 86762 RUBELLA ANTIBODY: CPT

## 2019-09-18 PROCEDURE — 99999 PR PBB SHADOW E&M-EST. PATIENT-LVL II: CPT | Mod: PBBFAC,,, | Performed by: ADVANCED PRACTICE MIDWIFE

## 2019-09-18 PROCEDURE — 25000003 PHARM REV CODE 250: Performed by: MIDWIFE

## 2019-09-18 PROCEDURE — 0502F SUBSEQUENT PRENATAL CARE: CPT | Mod: CPTII,S$GLB,, | Performed by: ADVANCED PRACTICE MIDWIFE

## 2019-09-18 PROCEDURE — 80053 COMPREHEN METABOLIC PANEL: CPT

## 2019-09-18 PROCEDURE — 87340 HEPATITIS B SURFACE AG IA: CPT

## 2019-09-18 PROCEDURE — 85025 COMPLETE CBC W/AUTO DIFF WBC: CPT

## 2019-09-18 RX ORDER — LABETALOL HYDROCHLORIDE 5 MG/ML
20 INJECTION, SOLUTION INTRAVENOUS ONCE AS NEEDED
Status: COMPLETED | OUTPATIENT
Start: 2019-09-18 | End: 2019-09-18

## 2019-09-18 RX ORDER — BUTALBITAL, ACETAMINOPHEN AND CAFFEINE 50; 325; 40 MG/1; MG/1; MG/1
1 TABLET ORAL ONCE
Status: COMPLETED | OUTPATIENT
Start: 2019-09-18 | End: 2019-09-18

## 2019-09-18 RX ORDER — ONDANSETRON 8 MG/1
8 TABLET, ORALLY DISINTEGRATING ORAL EVERY 8 HOURS PRN
Status: DISCONTINUED | OUTPATIENT
Start: 2019-09-18 | End: 2019-09-20 | Stop reason: SDUPTHER

## 2019-09-18 RX ORDER — ACETAMINOPHEN 500 MG
500 TABLET ORAL EVERY 6 HOURS PRN
Status: DISCONTINUED | OUTPATIENT
Start: 2019-09-18 | End: 2019-09-20

## 2019-09-18 RX ORDER — HYDRALAZINE HYDROCHLORIDE 20 MG/ML
10 INJECTION INTRAMUSCULAR; INTRAVENOUS ONCE AS NEEDED
Status: DISCONTINUED | OUTPATIENT
Start: 2019-09-18 | End: 2019-09-20 | Stop reason: SDUPTHER

## 2019-09-18 RX ORDER — LABETALOL HYDROCHLORIDE 5 MG/ML
80 INJECTION, SOLUTION INTRAVENOUS ONCE AS NEEDED
Status: DISCONTINUED | OUTPATIENT
Start: 2019-09-18 | End: 2019-09-20 | Stop reason: SDUPTHER

## 2019-09-18 RX ORDER — LABETALOL HYDROCHLORIDE 5 MG/ML
40 INJECTION, SOLUTION INTRAVENOUS ONCE AS NEEDED
Status: DISCONTINUED | OUTPATIENT
Start: 2019-09-18 | End: 2019-09-20 | Stop reason: SDUPTHER

## 2019-09-18 RX ORDER — LABETALOL 200 MG/1
200 TABLET, FILM COATED ORAL EVERY 12 HOURS
Status: DISCONTINUED | OUTPATIENT
Start: 2019-09-18 | End: 2019-09-18

## 2019-09-18 RX ADMIN — BUTALBITAL, ACETAMINOPHEN, AND CAFFEINE 1 TABLET: 50; 325; 40 TABLET ORAL at 10:09

## 2019-09-18 RX ADMIN — ACETAMINOPHEN 500 MG: 500 TABLET ORAL at 06:09

## 2019-09-18 RX ADMIN — LABETALOL HYDROCHLORIDE 20 MG: 5 INJECTION INTRAVENOUS at 06:09

## 2019-09-18 NOTE — H&P
[facility]  Obstetrics  History & Physical    Patient Name: Samanta Argueta  MRN: [id number]  Admission Date: 2019  Primary Care Provider: Lisbeth Crow MD    Subjective:     Principal Problem:Hypertension affecting pregnancy in third trimester    History of Present Illness:  33 yo, G1 34w5d present from office with high blood pressures    Obstetric HPI:  Patient presents from office with high blood pressures for PIH work up, active fetal movement, No vaginal bleeding , No loss of fluid     This pregnancy has been complicated by polycystic kidney disease, AMA and RH negative    OB History    Para Term  AB Living   1 0 0 0 0 0   SAB TAB Ectopic Multiple Live Births   0 0 0 0 0      # Outcome Date GA Lbr Epi/2nd Weight Sex Delivery Anes PTL Lv   1 Current              Past Medical History:   Diagnosis Date    Autosomal recessive polycystic kidneys     GERD (gastroesophageal reflux disease)     History of uterine fibroid     Melanoma 2016    left superior helix (breslow dept 0.67mm)    Migraine headache     PONV (postoperative nausea and vomiting)     post op nausea     Past Surgical History:   Procedure Laterality Date    BIOPSY-LYMPH NODE Left 6/10/2016    Performed [facility] 2ND FLR    ETHMOIDECTOMY Bilateral 2015    [facility] at [facility] OR    EXCISION-SKIN Left 6/10/2016    Performed by [facility] at [facility] 2ND FLR    LYMPH NODE BIOPSY      MALIGNANT SKIN LESION EXCISION  06/10/2016    NASAL SEPTUM SURGERY      POLYPECTOMY, UTERUS, HYSTEROSCOPIC N/A 1/10/2019    [facility]    RENAL EXPLORATION      SEPTOPLASTY N/A 2015    [facility] at [facility] OR    SINUS SURGERY FUNCTIONAL ENDOSCOPIC Bilateral 2015    Performed by [facility] at [facility] OR       PTA Medications   Medication Sig    cetirizine (ZYRTEC) 10  MG tablet     fish oil-omega-3 fatty acids 300-1,000 mg capsule Take 2 g by mouth once daily.    Lactobacillus rhamnosus GG (CULTURELLE) 10 billion cell capsule     MAGNESIUM ORAL Take by mouth once daily.     methylPREDNISolone (MEDROL DOSEPACK) 4 mg tablet Take as directed    NIFEdipine (PROCARDIA-XL) 30 MG (OSM) 24 hr tablet Take 1 tablet (30 mg total) by mouth once daily.    omeprazole (PRILOSEC) 20 MG capsule Take 1 capsule (20 mg total) by mouth once daily.    ondansetron (ZOFRAN) 4 MG tablet TAKE ONE TABLET BY MOUTH EVERY 12 HOURS AS NEEDED FOR NAUSEA    prenat.vits,yonatan,min-iron-folic (PRENATAL VITAMIN) Tab Take by mouth once daily.       Review of patient's allergies indicates:   Allergen Reactions    Singulair [montelukast]      Itching all over        Family History     Problem Relation (Age of Onset)    Cancer Maternal Grandmother    Diabetes Paternal Grandmother    Hypertension Father    Kidney disease Father        Tobacco Use    Smoking status: Never Smoker    Smokeless tobacco: Never Used   Substance and Sexual Activity    Alcohol use: Not Currently     Comment: socially    Drug use: No    Sexual activity: Yes     Partners: Female     Review of Systems   Constitutional: Negative for activity change and appetite change.   HENT: Positive for nasal congestion.    Eyes: Positive for visual disturbance (blurred vision).   Respiratory: Negative for cough.    Cardiovascular: Negative for chest pain.   Gastrointestinal: Positive for nausea. Negative for abdominal pain and vomiting.   Genitourinary: Negative for pelvic pain, vaginal bleeding and vaginal discharge.   Musculoskeletal: Negative for back pain.   Neurological: Positive for headaches. Negative for vertigo and syncope.   Psychiatric/Behavioral: Negative for depression and sleep disturbance. The patient is not nervous/anxious.       Objective:     Vital Signs (Most Recent):    Vital Signs (24h Range):  BP: (162)/(98) 162/98        There  is no height or weight on file to calculate BMI.    FHT: 135Cat 1 (reassuring)  TOCO:  none    Physical Exam:   Constitutional: She is oriented to person, place, and time. She appears well-developed and well-nourished.    HENT:   Head: Normocephalic and atraumatic.     Neck: Normal range of motion. Neck supple.     Pulmonary/Chest: Effort normal.        Abdominal: Soft.   gravid             Musculoskeletal: Normal range of motion and moves all extremeties. She exhibits edema (trace BLE).       Neurological: She is alert and oriented to person, place, and time.   DTR +3    Skin: Skin is warm and dry.    Psychiatric: She has a normal mood and affect. Her behavior is normal. Judgment and thought content normal.       Cervix:  Dilation:    Effacement:    Station:   Presentation:      Significant Labs:  Lab Results   Component Value Date    GROUPTRH A NEG 2019    HEPBSAG Negative 2018       I have personallly reviewed all pertinent lab results from the last 24 hours.    Assessment/Plan:     34 y.o. female  at 34w5d for:    * Hypertension affecting pregnancy in third trimester  G1 34w5d with high blood pressures, headache and blurred vision  Observe  PIH labs  DC procardia  NST        Silvana Baron CNM  Obstetrics  [facility]

## 2019-09-18 NOTE — PROGRESS NOTES
Blood pressure 162/98, taking Procardia 30 mg b.i.d..  Was feeling fine yesterday but last night and today complaining of headaches occasional nausea, not feeling well, 3+ DTRs.  Last PCR 0.4 on 09/14/2019.  Betamethasone series was also administered at that time.   Cervix was 0 /-3 at that time  To Labor and delivery for evaluation advised NPO until evaluated.  Report given

## 2019-09-18 NOTE — ASSESSMENT & PLAN NOTE
G1 34w5d with high blood pressures, headache and blurred vision  Observe  PIH labs  DC procardia  NST

## 2019-09-18 NOTE — SUBJECTIVE & OBJECTIVE
Obstetric HPI:  Patient presents from office with high blood pressures for PIH work up, active fetal movement, No vaginal bleeding , No loss of fluid     This pregnancy has been complicated by polycystic kidney disease, AMA and RH negative    OB History    Para Term  AB Living   1 0 0 0 0 0   SAB TAB Ectopic Multiple Live Births   0 0 0 0 0      # Outcome Date GA Lbr Epi/2nd Weight Sex Delivery Anes PTL Lv   1 Current              Past Medical History:   Diagnosis Date    Autosomal recessive polycystic kidneys     GERD (gastroesophageal reflux disease)     History of uterine fibroid     Melanoma 2016    left superior helix (breslow dept 0.67mm)    Migraine headache     PONV (postoperative nausea and vomiting)     post op nausea     Past Surgical History:   Procedure Laterality Date    BIOPSY-LYMPH NODE Left 6/10/2016    [facility]formed [facility] 2ND FLR    ETHMOIDECTOMY Bilateral 2015    [facility]    EXCISION-SKIN Left 6/10/2016    Performed [facility] at [facility] 2ND FLR    LYMPH NODE BIOPSY      MALIGNANT SKIN LESION EXCISION  06/10/2016    NASAL SEPTUM SURGERY      POLYPECTOMY, UTERUS, HYSTEROSCOPIC N/A 1/10/2019    [facility]    RENAL EXPLORATION      SEPTOPLASTY N/A 2015    [facility]    SINUS SURGERY FUNCTIONAL ENDOSCOPIC Bilateral 2015    Performed by [facility] at [facility] OR       PTA Medications   Medication Sig    cetirizine (ZYRTEC) 10 MG tablet     fish oil-omega-3 fatty acids 300-1,000 mg capsule Take 2 g by mouth once daily.    Lactobacillus rhamnosus GG (CULTURELLE) 10 billion cell capsule     MAGNESIUM ORAL Take by mouth once daily.     methylPREDNISolone (MEDROL DOSEPACK) 4 mg tablet Take as directed    NIFEdipine (PROCARDIA-XL) 30 MG (OSM) 24 hr tablet Take 1 tablet (30 mg total) by mouth once  daily.    omeprazole (PRILOSEC) 20 MG capsule Take 1 capsule (20 mg total) by mouth once daily.    ondansetron (ZOFRAN) 4 MG tablet TAKE ONE TABLET BY MOUTH EVERY 12 HOURS AS NEEDED FOR NAUSEA    prenat.vits,yonatan,min-iron-folic (PRENATAL VITAMIN) Tab Take by mouth once daily.       Review of patient's allergies indicates:   Allergen Reactions    Singulair [montelukast]      Itching all over        Family History     Problem Relation (Age of Onset)    Cancer Maternal Grandmother    Diabetes Paternal Grandmother    Hypertension Father    Kidney disease Father        Tobacco Use    Smoking status: Never Smoker    Smokeless tobacco: Never Used   Substance and Sexual Activity    Alcohol use: Not Currently     Comment: socially    Drug use: No    Sexual activity: Yes     Partners: Female     Review of Systems   Constitutional: Negative for activity change and appetite change.   HENT: Positive for nasal congestion.    Eyes: Positive for visual disturbance (blurred vision).   Respiratory: Negative for cough.    Cardiovascular: Negative for chest pain.   Gastrointestinal: Positive for nausea. Negative for abdominal pain and vomiting.   Genitourinary: Negative for pelvic pain, vaginal bleeding and vaginal discharge.   Musculoskeletal: Negative for back pain.   Neurological: Positive for headaches. Negative for vertigo and syncope.   Psychiatric/Behavioral: Negative for depression and sleep disturbance. The patient is not nervous/anxious.       Objective:     Vital Signs (Most Recent):    Vital Signs (24h Range):  BP: (162)/(98) 162/98        There is no height or weight on file to calculate BMI.    FHT: 135Cat 1 (reassuring)  TOCO:  none    Physical Exam:   Constitutional: She is oriented to person, place, and time. She appears well-developed and well-nourished.    HENT:   Head: Normocephalic and atraumatic.     Neck: Normal range of motion. Neck supple.     Pulmonary/Chest: Effort normal.        Abdominal: Soft.    gravid             Musculoskeletal: Normal range of motion and moves all extremeties. She exhibits edema (trace BLE).       Neurological: She is alert and oriented to person, place, and time.   DTR +3    Skin: Skin is warm and dry.    Psychiatric: She has a normal mood and affect. Her behavior is normal. Judgment and thought content normal.       Cervix:  Dilation:    Effacement:    Station:   Presentation:      Significant Labs:  Lab Results   Component Value Date    GROUPTR A NEG 09/07/2019    HEPBSAG Negative 12/14/2018       I have personallly reviewed all pertinent lab results from the last 24 hours.

## 2019-09-18 NOTE — HOSPITAL COURSE
"G1 34w5d with high blood pressures, headache and blurred vision  Observe  PIH labs  DC procardia  NST  19-BPs have improved. Received 1 dose of IV labetalol. Pt reports headaches still mild but much improved from admit yesterday.   19:  Pt with persistent headache and hyperreflexia.  IV magnesium sulfate for seizure prophylaxis and induction of labor for preeclampsia with severe features.  During the course of induction, her headache persisted and worsened.  She developed oliguria, and remained hyperreflexic.  The decision was then made to proceed with primary  delivery for worsening CNM symptoms and oliguria remote from delivery.  She delivered a healthy, female infant ("Sara Kauffman").  Bladder distension was noted during the , so the Jolley was replaced, and 75cc blood tinged urine drained from it.  IV magnesium sulfate x 24 hours postpartum.  19-no complaints, feeling well. Would like to stay w/ infant & be discharged home together. BPs mildly elevated but stable  "

## 2019-09-18 NOTE — HPI
33 yo, G1 34w5d present from office with high blood pressures  9/20/19 Admitted to inpatient status for IOL secondary to pre-eclampsia. Mag Sulfate/seizure prophylaxis, cytotec for cervical ripening.

## 2019-09-18 NOTE — PROGRESS NOTES
Pt seen.  Agree with CNM note.  Pt with prior diagnosis of Pre-eclampsia and was taking Procardia 30 BID.  Presented with elevated BP and headaches.  BP on admit 138-161/. Fetal status reassuring.  Labs ordered and are pending.  EFW on 9/13/19 2533 g (35%).  BPP today 8/8 with cephalic presentation.  Will discontinue oral Procardia and monitor BP closely.  Will proceed with BP correction IV prn.  Pt counseled extensively.  If severe pre-eclampsia confirmed, plan to proceed with delivery (pt completed BMZ course 9/15/19) and Mg.  Pt voiced understanding and agrees with plan.

## 2019-09-19 ENCOUNTER — PATIENT MESSAGE (OUTPATIENT)
Dept: OBSTETRICS AND GYNECOLOGY | Facility: CLINIC | Age: 34
End: 2019-09-19

## 2019-09-19 PROBLEM — O16.3 ELEVATED BLOOD PRESSURE AFFECTING PREGNANCY IN THIRD TRIMESTER, ANTEPARTUM: Status: RESOLVED | Noted: 2019-09-05 | Resolved: 2019-09-19

## 2019-09-19 PROBLEM — O13.3 GESTATIONAL HYPERTENSION, THIRD TRIMESTER: Status: RESOLVED | Noted: 2019-09-07 | Resolved: 2019-09-19

## 2019-09-19 PROBLEM — O14.03 MILD PREECLAMPSIA, THIRD TRIMESTER: Status: RESOLVED | Noted: 2019-09-13 | Resolved: 2019-09-19

## 2019-09-19 PROBLEM — Z34.90 NORMAL INTRAUTERINE PREGNANCY, ANTEPARTUM: Status: RESOLVED | Noted: 2019-04-17 | Resolved: 2019-09-19

## 2019-09-19 PROBLEM — O14.93 PRE-ECLAMPSIA IN THIRD TRIMESTER: Status: ACTIVE | Noted: 2019-09-18

## 2019-09-19 LAB
HBV SURFACE AG SERPL QL IA: NEGATIVE
RUBV IGG SER-ACNC: 19.7 IU/ML
RUBV IGG SER-IMP: REACTIVE

## 2019-09-19 PROCEDURE — 59025 FETAL NON-STRESS TEST: CPT | Mod: 76

## 2019-09-19 NOTE — PROGRESS NOTES
[facility] -   Obstetrics  Antepartum Progress Note    Patient Name: Samanta Argueta  MRN: [id number]  Admission Date: 9/18/2019  Hospital Length of Stay: 0 days  Attending Physician: Vikas Park MD  Primary Care Provider: Lisbeth Crow MD    Subjective:     Principal Problem:Pre-eclampsia in third trimester    HPI:  35 yo, G1 34w5d present from office with high blood pressures    Hospital Course:  G1 34w5d with high blood pressures, headache and blurred vision  Observe  PIH labs  DC procardia  NST  9/19/19-BPs have improved. Received 1 dose of IV labetalol. Pt reports headaches still mild but much improved from admit yesterday.     Vitals:    09/19/19 0603 09/19/19 0700 09/19/19 0800 09/19/19 0900   BP: (!) 144/88 (!) 142/92 (!) 141/99 (!) 143/98   Pulse: 83  86    Resp:   18    Temp:   98.4 °F (36.9 °C)    TempSrc:   Oral    SpO2:   99%        Obstetric HPI:  Patient reports None contractions, active fetal movement, absent vaginal bleeding , absent loss of fluid      Objective:     Vital Signs (Most Recent):  Temp: 98.4 °F (36.9 °C) (09/18/19 2300)  Pulse: 83 (09/19/19 0603)  BP: (!) 144/88 (09/19/19 0603)  SpO2: 99 % (09/18/19 2102) Vital Signs (24h Range):  Temp:  [98.1 °F (36.7 °C)-98.4 °F (36.9 °C)] 98.4 °F (36.9 °C)  Pulse:  [] 83  SpO2:  [96 %-99 %] 99 %  BP: (129-162)/() 144/88        There is no height or weight on file to calculate BMI.    FHT:  130 Cat 1 (reassuring)  TOCO:  Q 0 minutes    No intake or output data in the 24 hours ending 09/19/19 0955    Significant Labs:  Recent Lab Results       09/18/19  1720   09/18/19  1630        Albumin 2.8       Alkaline Phosphatase 114       ALT 8       Anion Gap 10       AST 11       Baso # 0.01       Basophil% 0.1       BILIRUBIN TOTAL 0.2  Comment:  For infants and newborns, interpretation of results should be based  on gestational age, weight and in agreement with clinical  observations.  Premature Infant recommended  reference ranges:  Up to 24 hours.............<8.0 mg/dL  Up to 48 hours............<12.0 mg/dL  3-5 days..................<15.0 mg/dL  6-29 days.................<15.0 mg/dL         BUN, Bld 11       Calcium 9.6       Chloride 107       CO2 21       Creatinine 0.6       Creatinine, Random Ur   41.4  Comment:  The random urine reference ranges provided were established   for 24 hour urine collections.  No reference ranges exist for  random urine specimens.  Correlate clinically.       Differential Method Automated       eGFR if  >60       eGFR if non  >60  Comment:  Calculation used to obtain the estimated glomerular filtration  rate (eGFR) is the CKD-EPI equation.          Eos # 0.1       Eosinophil% 0.8       Glucose 78       Gran # (ANC) 6.3       Gran% 71.6       Hematocrit 34.1       Hemoglobin 11.8       HIV Rapid Testing Negative       Lymph # 1.9       Lymph% 21.2       MCH 28.9       MCHC 34.6       MCV 84       Mono # 0.7       Mono% 7.7       MPV 11.0       Platelets 232       Potassium 4.0       Prot/Creat Ratio, Ur   0.63     PROTEIN TOTAL 6.7       Protein, Urine Random   26  Comment:  The random urine reference ranges provided were established   for 24 hour urine collections.  No reference ranges exist for  random urine specimens.  Correlate clinically.       RBC 4.08       RDW 12.9       RPR Non-reactive       Sodium 138       WBC 9.04             Physical Exam:   Constitutional: She is oriented to person, place, and time. She appears well-developed and well-nourished.        Pulmonary/Chest: Effort normal.        Abdominal: Soft.   gravid             Musculoskeletal: Moves all extremeties.       Neurological: She is alert and oriented to person, place, and time. She has normal reflexes.    Skin: Skin is warm and dry.    Psychiatric: Her behavior is normal.       Assessment/Plan:     34 y.o. female  at 34w6d for:    * Pre-eclampsia in third trimester  G1 34w5d  with high blood pressures, headache and blurred vision  Observe  PIH labs  DC procardia  NST       Autosomal recessive polycystic kidneys          Will continue expectant management at this time. Proceed w/ induction for worsening symptoms/BPs      Britney Amezcua MD  Obstetrics  [facility] [place]

## 2019-09-19 NOTE — PROGRESS NOTES
Pt reports headache and blurry vision.  /88.  Pt also mentions history of migraine headaches.  Called HARSH Baron CNM; ordered one time dose of Fioricet.

## 2019-09-19 NOTE — SUBJECTIVE & OBJECTIVE
Obstetric HPI:  Patient reports None contractions, active fetal movement, absent vaginal bleeding , absent loss of fluid      Objective:     Vital Signs (Most Recent):  Temp: 98.4 °F (36.9 °C) (09/18/19 2300)  Pulse: 83 (09/19/19 0603)  BP: (!) 144/88 (09/19/19 0603)  SpO2: 99 % (09/18/19 2102) Vital Signs (24h Range):  Temp:  [98.1 °F (36.7 °C)-98.4 °F (36.9 °C)] 98.4 °F (36.9 °C)  Pulse:  [] 83  SpO2:  [96 %-99 %] 99 %  BP: (129-162)/() 144/88        There is no height or weight on file to calculate BMI.    FHT:  130 Cat 1 (reassuring)  TOCO:  Q 0 minutes    No intake or output data in the 24 hours ending 09/19/19 0955    Significant Labs:  Recent Lab Results       09/18/19  1720   09/18/19  1630        Albumin 2.8       Alkaline Phosphatase 114       ALT 8       Anion Gap 10       AST 11       Baso # 0.01       Basophil% 0.1       BILIRUBIN TOTAL 0.2  Comment:  For infants and newborns, interpretation of results should be based  on gestational age, weight and in agreement with clinical  observations.  Premature Infant recommended reference ranges:  Up to 24 hours.............<8.0 mg/dL  Up to 48 hours............<12.0 mg/dL  3-5 days..................<15.0 mg/dL  6-29 days.................<15.0 mg/dL         BUN, Bld 11       Calcium 9.6       Chloride 107       CO2 21       Creatinine 0.6       Creatinine, Random Ur   41.4  Comment:  The random urine reference ranges provided were established   for 24 hour urine collections.  No reference ranges exist for  random urine specimens.  Correlate clinically.       Differential Method Automated       eGFR if  >60       eGFR if non  >60  Comment:  Calculation used to obtain the estimated glomerular filtration  rate (eGFR) is the CKD-EPI equation.          Eos # 0.1       Eosinophil% 0.8       Glucose 78       Gran # (ANC) 6.3       Gran% 71.6       Hematocrit 34.1       Hemoglobin 11.8       HIV Rapid Testing Negative        Lymph # 1.9       Lymph% 21.2       MCH 28.9       MCHC 34.6       MCV 84       Mono # 0.7       Mono% 7.7       MPV 11.0       Platelets 232       Potassium 4.0       Prot/Creat Ratio, Ur   0.63     PROTEIN TOTAL 6.7       Protein, Urine Random   26  Comment:  The random urine reference ranges provided were established   for 24 hour urine collections.  No reference ranges exist for  random urine specimens.  Correlate clinically.       RBC 4.08       RDW 12.9       RPR Non-reactive       Sodium 138       WBC 9.04             Physical Exam:   Constitutional: She is oriented to person, place, and time. She appears well-developed and well-nourished.        Pulmonary/Chest: Effort normal.        Abdominal: Soft.   gravid             Musculoskeletal: Moves all extremeties.       Neurological: She is alert and oriented to person, place, and time. She has normal reflexes.    Skin: Skin is warm and dry.    Psychiatric: Her behavior is normal.

## 2019-09-20 ENCOUNTER — ANESTHESIA (OUTPATIENT)
Dept: OBSTETRICS AND GYNECOLOGY | Facility: HOSPITAL | Age: 34
End: 2019-09-20
Payer: COMMERCIAL

## 2019-09-20 ENCOUNTER — ANESTHESIA EVENT (OUTPATIENT)
Dept: OBSTETRICS AND GYNECOLOGY | Facility: HOSPITAL | Age: 34
End: 2019-09-20
Payer: COMMERCIAL

## 2019-09-20 PROBLEM — O14.13 PREECLAMPSIA, SEVERE, THIRD TRIMESTER: Status: ACTIVE | Noted: 2019-09-18

## 2019-09-20 PROBLEM — Z98.891 STATUS POST PRIMARY LOW TRANSVERSE CESAREAN SECTION: Status: ACTIVE | Noted: 2019-09-20

## 2019-09-20 LAB
ABO + RH BLD: NORMAL
ALBUMIN SERPL BCP-MCNC: 2.9 G/DL (ref 3.5–5.2)
ALBUMIN SERPL BCP-MCNC: 2.9 G/DL (ref 3.5–5.2)
ALP SERPL-CCNC: 130 U/L (ref 55–135)
ALP SERPL-CCNC: 136 U/L (ref 55–135)
ALT SERPL W/O P-5'-P-CCNC: 5 U/L (ref 10–44)
ALT SERPL W/O P-5'-P-CCNC: 7 U/L (ref 10–44)
ANION GAP SERPL CALC-SCNC: 13 MMOL/L (ref 8–16)
ANION GAP SERPL CALC-SCNC: 14 MMOL/L (ref 8–16)
AST SERPL-CCNC: 13 U/L (ref 10–40)
AST SERPL-CCNC: 14 U/L (ref 10–40)
BASOPHILS # BLD AUTO: 0.01 K/UL (ref 0–0.2)
BASOPHILS # BLD AUTO: 0.02 K/UL (ref 0–0.2)
BASOPHILS NFR BLD: 0.1 % (ref 0–1.9)
BASOPHILS NFR BLD: 0.1 % (ref 0–1.9)
BILIRUB SERPL-MCNC: 0.3 MG/DL (ref 0.1–1)
BILIRUB SERPL-MCNC: 0.3 MG/DL (ref 0.1–1)
BLD GP AB SCN CELLS X3 SERPL QL: NORMAL
BUN SERPL-MCNC: 10 MG/DL (ref 6–20)
BUN SERPL-MCNC: 9 MG/DL (ref 6–20)
CALCIUM SERPL-MCNC: 8.7 MG/DL (ref 8.7–10.5)
CALCIUM SERPL-MCNC: 9.1 MG/DL (ref 8.7–10.5)
CHLORIDE SERPL-SCNC: 103 MMOL/L (ref 95–110)
CHLORIDE SERPL-SCNC: 104 MMOL/L (ref 95–110)
CO2 SERPL-SCNC: 18 MMOL/L (ref 23–29)
CO2 SERPL-SCNC: 20 MMOL/L (ref 23–29)
CREAT SERPL-MCNC: 0.6 MG/DL (ref 0.5–1.4)
CREAT SERPL-MCNC: 0.6 MG/DL (ref 0.5–1.4)
CREAT UR-MCNC: 11.2 MG/DL (ref 15–325)
DIFFERENTIAL METHOD: ABNORMAL
DIFFERENTIAL METHOD: ABNORMAL
EOSINOPHIL # BLD AUTO: 0 K/UL (ref 0–0.5)
EOSINOPHIL # BLD AUTO: 0.1 K/UL (ref 0–0.5)
EOSINOPHIL NFR BLD: 0.1 % (ref 0–8)
EOSINOPHIL NFR BLD: 0.6 % (ref 0–8)
ERYTHROCYTE [DISTWIDTH] IN BLOOD BY AUTOMATED COUNT: 13.2 % (ref 11.5–14.5)
ERYTHROCYTE [DISTWIDTH] IN BLOOD BY AUTOMATED COUNT: 13.2 % (ref 11.5–14.5)
EST. GFR  (AFRICAN AMERICAN): >60 ML/MIN/1.73 M^2
EST. GFR  (AFRICAN AMERICAN): >60 ML/MIN/1.73 M^2
EST. GFR  (NON AFRICAN AMERICAN): >60 ML/MIN/1.73 M^2
EST. GFR  (NON AFRICAN AMERICAN): >60 ML/MIN/1.73 M^2
GLUCOSE SERPL-MCNC: 75 MG/DL (ref 70–110)
GLUCOSE SERPL-MCNC: 79 MG/DL (ref 70–110)
HCT VFR BLD AUTO: 34.7 % (ref 37–48.5)
HCT VFR BLD AUTO: 35.4 % (ref 37–48.5)
HGB BLD-MCNC: 11.9 G/DL (ref 12–16)
HGB BLD-MCNC: 12 G/DL (ref 12–16)
LYMPHOCYTES # BLD AUTO: 1.4 K/UL (ref 1–4.8)
LYMPHOCYTES # BLD AUTO: 1.7 K/UL (ref 1–4.8)
LYMPHOCYTES NFR BLD: 10.3 % (ref 18–48)
LYMPHOCYTES NFR BLD: 18 % (ref 18–48)
MAGNESIUM SERPL-MCNC: 4.6 MG/DL (ref 1.6–2.6)
MCH RBC QN AUTO: 28.8 PG (ref 27–31)
MCH RBC QN AUTO: 29 PG (ref 27–31)
MCHC RBC AUTO-ENTMCNC: 33.9 G/DL (ref 32–36)
MCHC RBC AUTO-ENTMCNC: 34.3 G/DL (ref 32–36)
MCV RBC AUTO: 85 FL (ref 82–98)
MCV RBC AUTO: 85 FL (ref 82–98)
MONOCYTES # BLD AUTO: 0.6 K/UL (ref 0.3–1)
MONOCYTES # BLD AUTO: 0.6 K/UL (ref 0.3–1)
MONOCYTES NFR BLD: 4.6 % (ref 4–15)
MONOCYTES NFR BLD: 5.8 % (ref 4–15)
NEUTROPHILS # BLD AUTO: 11.5 K/UL (ref 1.8–7.7)
NEUTROPHILS # BLD AUTO: 7.3 K/UL (ref 1.8–7.7)
NEUTROPHILS NFR BLD: 76.1 % (ref 38–73)
NEUTROPHILS NFR BLD: 85.6 % (ref 38–73)
PLATELET # BLD AUTO: 240 K/UL (ref 150–350)
PLATELET # BLD AUTO: 257 K/UL (ref 150–350)
PMV BLD AUTO: 10.8 FL (ref 9.2–12.9)
PMV BLD AUTO: 11.4 FL (ref 9.2–12.9)
POTASSIUM SERPL-SCNC: 3.8 MMOL/L (ref 3.5–5.1)
POTASSIUM SERPL-SCNC: 4 MMOL/L (ref 3.5–5.1)
PROT SERPL-MCNC: 6.9 G/DL (ref 6–8.4)
PROT SERPL-MCNC: 7 G/DL (ref 6–8.4)
PROT UR-MCNC: 53 MG/DL (ref 0–15)
PROT/CREAT UR: 4.73 MG/G{CREAT} (ref 0–0.2)
RBC # BLD AUTO: 4.1 M/UL (ref 4–5.4)
RBC # BLD AUTO: 4.17 M/UL (ref 4–5.4)
SODIUM SERPL-SCNC: 135 MMOL/L (ref 136–145)
SODIUM SERPL-SCNC: 137 MMOL/L (ref 136–145)
WBC # BLD AUTO: 13.56 K/UL (ref 3.9–12.7)
WBC # BLD AUTO: 9.64 K/UL (ref 3.9–12.7)

## 2019-09-20 PROCEDURE — 62326 NJX INTERLAMINAR LMBR/SAC: CPT | Performed by: NURSE ANESTHETIST, CERTIFIED REGISTERED

## 2019-09-20 PROCEDURE — 72100003 HC LABOR CARE, EA. ADDL. 8 HRS

## 2019-09-20 PROCEDURE — 63600175 PHARM REV CODE 636 W HCPCS: Performed by: NURSE ANESTHETIST, CERTIFIED REGISTERED

## 2019-09-20 PROCEDURE — 80053 COMPREHEN METABOLIC PANEL: CPT

## 2019-09-20 PROCEDURE — 72100002 HC LABOR CARE, 1ST 8 HOURS

## 2019-09-20 PROCEDURE — 36000684 HC CESAREAN SECTION, UNSCHEDULED

## 2019-09-20 PROCEDURE — 25000003 PHARM REV CODE 250: Performed by: MIDWIFE

## 2019-09-20 PROCEDURE — 27800516 HC TRAY, EPIDURAL COMBO: Performed by: NURSE ANESTHETIST, CERTIFIED REGISTERED

## 2019-09-20 PROCEDURE — 11000001 HC ACUTE MED/SURG PRIVATE ROOM

## 2019-09-20 PROCEDURE — 51702 INSERT TEMP BLADDER CATH: CPT

## 2019-09-20 PROCEDURE — 87184 SC STD DISK METHOD PER PLATE: CPT

## 2019-09-20 PROCEDURE — 87147 CULTURE TYPE IMMUNOLOGIC: CPT

## 2019-09-20 PROCEDURE — 85025 COMPLETE CBC W/AUTO DIFF WBC: CPT

## 2019-09-20 PROCEDURE — 83735 ASSAY OF MAGNESIUM: CPT

## 2019-09-20 PROCEDURE — 80053 COMPREHEN METABOLIC PANEL: CPT | Mod: 91

## 2019-09-20 PROCEDURE — 37000009 HC ANESTHESIA EA ADD 15 MINS: Performed by: OBSTETRICS & GYNECOLOGY

## 2019-09-20 PROCEDURE — 84156 ASSAY OF PROTEIN URINE: CPT

## 2019-09-20 PROCEDURE — 63600175 PHARM REV CODE 636 W HCPCS

## 2019-09-20 PROCEDURE — 59510 CESAREAN DELIVERY: CPT | Mod: AT,,, | Performed by: OBSTETRICS & GYNECOLOGY

## 2019-09-20 PROCEDURE — 25000003 PHARM REV CODE 250: Performed by: OBSTETRICS & GYNECOLOGY

## 2019-09-20 PROCEDURE — 25000003 PHARM REV CODE 250: Performed by: NURSE ANESTHETIST, CERTIFIED REGISTERED

## 2019-09-20 PROCEDURE — 86901 BLOOD TYPING SEROLOGIC RH(D): CPT

## 2019-09-20 PROCEDURE — 37000008 HC ANESTHESIA 1ST 15 MINUTES: Performed by: OBSTETRICS & GYNECOLOGY

## 2019-09-20 PROCEDURE — 87081 CULTURE SCREEN ONLY: CPT

## 2019-09-20 PROCEDURE — 59510 PR FULL ROUT OBSTE CARE,CESAREAN DELIV: ICD-10-PCS | Mod: AT,,, | Performed by: OBSTETRICS & GYNECOLOGY

## 2019-09-20 PROCEDURE — 63600175 PHARM REV CODE 636 W HCPCS: Performed by: OBSTETRICS & GYNECOLOGY

## 2019-09-20 RX ORDER — CALCIUM CARBONATE 200(500)MG
500 TABLET,CHEWABLE ORAL 3 TIMES DAILY PRN
Status: DISCONTINUED | OUTPATIENT
Start: 2019-09-20 | End: 2019-09-20

## 2019-09-20 RX ORDER — CALCIUM GLUCONATE 98 MG/ML
1 INJECTION, SOLUTION INTRAVENOUS
Status: DISCONTINUED | OUTPATIENT
Start: 2019-09-20 | End: 2019-09-24 | Stop reason: HOSPADM

## 2019-09-20 RX ORDER — HYDRALAZINE HYDROCHLORIDE 20 MG/ML
5 INJECTION INTRAMUSCULAR; INTRAVENOUS ONCE AS NEEDED
Status: DISCONTINUED | OUTPATIENT
Start: 2019-09-20 | End: 2019-09-24 | Stop reason: HOSPADM

## 2019-09-20 RX ORDER — HYDROMORPHONE HYDROCHLORIDE 1 MG/ML
1 INJECTION, SOLUTION INTRAMUSCULAR; INTRAVENOUS; SUBCUTANEOUS EVERY 4 HOURS PRN
Status: DISCONTINUED | OUTPATIENT
Start: 2019-09-20 | End: 2019-09-24 | Stop reason: HOSPADM

## 2019-09-20 RX ORDER — LABETALOL HYDROCHLORIDE 5 MG/ML
80 INJECTION, SOLUTION INTRAVENOUS ONCE AS NEEDED
Status: DISCONTINUED | OUTPATIENT
Start: 2019-09-20 | End: 2019-09-24 | Stop reason: HOSPADM

## 2019-09-20 RX ORDER — ONDANSETRON 8 MG/1
8 TABLET, ORALLY DISINTEGRATING ORAL EVERY 8 HOURS PRN
Status: DISCONTINUED | OUTPATIENT
Start: 2019-09-20 | End: 2019-09-20

## 2019-09-20 RX ORDER — SODIUM CITRATE AND CITRIC ACID MONOHYDRATE 334; 500 MG/5ML; MG/5ML
30 SOLUTION ORAL
Status: DISCONTINUED | OUTPATIENT
Start: 2019-09-20 | End: 2019-09-20

## 2019-09-20 RX ORDER — TERBUTALINE SULFATE 1 MG/ML
0.25 INJECTION SUBCUTANEOUS
Status: DISCONTINUED | OUTPATIENT
Start: 2019-09-20 | End: 2019-09-20

## 2019-09-20 RX ORDER — ROPIVACAINE HYDROCHLORIDE 2 MG/ML
INJECTION, SOLUTION EPIDURAL; INFILTRATION; PERINEURAL
Status: DISCONTINUED | OUTPATIENT
Start: 2019-09-20 | End: 2019-09-20

## 2019-09-20 RX ORDER — OXYTOCIN/RINGER'S LACTATE 20/1000 ML
PLASTIC BAG, INJECTION (ML) INTRAVENOUS CONTINUOUS PRN
Status: DISCONTINUED | OUTPATIENT
Start: 2019-09-20 | End: 2019-09-20

## 2019-09-20 RX ORDER — DOCUSATE SODIUM 100 MG/1
100 CAPSULE, LIQUID FILLED ORAL DAILY
Status: DISCONTINUED | OUTPATIENT
Start: 2019-09-21 | End: 2019-09-24 | Stop reason: HOSPADM

## 2019-09-20 RX ORDER — MISOPROSTOL 100 MCG
25 TABLET ORAL EVERY 4 HOURS
Status: DISCONTINUED | OUTPATIENT
Start: 2019-09-20 | End: 2019-09-20

## 2019-09-20 RX ORDER — MISOPROSTOL 200 UG/1
800 TABLET ORAL
Status: DISCONTINUED | OUTPATIENT
Start: 2019-09-20 | End: 2019-09-20

## 2019-09-20 RX ORDER — HYDROCODONE BITARTRATE AND ACETAMINOPHEN 10; 325 MG/1; MG/1
1 TABLET ORAL EVERY 4 HOURS PRN
Status: DISCONTINUED | OUTPATIENT
Start: 2019-09-20 | End: 2019-09-24 | Stop reason: HOSPADM

## 2019-09-20 RX ORDER — PROMETHAZINE HYDROCHLORIDE 25 MG/ML
INJECTION, SOLUTION INTRAMUSCULAR; INTRAVENOUS
Status: DISCONTINUED | OUTPATIENT
Start: 2019-09-20 | End: 2019-09-20

## 2019-09-20 RX ORDER — DIPHENHYDRAMINE HYDROCHLORIDE 50 MG/ML
25 INJECTION INTRAMUSCULAR; INTRAVENOUS EVERY 4 HOURS PRN
Status: DISCONTINUED | OUTPATIENT
Start: 2019-09-20 | End: 2019-09-24 | Stop reason: HOSPADM

## 2019-09-20 RX ORDER — AMMONIA 15 % (W/V)
0.3 AMPUL (EA) INHALATION CONTINUOUS PRN
Status: DISCONTINUED | OUTPATIENT
Start: 2019-09-20 | End: 2019-09-24 | Stop reason: HOSPADM

## 2019-09-20 RX ORDER — ACETAMINOPHEN 325 MG/1
650 TABLET ORAL EVERY 6 HOURS PRN
Status: DISCONTINUED | OUTPATIENT
Start: 2019-09-20 | End: 2019-09-24 | Stop reason: HOSPADM

## 2019-09-20 RX ORDER — ONDANSETRON 2 MG/ML
INJECTION INTRAMUSCULAR; INTRAVENOUS
Status: DISCONTINUED | OUTPATIENT
Start: 2019-09-20 | End: 2019-09-20

## 2019-09-20 RX ORDER — SODIUM CHLORIDE, SODIUM LACTATE, POTASSIUM CHLORIDE, CALCIUM CHLORIDE 600; 310; 30; 20 MG/100ML; MG/100ML; MG/100ML; MG/100ML
INJECTION, SOLUTION INTRAVENOUS CONTINUOUS
Status: DISCONTINUED | OUTPATIENT
Start: 2019-09-20 | End: 2019-09-20

## 2019-09-20 RX ORDER — SODIUM CHLORIDE, SODIUM LACTATE, POTASSIUM CHLORIDE, CALCIUM CHLORIDE 600; 310; 30; 20 MG/100ML; MG/100ML; MG/100ML; MG/100ML
INJECTION, SOLUTION INTRAVENOUS CONTINUOUS
Status: DISCONTINUED | OUTPATIENT
Start: 2019-09-20 | End: 2019-09-24 | Stop reason: HOSPADM

## 2019-09-20 RX ORDER — OXYTOCIN/RINGER'S LACTATE 30/500 ML
95 PLASTIC BAG, INJECTION (ML) INTRAVENOUS ONCE
Status: DISCONTINUED | OUTPATIENT
Start: 2019-09-20 | End: 2019-09-20 | Stop reason: SDUPTHER

## 2019-09-20 RX ORDER — ONDANSETRON 8 MG/1
8 TABLET, ORALLY DISINTEGRATING ORAL EVERY 8 HOURS PRN
Status: DISCONTINUED | OUTPATIENT
Start: 2019-09-20 | End: 2019-09-24 | Stop reason: HOSPADM

## 2019-09-20 RX ORDER — HYDROCODONE BITARTRATE AND ACETAMINOPHEN 5; 325 MG/1; MG/1
1 TABLET ORAL EVERY 4 HOURS PRN
Status: DISCONTINUED | OUTPATIENT
Start: 2019-09-20 | End: 2019-09-24 | Stop reason: HOSPADM

## 2019-09-20 RX ORDER — SIMETHICONE 80 MG
1 TABLET,CHEWABLE ORAL 4 TIMES DAILY PRN
Status: DISCONTINUED | OUTPATIENT
Start: 2019-09-20 | End: 2019-09-20

## 2019-09-20 RX ORDER — LABETALOL HYDROCHLORIDE 5 MG/ML
40 INJECTION, SOLUTION INTRAVENOUS ONCE AS NEEDED
Status: DISCONTINUED | OUTPATIENT
Start: 2019-09-20 | End: 2019-09-24 | Stop reason: HOSPADM

## 2019-09-20 RX ORDER — MAGNESIUM SULFATE HEPTAHYDRATE 40 MG/ML
4 INJECTION, SOLUTION INTRAVENOUS ONCE
Status: COMPLETED | OUTPATIENT
Start: 2019-09-20 | End: 2019-09-20

## 2019-09-20 RX ORDER — MORPHINE SULFATE 1 MG/ML
INJECTION, SOLUTION EPIDURAL; INTRATHECAL; INTRAVENOUS
Status: DISCONTINUED | OUTPATIENT
Start: 2019-09-20 | End: 2019-09-20

## 2019-09-20 RX ORDER — MAGNESIUM SULFATE HEPTAHYDRATE 40 MG/ML
2 INJECTION, SOLUTION INTRAVENOUS CONTINUOUS
Status: DISCONTINUED | OUTPATIENT
Start: 2019-09-20 | End: 2019-09-24 | Stop reason: HOSPADM

## 2019-09-20 RX ORDER — LIDOCAINE HCL/EPINEPHRINE/PF 2%-1:200K
VIAL (ML) INJECTION
Status: DISCONTINUED | OUTPATIENT
Start: 2019-09-20 | End: 2019-09-20

## 2019-09-20 RX ORDER — LABETALOL HYDROCHLORIDE 5 MG/ML
20 INJECTION, SOLUTION INTRAVENOUS ONCE AS NEEDED
Status: DISCONTINUED | OUTPATIENT
Start: 2019-09-20 | End: 2019-09-24 | Stop reason: HOSPADM

## 2019-09-20 RX ORDER — OXYTOCIN/RINGER'S LACTATE 30/500 ML
334 PLASTIC BAG, INJECTION (ML) INTRAVENOUS ONCE
Status: DISCONTINUED | OUTPATIENT
Start: 2019-09-20 | End: 2019-09-24 | Stop reason: HOSPADM

## 2019-09-20 RX ORDER — CEFAZOLIN SODIUM 2 G/50ML
2 SOLUTION INTRAVENOUS
Status: DISCONTINUED | OUTPATIENT
Start: 2019-09-20 | End: 2019-09-20

## 2019-09-20 RX ORDER — OXYTOCIN/RINGER'S LACTATE 30/500 ML
41.65 PLASTIC BAG, INJECTION (ML) INTRAVENOUS CONTINUOUS
Status: ACTIVE | OUTPATIENT
Start: 2019-09-20 | End: 2019-09-21

## 2019-09-20 RX ORDER — ONDANSETRON 2 MG/ML
4 INJECTION INTRAMUSCULAR; INTRAVENOUS EVERY 12 HOURS PRN
Status: DISCONTINUED | OUTPATIENT
Start: 2019-09-20 | End: 2019-09-24 | Stop reason: HOSPADM

## 2019-09-20 RX ORDER — OXYTOCIN/RINGER'S LACTATE 30/500 ML
334 PLASTIC BAG, INJECTION (ML) INTRAVENOUS ONCE
Status: DISCONTINUED | OUTPATIENT
Start: 2019-09-20 | End: 2019-09-20

## 2019-09-20 RX ORDER — HYDRALAZINE HYDROCHLORIDE 20 MG/ML
10 INJECTION INTRAMUSCULAR; INTRAVENOUS ONCE AS NEEDED
Status: DISCONTINUED | OUTPATIENT
Start: 2019-09-20 | End: 2019-09-24 | Stop reason: HOSPADM

## 2019-09-20 RX ORDER — SODIUM CHLORIDE 9 MG/ML
INJECTION, SOLUTION INTRAVENOUS
Status: DISCONTINUED | OUTPATIENT
Start: 2019-09-20 | End: 2019-09-20

## 2019-09-20 RX ORDER — BUTALBITAL, ACETAMINOPHEN AND CAFFEINE 50; 325; 40 MG/1; MG/1; MG/1
1 TABLET ORAL EVERY 4 HOURS PRN
Status: DISCONTINUED | OUTPATIENT
Start: 2019-09-20 | End: 2019-09-20

## 2019-09-20 RX ORDER — OXYTOCIN/RINGER'S LACTATE 30/500 ML
95 PLASTIC BAG, INJECTION (ML) INTRAVENOUS ONCE
Status: DISCONTINUED | OUTPATIENT
Start: 2019-09-20 | End: 2019-09-20

## 2019-09-20 RX ORDER — DIPHENHYDRAMINE HCL 25 MG
25 CAPSULE ORAL EVERY 4 HOURS PRN
Status: DISCONTINUED | OUTPATIENT
Start: 2019-09-20 | End: 2019-09-24 | Stop reason: HOSPADM

## 2019-09-20 RX ORDER — PHENYLEPHRINE HYDROCHLORIDE 10 MG/ML
INJECTION INTRAVENOUS
Status: DISCONTINUED | OUTPATIENT
Start: 2019-09-20 | End: 2019-09-20

## 2019-09-20 RX ORDER — FENTANYL CITRATE 50 UG/ML
INJECTION, SOLUTION INTRAMUSCULAR; INTRAVENOUS
Status: DISCONTINUED | OUTPATIENT
Start: 2019-09-20 | End: 2019-09-20

## 2019-09-20 RX ORDER — METOCLOPRAMIDE HYDROCHLORIDE 5 MG/ML
5 INJECTION INTRAMUSCULAR; INTRAVENOUS EVERY 6 HOURS PRN
Status: DISCONTINUED | OUTPATIENT
Start: 2019-09-20 | End: 2019-09-24 | Stop reason: HOSPADM

## 2019-09-20 RX ADMIN — MAGNESIUM SULFATE HEPTAHYDRATE 4 G: 40 INJECTION, SOLUTION INTRAVENOUS at 09:09

## 2019-09-20 RX ADMIN — PHENYLEPHRINE HYDROCHLORIDE 200 MCG: 10 INJECTION INTRAVENOUS at 09:09

## 2019-09-20 RX ADMIN — ONDANSETRON 4 MG: 2 INJECTION, SOLUTION INTRAMUSCULAR; INTRAVENOUS at 08:09

## 2019-09-20 RX ADMIN — MISOPROSTOL 25 MCG: 100 TABLET ORAL at 03:09

## 2019-09-20 RX ADMIN — ROPIVACAINE HYDROCHLORIDE 5 ML: 2 INJECTION, SOLUTION EPIDURAL; INFILTRATION at 05:09

## 2019-09-20 RX ADMIN — ACETAMINOPHEN 500 MG: 500 TABLET ORAL at 09:09

## 2019-09-20 RX ADMIN — ONDANSETRON 4 MG: 2 INJECTION, SOLUTION INTRAMUSCULAR; INTRAVENOUS at 09:09

## 2019-09-20 RX ADMIN — FENTANYL CITRATE 100 MCG: 50 INJECTION, SOLUTION INTRAMUSCULAR; INTRAVENOUS at 08:09

## 2019-09-20 RX ADMIN — Medication: at 09:09

## 2019-09-20 RX ADMIN — MISOPROSTOL 25 MCG: 100 TABLET ORAL at 10:09

## 2019-09-20 RX ADMIN — MISOPROSTOL 25 MCG: 100 TABLET ORAL at 07:09

## 2019-09-20 RX ADMIN — LIDOCAINE HYDROCHLORIDE,EPINEPHRINE BITARTRATE 5 ML: 20; .005 INJECTION, SOLUTION EPIDURAL; INFILTRATION; INTRACAUDAL; PERINEURAL at 08:09

## 2019-09-20 RX ADMIN — SODIUM CHLORIDE, SODIUM LACTATE, POTASSIUM CHLORIDE, AND CALCIUM CHLORIDE: .6; .31; .03; .02 INJECTION, SOLUTION INTRAVENOUS at 04:09

## 2019-09-20 RX ADMIN — MORPHINE SULFATE 3 MG: 1 INJECTION, SOLUTION EPIDURAL; INTRATHECAL; INTRAVENOUS at 09:09

## 2019-09-20 RX ADMIN — MAGNESIUM SULFATE IN WATER 2 G/HR: 40 INJECTION, SOLUTION INTRAVENOUS at 09:09

## 2019-09-20 RX ADMIN — SODIUM CHLORIDE, SODIUM LACTATE, POTASSIUM CHLORIDE, AND CALCIUM CHLORIDE: .6; .31; .03; .02 INJECTION, SOLUTION INTRAVENOUS at 09:09

## 2019-09-20 RX ADMIN — PROMETHAZINE HYDROCHLORIDE 12.5 MG: 25 INJECTION INTRAMUSCULAR; INTRAVENOUS at 09:09

## 2019-09-20 RX ADMIN — LIDOCAINE HYDROCHLORIDE,EPINEPHRINE BITARTRATE 8 ML: 20; .005 INJECTION, SOLUTION EPIDURAL; INFILTRATION; INTRACAUDAL; PERINEURAL at 08:09

## 2019-09-20 RX ADMIN — SODIUM CHLORIDE, SODIUM LACTATE, POTASSIUM CHLORIDE, AND CALCIUM CHLORIDE: .6; .31; .03; .02 INJECTION, SOLUTION INTRAVENOUS at 08:09

## 2019-09-20 RX ADMIN — BUTALBITAL, ACETAMINOPHEN, AND CAFFEINE 1 TABLET: 50; 325; 40 TABLET ORAL at 03:09

## 2019-09-20 RX ADMIN — DEXTROSE 5 MILLION UNITS: 50 INJECTION, SOLUTION INTRAVENOUS at 08:09

## 2019-09-20 RX ADMIN — BUTALBITAL, ACETAMINOPHEN, AND CAFFEINE 1 TABLET: 50; 325; 40 TABLET ORAL at 07:09

## 2019-09-20 RX ADMIN — ONDANSETRON 8 MG: 8 TABLET, ORALLY DISINTEGRATING ORAL at 09:09

## 2019-09-20 RX ADMIN — PHENYLEPHRINE HYDROCHLORIDE 100 MCG: 10 INJECTION INTRAVENOUS at 09:09

## 2019-09-20 NOTE — PROGRESS NOTES
Patient laid to semi fowlers position, urine noted on pad, and gentle traction placed against hooks. Hooks was easily removed.

## 2019-09-20 NOTE — PROGRESS NOTES
Jolley bulb deflated, catheter advanced and bulb reinflated. No resistance felt with reinflation.

## 2019-09-20 NOTE — PROGRESS NOTES
[facility] -   Obstetrics  Antepartum Progress Note    Patient Name: Samanta Argueta  MRN: [id number]  Admission Date: 9/18/2019  Hospital Length of Stay: 0 days  Attending Physician: Vikas Park MD  Primary Care Provider: Lisbeth Crow MD    Subjective:     Principal Problem:Preeclampsia, severe, third trimester    HPI:  33 yo, G1 34w5d present from office with high blood pressures    Hospital Course:  G1 34w5d with high blood pressures, headache and blurred vision  Observe  PIH labs  DC procardia  NST  9/19/19-BPs have improved. Received 1 dose of IV labetalol. Pt reports headaches still mild but much improved from admit yesterday.   9/20/19:  Pt with persistent headache and hyperreflexia.  IV magnesium sulfate for seizure prophylaxis and induction of labor for preeclampsia with severe features.    Obstetric HPI:  Patient reports occasional contractions, active fetal movement, absent vaginal bleeding , absent loss of fluid.  States she is not really feeling that well.  Has a persistent dull headache.  No scotoma.  No chest pain or SOB.  No RUQ or epigastric pain.     Objective:     Vital Signs (Most Recent):  Temp: 98.3 °F (36.8 °C) (09/19/19 1200)  Pulse: 96 (09/20/19 0203)  Resp: 16 (09/19/19 1200)  BP: 131/85 (09/20/19 0203)  SpO2: 96 % (09/19/19 2200) Vital Signs (24h Range):  Temp:  [98.3 °F (36.8 °C)] 98.3 °F (36.8 °C)  Pulse:  [85-98] 96  Resp:  [16] 16  SpO2:  [96 %] 96 %  BP: (131-155)/(85-99) 131/85        There is no height or weight on file to calculate BMI.    FHT: Cat 1 (reassuring)  TOCO:  Q 0 minutes    No intake or output data in the 24 hours ending 09/20/19 0815         Significant Labs:  Recent Lab Results     None          Physical Exam:   Constitutional: She is oriented to person, place, and time. She appears well-developed and well-nourished. No distress.    HENT:   Head: Normocephalic and atraumatic.     Neck: Neck supple. No thyromegaly present.     Cardiovascular: Normal rate and regular rhythm.  Exam reveals no clubbing and no cyanosis.     Pulmonary/Chest: Effort normal.        Abdominal: Soft. She exhibits no distension and no mass. There is no tenderness. There is no rebound and no guarding.   Uterus gravid, soft, and non-tender             Musculoskeletal: She exhibits edema (1+ BL LE edema).       Neurological: She is alert and oriented to person, place, and time. She displays abnormal reflex (DTR's 3+ bilaterally, brisk).    Skin: No rash noted. No cyanosis. Nails show no clubbing.    Psychiatric: She has a normal mood and affect. Her behavior is normal. Judgment and thought content normal.       Assessment/Plan:     34 y.o. female  at 35w0d for:    * Preeclampsia, severe, third trimester  Patient now with persistent headache and hyperreflexia.  Start IV magnesium and recommend induction of labor.  I reviewed this with the patient and her wife.  All questions answered, and they agree with induction.     Rh negative state in antepartum period  Fetal type and screen postpartum.    Autosomal recessive polycystic kidneys            GBBS unknown:  GBBS culture now.  IV penicillin prophylaxis during induction.    Grazyna Price MD  Obstetrics  [facility]

## 2019-09-20 NOTE — PROGRESS NOTES
S:  @ 35 wks, Cytotec IOL for Pre-e. On MgSO4 prophylaxis @ 2 gm/hr.       C/O headache that improved this morning but is returning. No visual changes. Denies chest heaviness, sob or upper quadrant pain.  O: 131/85. P-92 R- 20   Pulse ox 99%  Total IVF's 125 ml/hr        Dtr's 2+.        VE: deferred.         FHT's- Cat 1          CTX- q 5          Output: 60 cc over 3 hours.  A:  Pre-E on MagSo4       IOL, cytotec  P: Cont. cytotec protocol.       Monitor output closely.

## 2019-09-20 NOTE — SUBJECTIVE & OBJECTIVE
Obstetric HPI:  Patient reports occasional contractions, active fetal movement, absent vaginal bleeding , absent loss of fluid.  States she is not really feeling that well.  Has a persistent dull headache.  No scotoma.  No chest pain or SOB.  No RUQ or epigastric pain.     Objective:     Vital Signs (Most Recent):  Temp: 98.3 °F (36.8 °C) (09/19/19 1200)  Pulse: 96 (09/20/19 0203)  Resp: 16 (09/19/19 1200)  BP: 131/85 (09/20/19 0203)  SpO2: 96 % (09/19/19 2200) Vital Signs (24h Range):  Temp:  [98.3 °F (36.8 °C)] 98.3 °F (36.8 °C)  Pulse:  [85-98] 96  Resp:  [16] 16  SpO2:  [96 %] 96 %  BP: (131-155)/(85-99) 131/85        There is no height or weight on file to calculate BMI.    FHT: Cat 1 (reassuring)  TOCO:  Q 0 minutes    No intake or output data in the 24 hours ending 09/20/19 0815         Significant Labs:  Recent Lab Results     None          Physical Exam:   Constitutional: She is oriented to person, place, and time. She appears well-developed and well-nourished. No distress.    HENT:   Head: Normocephalic and atraumatic.     Neck: Neck supple. No thyromegaly present.    Cardiovascular: Normal rate and regular rhythm.  Exam reveals no clubbing and no cyanosis.     Pulmonary/Chest: Effort normal.        Abdominal: Soft. She exhibits no distension and no mass. There is no tenderness. There is no rebound and no guarding.   Uterus gravid, soft, and non-tender             Musculoskeletal: She exhibits edema (1+ BL LE edema).       Neurological: She is alert and oriented to person, place, and time. She displays abnormal reflex (DTR's 3+ bilaterally, brisk).    Skin: No rash noted. No cyanosis. Nails show no clubbing.    Psychiatric: She has a normal mood and affect. Her behavior is normal. Judgment and thought content normal.

## 2019-09-20 NOTE — PROGRESS NOTES
Called MD/ Grazyna Price re: current clinical status, decreased output, worsening h/a with normal BP's.   Will give 500 ml LR bolus now then resume IVF's at 75 ml/hr with MagSo4 50 ml/hr.  PCR 4. 73. MD aware.  Will add Fioricet to order set for headache relief.  Proceed with cytotec IOL.  No definitive cervical change noted with Cytotec #2.  Reassuring FHT's.

## 2019-09-20 NOTE — ANESTHESIA PREPROCEDURE EVALUATION
09/20/2019  Samanta Argueta is a 34 y.o., female.    Anesthesia Evaluation    I have reviewed the Patient Summary Reports.    I have reviewed the Nursing Notes.   I have reviewed the Medications.     Review of Systems  Anesthesia Hx:  Neg history of prior surgery. Denies Family Hx of Anesthesia complications.   Denies Personal Hx of Anesthesia complications.   Cardiovascular:   Hypertension    Renal/:   Chronic Renal Disease    Hepatic/GI:   GERD    Neurological:   Headaches        Physical Exam  General:  Well nourished    Airway/Jaw/Neck:  Airway Findings: Mouth Opening: Normal Tongue: Normal  General Airway Assessment: Adult  Mallampati: II  Jaw/Neck Findings:  Neck ROM: Normal ROM     Eyes/Ears/Nose:  EYES/EARS/NOSE FINDINGS: Normal   Dental:  Dental Findings: In tact   Chest/Lungs:  Chest/Lungs Clear    Heart/Vascular:  Heart Findings: Normal Heart murmur: negative       Mental Status:  Mental Status Findings:  Cooperative         Anesthesia Plan  Type of Anesthesia, risks & benefits discussed:  Anesthesia Type:  epidural  Patient's Preference:   Intra-op Monitoring Plan: standard ASA monitors  Intra-op Monitoring Plan Comments:   Post Op Pain Control Plan: epidural analgesia  Post Op Pain Control Plan Comments:   Induction:   IV  Beta Blocker:         Informed Consent: Patient understands risks and agrees with Anesthesia plan.  Questions answered. Anesthesia consent signed with patient.  ASA Score: 2     Day of Surgery Review of History & Physical: I have interviewed and examined the patient. I have reviewed the patient's H&P dated:  There are no significant changes.  H&P update referred to the surgeon.         Ready For Surgery From Anesthesia Perspective.

## 2019-09-20 NOTE — ANESTHESIA PROCEDURE NOTES
Epidural    Patient location during procedure: OB  Block not for primary anesthetic.  Post-op Pain Location: IUP    Staffing  Performing Provider: Wili Antunez CRNA  Authorizing Provider: James Hector MD        Preanesthetic Checklist  Completed: patient identified, site marked, surgical consent, pre-op evaluation, timeout performed, IV checked, risks and benefits discussed, monitors and equipment checked, anesthesia consent given, hand hygiene performed and patient being monitored  Preparation  Patient position: sitting  Prep: Betadine  Patient monitoring: Pulse Ox and Blood Pressure  Epidural  Skin Anesthetic: lidocaine 1%  Skin Wheal: 3 mL  Administration type: continuous  Approach: midline  Interspace: L3-4    Injection technique: JOHN air  Needle and Epidural Catheter  Needle type: Tuohy   Needle gauge: 17  Needle length: 3.5 inches  Needle insertion depth: 6 cm  Catheter type: springwound and multi-orifice  Catheter size: 18 G  Catheter at skin depth: 12 cm  Test dose: 3 mL of lidocaine 1.5% with Epi 1-to-200,000  Additional Documentation: incremental injection, negative aspiration for heme and CSF, no paresthesia on injection, no signs/symptoms of IV or SA injection, no significant pain on injection and no significant complaints from patient  Needle localization: anatomical landmarks  Medications:  Volume per aspiration: 0 mL  Time between aspirations: 2 minutes  Assessment  Ease of block: easy  Patient's tolerance of the procedure: comfortable throughout block and no complaintsNo inadvertent dural puncture with Tuohy.  Dural puncture not performed with spinal needle

## 2019-09-20 NOTE — ASSESSMENT & PLAN NOTE
Patient now with persistent headache and hyperreflexia.  Start IV magnesium and recommend induction of labor.  I reviewed this with the patient and her wife.  All questions answered, and they agree with induction.

## 2019-09-20 NOTE — PROGRESS NOTES
Catheter flushed with no resistance. Continued no urine output. KAUSHIK Keith notified. Catheter bulb unable to be deflated and resistance met when trying to gently remove. Assistance called for.

## 2019-09-20 NOTE — SUBJECTIVE & OBJECTIVE
HPI:  Pt is doing well.  Feels better after hooks removal.  Is voiding spontaneously now without difficulty.  Pain is well controlled.  Tolerating liquids without nausea or vomiting. Bleeding is minimal.  Passing flatus but no BM yet.     OB History    Para Term  AB Living   1 0 0 0 0 0   SAB TAB Ectopic Multiple Live Births   0 0 0 0 0      # Outcome Date GA Lbr Epi/2nd Weight Sex Delivery Anes PTL Lv   1 Current              Past Medical History:   Diagnosis Date    Autosomal recessive polycystic kidneys     GERD (gastroesophageal reflux disease)     History of uterine fibroid     Melanoma 2016    left superior helix (breslow dept 0.67mm)    Migraine headache     PONV (postoperative nausea and vomiting)     post op nausea     Past Surgical History:   Procedure Laterality Date    BIOPSY-LYMPH NODE Left 6/10/2016    Performed [facility] at [facility] 2ND F[facility]    ETHMOIDECTOMY Bilateral 2015    [facility] at [facility]    EXCISION-SKIN Left 6/10/2016    Performed by [facility] at [facility] OR 2ND FLR    LYMPH NODE BIOPSY      MALIGNANT SKIN LESION EXCISION  06/10/2016    NASAL SEPTUM SURGERY      POLYPECTOMY, UTERUS, HYSTEROSCOPIC N/A 1/10/2019    [facility]    RENAL EXPLORATION      SEPTOPLASTY N/A 2015    [facility] at [facility] OR    SINUS SURGERY FUNCTIONAL ENDOSCOPIC Bilateral 2015    Per[facility] at [facility] OR       PTA Medications   Medication Sig    fish oil-omega-3 fatty acids 300-1,000 mg capsule Take 2 g by mouth once daily.    Lactobacillus rhamnosus GG (CULTURELLE) 10 billion cell capsule     NIFEdipine (PROCARDIA-XL) 30 MG (OSM) 24 hr tablet Take 1 tablet (30 mg total) by mouth once daily.    prenat.vits,yonatan,min-iron-folic (PRENATAL VITAMIN) Tab Take by mouth once daily.    cetirizine (ZYRTEC) 10 MG tablet     MAGNESIUM ORAL Take by  mouth once daily.     methylPREDNISolone (MEDROL DOSEPACK) 4 mg tablet Take as directed    omeprazole (PRILOSEC) 20 MG capsule Take 1 capsule (20 mg total) by mouth once daily.    ondansetron (ZOFRAN) 4 MG tablet TAKE ONE TABLET BY MOUTH EVERY 12 HOURS AS NEEDED FOR NAUSEA       Review of patient's allergies indicates:   Allergen Reactions    Singulair [montelukast]      Itching all over        Family History     Problem Relation (Age of Onset)    Cancer Maternal Grandmother    Diabetes Paternal Grandmother    Hypertension Father    Kidney disease Father        Tobacco Use    Smoking status: Never Smoker    Smokeless tobacco: Never Used   Substance and Sexual Activity    Alcohol use: Not Currently     Comment: socially    Drug use: No    Sexual activity: Yes     Partners: Female     Review of Systems   Objective:     Vital Signs (Most Recent):  Temp: 98.3 °F (36.8 °C) (09/19/19 1200)  Pulse: 96 (09/20/19 0203)  Resp: 16 (09/19/19 1200)  BP: (!) 149/105 (09/20/19 0800)  SpO2: 96 % (09/19/19 2200) Vital Signs (24h Range):  Temp:  [98.3 °F (36.8 °C)] 98.3 °F (36.8 °C)  Pulse:  [85-98] 96  Resp:  [16] 16  SpO2:  [96 %] 96 %  BP: (126-155)/() 149/105        There is no height or weight on file to calculate BMI.      Physical Exam:   Constitutional: She is oriented to person, place, and time. She appears well-developed and well-nourished. No distress.       Cardiovascular: Normal rate, regular rhythm and normal heart sounds.     Pulmonary/Chest: Effort normal and breath sounds normal.        Abdominal: Soft. Bowel sounds are normal. She exhibits abdominal incision (dressing dry and in place). She exhibits no distension and no mass. There is tenderness (appropriate). There is no rebound and no guarding. No hernia.     Genitourinary: Uterus is not tender. There is bleeding (lochia) in the vagina.           Musculoskeletal: Normal range of motion and moves all extremeties.       Neurological: She is alert and  oriented to person, place, and time.    Skin: Skin is warm and dry.    Psychiatric: She has a normal mood and affect. Her behavior is normal. Thought content normal.       Significant Labs:  Lab Results   Component Value Date    GROUPTRH A NEG 09/07/2019    HEPBSAG Negative 09/18/2019         CBC:   Recent Labs   Lab 09/21/19  0445   WBC 12.42   RBC 3.57*   HGB 10.3*   HCT 30.2*      MCV 85   MCH 28.9   MCHC 34.1     CMP:   Recent Labs   Lab 09/21/19  0445   GLU 94   CALCIUM 7.2*   ALBUMIN 2.3*   PROT 5.4*   *   K 4.1   CO2 20*      BUN 9   CREATININE 0.6   ALKPHOS 100   ALT <5*   AST 12   BILITOT 0.3     I have personallly reviewed all pertinent lab results from the last 24 hours.

## 2019-09-20 NOTE — PROGRESS NOTES
KAUSHIK Keith and Dr Price at bedside to assess hooks catheter. Hooks catheter cut to try to drain bulb, but unsuccessful. Vaginal exam done to palpate bulb but unable to. Can palpate hooks. Will proceed with epidural to relax patient for further assessment.

## 2019-09-21 LAB
ALBUMIN SERPL BCP-MCNC: 2.3 G/DL (ref 3.5–5.2)
ALP SERPL-CCNC: 100 U/L (ref 55–135)
ALT SERPL W/O P-5'-P-CCNC: <5 U/L (ref 10–44)
ANION GAP SERPL CALC-SCNC: 10 MMOL/L (ref 8–16)
AST SERPL-CCNC: 12 U/L (ref 10–40)
BASOPHILS # BLD AUTO: 0 K/UL (ref 0–0.2)
BASOPHILS NFR BLD: 0 % (ref 0–1.9)
BILIRUB SERPL-MCNC: 0.3 MG/DL (ref 0.1–1)
BUN SERPL-MCNC: 9 MG/DL (ref 6–20)
CALCIUM SERPL-MCNC: 7.2 MG/DL (ref 8.7–10.5)
CHLORIDE SERPL-SCNC: 102 MMOL/L (ref 95–110)
CO2 SERPL-SCNC: 20 MMOL/L (ref 23–29)
CREAT SERPL-MCNC: 0.6 MG/DL (ref 0.5–1.4)
DIFFERENTIAL METHOD: ABNORMAL
EOSINOPHIL # BLD AUTO: 0 K/UL (ref 0–0.5)
EOSINOPHIL NFR BLD: 0.1 % (ref 0–8)
ERYTHROCYTE [DISTWIDTH] IN BLOOD BY AUTOMATED COUNT: 13.2 % (ref 11.5–14.5)
EST. GFR  (AFRICAN AMERICAN): >60 ML/MIN/1.73 M^2
EST. GFR  (NON AFRICAN AMERICAN): >60 ML/MIN/1.73 M^2
GLUCOSE SERPL-MCNC: 94 MG/DL (ref 70–110)
HCT VFR BLD AUTO: 30.2 % (ref 37–48.5)
HGB BLD-MCNC: 10.3 G/DL (ref 12–16)
LYMPHOCYTES # BLD AUTO: 1.4 K/UL (ref 1–4.8)
LYMPHOCYTES NFR BLD: 11.4 % (ref 18–48)
MCH RBC QN AUTO: 28.9 PG (ref 27–31)
MCHC RBC AUTO-ENTMCNC: 34.1 G/DL (ref 32–36)
MCV RBC AUTO: 85 FL (ref 82–98)
MONOCYTES # BLD AUTO: 0.7 K/UL (ref 0.3–1)
MONOCYTES NFR BLD: 5.3 % (ref 4–15)
NEUTROPHILS # BLD AUTO: 10.3 K/UL (ref 1.8–7.7)
NEUTROPHILS NFR BLD: 83.6 % (ref 38–73)
PLATELET # BLD AUTO: 227 K/UL (ref 150–350)
PMV BLD AUTO: 10.2 FL (ref 9.2–12.9)
POTASSIUM SERPL-SCNC: 4.1 MMOL/L (ref 3.5–5.1)
PROT SERPL-MCNC: 5.4 G/DL (ref 6–8.4)
RBC # BLD AUTO: 3.57 M/UL (ref 4–5.4)
SODIUM SERPL-SCNC: 132 MMOL/L (ref 136–145)
WBC # BLD AUTO: 12.42 K/UL (ref 3.9–12.7)

## 2019-09-21 PROCEDURE — 85025 COMPLETE CBC W/AUTO DIFF WBC: CPT

## 2019-09-21 PROCEDURE — 63600175 PHARM REV CODE 636 W HCPCS: Performed by: OBSTETRICS & GYNECOLOGY

## 2019-09-21 PROCEDURE — 80053 COMPREHEN METABOLIC PANEL: CPT

## 2019-09-21 PROCEDURE — 99024 POSTOP FOLLOW-UP VISIT: CPT | Mod: ,,, | Performed by: OBSTETRICS & GYNECOLOGY

## 2019-09-21 PROCEDURE — 11000001 HC ACUTE MED/SURG PRIVATE ROOM

## 2019-09-21 PROCEDURE — 25000003 PHARM REV CODE 250: Performed by: OBSTETRICS & GYNECOLOGY

## 2019-09-21 PROCEDURE — 99024 PR POST-OP FOLLOW-UP VISIT: ICD-10-PCS | Mod: ,,, | Performed by: OBSTETRICS & GYNECOLOGY

## 2019-09-21 RX ORDER — IBUPROFEN 600 MG/1
600 TABLET ORAL EVERY 6 HOURS
Status: DISCONTINUED | OUTPATIENT
Start: 2019-09-22 | End: 2019-09-24 | Stop reason: HOSPADM

## 2019-09-21 RX ADMIN — HYDROCODONE BITARTRATE AND ACETAMINOPHEN 1 TABLET: 5; 325 TABLET ORAL at 03:09

## 2019-09-21 RX ADMIN — HYDROCODONE BITARTRATE AND ACETAMINOPHEN 1 TABLET: 10; 325 TABLET ORAL at 07:09

## 2019-09-21 RX ADMIN — MAGNESIUM SULFATE IN WATER 2 G/HR: 40 INJECTION, SOLUTION INTRAVENOUS at 02:09

## 2019-09-21 RX ADMIN — HYDROCODONE BITARTRATE AND ACETAMINOPHEN 1 TABLET: 5; 325 TABLET ORAL at 01:09

## 2019-09-21 RX ADMIN — DOCUSATE SODIUM 100 MG: 100 CAPSULE, LIQUID FILLED ORAL at 09:09

## 2019-09-21 RX ADMIN — HYDROCODONE BITARTRATE AND ACETAMINOPHEN 1 TABLET: 5; 325 TABLET ORAL at 09:09

## 2019-09-21 RX ADMIN — HYDROCODONE BITARTRATE AND ACETAMINOPHEN 1 TABLET: 5; 325 TABLET ORAL at 05:09

## 2019-09-21 RX ADMIN — SODIUM CHLORIDE, SODIUM LACTATE, POTASSIUM CHLORIDE, AND CALCIUM CHLORIDE: .6; .31; .03; .02 INJECTION, SOLUTION INTRAVENOUS at 02:09

## 2019-09-21 NOTE — ASSESSMENT & PLAN NOTE
POD # 1 s/p PLTCS  Pt is doing well.  Proceed with routine post-operative care.  Pt counseled on management plan and discharge goals.

## 2019-09-21 NOTE — ANESTHESIA POSTPROCEDURE EVALUATION
Anesthesia Post Evaluation    Patient: Samanta Argueta    Procedure(s) Performed: Procedure(s) (LRB):   SECTION (N/A)    Final Anesthesia Type: spinal  Patient location during evaluation: labor & delivery  Patient participation: Yes- Able to Participate  Level of consciousness: awake and alert, awake and oriented  Post-procedure vital signs: reviewed and stable  Pain management: adequate  Airway patency: patent  PONV status at discharge: No PONV  Anesthetic complications: no      Cardiovascular status: blood pressure returned to baseline and hemodynamically stable  Respiratory status: unassisted and spontaneous ventilation  Hydration status: euvolemic  Follow-up not needed.          Vitals Value Taken Time   /90 2019  9:57 PM   Temp 36.6 °C (97.9 °F) 2019  9:57 PM   Pulse 100 2019  9:57 PM   Resp 18 2019  9:57 PM   SpO2 98 % 2019  9:57 PM         No case tracking events are documented in the log.      Pain/Annette Score: Pain Rating Prior to Med Admin: 6 (2019  7:33 PM)  Pain Rating Post Med Admin: 2 (2019  4:00 PM)

## 2019-09-21 NOTE — PROGRESS NOTES
S: Headache returning. No visual changes. Denies quadrant pain or n/v. Mg continues at 2 gm/hr. Total IVF's 125ml/hr. No sob, chest pain. No mentation changes or resp. Depression. Epidural in place.   O: BP 130s/70-80's.   sats 99%  DTR 2+       Chest: CTA        FHT's cat 1       VE; EOS FT/ thick/ posterior/ soft.        CTX: irregular, mild        Jolley Cath : 2 cc blood on insertion.        Bladder scan ~ 110 cc noted.  A:   @ 35 wks        Pre-eclamptic remote from delivery.        Decreasing urinary output        Resumption of headache  P: MD informed of current clinical status.

## 2019-09-21 NOTE — ASSESSMENT & PLAN NOTE
Headaches much improved and BP normalizing and stable.  UOP has improved after hooks removal, thus it is likely that oliguria was secondary to faulty hooks.  Noted blood in urine is likely due to irritation and trauma associated with 3 hooks placements in the past 24 hrs.  Will continue with spontaneous voiding and Mg to complete 24 hrs.

## 2019-09-21 NOTE — NURSING
MD notified of limited urine output. New order received for LR fluid bolus and repeat CBC and CMP.

## 2019-09-21 NOTE — PROGRESS NOTES
[facility] -   Obstetrics  Postpartum Progress Note    Patient Name: Samanta Argueta  MRN: [id number]  Admission Date: 2019  Hospital Length of Stay: 1 days  Attending Physician: Vikas Park MD  Primary Care Provider: Lisbeth Crow MD    Subjective:     Principal Problem:Preeclampsia, severe, third trimester    HPI:  Pt is doing well.  Feels better after hooks removal.  Is voiding spontaneously now without difficulty.  Pain is well controlled.  Tolerating liquids without nausea or vomiting. Bleeding is minimal.  Passing flatus but no BM yet.     OB History    Para Term  AB Living   1 0 0 0 0 0   SAB TAB Ectopic Multiple Live Births   0 0 0 0 0      # Outcome Date GA Lbr Epi/2nd Weight Sex Delivery Anes PTL Lv   1 Current              Past Medical History:   Diagnosis Date    Autosomal recessive polycystic kidneys     GERD (gastroesophageal reflux disease)     History of uterine fibroid     Melanoma 2016    left superior helix (breslow dept 0.67mm)    Migraine headache     PONV (postoperative nausea and vomiting)     post op nausea     Past Surgical History:   Procedure Laterality Date    BIOPSY-LYMPH NODE Left 6/10/2016    [facility]formed [facility] 2ND F[facility]    ETHMOIDECTOMY Bilateral 2015    [facility]    EXCISION-SKIN Left 6/10/2016    [facility] at [facility] 2ND FLR    LYMPH NODE BIOPSY      MALIGNANT SKIN LESION EXCISION  06/10/2016    NASAL SEPTUM SURGERY      POLYPECTOMY, UTERUS, HYSTEROSCOPIC N/A 1/10/2019    [facility]    RENAL EXPLORATION      SEPTOPLASTY N/A 2015    [facility]    SINUS SURGERY FUNCTIONAL ENDOSCOPIC Bilateral 2015    [facility] at [facility] OR       PTA Medications   Medication Sig    fish oil-omega-3 fatty acids 300-1,000 mg capsule Take  2 g by mouth once daily.    Lactobacillus rhamnosus GG (CULTURELLE) 10 billion cell capsule     NIFEdipine (PROCARDIA-XL) 30 MG (OSM) 24 hr tablet Take 1 tablet (30 mg total) by mouth once daily.    prenat.vits,yonatan,min-iron-folic (PRENATAL VITAMIN) Tab Take by mouth once daily.    cetirizine (ZYRTEC) 10 MG tablet     MAGNESIUM ORAL Take by mouth once daily.     methylPREDNISolone (MEDROL DOSEPACK) 4 mg tablet Take as directed    omeprazole (PRILOSEC) 20 MG capsule Take 1 capsule (20 mg total) by mouth once daily.    ondansetron (ZOFRAN) 4 MG tablet TAKE ONE TABLET BY MOUTH EVERY 12 HOURS AS NEEDED FOR NAUSEA       Review of patient's allergies indicates:   Allergen Reactions    Singulair [montelukast]      Itching all over        Family History     Problem Relation (Age of Onset)    Cancer Maternal Grandmother    Diabetes Paternal Grandmother    Hypertension Father    Kidney disease Father        Tobacco Use    Smoking status: Never Smoker    Smokeless tobacco: Never Used   Substance and Sexual Activity    Alcohol use: Not Currently     Comment: socially    Drug use: No    Sexual activity: Yes     Partners: Female     Review of Systems   Objective:     Vital Signs (Most Recent):  Temp: 98.3 °F (36.8 °C) (09/19/19 1200)  Pulse: 96 (09/20/19 0203)  Resp: 16 (09/19/19 1200)  BP: (!) 149/105 (09/20/19 0800)  SpO2: 96 % (09/19/19 2200) Vital Signs (24h Range):  Temp:  [98.3 °F (36.8 °C)] 98.3 °F (36.8 °C)  Pulse:  [85-98] 96  Resp:  [16] 16  SpO2:  [96 %] 96 %  BP: (126-155)/() 149/105        There is no height or weight on file to calculate BMI.      Physical Exam:   Constitutional: She is oriented to person, place, and time. She appears well-developed and well-nourished. No distress.       Cardiovascular: Normal rate, regular rhythm and normal heart sounds.     Pulmonary/Chest: Effort normal and breath sounds normal.        Abdominal: Soft. Bowel sounds are normal. She exhibits abdominal incision  (dressing dry and in place). She exhibits no distension and no mass. There is tenderness (appropriate). There is no rebound and no guarding. No hernia.     Genitourinary: Uterus is not tender. There is bleeding (lochia) in the vagina.           Musculoskeletal: Normal range of motion and moves all extremeties.       Neurological: She is alert and oriented to person, place, and time.    Skin: Skin is warm and dry.    Psychiatric: She has a normal mood and affect. Her behavior is normal. Thought content normal.       Significant Labs:  Lab Results   Component Value Date    GROUPTRH A NEG 2019    HEPBSAG Negative 2019         CBC:   Recent Labs   Lab 19  0445   WBC 12.42   RBC 3.57*   HGB 10.3*   HCT 30.2*      MCV 85   MCH 28.9   MCHC 34.1     CMP:   Recent Labs   Lab 19  0445   GLU 94   CALCIUM 7.2*   ALBUMIN 2.3*   PROT 5.4*   *   K 4.1   CO2 20*      BUN 9   CREATININE 0.6   ALKPHOS 100   ALT <5*   AST 12   BILITOT 0.3     I have personallly reviewed all pertinent lab results from the last 24 hours.    Assessment/Plan:     34 y.o. female  for:    * Preeclampsia, severe, third trimester  Headaches much improved and BP normalizing and stable.  UOP has improved after hooks removal, thus it is likely that oliguria was secondary to faulty hooks.  Noted blood in urine is likely due to irritation and trauma associated with 3 hooks placements in the past 24 hrs.  Will continue with spontaneous voiding and Mg to complete 24 hrs.    Status post primary low transverse  section  POD # 1 s/p PLTCS  Pt is doing well.  Proceed with routine post-operative care.  Pt counseled on management plan and discharge goals.    Mother's group B Streptococcus colonization status unknown  Collect GBBS swab now.  IV penicillin GBBS prophylaxis during induction.    Rh negative state in antepartum period  Fetal type and screen postpartum.    Autosomal recessive polycystic kidneys               Disposition: As patient meets milestones, will plan to discharge 2-3 days.    Vikas Park MD  Obstetrics  [facility]

## 2019-09-21 NOTE — L&D DELIVERY NOTE
"[facility]   Section   Operative Note    SUMMARY     Date of Procedure: 2019     Procedure: Procedure(s) (LRB):   SECTION (N/A)    Surgeon(s) and Role:     * Grazyna Price MD - Primary    Assistant:  first everton Mccollum    Pre-Operative Diagnosis:   1.  IUP at 35wga  2.  Preeclampsia with severe features, worsening CNS symptoms remote from delivery      Post-Operative Diagnosis: Post-Op Diagnosis Codes:   1.  IUP at 35wga  2.  Preeclampsia with severe features, worsening CNS symptoms remote from delivery    Anesthesia: Spinal/Epidural    Technical Procedures Used: Primary low transverse  delivery via Pfannensteil skin incision           Description of the Findings of the Procedure: Maternal bladder distended at the beginning of the case with minimal output from the Jolley catheter.  Catheter was replaced after repair of the hysterotomy.  75cc blood stained urine drained from it, and bladder remained distended, but much softer from the beginning of the procedure.  Uterus, bilateral tubes and ovaries normal.  Clear amniotic fluid.  Female infant ("Sara Kauffman"), [id number], APGARS 9/9.  3 vessel cord.  Placenta delivered spontaneously, intact.    Significant Surgical Tasks Conducted by the Assistant(s), if Applicable: Retraction, exposure, skin closure    Complications: No    Blood Loss: * 500 mL *     With patient in supine position, the legs are  and Jolley Catheter placed and positioning to supine done.   Abdomen prepped with Chloroprep and 3 minute drying time allowed prior to draping of the abdomen.   Time out taken with OR team members.  Pfannenstiel Incision made through the skin, transverse fascial incision developed, rectus muscles  in the midline and the peritoneum entered.   no adhesions noted.  Maternal bladder markedly distended at the beginning of the procedure.  The lower uterine segment and position of the fetus identified.   Bladder " blade was placed.   Low Transverse Incision made through well developer lower uterine segment and extended laterally with blunt dissection.   Clear fluid noted.  Infant delivered from vertex presentation.  Cord clamped after one minute and  handed to attending nurse.  Cord blood taken, placenta delivered.  The uterus wasnot exteriorized.  The edges of the uterine incision are grasped with Elmore clamps at the angles and the inferior and superior midline edges of the incision.    Closure with running lock 0 Chromic, starting at each angle, tying in the midline.   Observation for bleeding with suture of any bleeding along the hysterotomy line.   With good hemostasis noted, the anterior pelvis is rinsed with sterile saline.   Still minimal drainage noted from existing hooks catheter, so this was replaced by the RN.  After replacement of the catheter, 75cc blood tinged urine drained into the Hooks, and the bladder, while still somewhat distended, was much softer and more pliable.  Right and left adnexa with normal anatomy.     Closure of the abdomen with 2 0 Vicryl horizontal mattress of the rectus muscles, fascial closure with 0 looped PDS starting at the left angle and tying the knot at the right angle.  Subcutaneous closure with 2-0 plain gut interrupted.  Skin closure with 4 0 Monocryl subcuticular.  Wound dressed with Aquasel and pressure dressing.          Specimens:   Specimen (12h ago, onward)    None          Condition: Good    Disposition: PACU - hemodynamically stable.    Attestation: Good         Delivery Information for  Girl Samanta Argueta    Birth information:  YOB: 2020   Time of birth: 9:02 PM   Sex: female   Head Delivery Date/Time:     Delivery type:    Gestational Age: 35w0d    Delivery Providers    Delivering clinician:             Measurements    Weight:  2650 g  Length:  46 cm  Head circumference:  33.2 cm  Chest circumference:  33 cm  Abdominal girth:  29  cm  Birth comments:  nicu attended         Apgars    Living status:  Living  Apgars:   1 min.:   5 min.:   10 min.:   15 min.:   20 min.:     Skin color:   1  1       Heart rate:   2  2       Reflex irritability:   2  2       Muscle tone:   2  2       Respiratory effort:   2  2       Total:   9  9       Apgars assigned by:  RAVI NICHOLE RN                         Interventions/Resuscitation         Cord    No data filed        Placenta    Placenta delivery date/time:    Placenta removal:             Labor Events:       labor:       Labor Onset Date/Time:         Dilation Complete Date/Time:         Start Pushing Date/Time:       Rupture Date/Time:              Rupture type:           Fluid Amount:        Fluid Color:        Fluid Odor:        Membrane Status (PeriCalm):        Rupture Date/Time (PeriCalm):        Fluid Amount (PeriCalm):        Fluid Color (PeriCalm):         steroids:       Antibiotics given for GBS:       Induction:       Indications for induction:        Augmentation:       Indications for augmentation:       Labor complications:       Additional complications:          Cervical ripening:                     Delivery:      Episiotomy:       Indication for Episiotomy:       Perineal Lacerations:   Repaired:      Periurethral Laceration:   Repaired:     Labial Laceration:   Repaired:     Sulcus Laceration:   Repaired:     Vaginal Laceration:   Repaired:     Cervical Laceration:   Repaired:     Repair suture:       Repair # of packets:       Last Value - EBL - Nursing (mL):       Sum - EBL - Nursing (mL): 0     Last Value - EBL - Anesthesia (mL):      Calculated QBL (mL):        Vaginal Sweep Performed:       Surgicount Correct:         Other providers:            Details (if applicable):  Trial of Labor      Categorization:      Priority:     Indications for :     Incision Type:       Additional  information:  Forceps:    Vacuum:    Breech:     Observed anomalies nicu attended   Other (Comments):

## 2019-09-21 NOTE — PROGRESS NOTES
Went to bedside to assess the patient.  States her headache is getting worse, and she also feels more swollen.  She has required several IV fluid boluses and even a hooks catheter change for oliguria.  Minimal response in urinary output to fluid boluses.    O:  's-140's/90's-low 100's  GEN: increased periorbital and facial edema noted  CV: RRR  LUNGS: CTA BL  ABD: gravid, soft, non-tender  DTR's: brisk, 3+ bilaterally    Recent cervical check per CNM is closed/thick/high and posterior after 2 doses of cytotec    CBC and CMP around 4 pm today were stable    A/P:  Patient now with worsening CNS symptoms on IV magnesium, and oliguria unresponsive to IV fluid boluses.  Cervix is still closed and high.  I discussed proceeding with  to expedite delivery for worsening clinical status remote from delivery.  She and her wife agree.  Consents were reviewed in detail and signed.  All questions answered.  Proceed with primary .

## 2019-09-21 NOTE — NURSING
"Upon entering patient's room she stated "I think I peed around the catheter." I Observed a large amount of blood tinged urine saturating green pads and sheets. Sheets and pads changed. Notified MD of urine occurrence and that urine is still not draining into the catheter itself. Ordered to hold fluid bolus but continue with repeat blood draw. Will remove hooks if this happens again and use bed pan instead. Will continue to monitor.   "

## 2019-09-21 NOTE — TRANSFER OF CARE
Anesthesia Transfer of Care Note    Patient: Samanta ZimmermanSamAlexis    Procedure(s) Performed: Procedure(s) (LRB):   SECTION (N/A)    Patient location: Labor and Delivery    Anesthesia Type: spinal    Transport from OR: Transported from OR on room air with adequate spontaneous ventilation    Post pain: adequate analgesia    Post assessment: no apparent anesthetic complications and tolerated procedure well    Post vital signs: stable    Level of consciousness: awake and alert    Nausea/Vomiting: no nausea/vomiting    Complications: none    Transfer of care protocol was followed      Last vitals:   Visit Vitals  BP (!) 136/90   Pulse 100   Temp 36.6 °C (97.9 °F)   Resp 18   LMP 2019   SpO2 98%   Breastfeeding? No

## 2019-09-21 NOTE — LACTATION NOTE
Lactation Rounds:     Visited mother at bedside. Her partner and AMBER Markham RN IBCLC were performing hand expression for patient. Mother reported some tenderness with hand expression that she stated was tolerable. Discussed infant's feeding since birth and plan of care. Written plan of care was provided to patient and her partner.    PLAN  The baby is what we call a late  baby. Late  babies are immature in multiple ways. They cannot be expected to behave like term babies. They are can sleepy, passive, or might not transfer milk well from the breast.      Plan:     Feed based on feeding cues.   Skin to skin every 2-3 hours if no feeding cues.   Notify bedside nurse if no feeding 3 hours from beginning of last feeding.   Attempt feeding baby for 10-15 minutes. If feeding is not nutritive;    Supplement with all expressed breast milk available (from previous pumping/hand expression session).   Hand express and collect all available colostrum for baby, save for next feeding.       Expected oral intake per feeding (according to [employer] Academy [employer] Breastfeeding Medicine) & expected output for each day of life:  Day 2: 5-15 mL per feeding, 2 voids, 2 stools  Day 3: 15-30 mL per feeding, 3 voids, 3 stools  Day 4: 30-60 mL per feeding, 4 voids, 3 stools  Day 5: begin bottle feeding if not going well to the breast, 6-8 voids, 3 stools.     Consider Outpatient Lactation Consult on day of life 4 or 5, call [phone] to schedule  Consider rental of hospital grade pump if primarily pumping for infants 1st month of life.    This might seems like a busy plan, but this plan allows us to feed the baby, and maintain milk supply!     Feed the baby. A baby who is getting the right amount of calories and nutrition is best able to learn how to nurse. First choice for what to feed a non-nursing baby is moms own milk.    Maintain milk supply. If moms milk supply is being maintained with an appropriate  frequency and amount of milk expression, more time is available for baby to learn to nurse, and babys efforts will be better rewarded (with more milk).    Latch attempt made in football hold on the right breast. Mother's partner was supportive and assisting with latch attempts. Reviewed positioning and latch technique. Infant remained sleepy throughout attempt. She was able to grasp breast but did not suckle for longer than a couple of seconds at a time. Encouraged feeding of expressed colostrum (approximately 1.3 mL) via mother's chosen alternative feeding method (syringe). Reviewed availability of lactation support. Mother was encouraged to call for assistance as needed. She verbalized her understanding.

## 2019-09-21 NOTE — PROGRESS NOTES
Patient urinated around catheter again.  Dark yellow colored urine on pad.  Pad weighed-120g.  Dr Park notified.  Ordered to remove catheter and proceed with using bedpan.

## 2019-09-22 PROCEDURE — 11000001 HC ACUTE MED/SURG PRIVATE ROOM

## 2019-09-22 PROCEDURE — 99024 PR POST-OP FOLLOW-UP VISIT: ICD-10-PCS | Mod: ,,, | Performed by: OBSTETRICS & GYNECOLOGY

## 2019-09-22 PROCEDURE — 25000003 PHARM REV CODE 250: Performed by: OBSTETRICS & GYNECOLOGY

## 2019-09-22 PROCEDURE — 99024 POSTOP FOLLOW-UP VISIT: CPT | Mod: ,,, | Performed by: OBSTETRICS & GYNECOLOGY

## 2019-09-22 RX ORDER — SIMETHICONE 80 MG
1 TABLET,CHEWABLE ORAL 3 TIMES DAILY PRN
Status: DISCONTINUED | OUTPATIENT
Start: 2019-09-22 | End: 2019-09-24 | Stop reason: HOSPADM

## 2019-09-22 RX ADMIN — IBUPROFEN 600 MG: 600 TABLET ORAL at 05:09

## 2019-09-22 RX ADMIN — HYDROCODONE BITARTRATE AND ACETAMINOPHEN 1 TABLET: 5; 325 TABLET ORAL at 10:09

## 2019-09-22 RX ADMIN — SIMETHICONE CHEW TAB 80 MG 80 MG: 80 TABLET ORAL at 09:09

## 2019-09-22 RX ADMIN — IBUPROFEN 600 MG: 600 TABLET ORAL at 12:09

## 2019-09-22 RX ADMIN — DOCUSATE SODIUM 100 MG: 100 CAPSULE, LIQUID FILLED ORAL at 09:09

## 2019-09-22 RX ADMIN — SIMETHICONE CHEW TAB 80 MG 80 MG: 80 TABLET ORAL at 05:09

## 2019-09-22 RX ADMIN — HYDROCODONE BITARTRATE AND ACETAMINOPHEN 1 TABLET: 5; 325 TABLET ORAL at 05:09

## 2019-09-22 RX ADMIN — IBUPROFEN 600 MG: 600 TABLET ORAL at 11:09

## 2019-09-22 NOTE — LACTATION NOTE
Lactation Rounds:      Called to bedside for feeding assistance. Infant swaddled and sleeping at start of visit. Gentle waking techniques, including diaper change. Infant placed to breast in football and then cross-cradle holds on left breast. Infant was able to latch to breast (more deeply in cross-cradle) and suckle briefly, but no nutritive feeding was achieved due to infant sleepiness. Infant placed skin to skin with mother. Reviewed plan with both parents. Mother pumped on INITIATE setting using size 21 mm flanges. Encouraged hand expression after pumping. Parents verbalized understanding of plan of care.

## 2019-09-22 NOTE — PROGRESS NOTES
Mother requesting supplementation due to her concern of low supply at this time and infant weight loss of 8.9%. Reviewed risks of supplementation. Discussed adequacy of colostrum. Instructed mother on normal  feeding and sleeping patterns. Encouraged mother to breastfeed infant a minimum of 8 times in 24 hours prior to supplementation to promote appropriate breast stimulation for adequate milk supply. Discussed with mother preferred alternative feeding methods, such as supplement infant at breast via, syringe, spoon, or cup feeding. Discussed risks and encouraged mother to avoid artificial nipples and bottles. Mother chooses to supplement infant via syringe. Mother taught how to safely feed infant via this method.    Because baby is being supplemented away from the breast, mother was:   - informed that breastfeeding support and assistance is available as needed  - encouraged to express milk from both breasts each time a supplement is given  - encouraged to use her own collected milk as a first choice for supplementation  Mother was encouraged to request assistance as needed and voices understanding.

## 2019-09-22 NOTE — SUBJECTIVE & OBJECTIVE
"Hospital course: G1 34w5d with high blood pressures, headache and blurred vision  Observe  PIH labs  DC procardia  NST  19-BPs have improved. Received 1 dose of IV labetalol. Pt reports headaches still mild but much improved from admit yesterday.   19:  Pt with persistent headache and hyperreflexia.  IV magnesium sulfate for seizure prophylaxis and induction of labor for preeclampsia with severe features.  During the course of induction, her headache persisted and worsened.  She developed oliguria, and remained hyperreflexic.  The decision was then made to proceed with primary  delivery for worsening CNM symptoms and oliguria remote from delivery.  She delivered a healthy, female infant ("Sara Kauffman").  Bladder distension was noted during the , so the Jolley was replaced, and 75cc blood tinged urine drained from it.  IV magnesium sulfate x 24 hours postpartum.    Interval History:   She is doing well this morning. She is tolerating a regular diet without nausea or vomiting. She is voiding spontaneously and without any difficulty.  Denies incontinence or dysuria. She is ambulating. She has passed flatus, and has not a BM. Vaginal bleeding is mild. She denies fever or chills. Abdominal pain is moderate and controlled with oral medications. She is breastfeeding.     Objective:     Vital Signs (Most Recent):  Temp: 98.7 °F (37.1 °C) (19 0800)  Pulse: 101 (19 0800)  Resp: 18 (19 0800)  BP: 135/85 (19 0800)  SpO2: 95 % (19 2233) Vital Signs (24h Range):  Temp:  [98.2 °F (36.8 °C)-98.7 °F (37.1 °C)] 98.7 °F (37.1 °C)  Pulse:  [] 101  Resp:  [16-19] 18  SpO2:  [93 %-97 %] 95 %  BP: (122-148)/(68-90) 135/85        There is no height or weight on file to calculate BMI.      Intake/Output Summary (Last 24 hours) at 2019 0921  Last data filed at 2019 0500  Gross per 24 hour   Intake 1479.17 ml   Output 4500 ml   Net -3020.83 ml       Significant Labs:  Lab " Results   Component Value Date    GROUPTRH A NEG 09/20/2019    HEPBSAG Negative 09/18/2019    STREPBCULT Normal cervicovaginal katheryn present 09/20/2019     Recent Labs   Lab 09/21/19  0445   HGB 10.3*   HCT 30.2*       I have personallly reviewed all pertinent lab results from the last 24 hours.    Physical Exam:   Constitutional: She is oriented to person, place, and time. She appears well-developed and well-nourished. No distress.       Cardiovascular: Normal rate, regular rhythm and normal heart sounds.     Pulmonary/Chest: Effort normal and breath sounds normal.        Abdominal: Soft. Bowel sounds are normal. She exhibits abdominal incision (Dressing dry and in place). She exhibits no distension and no mass. There is tenderness (appropriate). There is no rebound and no guarding. No hernia.     Genitourinary: Uterus is not tender. There is bleeding (lochia) in the vagina.           Musculoskeletal: Normal range of motion and moves all extremeties. She exhibits edema (1+ pretibial).       Neurological: She is alert and oriented to person, place, and time.    Skin: Skin is warm and dry.    Psychiatric: She has a normal mood and affect. Her behavior is normal. Thought content normal.

## 2019-09-22 NOTE — PLAN OF CARE
Pt alert and oriented. POD #2, primary c/s. s/p MgSO4, brisk reflexes, intermittent headache, denies vision change. Aquacel dressing intact, moderate dried drainage noted- area marked. Uterus firm, lochia light. Abdomen distended, encouraged pt to ambulate minimum 3X/day. Voiding spontaneously, dark red urine noted. VSS. Pumping and hand expression utilized. Will continue to monitor.

## 2019-09-22 NOTE — LACTATION NOTE
This note was copied from a baby's chart.  Lactation Rounds:     Called to bedside for feeding attempt. Reviewed plan of care with parents. Mother pumped (tried 24 mm flanges at this attempt), hand expressed with assistance from family and nurse, and made latch attempts with infant at this feeding. Infant was very sleepy and did not grasp breast deeply at this attempt. Mother collected some colostrum drops, which were finger fed to infant. Maternal breast edema is improved today.     Discussed mother's goals for feeding now and in the future. Mother stated that she would like to exclusively breastfeed her baby. She stated that she is also open to pumping and feeding. Encouraged continuation of pumping and hand expressing today in addition to formula supplementation via syringe as needed. Discussed steps of progression toward goal of exclusive breastfeeding, and encouraged patient to focus on protecting milk supply and feeding baby right now. She verbalized her understanding. Her partner and sister are present for support today. Lactation phone number was provided with encouragement to call with any questions or concerns or for observation of/assistance with next feeding.

## 2019-09-22 NOTE — ASSESSMENT & PLAN NOTE
POD # 2 s/p PLTCS  Pt is doing well.  Proceed with routine post-operative care.  Pt counseled on management plan and discharge goals.  Anticipate discharge tomorrow AM.

## 2019-09-22 NOTE — ASSESSMENT & PLAN NOTE
BP improved and stable.  UOP excellent and improved significantly after hooks removal.  Pt with no residual voiding issues.

## 2019-09-22 NOTE — PROGRESS NOTES
"[facility] -   Obstetrics  Postpartum Progress Note    Patient Name: Samanta Argueta  MRN: [id number]  Admission Date: 2019  Hospital Length of Stay: 2 days  Attending Physician: Vikas Park MD  Primary Care Provider: Lisbeth Crow MD    Subjective:     Principal Problem:Preeclampsia, severe, third trimester    Hospital course: G1 34w5d with high blood pressures, headache and blurred vision  Observe  PIH labs  DC procardia  NST  19-BPs have improved. Received 1 dose of IV labetalol. Pt reports headaches still mild but much improved from admit yesterday.   19:  Pt with persistent headache and hyperreflexia.  IV magnesium sulfate for seizure prophylaxis and induction of labor for preeclampsia with severe features.  During the course of induction, her headache persisted and worsened.  She developed oliguria, and remained hyperreflexic.  The decision was then made to proceed with primary  delivery for worsening CNM symptoms and oliguria remote from delivery.  She delivered a healthy, female infant ("Sara Kauffamn").  Bladder distension was noted during the , so the Jolley was replaced, and 75cc blood tinged urine drained from it.  IV magnesium sulfate x 24 hours postpartum.    Interval History:   She is doing well this morning. She is tolerating a regular diet without nausea or vomiting. She is voiding spontaneously and without any difficulty.  Denies incontinence or dysuria. She is ambulating. She has passed flatus, and has not a BM. Vaginal bleeding is mild. She denies fever or chills. Abdominal pain is moderate and controlled with oral medications. She is breastfeeding.     Objective:     Vital Signs (Most Recent):  Temp: 98.7 °F (37.1 °C) (19 0800)  Pulse: 101 (19 0800)  Resp: 18 (19 0800)  BP: 135/85 (19 0800)  SpO2: 95 % (19 2233) Vital Signs (24h Range):  Temp:  [98.2 °F (36.8 °C)-98.7 °F (37.1 °C)] 98.7 °F (37.1 °C)  Pulse:  " [] 101  Resp:  [16-19] 18  SpO2:  [93 %-97 %] 95 %  BP: (122-148)/(68-90) 135/85        There is no height or weight on file to calculate BMI.      Intake/Output Summary (Last 24 hours) at 2019 0921  Last data filed at 2019 0500  Gross per 24 hour   Intake 1479.17 ml   Output 4500 ml   Net -3020.83 ml       Significant Labs:  Lab Results   Component Value Date    GROUPTRH A NEG 2019    HEPBSAG Negative 2019    STREPBCULT Normal cervicovaginal katheryn present 2019     Recent Labs   Lab 19  0445   HGB 10.3*   HCT 30.2*       I have personallly reviewed all pertinent lab results from the last 24 hours.    Physical Exam:   Constitutional: She is oriented to person, place, and time. She appears well-developed and well-nourished. No distress.       Cardiovascular: Normal rate, regular rhythm and normal heart sounds.     Pulmonary/Chest: Effort normal and breath sounds normal.        Abdominal: Soft. Bowel sounds are normal. She exhibits abdominal incision (Dressing dry and in place). She exhibits no distension and no mass. There is tenderness (appropriate). There is no rebound and no guarding. No hernia.     Genitourinary: Uterus is not tender. There is bleeding (lochia) in the vagina.           Musculoskeletal: Normal range of motion and moves all extremeties. She exhibits edema (1+ pretibial).       Neurological: She is alert and oriented to person, place, and time.    Skin: Skin is warm and dry.    Psychiatric: She has a normal mood and affect. Her behavior is normal. Thought content normal.       Assessment/Plan:     34 y.o. female  for:    * Preeclampsia, severe, third trimester  BP improved and stable.  UOP excellent and improved significantly after hooks removal.  Pt with no residual voiding issues.      Status post primary low transverse  section  POD # 2 s/p PLTCS  Pt is doing well.  Proceed with routine post-operative care.  Pt counseled on management plan and  discharge goals.  Anticipate discharge tomorrow AM.    Mother's group B Streptococcus colonization status unknown  Collect GBBS swab now.  IV penicillin GBBS prophylaxis during induction.    Rh negative state in antepartum period  Fetal type and screen postpartum.    Autosomal recessive polycystic kidneys              Disposition: As patient meets milestones, will plan to discharge tomorrow.    Vikas Park MD  Obstetrics  [facility] -

## 2019-09-22 NOTE — LACTATION NOTE
Lactation Rounds:     Visited mother at bedside. She is still weak from medication and needed full assist at this encounter. Partner present and engaged. Infant was showing feeding cues at start of visit. Assisted mother to position and latch infant on left side in football hold. Infant was able to grasp breast but did not suckle. Same behavior was noted in cross-cradle hold, although infant was slightly more active in this position. After approximately 15 minutes, infant was removed from breast and swaddled.     Due to ineffective feeding at the breast, pumping was initiated. Small drops of colostrum were obtained on nipple after pumping on INITIATE setting. Several large colostrum drops were then hand expressed by partner and myself, and all colostrum was fed to infant.     Parents had questions about when it would be appropriate to introduce formula supplementation. Discussed medical indications for formula supplementation, including signals from infant that she is not eating enough. At this time, infant is stable and output is more than adequate. Encouraged parents to continue to observe for their baby's cues and to report any concerns they may have. They verbalized their understanding.     Mother's breasts were noted to be edematous. Reverse pressure softening performed with hand expression. Size 21 mm flanges were also brought to bedside for use at next pumping session. Encouraged parents to call for lactation support as needed.

## 2019-09-22 NOTE — LACTATION NOTE
Lactation Rounds:     Visited mother at bedside. She was sitting in chair with family present. Discussed infant's feeding and mother's pumping overnight. Mother reported discouragement with her pumping output. Reviewed expectations for milk supply today. Mother stated that she feels that the size 21 mm flanges are working well. Infant has been feeding formula via syringe, and parents report improvement in infant behavior and satiety cues with supplementation. Support and encouragement offered. Mother stated that she is comfortable with feeding plan at the moment. She was encouraged to call for assistance with feeding/pumping or with any questions or concerns. Lactation phone number provided. Mother verbalized her understanding.

## 2019-09-22 NOTE — NURSING
Magnesium sulfate discontinued, pt up to bathroom, urine continues to be blood tinged, will continue to monitor

## 2019-09-23 PROCEDURE — 25000003 PHARM REV CODE 250: Performed by: OBSTETRICS & GYNECOLOGY

## 2019-09-23 PROCEDURE — 11000001 HC ACUTE MED/SURG PRIVATE ROOM

## 2019-09-23 RX ADMIN — IBUPROFEN 600 MG: 600 TABLET ORAL at 12:09

## 2019-09-23 RX ADMIN — SIMETHICONE CHEW TAB 80 MG 80 MG: 80 TABLET ORAL at 05:09

## 2019-09-23 RX ADMIN — IBUPROFEN 600 MG: 600 TABLET ORAL at 11:09

## 2019-09-23 RX ADMIN — DOCUSATE SODIUM 100 MG: 100 CAPSULE, LIQUID FILLED ORAL at 08:09

## 2019-09-23 RX ADMIN — IBUPROFEN 600 MG: 600 TABLET ORAL at 05:09

## 2019-09-23 RX ADMIN — HYDROCODONE BITARTRATE AND ACETAMINOPHEN 1 TABLET: 10; 325 TABLET ORAL at 05:09

## 2019-09-23 RX ADMIN — HYDROCODONE BITARTRATE AND ACETAMINOPHEN 1 TABLET: 10; 325 TABLET ORAL at 11:09

## 2019-09-23 NOTE — SUBJECTIVE & OBJECTIVE
"Hospital course: G1 34w5d with high blood pressures, headache and blurred vision  Observe  PIH labs  DC procardia  NST  19-BPs have improved. Received 1 dose of IV labetalol. Pt reports headaches still mild but much improved from admit yesterday.   19:  Pt with persistent headache and hyperreflexia.  IV magnesium sulfate for seizure prophylaxis and induction of labor for preeclampsia with severe features.  During the course of induction, her headache persisted and worsened.  She developed oliguria, and remained hyperreflexic.  The decision was then made to proceed with primary  delivery for worsening CNM symptoms and oliguria remote from delivery.  She delivered a healthy, female infant ("Sara Kauffman").  Bladder distension was noted during the , so the Jolley was replaced, and 75cc blood tinged urine drained from it.  IV magnesium sulfate x 24 hours postpartum.  19-no complaints, feeling well. Would like to stay w/ infant & be discharged home together. BPs mildly elevated but stable    Interval History: s/p primary LTCS    She is doing well this morning. She is tolerating a regular diet without nausea or vomiting. She is voiding spontaneously. She is ambulating. She has passed flatus, and has a BM. Vaginal bleeding is mild. She denies fever or chills. Abdominal pain is mild and controlled with oral medications. She is using breast pump    Objective:     Vital Signs (Most Recent):  Temp: 99.2 °F (37.3 °C) (19 1215)  Pulse: 95 (19 1215)  Resp: 18 (19 1215)  BP: (!) 140/88 (19 1215)  SpO2: 98 % (19 1215) Vital Signs (24h Range):  Temp:  [98.3 °F (36.8 °C)-99.2 °F (37.3 °C)] 99.2 °F (37.3 °C)  Pulse:  [] 95  Resp:  [18] 18  SpO2:  [95 %-98 %] 98 %  BP: (134-153)/(37-93) 140/88        There is no height or weight on file to calculate BMI.    No intake or output data in the 24 hours ending 19 1449    Significant Labs:  Lab Results   Component Value " Date    GROUPTRH A NEG 09/20/2019    HEPBSAG Negative 09/18/2019    STREPBCULT Normal cervicovaginal katheryn present 09/20/2019     No results for input(s): HGB, HCT in the last 48 hours.    I have personallly reviewed all pertinent lab results from the last 24 hours.    Physical Exam:   Constitutional: She is oriented to person, place, and time. She appears well-developed and well-nourished.        Pulmonary/Chest: Effort normal.        Abdominal: Soft. She exhibits abdominal incision. There is tenderness.   Bandage stained w/ old blood stable; fundus firm. Tenderness minimal     Genitourinary:   Genitourinary Comments: Scant             Musculoskeletal: Moves all extremeties.       Neurological: She is alert and oriented to person, place, and time.    Skin: Skin is warm and dry.    Psychiatric: She has a normal mood and affect. Her behavior is normal.

## 2019-09-23 NOTE — LACTATION NOTE
Visited mother at bedside. Reviewed current POC with parents.     PLAN  The baby is what we call a late  baby. Late  babies are immature in multiple ways. They cannot be expected to behave like term babies. They are can sleepy, passive, or might not transfer milk well from the breast.      Plan:     Feed based on feeding cues.   Skin to skin every 2-3 hours if no feeding cues.   Notify bedside nurse if no feeding 3 hours from beginning of last feeding.   Attempt feeding baby for 10-15 minutes. If feeding is not nutritive;    Supplement with all expressed breast milk available (from previous pumping/hand expression session).   Hand express and collect all available colustrum for baby, save for next feeding.       Expected oral intake per feeding (according to [employer] Breastfeeding [employer]) & expected output for each day of life:  Day 2: 5-15 mL per feeding, 2 voids, 2 stools  Day 3: 15-30 mL per feeding, 3 voids, 3 stools  Day 4: 30-60 mL per feeding, 4 voids, 3 stools     Consider Outpatient Lactation Consult on day of life 4 or 5, call [phone] to schedule  Consider rental of hospital grade pump if primarily pumping for infants 1st month of life.    This might seems like a busy plan, but this plan allows us to feed the baby, and maintain milk supply!     Feed the baby. A baby who is getting the right amount of calories and nutrition is best able to learn how to nurse. First choice for what to feed a non-nursing baby is moms own milk.    Maintain milk supply. If moms milk supply is being maintained with an appropriate frequency and amount of milk expression, more time is available for baby to learn to nurse, and babys efforts will be better rewarded (with more milk).

## 2019-09-23 NOTE — PROGRESS NOTES
"[facility] -   Obstetrics  Postpartum Progress Note    Patient Name: Samanta Argueta  MRN: [id number]  Admission Date: 2019  Hospital Length of Stay: 3 days  Attending Physician: Vikas Park MD  Primary Care Provider: Lisbeth Crow MD    Subjective:     Principal Problem:Status post primary low transverse  section    Hospital course: G1 34w5d with high blood pressures, headache and blurred vision  Observe  PIH labs  DC procardia  NST  19-BPs have improved. Received 1 dose of IV labetalol. Pt reports headaches still mild but much improved from admit yesterday.   19:  Pt with persistent headache and hyperreflexia.  IV magnesium sulfate for seizure prophylaxis and induction of labor for preeclampsia with severe features.  During the course of induction, her headache persisted and worsened.  She developed oliguria, and remained hyperreflexic.  The decision was then made to proceed with primary  delivery for worsening CNM symptoms and oliguria remote from delivery.  She delivered a healthy, female infant ("Sara Kauffman").  Bladder distension was noted during the , so the Jolley was replaced, and 75cc blood tinged urine drained from it.  IV magnesium sulfate x 24 hours postpartum.  19-no complaints, feeling well. Would like to stay w/ infant & be discharged home together. BPs mildly elevated but stable    Interval History: s/p primary LTCS    She is doing well this morning. She is tolerating a regular diet without nausea or vomiting. She is voiding spontaneously. She is ambulating. She has passed flatus, and has a BM. Vaginal bleeding is mild. She denies fever or chills. Abdominal pain is mild and controlled with oral medications. She is using breast pump    Objective:     Vital Signs (Most Recent):  Temp: 99.2 °F (37.3 °C) (19 1215)  Pulse: 95 (19 1215)  Resp: 18 (19 1215)  BP: (!) 140/88 (19 1215)  SpO2: 98 % (19 1215) " Vital Signs (24h Range):  Temp:  [98.3 °F (36.8 °C)-99.2 °F (37.3 °C)] 99.2 °F (37.3 °C)  Pulse:  [] 95  Resp:  [18] 18  SpO2:  [95 %-98 %] 98 %  BP: (134-153)/(37-93) 140/88        There is no height or weight on file to calculate BMI.    No intake or output data in the 24 hours ending 19 1449    Significant Labs:  Lab Results   Component Value Date    GROUPTRH A NEG 2019    HEPBSAG Negative 2019    STREPBCULT Normal cervicovaginal katheryn present 2019     No results for input(s): HGB, HCT in the last 48 hours.    I have personallly reviewed all pertinent lab results from the last 24 hours.    Physical Exam:   Constitutional: She is oriented to person, place, and time. She appears well-developed and well-nourished.        Pulmonary/Chest: Effort normal.        Abdominal: Soft. She exhibits abdominal incision. There is tenderness.   Bandage stained w/ old blood stable; fundus firm. Tenderness minimal     Genitourinary:   Genitourinary Comments: Scant             Musculoskeletal: Moves all extremeties.       Neurological: She is alert and oriented to person, place, and time.    Skin: Skin is warm and dry.    Psychiatric: She has a normal mood and affect. Her behavior is normal.       Assessment/Plan:     34 y.o. female  for:    * Status post primary low transverse  section  POD # 2 s/p PLTCS  Pt is doing well.  Proceed with routine post-operative care.  Pt counseled on management plan and discharge goals.  Anticipate discharge tomorrow AM.      Mother's group B Streptococcus colonization status unknown  Collect GBBS swab now.  IV penicillin GBBS prophylaxis during induction.    Preeclampsia, severe, third trimester  BP improved and stable.  UOP excellent and improved significantly after hooks removal.  Pt with no residual voiding issues.         Rh negative state in antepartum period  Fetal type and screen postpartum.    Autosomal recessive polycystic kidneys                  Disposition: As patient meets milestones, will plan to discharge with infant. Continue monitoring BPs.    Britney Amezcua MD  Obstetrics  [facility] [place]

## 2019-09-23 NOTE — PLAN OF CARE
Caring for self and baby with minimal assistance from family, bonding appropriate, continue current POC

## 2019-09-24 VITALS
SYSTOLIC BLOOD PRESSURE: 137 MMHG | RESPIRATION RATE: 18 BRPM | DIASTOLIC BLOOD PRESSURE: 89 MMHG | OXYGEN SATURATION: 99 % | HEART RATE: 104 BPM | TEMPERATURE: 99 F

## 2019-09-24 PROCEDURE — 25000003 PHARM REV CODE 250: Performed by: PHYSICIAN ASSISTANT

## 2019-09-24 PROCEDURE — 25000003 PHARM REV CODE 250: Performed by: OBSTETRICS & GYNECOLOGY

## 2019-09-24 RX ORDER — DOCUSATE SODIUM 100 MG/1
100 CAPSULE, LIQUID FILLED ORAL DAILY
Refills: 0 | COMMUNITY
Start: 2019-09-25 | End: 2020-03-19

## 2019-09-24 RX ORDER — IBUPROFEN 600 MG/1
600 TABLET ORAL EVERY 6 HOURS
Qty: 30 TABLET | Refills: 0 | Status: SHIPPED | OUTPATIENT
Start: 2019-09-24 | End: 2020-03-19

## 2019-09-24 RX ORDER — NIFEDIPINE 30 MG/1
30 TABLET, EXTENDED RELEASE ORAL DAILY
Status: DISCONTINUED | OUTPATIENT
Start: 2019-09-24 | End: 2019-09-24 | Stop reason: HOSPADM

## 2019-09-24 RX ORDER — HYDROCODONE BITARTRATE AND ACETAMINOPHEN 5; 325 MG/1; MG/1
1 TABLET ORAL EVERY 6 HOURS PRN
Qty: 20 TABLET | Refills: 0 | Status: SHIPPED | OUTPATIENT
Start: 2019-09-24 | End: 2020-03-19

## 2019-09-24 RX ADMIN — SIMETHICONE CHEW TAB 80 MG 80 MG: 80 TABLET ORAL at 09:09

## 2019-09-24 RX ADMIN — HYDROCODONE BITARTRATE AND ACETAMINOPHEN 1 TABLET: 5; 325 TABLET ORAL at 09:09

## 2019-09-24 RX ADMIN — IBUPROFEN 600 MG: 600 TABLET ORAL at 05:09

## 2019-09-24 RX ADMIN — DOCUSATE SODIUM 100 MG: 100 CAPSULE, LIQUID FILLED ORAL at 08:09

## 2019-09-24 RX ADMIN — NIFEDIPINE 30 MG: 30 TABLET, FILM COATED, EXTENDED RELEASE ORAL at 09:09

## 2019-09-24 NOTE — LACTATION NOTE
Mother has been pumping using #24 flanges. Her milk is coming in this morning. They increased the amount of formula and breast milk Sara is receiving. Sara's weight loss is now stabilized.   Will keep current POC at home; mother to attempt latching Sara, and an appointment will be made for lactation follow up in a week and an half.  Mother denies any further lactation needs or concerns at this time. Mother anticipates discharge home today. Lactation discharge information reviewed.  Mother is aware of warm line and outpatient consultations. Encouraged mother to contact lactation with any questions, concerns, or problems. Contact numbers provided, and mother verbalizes understanding.

## 2019-09-24 NOTE — ASSESSMENT & PLAN NOTE
POD #4 s/p PLTCS for severe preE  Pt is doing well.  Proceed with routine post-operative care.  Pt counseled on management plan and discharge goals. Will start Procardia XL 30 mg daily for slowly uptrending blood pressures.  Anticipate discharge later today pending BP control with recheck in 3 days in clinic.

## 2019-09-24 NOTE — SUBJECTIVE & OBJECTIVE
"Hospital course: G1 34w5d with high blood pressures, headache and blurred vision  Observe  PIH labs  DC procardia  NST  19-BPs have improved. Received 1 dose of IV labetalol. Pt reports headaches still mild but much improved from admit yesterday.   19:  Pt with persistent headache and hyperreflexia.  IV magnesium sulfate for seizure prophylaxis and induction of labor for preeclampsia with severe features.  During the course of induction, her headache persisted and worsened.  She developed oliguria, and remained hyperreflexic.  The decision was then made to proceed with primary  delivery for worsening CNM symptoms and oliguria remote from delivery.  She delivered a healthy, female infant ("Sara Kauffman").  Bladder distension was noted during the , so the Jolley was replaced, and 75cc blood tinged urine drained from it.  IV magnesium sulfate x 24 hours postpartum.  19-no complaints, feeling well. Would like to stay w/ infant & be discharged home together. BPs mildly elevated but stable    Interval History:     She is doing well this morning. She is tolerating a regular diet without nausea or vomiting. She is voiding spontaneously. She is ambulating. She has passed flatus, and has not a BM. Vaginal bleeding is mild. She denies fever or chills. Abdominal pain is mild and controlled with oral medications. She is breastfeeding. She desires circumcision for her male baby: not applicable.    Objective:     Vital Signs (Most Recent):  Temp: 99 °F (37.2 °C) (19 0800)  Pulse: 94 (19 0800)  Resp: 20 (19 0800)  BP: (!) 140/93 (19 0800)  SpO2: 99 % (19 1600) Vital Signs (24h Range):  Temp:  [98.1 °F (36.7 °C)-99.2 °F (37.3 °C)] 99 °F (37.2 °C)  Pulse:  [90-97] 94  Resp:  [16-20] 20  SpO2:  [98 %-99 %] 99 %  BP: (135-149)/(88-95) 140/93        There is no height or weight on file to calculate BMI.    No intake or output data in the 24 hours ending 19 " 0903    Significant Labs:  Lab Results   Component Value Date    GROUPTRH A NEG 09/20/2019    HEPBSAG Negative 09/18/2019    STREPBCULT Normal cervicovaginal katheryn present 09/20/2019     No results for input(s): HGB, HCT in the last 48 hours.    I have personallly reviewed all pertinent lab results from the last 24 hours.    Physical Exam:   Constitutional: She is oriented to person, place, and time. She appears well-developed and well-nourished. No distress.       Cardiovascular: Normal rate, regular rhythm, normal heart sounds and intact distal pulses.    No murmur heard.   Pulmonary/Chest: Effort normal and breath sounds normal. No respiratory distress. She has no wheezes. She has no rales.        Abdominal: Soft. Bowel sounds are normal. She exhibits abdominal incision (Aquacel dressing in place, staining within markings, otherwise dry/intact). She exhibits no distension. There is no tenderness. There is no guarding.   Uterine fundus firm, below umbilicus, mild tenderness (appropriate)             Musculoskeletal: Moves all extremeties. She exhibits edema (1+ BLE).   No calf tenderness       Neurological: She is alert and oriented to person, place, and time.    Skin: Skin is warm and dry. No rash noted. She is not diaphoretic.

## 2019-09-24 NOTE — NURSING
VSS, bleeding light, fundus firm without massage. Discharge instructions reviewed with pt and she verbalizes understanding. Follow up appts made for pt and she verbalizes understanding of time date and place of appt. Discharged to car via wheelchair with infant in arms by staff.

## 2019-09-24 NOTE — PLAN OF CARE
Pt is progressing well. Pain is controlled with PO pain meds. Ambulates independently and voids without difficulty. Dressing remains dry and intact with drainage noted but has not spread beyond area marked. Pumping and formula feeding. Bonding well with baby. VSS. Will continue to monitor.

## 2019-09-24 NOTE — PROGRESS NOTES
"[facility] -   Obstetrics  Postpartum Progress Note    Patient Name: Samanta Argueta  MRN: [id number]  Admission Date: 2019  Hospital Length of Stay: 4 days  Attending Physician: Vikas Park MD  Primary Care Provider: Lisbeth Crow MD    Subjective:     Principal Problem:Status post primary low transverse  section    Hospital course: G1 34w5d with high blood pressures, headache and blurred vision  Observe  PIH labs  DC procardia  NST  19-BPs have improved. Received 1 dose of IV labetalol. Pt reports headaches still mild but much improved from admit yesterday.   19:  Pt with persistent headache and hyperreflexia.  IV magnesium sulfate for seizure prophylaxis and induction of labor for preeclampsia with severe features.  During the course of induction, her headache persisted and worsened.  She developed oliguria, and remained hyperreflexic.  The decision was then made to proceed with primary  delivery for worsening CNM symptoms and oliguria remote from delivery.  She delivered a healthy, female infant ("Sara Kauffman").  Bladder distension was noted during the , so the Jolley was replaced, and 75cc blood tinged urine drained from it.  IV magnesium sulfate x 24 hours postpartum.  19-no complaints, feeling well. Would like to stay w/ infant & be discharged home together. BPs mildly elevated but stable    Interval History:     She is doing well this morning. She is tolerating a regular diet without nausea or vomiting. She is voiding spontaneously. She is ambulating. She has passed flatus, and has not a BM. Vaginal bleeding is mild. She denies fever or chills. Abdominal pain is mild and controlled with oral medications. She is breastfeeding. She desires circumcision for her male baby: not applicable.    Objective:     Vital Signs (Most Recent):  Temp: 99 °F (37.2 °C) (19 0800)  Pulse: 94 (19 0800)  Resp: 20 (19 0800)  BP: (!) 140/93 " (19 0800)  SpO2: 99 % (19 1600) Vital Signs (24h Range):  Temp:  [98.1 °F (36.7 °C)-99.2 °F (37.3 °C)] 99 °F (37.2 °C)  Pulse:  [90-97] 94  Resp:  [16-20] 20  SpO2:  [98 %-99 %] 99 %  BP: (135-149)/(88-95) 140/93        There is no height or weight on file to calculate BMI.    No intake or output data in the 24 hours ending 19 0903    Significant Labs:  Lab Results   Component Value Date    GROUPTRH A NEG 2019    HEPBSAG Negative 2019    STREPBCULT Normal cervicovaginal katheryn present 2019     No results for input(s): HGB, HCT in the last 48 hours.    I have personallly reviewed all pertinent lab results from the last 24 hours.    Physical Exam:   Constitutional: She is oriented to person, place, and time. She appears well-developed and well-nourished. No distress.       Cardiovascular: Normal rate, regular rhythm, normal heart sounds and intact distal pulses.    No murmur heard.   Pulmonary/Chest: Effort normal and breath sounds normal. No respiratory distress. She has no wheezes. She has no rales.        Abdominal: Soft. Bowel sounds are normal. She exhibits abdominal incision (Aquacel dressing in place, staining within markings, otherwise dry/intact). She exhibits no distension. There is no tenderness. There is no guarding.   Uterine fundus firm, below umbilicus, mild tenderness (appropriate)             Musculoskeletal: Moves all extremeties. She exhibits edema (1+ BLE).   No calf tenderness       Neurological: She is alert and oriented to person, place, and time.    Skin: Skin is warm and dry. No rash noted. She is not diaphoretic.        Assessment/Plan:     34 y.o. female  for:    * Status post primary low transverse  section  POD #4 s/p PLTCS for severe preE  Pt is doing well.  Proceed with routine post-operative care.  Pt counseled on management plan and discharge goals. Will start Procardia XL 30 mg daily for slowly uptrending blood pressures.  Anticipate  discharge later today pending BP control with recheck in 3 days in clinic.      Mother's group B Streptococcus colonization status unknown  Collect GBBS swab now.  IV penicillin GBBS prophylaxis during induction.    Preeclampsia, severe, third trimester  BP beginning to trend upwards, will start Procardia XL 30 mg daily. Remains asymptomatic.         Rh negative state in antepartum period  Fetal type is Rh neg.    Autosomal recessive polycystic kidneys                   Disposition: As patient meets milestones, will plan to discharge.    Magali Spangler PA-C  Obstetrics  [facility]

## 2019-09-24 NOTE — DISCHARGE SUMMARY
"[facility] -   Obstetrics  Discharge Summary      Patient Name: Samanta Argueta  MRN: [id number]  Admission Date: 2019  Hospital Length of Stay: 4 days  Discharge Date and Time:  2019 11:26 AM  Attending Physician: Vikas Park MD   Discharging Provider: Magali Spangler PA-C   Primary Care Provider: Lisbeth Crow MD    HPI: 33 yo, G1 34w5d present from office with high blood pressures  19 Admitted to inpatient status for IOL secondary to pre-eclampsia. Mag Sulfate/seizure prophylaxis, cytotec for cervical ripening.         Procedure(s) (LRB):   SECTION (N/A)     Hospital Course:   G1 34w5d with high blood pressures, headache and blurred vision  Observe  PIH labs  DC procardia  NST  19-BPs have improved. Received 1 dose of IV labetalol. Pt reports headaches still mild but much improved from admit yesterday.   19:  Pt with persistent headache and hyperreflexia.  IV magnesium sulfate for seizure prophylaxis and induction of labor for preeclampsia with severe features.  During the course of induction, her headache persisted and worsened.  She developed oliguria, and remained hyperreflexic.  The decision was then made to proceed with primary  delivery for worsening CNM symptoms and oliguria remote from delivery.  She delivered a healthy, female infant ("Sara Kauffman").  Bladder distension was noted during the , so the Jolley was replaced, and 75cc blood tinged urine drained from it.  IV magnesium sulfate x 24 hours postpartum.  19-no complaints, feeling well. Would like to stay w/ infant & be discharged home together. BPs mildly elevated but stable     Consults (From admission, onward)        Status Ordering Provider     Consult to Lactation  Use PRN     Provider:  (Not yet assigned)    Acknowledged HILDA ALEXANDER          Final Active Diagnoses:    Diagnosis Date Noted POA    PRINCIPAL PROBLEM:  Status post primary low transverse "  section [Z98.891] 2019 Not Applicable    Mother's group B Streptococcus colonization status unknown [P00.2] 2019 Not Applicable    Preeclampsia, severe, third trimester [O14.13] 2019 Yes    Rh negative state in antepartum period [O09.899, Z67.91] 2019 Not Applicable    Autosomal recessive polycystic kidneys [Q61.19]  Not Applicable      Problems Resolved During this Admission:        Labs: All labs within the past 24 hours have been reviewed    Feeding Method: breast    Immunizations     None          Delivery:    Episiotomy: None   Lacerations:     Repair suture:     Repair # of packets:     Blood loss (ml):       Birth information:  YOB: 2020   Time of birth: 9:02 PM   Sex: female   Delivery type: , Low Transverse   Gestational Age: 35w0d    Delivery Clinician:      Other providers:       Additional  information:  Forceps:    Vacuum:    Breech:    Observed anomalies nicu attended     Living?:           APGARS  One minute Five minutes Ten minutes   Skin color:         Heart rate:         Grimace:         Muscle tone:         Breathing:         Totals: 9  9        Placenta: Delivered:       appearance    Pending Diagnostic Studies:     None          Discharged Condition: good    Disposition: Home or Self Care    Follow Up:  Follow-up Information     [facility] In 1 week.    Why:  Dressing removal and BP check           Vikas Park MD In 4 weeks.    Specialty:  Obstetrics and Gynecology  Why:  Post-operative visit  Contact information:  [address]  [place] [address]  [phone]                 Patient Instructions:      Diet Adult Regular     Other restrictions (specify):   Order Comments: Pelvic rest x 6 weeks (no tampons, intercourse douching); showers only for 6 wks; no lifting more than baby     Call MD for:  temperature >100.4     Call MD for:  severe uncontrolled pain     Call MD for:  redness, tenderness, or signs of infection  (pain, swelling, redness, odor or green/yellow discharge around incision site)     Call MD for:  difficulty breathing or increased cough     Call MD for:  severe persistent headache     Call MD for:  persistent dizziness, light-headedness, or visual disturbances     Leave dressing on - Keep it clean, dry, and intact until clinic visit   Order Comments: Dressing will be removed at 1 week nurse visit.  Showers only- no baths for 6 weeks.     Medications:  Current Discharge Medication List      START taking these medications    Details   docusate sodium (COLACE) 100 MG capsule Take 1 capsule (100 mg total) by mouth once daily.  Refills: 0      HYDROcodone-acetaminophen (NORCO) 5-325 mg per tablet Take 1 tablet by mouth every 6 (six) hours as needed for Pain.  Qty: 20 tablet, Refills: 0    Comments: Quantity prescribed more than 7 day supply? No      ibuprofen (ADVIL,MOTRIN) 600 MG tablet Take 1 tablet (600 mg total) by mouth every 6 (six) hours.  Qty: 30 tablet, Refills: 0         CONTINUE these medications which have NOT CHANGED    Details   fish oil-omega-3 fatty acids 300-1,000 mg capsule Take 2 g by mouth once daily.      Lactobacillus rhamnosus GG (CULTURELLE) 10 billion cell capsule       NIFEdipine (PROCARDIA-XL) 30 MG (OSM) 24 hr tablet Take 1 tablet (30 mg total) by mouth once daily.  Qty: 30 tablet, Refills: 11      prenat.vits,yonatan,min-iron-folic (PRENATAL VITAMIN) Tab Take by mouth once daily.      cetirizine (ZYRTEC) 10 MG tablet       omeprazole (PRILOSEC) 20 MG capsule Take 1 capsule (20 mg total) by mouth once daily.  Qty: 90 capsule, Refills: 3         STOP taking these medications       MAGNESIUM ORAL Comments:   Reason for Stopping:         methylPREDNISolone (MEDROL DOSEPACK) 4 mg tablet Comments:   Reason for Stopping:         ondansetron (ZOFRAN) 4 MG tablet Comments:   Reason for Stopping:               Magali Spangler PA-C  Obstetrics  [facility]

## 2019-09-25 LAB
BACTERIA SPEC AEROBE CULT: ABNORMAL
BACTERIA SPEC AEROBE CULT: ABNORMAL

## 2019-09-26 PROBLEM — B95.1 POSITIVE GBS TEST: Status: ACTIVE | Noted: 2019-09-26

## 2019-09-27 ENCOUNTER — OFFICE VISIT (OUTPATIENT)
Dept: OBSTETRICS AND GYNECOLOGY | Facility: CLINIC | Age: 34
End: 2019-09-27
Payer: COMMERCIAL

## 2019-09-27 VITALS — HEIGHT: 62 IN | DIASTOLIC BLOOD PRESSURE: 86 MMHG | SYSTOLIC BLOOD PRESSURE: 128 MMHG | BODY MASS INDEX: 38.79 KG/M2

## 2019-09-27 DIAGNOSIS — O14.13 PREECLAMPSIA, SEVERE, THIRD TRIMESTER: ICD-10-CM

## 2019-09-27 DIAGNOSIS — Z98.891 STATUS POST PRIMARY LOW TRANSVERSE CESAREAN SECTION: Primary | ICD-10-CM

## 2019-09-27 PROCEDURE — 99999 PR PBB SHADOW E&M-EST. PATIENT-LVL II: ICD-10-PCS | Mod: PBBFAC,,,

## 2019-09-27 PROCEDURE — 0503F POSTPARTUM CARE VISIT: CPT | Mod: S$GLB,,, | Performed by: OBSTETRICS & GYNECOLOGY

## 2019-09-27 PROCEDURE — 99999 PR PBB SHADOW E&M-EST. PATIENT-LVL II: CPT | Mod: PBBFAC,,,

## 2019-09-27 PROCEDURE — 0503F PR POSTPARTUM CARE VISIT: ICD-10-PCS | Mod: S$GLB,,, | Performed by: OBSTETRICS & GYNECOLOGY

## 2019-09-27 NOTE — PROGRESS NOTES
Subjective:       Patient ID: Samanta Argueta is a 34 y.o. female.    Chief Complaint:  Wound Check      History of Present Illness  HPI  Postoperative Follow-up  Patient presents to the clinic 1 weeks status post  for severe preeclampsia. Eating a regular diet without difficulty. Bowel movements are normal. Pain is controlled with current analgesics. Medications being used: ibuprofen (OTC) and norco.  States the Procardia causes some nausea and run down feeling when she takes it, these symptoms typically resolved after a few hours.  Denies HA, changes to her vision, RUQ pain. Leg swelling much improved. Home BP's have been in 140's over 90's      GYN & OB History  Patient's last menstrual period was 2019.   Date of Last Pap: 2018    OB History    Para Term  AB Living   1 1 0 1 0 1   SAB TAB Ectopic Multiple Live Births   0 0 0 0 1      # Outcome Date GA Lbr Epi/2nd Weight Sex Delivery Anes PTL Lv   1  19 35w0d  2.65 kg (5 lb 13.5 oz) F CS-LTranv EPI Y KRIS      Birth Comments: nicu attended       Review of Systems  Review of Systems   Constitutional: Negative for activity change, chills, fatigue and fever.   Respiratory: Negative for cough.    Cardiovascular: Negative for chest pain and leg swelling.   Gastrointestinal: Negative for abdominal pain, constipation, nausea and vomiting.   Genitourinary: Negative for dysuria, frequency, hematuria, pelvic pain, urgency, vaginal bleeding and vaginal discharge.   Integumentary:  Negative for rash.           Objective:    Physical Exam:   Constitutional: She is oriented to person, place, and time. She appears well-developed and well-nourished. No distress.       Cardiovascular: Normal rate, regular rhythm, normal heart sounds and intact distal pulses.    No murmur heard.   Pulmonary/Chest: Effort normal and breath sounds normal. No respiratory distress. She has no wheezes. She has no rales.        Abdominal: Soft.  Bowel sounds are normal. She exhibits abdominal incision (Aquacel dressing in place, intact; dressing removed, incision is healing well without erythema/induration/drainage). She exhibits no distension. There is no tenderness. There is no guarding.             Musculoskeletal: Moves all extremeties. She exhibits no edema.   No calf tenderness       Neurological: She is alert and oriented to person, place, and time.    Skin: Skin is warm and dry. No rash noted. She is not diaphoretic.           Problem List Items Addressed This Visit        Obstetric    Preeclampsia, severe, third trimester    Status post primary low transverse  section - Primary      Recommended to switch to qhs dosing for Procardia. If symptoms continue patient informed to contact us to discuss switching antihypertensive.    Patient is scheduled for postpartum visit on 10/25 with Dr. Price.    Reviewed all restrictions & limitations with the patient. Advised to call clinic in event of fever, redness or drainage around the incision, worsening pain, or any any concerning issues or symptoms.  Instructed the importance of showering daily, no tub baths, using an antibacterial soap.  Patient verbalized understanding.

## 2019-09-30 ENCOUNTER — TELEPHONE (OUTPATIENT)
Dept: OBSTETRICS AND GYNECOLOGY | Facility: CLINIC | Age: 34
End: 2019-09-30

## 2019-09-30 RX ORDER — FLUCONAZOLE 150 MG/1
150 TABLET ORAL DAILY
Qty: 1 TABLET | Refills: 1 | Status: SHIPPED | OUTPATIENT
Start: 2019-09-30 | End: 2019-10-01

## 2019-09-30 NOTE — TELEPHONE ENCOUNTER
Called and discussed symptoms.  Denies fever, heat, redness.  Blocked duct versus possible yeast infection.  Advised to gently massaged area during feeding which may help with a blocked duct and I will send prescription for Diflucan if needed.  She does have a follow-up visit with lactation on Thursday and if there are still any concerns to come on in for a visit and evaluation

## 2019-10-02 ENCOUNTER — TELEPHONE (OUTPATIENT)
Dept: OBSTETRICS AND GYNECOLOGY | Facility: CLINIC | Age: 34
End: 2019-10-02

## 2019-10-02 ENCOUNTER — OFFICE VISIT (OUTPATIENT)
Dept: OBSTETRICS AND GYNECOLOGY | Facility: CLINIC | Age: 34
End: 2019-10-02
Payer: COMMERCIAL

## 2019-10-02 VITALS
WEIGHT: 184.5 LBS | DIASTOLIC BLOOD PRESSURE: 68 MMHG | BODY MASS INDEX: 33.95 KG/M2 | SYSTOLIC BLOOD PRESSURE: 120 MMHG | HEIGHT: 62 IN

## 2019-10-02 DIAGNOSIS — N64.4 BREAST PAIN: ICD-10-CM

## 2019-10-02 PROCEDURE — 99999 PR PBB SHADOW E&M-EST. PATIENT-LVL III: CPT | Mod: PBBFAC,,, | Performed by: ADVANCED PRACTICE MIDWIFE

## 2019-10-02 PROCEDURE — 87070 CULTURE OTHR SPECIMN AEROBIC: CPT

## 2019-10-02 PROCEDURE — 99213 OFFICE O/P EST LOW 20 MIN: CPT | Mod: 24,S$GLB,, | Performed by: ADVANCED PRACTICE MIDWIFE

## 2019-10-02 PROCEDURE — 99213 PR OFFICE/OUTPT VISIT, EST, LEVL III, 20-29 MIN: ICD-10-PCS | Mod: 24,S$GLB,, | Performed by: ADVANCED PRACTICE MIDWIFE

## 2019-10-02 PROCEDURE — 99999 PR PBB SHADOW E&M-EST. PATIENT-LVL III: ICD-10-PCS | Mod: PBBFAC,,, | Performed by: ADVANCED PRACTICE MIDWIFE

## 2019-10-02 RX ORDER — SERTRALINE HYDROCHLORIDE 50 MG/1
50 TABLET, FILM COATED ORAL DAILY
Qty: 30 TABLET | Refills: 2 | Status: SHIPPED | OUTPATIENT
Start: 2019-10-02 | End: 2019-10-25 | Stop reason: SDUPTHER

## 2019-10-02 NOTE — TELEPHONE ENCOUNTER
Called to address question, no answer left message on machine.  A diagnosis of thrush has not been made yet and baby has no symptoms.  Before I embark on the Diflucan regime I would want to take a swabb and perform an examination.  She is welcome to come on in to the clinic today tomorrow  so that I can determine diagnosis

## 2019-10-02 NOTE — PROGRESS NOTES
Samanta Argueta is a 34 y.o. female  presents for a postpartum visit to evaluate breastfeeding issues and depression.  She is status post   section 2 weeks ago.  Her hospitalization was complicated. Severe preeclampsia.  Failed induction  She is breastfeeding/ pumping.   Is being followed by lactation and has an appointment with Madhuri tomorrow.  There was a possibility of yeast infection / clogged duct and management.  Advised to come in for exam.    Breasts are symmetrical, no evidence of mastitis, heat, localized tenderness or redness.  No lymphadenopathy.  Appears to be lactating well with full breasts.  No obvious signs of yeast, no lesions / cracked nipples. Appear to be lactating well and no obvious signs of infection  A swab was taken from the right nipple as this was the breast that had been of concern.   She admits to postpartum depression.  A little tearful today, this was discussed thoroughly and she was very articulate.  Partner extremely supportive.  It was concluded that Zoloft 50 mg may be helpful and will start today.  If depression worsens to start medication and call us immediately.        Past Medical History:   Diagnosis Date    Autosomal recessive polycystic kidneys     GERD (gastroesophageal reflux disease)     History of uterine fibroid     Melanoma 2016    left superior helix (breslow dept 0.67mm)    Migraine headache     PONV (postoperative nausea and vomiting)     post op nausea     Past Surgical History:   Procedure Laterality Date     SECTION N/A 2019    Procedure:  SECTION;  Surgeon: Grazyna Price MD;  Location: [facility] L&D;  Service: OB/GYN;  Laterality: N/A;    HYSTEROSCOPIC POLYPECTOMY OF UTERUS N/A 1/10/2019    Procedure: POLYPECTOMY, UTERUS, HYSTEROSCOPIC;  Surgeon: Jolanta Sifuentes MD;  Location[facility] OR;  Service: OB/GYN;  Laterality: N/A;    LYMPH NODE BIOPSY      MALIGNANT SKIN LESION EXCISION  06/10/2016    NASAL  SEPTUM SURGERY      RENAL EXPLORATION  2011     Review of patient's allergies indicates:   Allergen Reactions    Singulair [montelukast]      Itching all over       Current Outpatient Medications:     cetirizine (ZYRTEC) 10 MG tablet, , Disp: , Rfl:     docusate sodium (COLACE) 100 MG capsule, Take 1 capsule (100 mg total) by mouth once daily., Disp: , Rfl: 0    fish oil-omega-3 fatty acids 300-1,000 mg capsule, Take 2 g by mouth once daily., Disp: , Rfl:     ibuprofen (ADVIL,MOTRIN) 600 MG tablet, Take 1 tablet (600 mg total) by mouth every 6 (six) hours., Disp: 30 tablet, Rfl: 0    Lactobacillus rhamnosus GG (CULTURELLE) 10 billion cell capsule, , Disp: , Rfl:     NIFEdipine (PROCARDIA-XL) 30 MG (OSM) 24 hr tablet, Take 1 tablet (30 mg total) by mouth once daily., Disp: 30 tablet, Rfl: 11    omeprazole (PRILOSEC) 20 MG capsule, Take 1 capsule (20 mg total) by mouth once daily., Disp: 90 capsule, Rfl: 3    prenat.vits,yonatan,min-iron-folic (PRENATAL VITAMIN) Tab, Take by mouth once daily., Disp: , Rfl:     HYDROcodone-acetaminophen (NORCO) 5-325 mg per tablet, Take 1 tablet by mouth every 6 (six) hours as needed for Pain. (Patient not taking: Reported on 10/2/2019), Disp: 20 tablet, Rfl: 0      Vitals:    10/02/19 1103   BP: 120/68       GENERAL: healthy, alert, mild distress, cooperative, crying, smiling     Breasts - See opening note  Psych -See opening note      Assessment:      2 weeks postpartum with some breastfeeding concerns that are being followed by lactation.    Swab taken from right nipple.    Postpartum depression.        Plan:   Keep lactation appointment tomorrow and let me know how that goes, if I need to add any medication.    Follow-up breast swab results as indicated  Start Zoloft 50 mg and take as directed -if any worsening of symptoms to stop and call us.    Keep scheduled follow-up postpartum appointment with OBGYN or p.r.n. problems

## 2019-10-04 LAB — BACTERIA SPEC AEROBE CULT: NORMAL

## 2019-10-23 DIAGNOSIS — M25.532 BILATERAL WRIST PAIN: Primary | ICD-10-CM

## 2019-10-23 DIAGNOSIS — M25.531 BILATERAL WRIST PAIN: Primary | ICD-10-CM

## 2019-10-25 ENCOUNTER — POSTPARTUM VISIT (OUTPATIENT)
Dept: OBSTETRICS AND GYNECOLOGY | Facility: CLINIC | Age: 34
End: 2019-10-25
Payer: COMMERCIAL

## 2019-10-25 VITALS
DIASTOLIC BLOOD PRESSURE: 72 MMHG | SYSTOLIC BLOOD PRESSURE: 112 MMHG | BODY MASS INDEX: 33.23 KG/M2 | HEIGHT: 62 IN | WEIGHT: 180.56 LBS

## 2019-10-25 DIAGNOSIS — Z98.891 STATUS POST PRIMARY LOW TRANSVERSE CESAREAN SECTION: Primary | ICD-10-CM

## 2019-10-25 DIAGNOSIS — O14.93 PREECLAMPSIA, THIRD TRIMESTER: ICD-10-CM

## 2019-10-25 PROBLEM — Z78.9 CONCEIVED BY IN VITRO FERTILIZATION: Status: RESOLVED | Noted: 2019-09-07 | Resolved: 2019-10-25

## 2019-10-25 PROCEDURE — 99999 PR PBB SHADOW E&M-EST. PATIENT-LVL III: ICD-10-PCS | Mod: PBBFAC,,, | Performed by: OBSTETRICS & GYNECOLOGY

## 2019-10-25 PROCEDURE — 99999 PR PBB SHADOW E&M-EST. PATIENT-LVL III: CPT | Mod: PBBFAC,,, | Performed by: OBSTETRICS & GYNECOLOGY

## 2019-10-25 PROCEDURE — 0503F PR POSTPARTUM CARE VISIT: ICD-10-PCS | Mod: S$GLB,,, | Performed by: OBSTETRICS & GYNECOLOGY

## 2019-10-25 PROCEDURE — 0503F POSTPARTUM CARE VISIT: CPT | Mod: S$GLB,,, | Performed by: OBSTETRICS & GYNECOLOGY

## 2019-10-25 RX ORDER — SERTRALINE HYDROCHLORIDE 50 MG/1
50 TABLET, FILM COATED ORAL DAILY
Qty: 30 TABLET | Refills: 11 | Status: SHIPPED | OUTPATIENT
Start: 2019-10-25 | End: 2020-11-02 | Stop reason: SDUPTHER

## 2019-10-25 NOTE — PROGRESS NOTES
"CC: Post-partum follow-up    Samanta Argueta is a 34 y.o. female  who presents for post-partum visit.  She is S/P a , due to severe preeclampsia with worsening CNS symptoms on magnesium remote from delivery.  She and the baby are doing well.  No pain.  No fever.   No bowel / bladder complaints.    Delivery Date: 2019  Delivery MD: Dr. Grazyna Price  Gender: female  Birth Weight: 5 pounds 13 ounces  Breast Feeding: YES (pumping exclusively)  Depression: YES, improved on zoloft 50mg, and feels much better  Contraception: no method    Pregnancy was complicated by:  Polycystic kidney disease (follows with Dr. Nagy)  Rh negative (got rhogam 19 antenatally; no rhogam needed after delivery)  Advanced maternal age  Severe preeclampsia (currently on procardia 30 XL daily)      /72   Ht 5' 2" (1.575 m)   Wt 81.9 kg (180 lb 8.9 oz)   LMP 2019   Breastfeeding? Yes   BMI 33.02 kg/m²     ROS:  GENERAL: No fever, chills, fatigability.  VULVAR: No pain, no lesions and no itching.  VAGINAL: No relaxation, no itching, no discharge, no abnormal bleeding and no lesions.  ABDOMEN: No abdominal pain. Denies nausea. Denies vomiting. No diarrhea. No constipation  BREAST: Denies pain. No lumps. No discharge.  URINARY: No incontinence, no nocturia, no frequency and no dysuria.  CARDIOVASCULAR: No chest pain. No shortness of breath. No leg cramps.  NEUROLOGICAL: No headaches. No vision changes.    PHYSICAL EXAM:  INCISION: well-healed and intact  ABDOMEN:  Soft, non-tender, non-distended  VULVA:  Normal, no lesions  CERVIX:  Without lesions, polyps or tenderness.  UTERUS:  Normal size, shape, consistency, no mass or tenderness.  ADNEXA:  Normal in size without mass or tenderness    Samanta was seen today for postpartum care.    Diagnoses and all orders for this visit:    Status post primary low transverse  section    Postpartum depression    Preeclampsia, third " trimester    Other orders  -     sertraline (ZOLOFT) 50 MG tablet; Take 1 tablet (50 mg total) by mouth once daily.    Blood pressure is normal.  Okay to stop procardia.  She has an appointment Monday with nephrology, and will get her bp checked then.  I will message Dr. Nagy through Epic about this plan.  Continue zoloft.  Refill sent.    PLAN:  May resume normal activities    RTC 1 year.    No follow-ups on file.

## 2019-10-25 NOTE — Clinical Note
Mian Coombs,This patient sees you on Monday.  She had severe preeclampsia, and has been on procardia 30 XL daily since delivery.  Blood pressure normal today, so we're stopping her procardia.  I told her that you guys would check her bp on Monday when she comes to see you.  Just wanted to let you know.

## 2019-10-28 ENCOUNTER — OFFICE VISIT (OUTPATIENT)
Dept: NEPHROLOGY | Facility: CLINIC | Age: 34
End: 2019-10-28
Payer: COMMERCIAL

## 2019-10-28 ENCOUNTER — LAB VISIT (OUTPATIENT)
Dept: LAB | Facility: HOSPITAL | Age: 34
End: 2019-10-28
Attending: INTERNAL MEDICINE
Payer: COMMERCIAL

## 2019-10-28 VITALS
SYSTOLIC BLOOD PRESSURE: 110 MMHG | WEIGHT: 177.94 LBS | HEIGHT: 62 IN | DIASTOLIC BLOOD PRESSURE: 66 MMHG | BODY MASS INDEX: 32.74 KG/M2

## 2019-10-28 DIAGNOSIS — Q61.3 POLYCYSTIC KIDNEY DISEASE: ICD-10-CM

## 2019-10-28 DIAGNOSIS — R80.8 OTHER PROTEINURIA: ICD-10-CM

## 2019-10-28 DIAGNOSIS — I10 ESSENTIAL HYPERTENSION: ICD-10-CM

## 2019-10-28 DIAGNOSIS — R80.8 OTHER PROTEINURIA: Primary | ICD-10-CM

## 2019-10-28 PROCEDURE — 99214 OFFICE O/P EST MOD 30 MIN: CPT | Mod: S$GLB,,, | Performed by: INTERNAL MEDICINE

## 2019-10-28 PROCEDURE — 99999 PR PBB SHADOW E&M-EST. PATIENT-LVL III: ICD-10-PCS | Mod: PBBFAC,,, | Performed by: INTERNAL MEDICINE

## 2019-10-28 PROCEDURE — 3008F BODY MASS INDEX DOCD: CPT | Mod: CPTII,S$GLB,, | Performed by: INTERNAL MEDICINE

## 2019-10-28 PROCEDURE — 99999 PR PBB SHADOW E&M-EST. PATIENT-LVL III: CPT | Mod: PBBFAC,,, | Performed by: INTERNAL MEDICINE

## 2019-10-28 PROCEDURE — 3008F PR BODY MASS INDEX (BMI) DOCUMENTED: ICD-10-PCS | Mod: CPTII,S$GLB,, | Performed by: INTERNAL MEDICINE

## 2019-10-28 PROCEDURE — 99214 PR OFFICE/OUTPT VISIT, EST, LEVL IV, 30-39 MIN: ICD-10-PCS | Mod: S$GLB,,, | Performed by: INTERNAL MEDICINE

## 2019-10-28 PROCEDURE — 84156 ASSAY OF PROTEIN URINE: CPT

## 2019-10-28 NOTE — PROGRESS NOTES
NEPHROLOGY CLINIC FOLLOWUP NOTE:  Date of clinic visit: 10/28/19     REASON FOR FOLLOWUP AND CHIEF COMPLAINT:  History of polycystic kidney disease.     HISTORY OF PRESENT ILLNESS:  Ms. Samanta Argueta is a 34-year-old  female with a h/o of PCKD who presents for f/u. Pt is now s/p delivery of a healthy baby girl in Sept 2019 at 35 weeks of gestation. OB notes reviewed. Pt developed preeclampsia in 3rd trimester and required procardia for BP control. On f/u today, pt has no new c/o's, no discomfort in the back or abdomen, reports no blood in urine, no new events. Pt reports she is still breast-feeding. Labs and meds reviewed with pt today. She has been off ACE-I's since conception.      Again discussed with pt today the availability of Jynarque to treat polycystic kidney disease. This medicine slows down the growth of the renal cysts.      PAST MEDICAL HISTORY:  1.  Polycystic kidney disease. Was acquired paternally   2.  Dysmenorrhea.  3.  Migraine headaches.  4.  Maxillary sinusitis.  5.  Hypertriglyceridemia.  6.  Liver cysts.  7.  GERD.  8.  Allergic rhinitis.     PAST SURGICAL HISTORY:  Reviewed.     FAMILY HISTORY:  Reviewed.     ALLERGIES:  Reviewed, to Singulair.     MEDICATIONS:  Fully reviewed    Current Outpatient Medications:     cetirizine (ZYRTEC) 10 MG tablet, , Disp: , Rfl:     docusate sodium (COLACE) 100 MG capsule, Take 1 capsule (100 mg total) by mouth once daily., Disp: , Rfl: 0    fish oil-omega-3 fatty acids 300-1,000 mg capsule, Take 2 g by mouth once daily., Disp: , Rfl:     Lactobacillus rhamnosus GG (CULTURELLE) 10 billion cell capsule, , Disp: , Rfl:     omeprazole (PRILOSEC) 20 MG capsule, Take 1 capsule (20 mg total) by mouth once daily., Disp: 90 capsule, Rfl: 3    prenat.vits,yonatan,min-iron-folic (PRENATAL VITAMIN) Tab, Take by mouth once daily., Disp: , Rfl:     sertraline (ZOLOFT) 50 MG tablet, Take 1 tablet (50 mg total) by mouth once daily., Disp: 30 tablet,  Rfl: 11    HYDROcodone-acetaminophen (NORCO) 5-325 mg per tablet, Take 1 tablet by mouth every 6 (six) hours as needed for Pain. (Patient not taking: Reported on 10/2/2019), Disp: 20 tablet, Rfl: 0    ibuprofen (ADVIL,MOTRIN) 600 MG tablet, Take 1 tablet (600 mg total) by mouth every 6 (six) hours. (Patient not taking: Reported on 10/28/2019), Disp: 30 tablet, Rfl: 0  .     REVIEW OF SYSTEMS:  No recent hospitalizations.  GENERAL:  Negative.  HEAD, EYES, EARS, NOSE, AND THROAT:  Negative.  CARDIAC:  Negative.  PULMONARY:  Negative.  GASTROINTESTINAL:  Negative.  GENITOURINARY:  Negative.  PSYCHOLOGICAL:  Negative.  NEUROLOGICAL:  Negative.  ENDOCRINE:  Negative.  HEMATOLOGIC AND ONCOLOGIC:  Negative.  INFECTIOUS DISEASE:  Negative.  The rest of the review of systems negative.     PHYSICAL EXAMINATION:  VITAL SIGNS:  Blood pressure is 110/66, pulse 80, weight is 80.7 Kg  GENERAL:  She is cooperative, pleasant.   Ambulating by herself, in no acute distress.    HEENT:  Mucous membranes moist.  NECK:  No JVD.  HEART:  Regular rate and rhythm, S1 and S2 audible.  No rubs.  CHEST:  Clear to auscultation.  No rales or wheezes.  Breathing symmetric, unlabored.  ABDOMEN:  Soft and nontender.  EXTREMITIES:  trace edema.     Labs reviewed:  BMP  Lab Results   Component Value Date     (L) 09/21/2019    K 4.1 09/21/2019     09/21/2019    CO2 20 (L) 09/21/2019    BUN 9 09/21/2019    CREATININE 0.6 09/21/2019    CALCIUM 7.2 (L) 09/21/2019    ANIONGAP 10 09/21/2019    ESTGFRAFRICA >60 09/21/2019    EGFRNONAA >60 09/21/2019     Lab Results   Component Value Date    WBC 12.42 09/21/2019    HGB 10.3 (L) 09/21/2019    HCT 30.2 (L) 09/21/2019    MCV 85 09/21/2019     09/21/2019       Urine prot/cr ratio in 3rd trimester 4.7 g      ASSESSMENT AND PLAN:  This is a 34-year-old female with polycystic kidney disease presents for followup after delivery at 35 weeks of gestation:   The impression is as follows:     1.   Renal. Normal s Cr. Stable and normal renal function  K normal  Nephrotic syndrome in 3rd trimester of pregnancy due to preeclampsia  Delivery is usually the deficitive treatment for preeclampsia, will repeat urine protein/cr ratio today  H/o of PCKD     2.  Long-term treatment with Jynarque/Tolvaptan AFTER pregnancy was again discussed with pt today  Pt was given printed literature to read  Will discuss further in about 1 month when pt returns and after pt has read the literature     3.  Hypertension.  Blood pressure is well controlled at this point  Discussed re-start of an ACE-I for renal protection in PCKD, pt is still breast feeding. ACE-I's not recommended.     4. Pregnancy: s/p delivery at 35 weeks of gestation.  S/p IVF using egg from partner. Will not pass on PCKD gene     PLAN AND RECOMMENDATION:  As detailed above.    Opportunity for question and discussion provided.  Total time spent 25 minutes.    More than 50% of the time was spent on counseling and coordination of care.    Level IV visit.  RTC 1 month for f/u.     Malvin Nagy MD

## 2019-10-29 ENCOUNTER — PATIENT MESSAGE (OUTPATIENT)
Dept: OBSTETRICS AND GYNECOLOGY | Facility: CLINIC | Age: 34
End: 2019-10-29

## 2019-10-29 ENCOUNTER — TELEPHONE (OUTPATIENT)
Dept: OBSTETRICS AND GYNECOLOGY | Facility: CLINIC | Age: 34
End: 2019-10-29

## 2019-10-29 DIAGNOSIS — N61.0 MASTITIS: ICD-10-CM

## 2019-10-29 LAB
CREAT UR-MCNC: 145 MG/DL (ref 15–325)
PROT UR-MCNC: 19 MG/DL (ref 0–15)
PROT/CREAT UR: 0.13 MG/G{CREAT} (ref 0–0.2)

## 2019-10-29 RX ORDER — CEPHALEXIN 500 MG/1
500 CAPSULE ORAL EVERY 8 HOURS
Qty: 30 CAPSULE | Refills: 0 | Status: SHIPPED | OUTPATIENT
Start: 2019-10-29 | End: 2019-11-08

## 2019-10-29 NOTE — TELEPHONE ENCOUNTER
Called patient to review my discussion with urology regarding the issues we had with her Jolley during her labor and .  No answer.  Will try again later.

## 2019-10-31 ENCOUNTER — OFFICE VISIT (OUTPATIENT)
Dept: SPORTS MEDICINE | Facility: CLINIC | Age: 34
End: 2019-10-31
Payer: COMMERCIAL

## 2019-10-31 ENCOUNTER — HOSPITAL ENCOUNTER (OUTPATIENT)
Dept: RADIOLOGY | Facility: HOSPITAL | Age: 34
Discharge: HOME | End: 2019-10-31
Attending: ORTHOPAEDIC SURGERY
Payer: COMMERCIAL

## 2019-10-31 VITALS — WEIGHT: 177 LBS | BODY MASS INDEX: 32.57 KG/M2 | HEIGHT: 62 IN

## 2019-10-31 DIAGNOSIS — M25.531 BILATERAL WRIST PAIN: ICD-10-CM

## 2019-10-31 DIAGNOSIS — M65.4 DE QUERVAIN'S DISEASE (RADIAL STYLOID TENOSYNOVITIS): ICD-10-CM

## 2019-10-31 DIAGNOSIS — M25.532 BILATERAL WRIST PAIN: ICD-10-CM

## 2019-10-31 DIAGNOSIS — M65.4 RADIAL STYLOID TENOSYNOVITIS OF RIGHT HAND: Primary | ICD-10-CM

## 2019-10-31 PROCEDURE — 99214 PR OFFICE/OUTPT VISIT, EST, LEVL IV, 30-39 MIN: ICD-10-PCS | Mod: 25,S$GLB,, | Performed by: ORTHOPAEDIC SURGERY

## 2019-10-31 PROCEDURE — 3008F PR BODY MASS INDEX (BMI) DOCUMENTED: ICD-10-PCS | Mod: CPTII,S$GLB,, | Performed by: ORTHOPAEDIC SURGERY

## 2019-10-31 PROCEDURE — 73110 XR WRIST COMPLETE 3 VIEWS BILATERAL: ICD-10-PCS | Mod: 26,50,, | Performed by: RADIOLOGY

## 2019-10-31 PROCEDURE — 73110 X-RAY EXAM OF WRIST: CPT | Mod: 26,50,, | Performed by: RADIOLOGY

## 2019-10-31 PROCEDURE — 99214 OFFICE O/P EST MOD 30 MIN: CPT | Mod: 25,S$GLB,, | Performed by: ORTHOPAEDIC SURGERY

## 2019-10-31 PROCEDURE — 99999 PR PBB SHADOW E&M-EST. PATIENT-LVL III: CPT | Mod: PBBFAC,,, | Performed by: ORTHOPAEDIC SURGERY

## 2019-10-31 PROCEDURE — 99999 PR PBB SHADOW E&M-EST. PATIENT-LVL III: ICD-10-PCS | Mod: PBBFAC,,, | Performed by: ORTHOPAEDIC SURGERY

## 2019-10-31 PROCEDURE — 3008F BODY MASS INDEX DOCD: CPT | Mod: CPTII,S$GLB,, | Performed by: ORTHOPAEDIC SURGERY

## 2019-10-31 PROCEDURE — 20550 NJX 1 TENDON SHEATH/LIGAMENT: CPT | Mod: RT,S$GLB,, | Performed by: ORTHOPAEDIC SURGERY

## 2019-10-31 PROCEDURE — 20550 TENDON SHEATH: ICD-10-PCS | Mod: RT,S$GLB,, | Performed by: ORTHOPAEDIC SURGERY

## 2019-10-31 PROCEDURE — 73110 X-RAY EXAM OF WRIST: CPT | Mod: 50,TC

## 2019-10-31 RX ADMIN — TRIAMCINOLONE ACETONIDE 40 MG: 40 INJECTION, SUSPENSION INTRA-ARTICULAR; INTRAMUSCULAR at 01:10

## 2019-10-31 NOTE — PROGRESS NOTES
Subjective:     Patient ID: Samanta Argueta is a 34 y.o. female.    Chief Complaint: Pain bilateral wrists, right worse than left    Pt recently had a baby. She stated during her pregnancy around 27 weeks she started having pain in her wrist. She had her baby 6 weeks ago and is still having pain.  RIGHT wrist is worse       Radial sided wrist pain at radial styloid    Wrist Pain    The pain is present in the right wrist and left wrist (pt states her right wrist). The current episode started more than 1 month ago. The problem occurs intermittently. The problem has been gradually worsening. The quality of the pain is described as aching, sharp and shooting. The pain is at a severity of 7/10. Associated symptoms include joint swelling. Pertinent negatives include no fever or itching. The symptoms are aggravated by activity, lifting and rotation. She has tried brace/corset and NSAIDs for the symptoms. Physical therapy was not tried.      Past Medical History:   Diagnosis Date    Autosomal recessive polycystic kidneys     GERD (gastroesophageal reflux disease)     History of uterine fibroid     Melanoma 2016    left superior helix (breslow dept 0.67mm)    Migraine headache     PONV (postoperative nausea and vomiting)     post op nausea     Past Surgical History:   Procedure Laterality Date     SECTION N/A 2019    Procedure:  SECTION;  Surgeon: Grazyna Price MD;  Location: [facility] L&D;  Service: OB/GYN;  Laterality: N/A;    HYSTEROSCOPIC POLYPECTOMY OF UTERUS N/A 1/10/2019    Procedure: POLYPECTOMY, UTERUS, HYSTEROSCOPIC;  Surgeon: Jolanta Sifuentes MD;  Location: [facility] OR;  Service: OB/GYN;  Laterality: N/A;    LYMPH NODE BIOPSY      MALIGNANT SKIN LESION EXCISION  06/10/2016    NASAL SEPTUM SURGERY      RENAL EXPLORATION       Family History   Problem Relation Age of Onset    Hypertension Father     Kidney disease Father     Diabetes Paternal Grandmother     Cancer  Maternal Grandmother     Thrombophilia Neg Hx     Breast cancer Neg Hx     Colon cancer Neg Hx     Ovarian cancer Neg Hx     Eczema Neg Hx     Lupus Neg Hx     Psoriasis Neg Hx     Melanoma Neg Hx     Uterine cancer Neg Hx     Cervical cancer Neg Hx      Social History     Socioeconomic History    Marital status:      Spouse name: Not on file    Number of children: 0    Years of education: Not on file    Highest education level: Not on file   Occupational History    Occupation: Optometry      Employer: [employer]     Comment: [employer] [place]   Social Needs    Financial resource strain: Not on file    Food insecurity:     Worry: Not on file     Inability: Not on file    Transportation needs:     Medical: Not on file     Non-medical: Not on file   Tobacco Use    Smoking status: Never Smoker    Smokeless tobacco: Never Used   Substance and Sexual Activity    Alcohol use: Not Currently     Comment: socially    Drug use: No    Sexual activity: Yes     Partners: Female   Lifestyle    Physical activity:     Days per week: Not on file     Minutes per session: Not on file    Stress: Not on file   Relationships    Social connections:     Talks on phone: Not on file     Gets together: Not on file     Attends [employer] service: Not on file     Active member of club or organization: Not on file     Attends meetings of clubs or organizations: Not on file     Relationship status: Not on file   Other Topics Concern    Are you pregnant or think you may be? No    Breast-feeding No   Social History Narrative    Not on file     Medication List with Changes/Refills   Current Medications    CEPHALEXIN (KEFLEX) 500 MG CAPSULE    Take 1 capsule (500 mg total) by mouth every 8 (eight) hours. for 10 days    CETIRIZINE (ZYRTEC) 10 MG TABLET        DOCUSATE SODIUM (COLACE) 100 MG CAPSULE    Take 1 capsule (100 mg total) by mouth once daily.    FISH OIL-OMEGA-3 FATTY ACIDS 300-1,000 MG CAPSULE    Take 2 g by  mouth once daily.    HYDROCODONE-ACETAMINOPHEN (NORCO) 5-325 MG PER TABLET    Take 1 tablet by mouth every 6 (six) hours as needed for Pain.    IBUPROFEN (ADVIL,MOTRIN) 600 MG TABLET    Take 1 tablet (600 mg total) by mouth every 6 (six) hours.    LACTOBACILLUS RHAMNOSUS GG (CULTURELLE) 10 BILLION CELL CAPSULE        OMEPRAZOLE (PRILOSEC) 20 MG CAPSULE    Take 1 capsule (20 mg total) by mouth once daily.    PRENAT.VITS,JUSTO,MIN-IRON-FOLIC (PRENATAL VITAMIN) TAB    Take by mouth once daily.    SERTRALINE (ZOLOFT) 50 MG TABLET    Take 1 tablet (50 mg total) by mouth once daily.     Review of patient's allergies indicates:   Allergen Reactions    Singulair [montelukast]      Itching all over     Review of Systems   Constitution: Negative for fever.   HENT: Negative for sore throat.    Eyes: Negative for blurred vision.   Cardiovascular: Negative for dyspnea on exertion.   Respiratory: Negative for shortness of breath.    Hematologic/Lymphatic: Does not bruise/bleed easily.   Skin: Negative for itching.   Gastrointestinal: Negative for vomiting.   Genitourinary: Negative for dysuria.   Neurological: Negative for dizziness.   Psychiatric/Behavioral: The patient does not have insomnia.        Objective:   Body mass index is 32.37 kg/m².  There were no vitals filed for this visit.        General    Nursing note and vitals reviewed.  Constitutional: She is oriented to person, place, and time. She appears well-developed. No distress.   HENT:   Head: Normocephalic and atraumatic.   Eyes: EOM are normal.   Cardiovascular: Normal rate.    Pulmonary/Chest: Effort normal. No stridor.   Neurological: She is alert and oriented to person, place, and time.   Psychiatric: She has a normal mood and affect. Her behavior is normal.             Right Hand/Wrist Exam     Tenderness   The patient is tender to palpation of the radial area.    Tests   Tinel's sign (median nerve): negative  Finkelstein's test: positive  Carpal Tunnel  Compression Test: negative    Atrophy   Thenar:  negative    Other     Neuorologic Exam    Median Distribution: normal  Ulnar Distribution: normal  Radial Distribution: normal      Left Hand/Wrist Exam     Other     Sensory Exam  Median Distribution: normal  Ulnar Distribution: normal  Radial Distribution: normal          Vascular Exam       Capillary Refill  Right Hand: normal capillary refill  Left Hand: normal capillary refill      IMAGING Radiographs / Imaging : Results reviewed by me and interpreted by me, discussed with the patient and / or family     X-Ray Wrist Complete Bilateral  EXAMINATION:  XR WRIST COMPLETE 3 VIEWS BILATERAL    CLINICAL HISTORY:  Pain in right wrist    TECHNIQUE:  PA, lateral, and oblique views of both wrists were performed.    COMPARISON:  None    FINDINGS:  Juxta-articular osteopenia bilaterally.  No acute or healing fracture noted.  No focal erosion or periosteal reaction.  No significant soft tissue swelling, calcification or radiopaque foreign body.  Remaining osseous structures appear intact.    Electronically signed by: Robbie Ayoub MD  Date:    10/31/2019  Time:    15:16        Assessment:     Encounter Diagnoses   Name Primary?    Bilateral wrist pain Yes    Radial styloid tenosynovitis of right hand     De Quervain's disease (radial styloid tenosynovitis)         Plan:     Thumb spica brace prn  CSI right wrist radial styloid, discussed risks and benefits  Follow up PRN  Discussed long term surgical options if needed

## 2019-11-05 ENCOUNTER — PATIENT MESSAGE (OUTPATIENT)
Dept: SPORTS MEDICINE | Facility: CLINIC | Age: 34
End: 2019-11-05

## 2019-11-05 RX ORDER — TRIAMCINOLONE ACETONIDE 40 MG/ML
40 INJECTION, SUSPENSION INTRA-ARTICULAR; INTRAMUSCULAR
Status: DISCONTINUED | OUTPATIENT
Start: 2019-10-31 | End: 2019-11-05 | Stop reason: HOSPADM

## 2019-11-06 ENCOUNTER — PATIENT MESSAGE (OUTPATIENT)
Dept: OBSTETRICS AND GYNECOLOGY | Facility: CLINIC | Age: 34
End: 2019-11-06

## 2019-11-06 NOTE — PROCEDURES
R first doral compartmentTendon Sheath  Date/Time: 10/31/2019 1:40 PM  Performed by: Raffaele Andrade MD  Authorized by: Raffaele Andrade MD     Consent Done?: Yes (Verbal)  Timeout: prior to procedure the correct patient, procedure, and site was verified    Indications:  Diagnostic evaluation and pain  Site marked: the procedure site was marked    Timeout: prior to procedure the correct patient, procedure, and site was verified    Location:  Wrist  Prep: patient was prepped and draped in usual sterile fashion    Ultrasonic guidance for needle placement?: No    Needle size:  25 G  Approach:  Radial  Medications:  40 mg triamcinolone acetonide 40 mg/mL  Patient tolerance:  Patient tolerated the procedure well with no immediate complications   The patient had no adverse reactions to the medication. The patient was instructed to apply ice to the joint for 30 minutes on and 30 minutes off for the next 48 hours, and avoid strenuous activities for 48 hours following the injection. Patient was warned of possible blood sugar and/or blood pressure changes during that time regarding corticosteroid injections if applicable.  Following that time, patient can resume regular activities. Note that informed consent was performed with the patient before injection discussing risks, benefits, indications and alternatives to injection, risks including but not limited to bleeding and infection.

## 2019-11-11 ENCOUNTER — LAB VISIT (OUTPATIENT)
Dept: LAB | Facility: HOSPITAL | Age: 34
End: 2019-11-11
Attending: INTERNAL MEDICINE
Payer: COMMERCIAL

## 2019-11-11 DIAGNOSIS — R80.8 OTHER PROTEINURIA: ICD-10-CM

## 2019-11-11 PROCEDURE — 80069 RENAL FUNCTION PANEL: CPT

## 2019-11-11 PROCEDURE — 36415 COLL VENOUS BLD VENIPUNCTURE: CPT

## 2019-11-12 LAB
ALBUMIN SERPL BCP-MCNC: 4.1 G/DL (ref 3.5–5.2)
ANION GAP SERPL CALC-SCNC: 11 MMOL/L (ref 8–16)
BUN SERPL-MCNC: 12 MG/DL (ref 6–20)
CALCIUM SERPL-MCNC: 9.5 MG/DL (ref 8.7–10.5)
CHLORIDE SERPL-SCNC: 104 MMOL/L (ref 95–110)
CO2 SERPL-SCNC: 26 MMOL/L (ref 23–29)
CREAT SERPL-MCNC: 0.9 MG/DL (ref 0.5–1.4)
EST. GFR  (AFRICAN AMERICAN): >60 ML/MIN/1.73 M^2
EST. GFR  (NON AFRICAN AMERICAN): >60 ML/MIN/1.73 M^2
GLUCOSE SERPL-MCNC: 94 MG/DL (ref 70–110)
PHOSPHATE SERPL-MCNC: 3.1 MG/DL (ref 2.7–4.5)
POTASSIUM SERPL-SCNC: 4.1 MMOL/L (ref 3.5–5.1)
SODIUM SERPL-SCNC: 141 MMOL/L (ref 136–145)

## 2019-11-20 ENCOUNTER — PATIENT MESSAGE (OUTPATIENT)
Dept: OBSTETRICS AND GYNECOLOGY | Facility: CLINIC | Age: 34
End: 2019-11-20

## 2019-11-24 ENCOUNTER — PATIENT MESSAGE (OUTPATIENT)
Dept: OBSTETRICS AND GYNECOLOGY | Facility: CLINIC | Age: 34
End: 2019-11-24

## 2019-11-24 DIAGNOSIS — Z30.41 ENCOUNTER FOR SURVEILLANCE OF CONTRACEPTIVE PILLS: Primary | ICD-10-CM

## 2019-11-26 PROBLEM — O14.93 PREECLAMPSIA, THIRD TRIMESTER: Status: RESOLVED | Noted: 2019-10-25 | Resolved: 2019-11-26

## 2019-11-26 RX ORDER — LEVONORGESTREL / ETHINYL ESTRADIOL AND ETHINYL ESTRADIOL 150-30(84)
1 KIT ORAL DAILY
Qty: 91 EACH | Refills: 2 | Status: SHIPPED | OUTPATIENT
Start: 2019-11-26 | End: 2020-09-21 | Stop reason: SDUPTHER

## 2019-12-16 ENCOUNTER — OFFICE VISIT (OUTPATIENT)
Dept: INTERNAL MEDICINE | Facility: CLINIC | Age: 34
End: 2019-12-16
Payer: COMMERCIAL

## 2019-12-16 ENCOUNTER — PATIENT MESSAGE (OUTPATIENT)
Dept: INTERNAL MEDICINE | Facility: CLINIC | Age: 34
End: 2019-12-16

## 2019-12-16 VITALS
WEIGHT: 181 LBS | HEART RATE: 96 BPM | SYSTOLIC BLOOD PRESSURE: 118 MMHG | OXYGEN SATURATION: 97 % | BODY MASS INDEX: 33.31 KG/M2 | DIASTOLIC BLOOD PRESSURE: 86 MMHG | HEIGHT: 62 IN | TEMPERATURE: 98 F

## 2019-12-16 DIAGNOSIS — J03.90 EXUDATIVE TONSILLITIS: Primary | ICD-10-CM

## 2019-12-16 LAB — DEPRECATED S PYO AG THROAT QL EIA: NEGATIVE

## 2019-12-16 PROCEDURE — 87147 CULTURE TYPE IMMUNOLOGIC: CPT | Mod: 59

## 2019-12-16 PROCEDURE — 99214 OFFICE O/P EST MOD 30 MIN: CPT | Mod: S$GLB,,, | Performed by: NURSE PRACTITIONER

## 2019-12-16 PROCEDURE — 99999 PR PBB SHADOW E&M-EST. PATIENT-LVL IV: ICD-10-PCS | Mod: PBBFAC,,, | Performed by: NURSE PRACTITIONER

## 2019-12-16 PROCEDURE — 87880 STREP A ASSAY W/OPTIC: CPT

## 2019-12-16 PROCEDURE — 3008F BODY MASS INDEX DOCD: CPT | Mod: CPTII,S$GLB,, | Performed by: NURSE PRACTITIONER

## 2019-12-16 PROCEDURE — 99214 PR OFFICE/OUTPT VISIT, EST, LEVL IV, 30-39 MIN: ICD-10-PCS | Mod: S$GLB,,, | Performed by: NURSE PRACTITIONER

## 2019-12-16 PROCEDURE — 3008F PR BODY MASS INDEX (BMI) DOCUMENTED: ICD-10-PCS | Mod: CPTII,S$GLB,, | Performed by: NURSE PRACTITIONER

## 2019-12-16 PROCEDURE — 99999 PR PBB SHADOW E&M-EST. PATIENT-LVL IV: CPT | Mod: PBBFAC,,, | Performed by: NURSE PRACTITIONER

## 2019-12-16 PROCEDURE — 87081 CULTURE SCREEN ONLY: CPT

## 2019-12-16 RX ORDER — AMOXICILLIN AND CLAVULANATE POTASSIUM 875; 125 MG/1; MG/1
1 TABLET, FILM COATED ORAL 2 TIMES DAILY
Qty: 20 TABLET | Refills: 0 | Status: SHIPPED | OUTPATIENT
Start: 2019-12-16 | End: 2019-12-26

## 2019-12-16 NOTE — PROGRESS NOTES
Subjective:       Patient ID: Samanta Argueta is a 34 y.o. female.    Chief Complaint: Sore Throat    Sore Throat    This is a new problem. The current episode started yesterday. The problem has been unchanged. There has been no fever. Associated symptoms include congestion and trouble swallowing. Pertinent negatives include no coughing, shortness of breath or vomiting. She has had no exposure to strep. Treatments tried: Dayquil/Nyquil  The treatment provided no relief.     Review of Systems   Constitutional: Negative for chills and fatigue.   HENT: Positive for congestion, sore throat and trouble swallowing.    Respiratory: Negative for cough and shortness of breath.    Cardiovascular: Negative for chest pain and palpitations.   Gastrointestinal: Negative for vomiting.   Musculoskeletal: Negative for arthralgias and gait problem.   Skin: Negative for color change and rash.   Psychiatric/Behavioral: Negative for agitation and confusion.       Objective:      Physical Exam   Constitutional: She is oriented to person, place, and time. Vital signs are normal. She appears well-developed and well-nourished.   HENT:   Head: Normocephalic and atraumatic.   Nose: Nose normal.   Mouth/Throat: Oropharyngeal exudate and posterior oropharyngeal erythema present.   Dull TM   Neck: Normal range of motion.   Cardiovascular: Normal rate and regular rhythm.   Pulmonary/Chest: Effort normal and breath sounds normal.   Musculoskeletal: Normal range of motion.   Neurological: She is alert and oriented to person, place, and time.   Skin: Skin is warm.   Psychiatric: She has a normal mood and affect. Her behavior is normal.       Assessment:       1. Exudative tonsillitis        Plan:         Exudative tonsillitis  -     Throat Screen, Rapid    Other orders  -     amoxicillin-clavulanate 875-125mg (AUGMENTIN) 875-125 mg per tablet; Take 1 tablet by mouth 2 (two) times daily. for 10 days  Dispense: 20 tablet; Refill:  0        Take medications prescribed.   Will inform of strep report.  Follow up if no improvement.

## 2019-12-19 LAB — BACTERIA THROAT CULT: NORMAL

## 2019-12-19 RX ORDER — NEOMYCIN SULFATE, POLYMYXIN B SULFATE AND DEXAMETHASONE 3.5; 10000; 1 MG/ML; [USP'U]/ML; MG/ML
1 SUSPENSION/ DROPS OPHTHALMIC 4 TIMES DAILY
Qty: 5 ML | Refills: 0 | Status: SHIPPED | OUTPATIENT
Start: 2019-12-19 | End: 2020-03-19

## 2019-12-19 NOTE — TELEPHONE ENCOUNTER
S: red eye x < 1 day (woke up this am) with discharge, pain: 0  O: 1+ diffuse conj injection with minimal scurf at lid margin, no iritis, no keratitis  A: acute follicular conj OD  P: maxitrol qid OD x 1 week

## 2019-12-30 ENCOUNTER — OFFICE VISIT (OUTPATIENT)
Dept: INTERNAL MEDICINE | Facility: CLINIC | Age: 34
End: 2019-12-30
Payer: COMMERCIAL

## 2019-12-30 VITALS
WEIGHT: 181.69 LBS | SYSTOLIC BLOOD PRESSURE: 124 MMHG | DIASTOLIC BLOOD PRESSURE: 88 MMHG | HEIGHT: 62 IN | BODY MASS INDEX: 33.43 KG/M2 | TEMPERATURE: 98 F | OXYGEN SATURATION: 98 % | HEART RATE: 100 BPM

## 2019-12-30 DIAGNOSIS — R09.82 PND (POST-NASAL DRIP): ICD-10-CM

## 2019-12-30 DIAGNOSIS — R05.9 COUGH: Primary | ICD-10-CM

## 2019-12-30 DIAGNOSIS — J32.9 SINUSITIS, UNSPECIFIED CHRONICITY, UNSPECIFIED LOCATION: ICD-10-CM

## 2019-12-30 PROCEDURE — 99214 OFFICE O/P EST MOD 30 MIN: CPT | Mod: S$GLB,,, | Performed by: NURSE PRACTITIONER

## 2019-12-30 PROCEDURE — 3008F PR BODY MASS INDEX (BMI) DOCUMENTED: ICD-10-PCS | Mod: CPTII,S$GLB,, | Performed by: NURSE PRACTITIONER

## 2019-12-30 PROCEDURE — 99999 PR PBB SHADOW E&M-EST. PATIENT-LVL IV: CPT | Mod: PBBFAC,,, | Performed by: NURSE PRACTITIONER

## 2019-12-30 PROCEDURE — 99214 PR OFFICE/OUTPT VISIT, EST, LEVL IV, 30-39 MIN: ICD-10-PCS | Mod: S$GLB,,, | Performed by: NURSE PRACTITIONER

## 2019-12-30 PROCEDURE — 99999 PR PBB SHADOW E&M-EST. PATIENT-LVL IV: ICD-10-PCS | Mod: PBBFAC,,, | Performed by: NURSE PRACTITIONER

## 2019-12-30 PROCEDURE — 3008F BODY MASS INDEX DOCD: CPT | Mod: CPTII,S$GLB,, | Performed by: NURSE PRACTITIONER

## 2019-12-30 RX ORDER — PROMETHAZINE HYDROCHLORIDE AND DEXTROMETHORPHAN HYDROBROMIDE 6.25; 15 MG/5ML; MG/5ML
5 SYRUP ORAL
Qty: 118 ML | Refills: 0 | Status: SHIPPED | OUTPATIENT
Start: 2019-12-30 | End: 2020-01-13

## 2019-12-30 RX ORDER — METHYLPREDNISOLONE 4 MG/1
TABLET ORAL
Qty: 21 TABLET | Refills: 0 | Status: SHIPPED | OUTPATIENT
Start: 2019-12-30 | End: 2020-03-19

## 2019-12-30 NOTE — LETTER
December 30, 2019                 [facility]  Internal Medicine  [address]  [place] [address]  Phone: [phone]  Fax: [phone]   December 30, 2019     Patient: Samanta Argueta   YOB: 1985   Date of Visit: 12/30/2019       To Whom it May Concern:    Samanta Argueta was seen in my clinic on 12/30/2019. She may return to work on 12/31/2019.    Please excuse her from any work missed.    If you have any questions or concerns, please don't hesitate to call.    Sincerely,         Liliya Vicente NP

## 2020-02-24 DIAGNOSIS — K21.9 GASTROESOPHAGEAL REFLUX DISEASE, ESOPHAGITIS PRESENCE NOT SPECIFIED: Primary | ICD-10-CM

## 2020-02-24 RX ORDER — OMEPRAZOLE 20 MG/1
20 CAPSULE, DELAYED RELEASE ORAL DAILY
Qty: 90 CAPSULE | Refills: 3 | Status: CANCELLED | OUTPATIENT
Start: 2020-02-24

## 2020-02-28 ENCOUNTER — PATIENT OUTREACH (OUTPATIENT)
Dept: ADMINISTRATIVE | Facility: OTHER | Age: 35
End: 2020-02-28

## 2020-03-19 ENCOUNTER — PATIENT MESSAGE (OUTPATIENT)
Dept: INTERNAL MEDICINE | Facility: CLINIC | Age: 35
End: 2020-03-19

## 2020-03-19 DIAGNOSIS — K21.9 GASTROESOPHAGEAL REFLUX DISEASE, ESOPHAGITIS PRESENCE NOT SPECIFIED: Primary | ICD-10-CM

## 2020-03-19 RX ORDER — OMEPRAZOLE 20 MG/1
20 CAPSULE, DELAYED RELEASE ORAL DAILY
Qty: 90 CAPSULE | Refills: 3 | Status: SHIPPED | OUTPATIENT
Start: 2020-03-19 | End: 2021-06-03 | Stop reason: SDUPTHER

## 2020-04-02 ENCOUNTER — APPOINTMENT (OUTPATIENT)
Dept: INTERNAL MEDICINE | Facility: CLINIC | Age: 35
End: 2020-04-02
Payer: COMMERCIAL

## 2020-04-02 DIAGNOSIS — R05.9 COUGH: ICD-10-CM

## 2020-04-02 DIAGNOSIS — R50.9 FEVER, UNSPECIFIED FEVER CAUSE: Primary | ICD-10-CM

## 2020-04-02 LAB
CTP QC/QA: YES
POC MOLECULAR INFLUENZA A AGN: NEGATIVE
POC MOLECULAR INFLUENZA B AGN: NEGATIVE

## 2020-04-02 PROCEDURE — U0002 COVID-19 LAB TEST NON-CDC: HCPCS

## 2020-04-03 ENCOUNTER — TELEPHONE (OUTPATIENT)
Dept: INTERNAL MEDICINE | Facility: CLINIC | Age: 35
End: 2020-04-03

## 2020-04-03 ENCOUNTER — OFFICE VISIT (OUTPATIENT)
Dept: OTOLARYNGOLOGY | Facility: CLINIC | Age: 35
End: 2020-04-03
Payer: COMMERCIAL

## 2020-04-03 ENCOUNTER — CLINICAL SUPPORT (OUTPATIENT)
Dept: INTERNAL MEDICINE | Facility: CLINIC | Age: 35
End: 2020-04-03
Payer: COMMERCIAL

## 2020-04-03 ENCOUNTER — PATIENT MESSAGE (OUTPATIENT)
Dept: INTERNAL MEDICINE | Facility: CLINIC | Age: 35
End: 2020-04-03

## 2020-04-03 DIAGNOSIS — J02.9 PHARYNGITIS, UNSPECIFIED ETIOLOGY: Primary | ICD-10-CM

## 2020-04-03 DIAGNOSIS — J02.9 PHARYNGITIS, UNSPECIFIED ETIOLOGY: ICD-10-CM

## 2020-04-03 DIAGNOSIS — R07.0 THROAT PAIN: Primary | ICD-10-CM

## 2020-04-03 LAB — GROUP A STREP, MOLECULAR: NEGATIVE

## 2020-04-03 PROCEDURE — 87651 STREP A DNA AMP PROBE: CPT

## 2020-04-03 PROCEDURE — 99024 PR POST-OP FOLLOW-UP VISIT: ICD-10-PCS | Mod: S$GLB,,, | Performed by: PHYSICIAN ASSISTANT

## 2020-04-03 PROCEDURE — 99024 POSTOP FOLLOW-UP VISIT: CPT | Mod: S$GLB,,, | Performed by: PHYSICIAN ASSISTANT

## 2020-04-03 NOTE — TELEPHONE ENCOUNTER
Spoke with pt she is inquiring about lab results.Pt would like to know if you can send in antibiotics for her just in case she have a infection.//LB

## 2020-04-03 NOTE — TELEPHONE ENCOUNTER
Spoke with pt regarding lab result .Pt stated that she is concern due her negative strep and flu test results.Pt states that she would like to know how will she be treated if not covid-19. She is having these symptoms body aches,low grade fever (100.4),throat hurt and congestion. Pt is concern because of here child.//LB

## 2020-04-03 NOTE — TELEPHONE ENCOUNTER
----- Message from Melani Hogan sent at 4/3/2020 11:52 AM CDT -----  Contact: Pt   Pt called in regards to completing a strep test and if it is positive to send over medication to   [facility]  [id number] [place], [place] [address] [address]  Phone: [phone] Fax: [phone]    Pt is asking to be called back at [phone] (home).

## 2020-04-03 NOTE — TELEPHONE ENCOUNTER
----- Message from Rudi Mcdonald sent at 4/3/2020  1:50 PM CDT -----  Contact: [phone] / self   Patient is returning a call from your office. Please Advise.

## 2020-04-04 LAB — SARS-COV-2 RNA RESP QL NAA+PROBE: DETECTED

## 2020-04-06 ENCOUNTER — TELEPHONE (OUTPATIENT)
Dept: INTERNAL MEDICINE | Facility: CLINIC | Age: 35
End: 2020-04-06

## 2020-04-06 NOTE — PROGRESS NOTES
Pt came did drove up visit for Strep swab that was preformed by Chika Fay. I printed the order and signed off next to Chika. //RAUL

## 2020-04-06 NOTE — TELEPHONE ENCOUNTER
Your test was POSITIVE for COVID-19 (coronavirus).       Prevention steps for patients with confirmed COVID-19       Stay home and stay away from family members and friends. The CDC says, you can leave home after these three things have happened: 1) You have had no fever for at least 72 hours (that is three full days of no fever without the use of medicine that reduces fevers) 2) AND other symptoms have improved (for example, when your cough or shortness of breath have improved) 3) AND at least 7 days have passed since your symptoms first appeared.   Separate yourself from other people and animals in your home.   Call ahead before visiting your doctor.   Wear a facemask.   Cover your coughs and sneezes.   Wash your hands often with soap and water; hand  can be used, too.   Avoid sharing personal household items.   Wipe down surfaces used daily.   Monitor your symptoms. Seek prompt medical attention if your illness is worsening (e.g., difficulty breathing).    Before seeking care, call your healthcare provider.   If you have a medical emergency and need to call 911, notify the dispatch personnel that you have, or are being evaluated for COVID-19. If possible, put on a facemask before emergency medical services arrive.        Recommended precautions for household members, intimate partners, and caregivers in a home setting of a patient with symptomatic laboratory-confirmed COVID-19 or a patient under investigation.  Household members, intimate partners, and caregivers in the home setting awaiting tests results have close contact with a person with symptomatic, laboratory-confirmed COVID-19 or a person under investigation. Close contacts should monitor their health; they should call their provider right away if they develop symptoms suggestive of COVID-19 (e.g., fever, cough, shortness of breath).    Close contacts should also follow these recommendations:   Make sure that you understand and can  help the patient follow their provider's instructions for medication(s) and care. You should help the patient with basic needs in the home and provide support for getting groceries, prescriptions, and other personal needs.   Monitor the patient's symptoms. If the patient is getting sicker, call his or her healthcare provider and tell them that the patient has laboratory-confirmed COVID-19. If the patient has a medical emergency and you need to call 911, notify the dispatch personnel that the patient has, or is being evaluated for COVID-19.   Household members should stay in another room or be  from the patient. Household members should use a separate bedroom and bathroom, if available.   Prohibit visitors.   Household members should care for any pets in the home.   Make sure that shared spaces in the home have good air flow, such as by an air conditioner or an opened window, weather permitting.   Perform hand hygiene frequently. Wash your hands often with soap and water for at least 20 seconds or use an alcohol-based hand  (that contains > 60% alcohol) covering all surfaces of your hands and rubbing them together until they feel dry. Soap and water should be used preferentially.   Avoid touching your eyes, nose, and mouth.   The patient should wear a facemask. If the patient is not able to wear a facemask (for example, because it causes trouble breathing), caregivers should wear a mask when they are in the same room as the patient.   Wear a disposable facemask and gloves when you touch or have contact with the patient's blood, stool, or body fluids, such as saliva, sputum, nasal mucus, vomit, urine.  o Throw out disposable facemasks and gloves after using them. Do not reuse.  o When removing personal protective equipment, first remove and dispose of gloves. Then, immediately clean your hands with soap and water or alcohol-based hand . Next, remove and dispose of facemask, and  immediately clean your hands again with soap and water or alcohol-based hand .   You should not share dishes, drinking glasses, cups, eating utensils, towels, bedding, or other items with the patient. After the patient uses these items, you should wash them thoroughly (see below Wash laundry thoroughly).   Clean all high-touch surfaces, such as counters, tabletops, doorknobs, bathroom fixtures, toilets, phones, keyboards, tablets, and bedside tables, every day. Also, clean any surfaces that may have blood, stool, or body fluids on them.   Use a household cleaning spray or wipe, according to the label instructions. Labels contain instructions for safe and effective use of the cleaning product including precautions you should take when applying the product, such as wearing gloves and making sure you have good ventilation during use of the product.   Wash laundry thoroughly.  o Immediately remove and wash clothes or bedding that have blood, stool, or body fluids on them.  o Wear disposable gloves while handling soiled items and keep soiled items away from your body. Clean your hands (with soap and water or an alcohol-based hand ) immediately after removing your gloves.  o Read and follow directions on labels of laundry or clothing items and detergent. In general, using a normal laundry detergent according to washing machine instructions and dry thoroughly using the warmest temperatures recommended on the clothing label.   Place all used disposable gloves, facemasks, and other contaminated items in a lined container before disposing of them with other household waste. Clean your hands (with soap and water or an alcohol-based hand ) immediately after handling these items. Soap and water should be used preferentially if hands are visibly dirty.   Discuss any additional questions with your state or local health department or healthcare provider. Check available hours when contacting  your local health department.    For more information see CDC link below.      https://www.cdc.gov/coronavirus/2019-ncov/hcp/guidance-prevent-spread.html#precautions        Sources:  [facility], [place] Department of [facility] and [facility]     Sincerely,     Martin Blanco PA-C

## 2020-04-16 DIAGNOSIS — U07.1 COVID-19 VIRUS DETECTED: ICD-10-CM

## 2020-04-21 DIAGNOSIS — Z01.84 ANTIBODY RESPONSE EXAMINATION: ICD-10-CM

## 2020-04-27 ENCOUNTER — LAB VISIT (OUTPATIENT)
Dept: LAB | Facility: HOSPITAL | Age: 35
End: 2020-04-27
Attending: INTERNAL MEDICINE
Payer: COMMERCIAL

## 2020-04-27 DIAGNOSIS — Z01.84 ANTIBODY RESPONSE EXAMINATION: ICD-10-CM

## 2020-04-27 LAB — SARS-COV-2 IGG SERPL QL IA: POSITIVE

## 2020-04-27 PROCEDURE — 36415 COLL VENOUS BLD VENIPUNCTURE: CPT

## 2020-04-27 PROCEDURE — 86769 SARS-COV-2 COVID-19 ANTIBODY: CPT

## 2020-05-04 ENCOUNTER — TELEPHONE (OUTPATIENT)
Dept: RESEARCH | Facility: HOSPITAL | Age: 35
End: 2020-05-04

## 2020-05-04 NOTE — TELEPHONE ENCOUNTER
Contacted Samanta Espinoza regarding COVID-19 Convalescent Plasma donation. Completed donor screening questionnaire attached to this encounter. Patient is eligible for donation. Date of resolution of symptoms 4/15/2020. Patient will be called back to schedule with blood bank for plasma donation.    Please note: Female donors with a history of pregnancy have been informed their plasma will undergo additional testing for HLA antibodies and may be unable to be utilized. Patient is aware they will not receive any testing results, notification of plasma utilization, or notification of plasma discarding from this donation. Patient is aware their plasma may not be dedicated to a particular patient during this time.

## 2020-05-06 ENCOUNTER — PATIENT OUTREACH (OUTPATIENT)
Dept: ADMINISTRATIVE | Facility: OTHER | Age: 35
End: 2020-05-06

## 2020-05-12 ENCOUNTER — PATIENT OUTREACH (OUTPATIENT)
Dept: ADMINISTRATIVE | Facility: OTHER | Age: 35
End: 2020-05-12

## 2020-05-15 ENCOUNTER — PATIENT MESSAGE (OUTPATIENT)
Dept: OBSTETRICS AND GYNECOLOGY | Facility: CLINIC | Age: 35
End: 2020-05-15

## 2020-05-15 ENCOUNTER — PATIENT MESSAGE (OUTPATIENT)
Dept: DERMATOLOGY | Facility: CLINIC | Age: 35
End: 2020-05-15

## 2020-05-18 ENCOUNTER — PATIENT OUTREACH (OUTPATIENT)
Dept: ADMINISTRATIVE | Facility: OTHER | Age: 35
End: 2020-05-18

## 2020-05-19 ENCOUNTER — OFFICE VISIT (OUTPATIENT)
Dept: DERMATOLOGY | Facility: CLINIC | Age: 35
End: 2020-05-19
Payer: COMMERCIAL

## 2020-05-19 DIAGNOSIS — Z12.83 SCREENING, MALIGNANT NEOPLASM, SKIN: ICD-10-CM

## 2020-05-19 DIAGNOSIS — M25.532 BILATERAL WRIST PAIN: Primary | ICD-10-CM

## 2020-05-19 DIAGNOSIS — D48.5 NEOPLASM OF UNCERTAIN BEHAVIOR OF SKIN: ICD-10-CM

## 2020-05-19 DIAGNOSIS — D22.9 MULTIPLE NEVI: ICD-10-CM

## 2020-05-19 DIAGNOSIS — L90.5 SCAR CONDITIONS/SKIN FIBROSIS: Primary | ICD-10-CM

## 2020-05-19 DIAGNOSIS — Z85.820 HISTORY OF MELANOMA: ICD-10-CM

## 2020-05-19 DIAGNOSIS — M25.531 BILATERAL WRIST PAIN: Primary | ICD-10-CM

## 2020-05-19 PROCEDURE — 88305 TISSUE EXAM BY PATHOLOGIST: CPT | Mod: 59 | Performed by: PATHOLOGY

## 2020-05-19 PROCEDURE — 11103 TANGNTL BX SKIN EA SEP/ADDL: CPT | Mod: S$GLB,,, | Performed by: DERMATOLOGY

## 2020-05-19 PROCEDURE — 99999 PR PBB SHADOW E&M-EST. PATIENT-LVL III: ICD-10-PCS | Mod: PBBFAC,,, | Performed by: DERMATOLOGY

## 2020-05-19 PROCEDURE — 11102 PR TANGENTIAL BIOPSY, SKIN, SINGLE LESION: ICD-10-PCS | Mod: S$GLB,,, | Performed by: DERMATOLOGY

## 2020-05-19 PROCEDURE — 88305 TISSUE EXAM BY PATHOLOGIST: ICD-10-PCS | Mod: 26,,, | Performed by: PATHOLOGY

## 2020-05-19 PROCEDURE — 11102 TANGNTL BX SKIN SINGLE LES: CPT | Mod: S$GLB,,, | Performed by: DERMATOLOGY

## 2020-05-19 PROCEDURE — 99213 OFFICE O/P EST LOW 20 MIN: CPT | Mod: 25,S$GLB,, | Performed by: DERMATOLOGY

## 2020-05-19 PROCEDURE — 88305 TISSUE EXAM BY PATHOLOGIST: CPT | Mod: 26,,, | Performed by: PATHOLOGY

## 2020-05-19 PROCEDURE — 99213 PR OFFICE/OUTPT VISIT, EST, LEVL III, 20-29 MIN: ICD-10-PCS | Mod: 25,S$GLB,, | Performed by: DERMATOLOGY

## 2020-05-19 PROCEDURE — 11103 PR TANGENTIAL BIOPSY, SKIN, EA ADDTL LESION: ICD-10-PCS | Mod: S$GLB,,, | Performed by: DERMATOLOGY

## 2020-05-19 PROCEDURE — 99999 PR PBB SHADOW E&M-EST. PATIENT-LVL III: CPT | Mod: PBBFAC,,, | Performed by: DERMATOLOGY

## 2020-05-19 NOTE — PROGRESS NOTES
Subjective:       Patient ID:  Samanta Espinoza is a 35 y.o. 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 Brian on 6/10/16), last seen on 4/11/19. She c/o lesion of the abdomen x  6 months.  + darker in color. This is a high risk patient here to check for the development of new lesions. Denies lymphadenopathy, weight loss, or night sweats.      Review of Systems   Constitutional: Negative for fever, chills, weight loss, fatigue, night sweats and malaise.   HENT: Negative for headaches.    Respiratory: Negative for cough and shortness of breath.    Gastrointestinal: Negative for indigestion.   Skin: Positive for activity-related sunscreen use. Negative for daily sunscreen use, recent sunburn and wears hat.   Neurological: Negative for headaches.   Hematologic/Lymphatic: Negative for adenopathy. Does not bruise/bleed easily.        Objective:    Physical Exam   Constitutional: She appears well-developed and well-nourished. No distress.   Neurological: She is alert and oriented to person, place, and time. She is not disoriented.   Psychiatric: She has a normal mood and affect.   Skin:   Areas Examined (abnormalities noted in diagram):   Scalp / Hair Palpated and Inspected  Head / Face Inspection Performed  Neck Inspection Performed  Chest / Axilla Inspection Performed  Abdomen Inspection Performed  Genitals / Buttocks / Groin Inspection Performed  Back Inspection Performed  RUE Inspected  LUE Inspection Performed  RLE Inspected  LLE Inspection Performed  Nails and Digits Inspection Performed                  Diagram Legend     Erythematous scaling macule/papule c/w actinic keratosis       Vascular papule c/w angioma      Pigmented verrucoid papule/plaque c/w seborrheic keratosis      Yellow umbilicated papule c/w sebaceous hyperplasia      Irregularly shaped tan  macule c/w lentigo     1-2 mm smooth white papules consistent with Milia      Movable subcutaneous cyst with punctum c/w epidermal inclusion cyst      Subcutaneous movable cyst c/w pilar cyst      Firm pink to brown papule c/w dermatofibroma      Pedunculated fleshy papule(s) c/w skin tag(s)      Evenly pigmented macule c/w junctional nevus     Mildly variegated pigmented, slightly irregular-bordered macule c/w mildly atypical nevus      Flesh colored to evenly pigmented papule c/w intradermal nevus       Pink pearly papule/plaque c/w basal cell carcinoma      Erythematous hyperkeratotic cursted plaque c/w SCC      Surgical scar with no sign of skin cancer recurrence      Open and closed comedones      Inflammatory papules and pustules      Verrucoid papule consistent consistent with wart     Erythematous eczematous patches and plaques     Dystrophic onycholytic nail with subungual debris c/w onychomycosis     Umbilicated papule    Erythematous-base heme-crusted tan verrucoid plaque consistent with inflamed seborrheic keratosis     Erythematous Silvery Scaling Plaque c/w Psoriasis     See annotation                Assessment / Plan:      Pathology Orders:     Normal Orders This Visit    Specimen to Pathology, Dermatology     Questions:    Procedure Type:  Dermatology and skin neoplasms    Number of Specimens:  2    ------------------------:  -------------------------    Spec 1 Procedure:  Biopsy    Spec 1 Clinical Impression:  nevus r/o atypia    Spec 1 Source:  right abdomen    ------------------------:  -------------------------    Spec 2 Procedure:  Biopsy    Spec 2 Clinical Impression:  nevus r/o atypia    Spec 2 Source:  left upper arm    Clinical Information:  see above        Scar conditions/skin fibrosis  Screening, malignant neoplasm, skin  History of melanoma  Scar of the left superior helix, hx of MM (Breslow depth 0.67 mm, excised on 6/10/16). No evidence of recurrence on physical exam today. No  lymphadenopathy appreciated of cervical, axillary, or inguinal lymph nodes. Continue routine skin surveillance (q 6 months). Reviewed ABCDEF of melanoma. Daily sunscreen advised.    Multiple nevi  Reassurance given.  Discussed ABCDEF of melanoma and changes for patient to look for.  Discussed importance of daily use of sunscreen which is broad-spectrum and has a minimum SPF of 30.    Neoplasm of uncertain behavior of skin  -     Specimen to Pathology, Dermatology  -     Shave biopsy(-ies) done of 2 site(s).   Patient informed to call for results within 2 weeks if have not received notification via telephone call or [facility]             Follow up for call for results.     PROCEDURE NOTE - SHAVE BIOPSY   Location: see above    After risk, benefits, and alternatives were discussed with the patient, the patient agrees to the procedure by verbal informed consent.  The area(s) were cleansed with alcohol. 2 cc of lidocaine 1% with epinephrine was injected for local anesthesia into each lesion(s).  A sharp dermablade was used to remove part or all of the lesion(s).  The specimen(s) will be sent for tissue pathology.  Hemostasis was obtained with aluminum chloride and/or hyfrecation.  The area(s) were dressed with vaseline ointment and bandaged.  The patient tolerated the procedure well without adverse events.  Wound care instructions were given to the patient on the AVS.  The patient will be notified of pathology results once available. Results will also be available in Epic.

## 2020-05-19 NOTE — PATIENT INSTRUCTIONS
Shave Biopsy Wound Care    Your doctor has performed a shave biopsy today.  A band aid and vaseline ointment has been placed over the site.  This should remain in place for 24 hours.  It is recommended that you keep the area dry for the first 24 hours.  After 24 hours, you may remove the band aid and wash the area with warm soap and water and apply Vaseline jelly.  Many patients prefer to use Neosporin or Bacitracin ointment.  This is acceptable; however, know that you can develop an allergy to this medication even if you have used it safely for years.  It is important to keep the area moist.  Letting it dry out and get air slows healing time, and will worsen the scar.  Band aid is optional after first 24 hours.      If you notice increasing redness, tenderness, pain, or yellow drainage at the biopsy site, please notify your doctor.  These are signs of an infection.    If your biopsy site is bleeding, apply firm pressure for 15 minutes straight.  Repeat for another 15 minutes, if it is still bleeding.   If the surgical site continues to bleed, then please contact your doctor.      [facility] - [facility]   DERMATOLOGY  [address]   [place] [address]   Dept: [phone]   Dept Fax: [phone]

## 2020-05-20 ENCOUNTER — HOSPITAL ENCOUNTER (OUTPATIENT)
Dept: RADIOLOGY | Facility: HOSPITAL | Age: 35
Discharge: HOME | End: 2020-05-20
Attending: FAMILY MEDICINE
Payer: COMMERCIAL

## 2020-05-20 ENCOUNTER — OFFICE VISIT (OUTPATIENT)
Dept: ORTHOPEDICS | Facility: CLINIC | Age: 35
End: 2020-05-20
Payer: COMMERCIAL

## 2020-05-20 VITALS
BODY MASS INDEX: 33.31 KG/M2 | DIASTOLIC BLOOD PRESSURE: 99 MMHG | HEART RATE: 86 BPM | WEIGHT: 181 LBS | SYSTOLIC BLOOD PRESSURE: 147 MMHG | HEIGHT: 62 IN

## 2020-05-20 DIAGNOSIS — M25.532 BILATERAL WRIST PAIN: ICD-10-CM

## 2020-05-20 DIAGNOSIS — M65.4 DE QUERVAIN'S TENOSYNOVITIS, RIGHT: Primary | ICD-10-CM

## 2020-05-20 DIAGNOSIS — M25.531 BILATERAL WRIST PAIN: ICD-10-CM

## 2020-05-20 PROCEDURE — 73110 X-RAY EXAM OF WRIST: CPT | Mod: TC,50

## 2020-05-20 PROCEDURE — 99213 OFFICE O/P EST LOW 20 MIN: CPT | Mod: 25,S$GLB,, | Performed by: FAMILY MEDICINE

## 2020-05-20 PROCEDURE — 73110 X-RAY EXAM OF WRIST: CPT | Mod: 26,50,, | Performed by: RADIOLOGY

## 2020-05-20 PROCEDURE — 99999 PR PBB SHADOW E&M-EST. PATIENT-LVL III: CPT | Mod: PBBFAC,,, | Performed by: FAMILY MEDICINE

## 2020-05-20 PROCEDURE — 20550 NJX 1 TENDON SHEATH/LIGAMENT: CPT | Mod: RT,S$GLB,, | Performed by: FAMILY MEDICINE

## 2020-05-20 PROCEDURE — 20550 PR INJECT TENDON SHEATH/LIGAMENT: ICD-10-PCS | Mod: RT,S$GLB,, | Performed by: FAMILY MEDICINE

## 2020-05-20 PROCEDURE — 99999 PR PBB SHADOW E&M-EST. PATIENT-LVL III: ICD-10-PCS | Mod: PBBFAC,,, | Performed by: FAMILY MEDICINE

## 2020-05-20 PROCEDURE — 3008F BODY MASS INDEX DOCD: CPT | Mod: CPTII,S$GLB,, | Performed by: FAMILY MEDICINE

## 2020-05-20 PROCEDURE — 73110 XR WRIST COMPLETE 3 VIEWS BILATERAL: ICD-10-PCS | Mod: 26,50,, | Performed by: RADIOLOGY

## 2020-05-20 PROCEDURE — 3008F PR BODY MASS INDEX (BMI) DOCUMENTED: ICD-10-PCS | Mod: CPTII,S$GLB,, | Performed by: FAMILY MEDICINE

## 2020-05-20 PROCEDURE — 99213 PR OFFICE/OUTPT VISIT, EST, LEVL III, 20-29 MIN: ICD-10-PCS | Mod: 25,S$GLB,, | Performed by: FAMILY MEDICINE

## 2020-05-20 RX ORDER — DICLOFENAC SODIUM 10 MG/G
2 GEL TOPICAL 3 TIMES DAILY
Qty: 100 G | Refills: 30 | Status: SHIPPED | OUTPATIENT
Start: 2020-05-20 | End: 2022-05-11

## 2020-05-20 NOTE — PROGRESS NOTES
Subjective:     Patient ID: Samanta Espinoza is a 35 y.o. female.    Chief Complaint: Pain of the Right Wrist and Pain of the Left Wrist      HPI:  This right-hand dominant patient who had an injection for de Quervain tenosynovitis last October is back for a similar type problem in the right wrist today.  Her problem last time seem to be exacerbated by retention of fluid from pregnancy.  This time she thinks it was from increased repetitive motion activity taking care of her child at home recently.  No specific injury to the wrist.  She does have a splint which she has started wearing again which helps.  She has not use a gel before and is not been to physical therapy so far.    The patient did have COVID-19 early April but states that she was symptom-free after few weeks and has not had any symptoms for at least 3 weeks now.  She works as an ophthalmology assistant in technician up stairs.  The patient would like another cortisone injection today.    Past Medical History:   Diagnosis Date    Autosomal recessive polycystic kidneys     GERD (gastroesophageal reflux disease)     History of uterine fibroid     Melanoma 2016    left superior helix (breslow dept 0.67mm)    Migraine headache     PONV (postoperative nausea and vomiting)     post op nausea     Past Surgical History:   Procedure Laterality Date     SECTION N/A 2019    Procedure:  SECTION;  Surgeon: Grazyna Price MD;  Location: [facility] L&D;  Service: OB/GYN;  Laterality: N/A;    HYSTEROSCOPIC POLYPECTOMY OF UTERUS N/A 1/10/2019    Procedure: POLYPECTOMY, UTERUS, HYSTEROSCOPIC;  Surgeon: Jolanta Sifuentes MD;  Location: [facility];  Service: OB/GYN;  Laterality: N/A;    LYMPH NODE BIOPSY      MALIGNANT SKIN LESION EXCISION  06/10/2016    NASAL SEPTUM SURGERY      RENAL EXPLORATION       Family History   Problem Relation Age of Onset    Hypertension Father     Kidney disease Father     Diabetes Paternal  Grandmother     Cancer Maternal Grandmother     Thrombophilia Neg Hx     Breast cancer Neg Hx     Colon cancer Neg Hx     Ovarian cancer Neg Hx     Eczema Neg Hx     Lupus Neg Hx     Psoriasis Neg Hx     Melanoma Neg Hx     Uterine cancer Neg Hx     Cervical cancer Neg Hx      Social History     Socioeconomic History    Marital status:      Spouse name: Not on file    Number of children: 0    Years of education: Not on file    Highest education level: Not on file   Occupational History    Occupation: Optometry      Employer: [employer]     Comment: [employer] [place]   Social Needs    Financial resource strain: Not on file    Food insecurity:     Worry: Not on file     Inability: Not on file    Transportation needs:     Medical: Not on file     Non-medical: Not on file   Tobacco Use    Smoking status: Never Smoker    Smokeless tobacco: Never Used   Substance and Sexual Activity    Alcohol use: Not Currently     Comment: socially    Drug use: No    Sexual activity: Yes     Partners: Female   Lifestyle    Physical activity:     Days per week: Not on file     Minutes per session: Not on file    Stress: Not on file   Relationships    Social connections:     Talks on phone: Not on file     Gets together: Not on file     Attends Congregational service: Not on file     Active member of club or organization: Not on file     Attends meetings of clubs or organizations: Not on file     Relationship status: Not on file   Other Topics Concern    Are you pregnant or think you may be? No    Breast-feeding No   Social History Narrative    Not on file       Current Outpatient Medications:     cetirizine (ZYRTEC) 10 MG tablet, , Disp: , Rfl:     fish oil-omega-3 fatty acids 300-1,000 mg capsule, Take 2 g by mouth once daily., Disp: , Rfl:     L norgest/e.estradiol-e.estrad (SEASONIQUE) 0.15 mg-30 mcg (84)/10 mcg (7) 3MPk, Take 1 tablet by mouth once daily., Disp: 91 each, Rfl: 2    Lactobacillus  rhamnosus GG (CULTURELLE) 10 billion cell capsule, , Disp: , Rfl:     omeprazole (PRILOSEC) 20 MG capsule, Take 1 capsule (20 mg total) by mouth once daily., Disp: 90 capsule, Rfl: 3    sertraline (ZOLOFT) 50 MG tablet, Take 1 tablet (50 mg total) by mouth once daily., Disp: 30 tablet, Rfl: 11    diclofenac sodium (VOLTAREN) 1 % Gel, Apply 2 grams topically 3 (three) times daily., Disp: 100 g, Rfl: 30  Review of patient's allergies indicates:   Allergen Reactions    Singulair [montelukast]      Itching all over     Review of Systems   Constitutional: Negative for chills, fever and weight loss.   Respiratory: Negative for shortness of breath.    Cardiovascular: Negative for chest pain and palpitations.       Objective:   Body mass index is 33.11 kg/m².  Vitals:    05/20/20 1442   BP: (!) 147/99   Pulse: 86           Ortho/SPM Exam alert and oriented well-nourished well-developed very pleasant adult female ambulatory and in no acute distress    Respiratory effort is normal    Right hand and wrist-tenderness along the abductor pollicis tendon at the right wrist and pain exacerbated by ulnar deviation.    Patient has normal range of motion of the wrist and hand negative Tinel and Phalen test    Strength is normal    Neurovascular intact    Psychiatric good affect and cognition    IMAGING: X-Ray Wrist Complete Bilateral  Narrative: EXAMINATION:  XR WRIST COMPLETE 3 VIEWS BILATERAL    CLINICAL HISTORY:  Pain in right wrist    TECHNIQUE:  PA, lateral, and oblique views of both wrists were performed.    COMPARISON:  10/31/2019    FINDINGS:  No acute fracture or dislocation or osseous erosion.  The soft tissues appear within normal limits.  No change since the comparison exam.  Impression: As above    Electronically signed by: Reilly Renteria MD  Date:    05/20/2020  Time:    15:13       Radiographs / Imaging : Relevant imaging results reviewed by me and interpreted by me, discussed with the patient and / or family  -radiographs reviewed and appear normal acutely and chronic has appeared in the radiology report.      Assessment:     Encounter Diagnosis   Name Primary?    De Quervain's tenosynovitis, right Yes        Plan:   De Quervain's tenosynovitis, right    Other orders  -     diclofenac sodium (VOLTAREN) 1 % Gel; Apply 2 grams topically 3 (three) times daily.  Dispense: 100 g; Refill: 30  -     Cancel: Small Joint Aspiration/Injection        The patient verbalized good understanding of the medical issues discussed today and expressed appreciation for the care provided.  Patient was given the opportunity to ask questions and be an active participant in their medical care. Patient had no further questions or concerns at this time.     The patient was encouraged to participate in appropriate physical activity.      Raffaele Rosario M.D.  [facility]        This note was dictated using voice recognition software and may have sound a like errors.

## 2020-05-20 NOTE — PATIENT INSTRUCTIONS
Ice to wrist for 15-20 minutes 3 times a day today and tomorrow    Continue to use splint    Use gel 3 times a day for the next month as needed

## 2020-05-22 LAB
FINAL PATHOLOGIC DIAGNOSIS: NORMAL
GROSS: NORMAL

## 2020-05-26 ENCOUNTER — PATIENT MESSAGE (OUTPATIENT)
Dept: NEPHROLOGY | Facility: CLINIC | Age: 35
End: 2020-05-26

## 2020-05-27 DIAGNOSIS — Q61.3 POLYCYSTIC KIDNEY DISEASE: Primary | ICD-10-CM

## 2020-05-29 ENCOUNTER — LAB VISIT (OUTPATIENT)
Dept: LAB | Facility: HOSPITAL | Age: 35
End: 2020-05-29
Attending: INTERNAL MEDICINE
Payer: COMMERCIAL

## 2020-05-29 DIAGNOSIS — Q61.3 POLYCYSTIC KIDNEY DISEASE: ICD-10-CM

## 2020-05-29 LAB
ALBUMIN SERPL BCP-MCNC: 3.7 G/DL (ref 3.5–5.2)
ANION GAP SERPL CALC-SCNC: 11 MMOL/L (ref 8–16)
BILIRUB UR QL STRIP: NEGATIVE
BUN SERPL-MCNC: 10 MG/DL (ref 6–20)
CALCIUM SERPL-MCNC: 9.1 MG/DL (ref 8.7–10.5)
CHLORIDE SERPL-SCNC: 102 MMOL/L (ref 95–110)
CLARITY UR: CLEAR
CO2 SERPL-SCNC: 24 MMOL/L (ref 23–29)
COLOR UR: YELLOW
CREAT SERPL-MCNC: 0.8 MG/DL (ref 0.5–1.4)
CREAT UR-MCNC: 47 MG/DL (ref 15–325)
EST. GFR  (AFRICAN AMERICAN): >60 ML/MIN/1.73 M^2
EST. GFR  (NON AFRICAN AMERICAN): >60 ML/MIN/1.73 M^2
GLUCOSE SERPL-MCNC: 116 MG/DL (ref 70–110)
GLUCOSE UR QL STRIP: NEGATIVE
HGB UR QL STRIP: NEGATIVE
KETONES UR QL STRIP: NEGATIVE
LEUKOCYTE ESTERASE UR QL STRIP: NEGATIVE
NITRITE UR QL STRIP: NEGATIVE
PH UR STRIP: 7 [PH] (ref 5–8)
PHOSPHATE SERPL-MCNC: 2.1 MG/DL (ref 2.7–4.5)
POTASSIUM SERPL-SCNC: 4 MMOL/L (ref 3.5–5.1)
PROT UR QL STRIP: NEGATIVE
PROT UR-MCNC: <7 MG/DL (ref 0–15)
PROT/CREAT UR: NORMAL MG/G{CREAT} (ref 0–0.2)
PTH-INTACT SERPL-MCNC: 68 PG/ML (ref 9–77)
SODIUM SERPL-SCNC: 137 MMOL/L (ref 136–145)
SP GR UR STRIP: <=1.005 (ref 1–1.03)
URN SPEC COLLECT METH UR: NORMAL

## 2020-05-29 PROCEDURE — 81003 URINALYSIS AUTO W/O SCOPE: CPT

## 2020-05-29 PROCEDURE — 80069 RENAL FUNCTION PANEL: CPT

## 2020-05-29 PROCEDURE — 36415 COLL VENOUS BLD VENIPUNCTURE: CPT

## 2020-05-29 PROCEDURE — 83970 ASSAY OF PARATHORMONE: CPT

## 2020-05-29 PROCEDURE — 82570 ASSAY OF URINE CREATININE: CPT

## 2020-06-01 ENCOUNTER — PATIENT OUTREACH (OUTPATIENT)
Dept: ADMINISTRATIVE | Facility: OTHER | Age: 35
End: 2020-06-01

## 2020-06-03 ENCOUNTER — OFFICE VISIT (OUTPATIENT)
Dept: NEPHROLOGY | Facility: CLINIC | Age: 35
End: 2020-06-03
Payer: COMMERCIAL

## 2020-06-03 DIAGNOSIS — I10 ESSENTIAL HYPERTENSION: Primary | ICD-10-CM

## 2020-06-03 DIAGNOSIS — N04.9 NEPHROTIC SYNDROME: ICD-10-CM

## 2020-06-03 DIAGNOSIS — Q61.3 POLYCYSTIC KIDNEY DISEASE: ICD-10-CM

## 2020-06-03 DIAGNOSIS — O14.93 PRE-ECLAMPSIA IN THIRD TRIMESTER: ICD-10-CM

## 2020-06-03 DIAGNOSIS — I10 UNCONTROLLED HYPERTENSION: ICD-10-CM

## 2020-06-03 DIAGNOSIS — Z71.89 ENCOUNTER FOR MEDICATION REVIEW AND COUNSELING: ICD-10-CM

## 2020-06-03 PROCEDURE — 99215 OFFICE O/P EST HI 40 MIN: CPT | Mod: 95,,, | Performed by: INTERNAL MEDICINE

## 2020-06-03 PROCEDURE — 99215 PR OFFICE/OUTPT VISIT, EST, LEVL V, 40-54 MIN: ICD-10-PCS | Mod: 95,,, | Performed by: INTERNAL MEDICINE

## 2020-06-03 RX ORDER — LISINOPRIL 10 MG/1
10 TABLET ORAL DAILY
Qty: 30 TABLET | Refills: 11 | Status: SHIPPED | OUTPATIENT
Start: 2020-06-03 | End: 2020-06-15 | Stop reason: ALTCHOICE

## 2020-06-03 NOTE — PROGRESS NOTES
NEPHROLOGY CLINIC FOLLOWUP NOTE:  Date of clinic visit: 6/3/20  Virtual visit was made by video     REASON FOR FOLLOWUP AND CHIEF COMPLAINT:  History of polycystic kidney disease.     HISTORY OF PRESENT ILLNESS:  Ms. Samanta Argueta is a 34-year-old  female with a h/o of PCKD who presents for f/u. Pt was last seen by us in Oct 2019. Pt was post partum at the time and had delivered a healthy baby girl in Sept 2019 at 35 weeks of gestation. Pt developed preeclampsia in 3rd trimester, had nephrotic syndrome, and required procardia for BP control. On f/u today, pt has uncontrolled HTN. Pt was previously on lisinopril, which was held during pregnancy. Currently, she is taking only 2.5 mg po qd. She has no other new c/o's, no discomfort, no new events. Pt reports she is no longer breast-feeding. Labs and meds reviewed with pt today.       Availability of Jynarque to treat polycystic kidney disease has been discussed with pt. This medicine slows down the growth of the renal cysts.      PAST MEDICAL HISTORY:  1.  Polycystic kidney disease. Was acquired paternally   2.  Dysmenorrhea.  3.  Migraine headaches.  4.  Maxillary sinusitis.  5.  Hypertriglyceridemia.  6.  Liver cysts.  7.  GERD.  8.  Allergic rhinitis.     PAST SURGICAL HISTORY:  Reviewed.     FAMILY HISTORY:  Reviewed.     ALLERGIES:  Reviewed, to Singulair.     MEDICATIONS:  Fully reviewed    Current Outpatient Medications:     cetirizine (ZYRTEC) 10 MG tablet, , Disp: , Rfl:     diclofenac sodium (VOLTAREN) 1 % Gel, Apply 2 grams topically 3 (three) times daily., Disp: 100 g, Rfl: 30    fish oil-omega-3 fatty acids 300-1,000 mg capsule, Take 2 g by mouth once daily., Disp: , Rfl:     L norgest/e.estradiol-e.estrad (SEASONIQUE) 0.15 mg-30 mcg (84)/10 mcg (7) 3MPk, Take 1 tablet by mouth once daily., Disp: 91 each, Rfl: 2    Lactobacillus rhamnosus GG (CULTURELLE) 10 billion cell capsule, , Disp: , Rfl:     lisinopriL 10 MG tablet, Take 1  tablet (10 mg total) by mouth once daily., Disp: 30 tablet, Rfl: 11    omeprazole (PRILOSEC) 20 MG capsule, Take 1 capsule (20 mg total) by mouth once daily., Disp: 90 capsule, Rfl: 3    sertraline (ZOLOFT) 50 MG tablet, Take 1 tablet (50 mg total) by mouth once daily., Disp: 30 tablet, Rfl: 11     REVIEW OF SYSTEMS:  No recent hospitalizations.  GENERAL:  Negative.  HEAD, EYES, EARS, NOSE, AND THROAT:  Negative.  CARDIAC:  Negative.  PULMONARY:  Negative.  GASTROINTESTINAL:  Negative.  GENITOURINARY:  Negative.  PSYCHOLOGICAL:  Negative.  NEUROLOGICAL:  Negative.  ENDOCRINE:  Negative.  HEMATOLOGIC AND ONCOLOGIC:  Negative.  INFECTIOUS DISEASE:  Negative.  The rest of the review of systems negative.     PHYSICAL EXAMINATION: on video connection, pt looks well, smiling, in NAD  Speech and thought process normal  BP in clinic last week was 147/99  BP this am reported by pt is 150/100     Labs reviewed:  BMP  Lab Results   Component Value Date     05/29/2020    K 4.0 05/29/2020     05/29/2020    CO2 24 05/29/2020    BUN 10 05/29/2020    CREATININE 0.8 05/29/2020    CALCIUM 9.1 05/29/2020    ANIONGAP 11 05/29/2020    ESTGFRAFRICA >60 05/29/2020    EGFRNONAA >60 05/29/2020     Lab Results   Component Value Date    WBC 12.42 09/21/2019    HGB 10.3 (L) 09/21/2019    HCT 30.2 (L) 09/21/2019    MCV 85 09/21/2019     09/21/2019        Urine prot/cr ratio is zero, was in 3rd trimester 4.7 g      ASSESSMENT AND PLAN:  This is a 34-year-old female with polycystic kidney disease presents for followup after delivery at 35 weeks of gestation:   The impression is as follows:     1.  Renal. Normal s Cr. Stable and normal renal function  K normal  Nephrotic syndrome in 3rd trimester of pregnancy due to preeclampsia. Has now resolved  Normal u/a    2. H/o of PCKD  Long-term treatment with Jynarque/Tolvaptan AFTER pregnancy has been discussed with pt  Pt was given printed literature to read  Will f/u on this  issue  I forgot to mention Jynarque to pt today on the video call. I called her back to discuss, but pt did not answer the phone, and I did not want to leave a voice message for privacy matters.  Will f/u on this issue    3.  Hypertension.  Blood pressure is not controlled at this point  Pt is no lonegr breast feeding.   Will re-start lisinopril 10 mg po qd  Asked pt to monitor BP at home and contact me with results     4. Pregnancy: s/p delivery at 35 weeks of gestation in Sept 2019  S/p IVF using egg from partner. Will not pass on PCKD gene     PLAN AND RECOMMENDATION:  As detailed above.    Opportunity for question and discussion provided.  Total time spent 40 minutes including time needed to review the records, the   patient evaluation,this  documentation, discussion with the patient,   more than 50% of the time was spent on coordination of care and counseling.    Level V visit.  RTC 1 month     Malvin Nagy MD

## 2020-06-15 RX ORDER — LOSARTAN POTASSIUM 25 MG/1
25 TABLET ORAL DAILY
Qty: 30 TABLET | Refills: 11 | Status: SHIPPED | OUTPATIENT
Start: 2020-06-15 | End: 2020-08-26

## 2020-08-04 ENCOUNTER — TELEPHONE (OUTPATIENT)
Dept: INFECTIOUS DISEASES | Facility: CLINIC | Age: 35
End: 2020-08-04

## 2020-08-04 NOTE — TELEPHONE ENCOUNTER
DATE OF CALL: 8/4/2020  PROTOCOL: 2020.198  TITLE:  A PHASE 1/2/3, PLACEBO-CONTROLLED, RANDOMIZED, OBSERVER-BLIND, DOSE-FINDING STUDY TO EVALUATE THE SAFETY, TOLERABILITY, IMMUNOGENICITY, AND EFFICACY OF SARS-COV-2 RNA VACCINE CANDIDATES AGAINST COVID-19 IN HEALTHY ADULTS  INVESTIGATOR: Lois Ashley  SPONSOR: [employer] is conducting the study for [employer]     Subject was contacted regarding participation in [employer] Vaccine study. Upon screening through this phone conversation, pt would appear to meet inclusion/exclusion criteria.  Pt is scheduled for initial [employer] vaccine study visit on 8/5 @ 2:45.

## 2020-08-26 ENCOUNTER — LAB VISIT (OUTPATIENT)
Dept: LAB | Facility: HOSPITAL | Age: 35
End: 2020-08-26
Attending: INTERNAL MEDICINE
Payer: COMMERCIAL

## 2020-08-26 ENCOUNTER — OFFICE VISIT (OUTPATIENT)
Dept: DERMATOLOGY | Facility: CLINIC | Age: 35
End: 2020-08-26
Payer: COMMERCIAL

## 2020-08-26 ENCOUNTER — OFFICE VISIT (OUTPATIENT)
Dept: NEPHROLOGY | Facility: CLINIC | Age: 35
End: 2020-08-26
Payer: COMMERCIAL

## 2020-08-26 VITALS
DIASTOLIC BLOOD PRESSURE: 100 MMHG | RESPIRATION RATE: 20 BRPM | SYSTOLIC BLOOD PRESSURE: 146 MMHG | BODY MASS INDEX: 31.56 KG/M2 | HEIGHT: 62 IN | HEART RATE: 86 BPM | WEIGHT: 171.5 LBS

## 2020-08-26 DIAGNOSIS — Z85.820 HISTORY OF MELANOMA: ICD-10-CM

## 2020-08-26 DIAGNOSIS — D22.9 MULTIPLE NEVI: ICD-10-CM

## 2020-08-26 DIAGNOSIS — L90.5 SCAR CONDITIONS/SKIN FIBROSIS: Primary | ICD-10-CM

## 2020-08-26 DIAGNOSIS — Q61.3 POLYCYSTIC KIDNEY DISEASE: ICD-10-CM

## 2020-08-26 DIAGNOSIS — Q61.3 POLYCYSTIC KIDNEY DISEASE: Primary | ICD-10-CM

## 2020-08-26 DIAGNOSIS — Z12.83 SCREENING, MALIGNANT NEOPLASM, SKIN: ICD-10-CM

## 2020-08-26 LAB
ALBUMIN SERPL BCP-MCNC: 3.6 G/DL (ref 3.5–5.2)
ALBUMIN SERPL BCP-MCNC: 3.6 G/DL (ref 3.5–5.2)
ANION GAP SERPL CALC-SCNC: 7 MMOL/L (ref 8–16)
ANION GAP SERPL CALC-SCNC: 7 MMOL/L (ref 8–16)
BUN SERPL-MCNC: 11 MG/DL (ref 6–20)
BUN SERPL-MCNC: 11 MG/DL (ref 6–20)
CALCIUM SERPL-MCNC: 9 MG/DL (ref 8.7–10.5)
CALCIUM SERPL-MCNC: 9 MG/DL (ref 8.7–10.5)
CHLORIDE SERPL-SCNC: 103 MMOL/L (ref 95–110)
CHLORIDE SERPL-SCNC: 103 MMOL/L (ref 95–110)
CO2 SERPL-SCNC: 26 MMOL/L (ref 23–29)
CO2 SERPL-SCNC: 26 MMOL/L (ref 23–29)
CREAT SERPL-MCNC: 0.7 MG/DL (ref 0.5–1.4)
CREAT SERPL-MCNC: 0.7 MG/DL (ref 0.5–1.4)
EST. GFR  (AFRICAN AMERICAN): >60 ML/MIN/1.73 M^2
EST. GFR  (AFRICAN AMERICAN): >60 ML/MIN/1.73 M^2
EST. GFR  (NON AFRICAN AMERICAN): >60 ML/MIN/1.73 M^2
EST. GFR  (NON AFRICAN AMERICAN): >60 ML/MIN/1.73 M^2
GLUCOSE SERPL-MCNC: 92 MG/DL (ref 70–110)
GLUCOSE SERPL-MCNC: 92 MG/DL (ref 70–110)
PHOSPHATE SERPL-MCNC: 1.9 MG/DL (ref 2.7–4.5)
PHOSPHATE SERPL-MCNC: 1.9 MG/DL (ref 2.7–4.5)
POTASSIUM SERPL-SCNC: 3.6 MMOL/L (ref 3.5–5.1)
POTASSIUM SERPL-SCNC: 3.6 MMOL/L (ref 3.5–5.1)
SODIUM SERPL-SCNC: 136 MMOL/L (ref 136–145)
SODIUM SERPL-SCNC: 136 MMOL/L (ref 136–145)

## 2020-08-26 PROCEDURE — 36415 COLL VENOUS BLD VENIPUNCTURE: CPT

## 2020-08-26 PROCEDURE — 99999 PR PBB SHADOW E&M-EST. PATIENT-LVL III: CPT | Mod: PBBFAC,,, | Performed by: INTERNAL MEDICINE

## 2020-08-26 PROCEDURE — 80069 RENAL FUNCTION PANEL: CPT

## 2020-08-26 PROCEDURE — 3008F PR BODY MASS INDEX (BMI) DOCUMENTED: ICD-10-PCS | Mod: CPTII,S$GLB,, | Performed by: INTERNAL MEDICINE

## 2020-08-26 PROCEDURE — 99214 PR OFFICE/OUTPT VISIT, EST, LEVL IV, 30-39 MIN: ICD-10-PCS | Mod: S$GLB,,, | Performed by: INTERNAL MEDICINE

## 2020-08-26 PROCEDURE — 99999 PR PBB SHADOW E&M-EST. PATIENT-LVL III: ICD-10-PCS | Mod: PBBFAC,,, | Performed by: INTERNAL MEDICINE

## 2020-08-26 PROCEDURE — 99214 OFFICE O/P EST MOD 30 MIN: CPT | Mod: S$GLB,,, | Performed by: INTERNAL MEDICINE

## 2020-08-26 PROCEDURE — 99213 PR OFFICE/OUTPT VISIT, EST, LEVL III, 20-29 MIN: ICD-10-PCS | Mod: S$GLB,,, | Performed by: DERMATOLOGY

## 2020-08-26 PROCEDURE — 3008F BODY MASS INDEX DOCD: CPT | Mod: CPTII,S$GLB,, | Performed by: INTERNAL MEDICINE

## 2020-08-26 PROCEDURE — 99213 OFFICE O/P EST LOW 20 MIN: CPT | Mod: S$GLB,,, | Performed by: DERMATOLOGY

## 2020-08-26 PROCEDURE — 99999 PR PBB SHADOW E&M-EST. PATIENT-LVL III: CPT | Mod: PBBFAC,,, | Performed by: DERMATOLOGY

## 2020-08-26 PROCEDURE — 99999 PR PBB SHADOW E&M-EST. PATIENT-LVL III: ICD-10-PCS | Mod: PBBFAC,,, | Performed by: DERMATOLOGY

## 2020-08-26 RX ORDER — LOSARTAN POTASSIUM 50 MG/1
50 TABLET ORAL DAILY
Qty: 30 TABLET | Refills: 11 | Status: SHIPPED | OUTPATIENT
Start: 2020-08-26 | End: 2021-03-31 | Stop reason: ALTCHOICE

## 2020-08-26 RX ORDER — CALCIUM CARB/MAGNESIUM CARB 311-232MG
5 TABLET ORAL NIGHTLY
COMMUNITY
End: 2022-06-02

## 2020-08-26 NOTE — PROGRESS NOTES
Subjective:       Patient ID:  Samanta Espinoza is a 35 y.o. 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 Brian on 6/10/16), mildly atypical nevi of the right abdomen and left upper arm (s/p biopsy on 5/19/20), last seen on 5/19/20.  Denies bleeding, tender, growing, or concerning lesions.      This is a high risk patient here to check for the development of new lesions. Denies lymphadenopathy, weight loss, or night sweats.      Review of Systems   Constitutional: Negative for fever, chills, weight loss, fatigue, night sweats and malaise.   HENT: Negative for headaches.    Respiratory: Negative for cough and shortness of breath.    Gastrointestinal: Negative for indigestion.   Skin: Positive for activity-related sunscreen use. Negative for daily sunscreen use, recent sunburn and wears hat.   Neurological: Negative for headaches.   Hematologic/Lymphatic: Negative for adenopathy. Does not bruise/bleed easily.        Objective:    Physical Exam   Constitutional: She appears well-developed and well-nourished. No distress.   Lymphadenopathy:     She has no cervical adenopathy. No inguinal adenopathy noted on the right or left side.   Neurological: She is alert and oriented to person, place, and time. She is not disoriented.   Psychiatric: She has a normal mood and affect.   Skin:   Areas Examined (abnormalities noted in diagram):   Scalp / Hair Palpated and Inspected  Head / Face Inspection Performed  Neck Inspection Performed  Chest / Axilla Inspection Performed  Abdomen Inspection Performed  Genitals / Buttocks / Groin Inspection Performed  Back Inspection Performed  RUE Inspected  LUE Inspection Performed  RLE Inspected  LLE Inspection Performed  Nails and Digits Inspection Performed                   Diagram Legend     Erythematous scaling  macule/papule c/w actinic keratosis       Vascular papule c/w angioma      Pigmented verrucoid papule/plaque c/w seborrheic keratosis      Yellow umbilicated papule c/w sebaceous hyperplasia      Irregularly shaped tan macule c/w lentigo     1-2 mm smooth white papules consistent with Milia      Movable subcutaneous cyst with punctum c/w epidermal inclusion cyst      Subcutaneous movable cyst c/w pilar cyst      Firm pink to brown papule c/w dermatofibroma      Pedunculated fleshy papule(s) c/w skin tag(s)      Evenly pigmented macule c/w junctional nevus     Mildly variegated pigmented, slightly irregular-bordered macule c/w mildly atypical nevus      Flesh colored to evenly pigmented papule c/w intradermal nevus       Pink pearly papule/plaque c/w basal cell carcinoma      Erythematous hyperkeratotic cursted plaque c/w SCC      Surgical scar with no sign of skin cancer recurrence      Open and closed comedones      Inflammatory papules and pustules      Verrucoid papule consistent consistent with wart     Erythematous eczematous patches and plaques     Dystrophic onycholytic nail with subungual debris c/w onychomycosis     Umbilicated papule    Erythematous-base heme-crusted tan verrucoid plaque consistent with inflamed seborrheic keratosis     Erythematous Silvery Scaling Plaque c/w Psoriasis     See annotation      Assessment / Plan:        Scar conditions/skin fibrosis  Screening, malignant neoplasm, skin  History of melanoma  Scar of the left superior helix, hx of MM (Breslow depth 0.67 mm, excised on 6/10/16). No evidence of recurrence on physical exam today. No lymphadenopathy appreciated of cervical, axillary, or inguinal lymph nodes. Continue routine skin surveillance (q 4-6 months). Reviewed ABCDEF of melanoma. Daily sunscreen advised.    Multiple nevi  Reassurance given.  Discussed ABCDEF of melanoma and changes for patient to look for.  Discussed importance of daily use of sunscreen which is  broad-spectrum and has a minimum SPF of 30.           Follow up in about 4 months (around 12/26/2020).

## 2020-08-26 NOTE — PROGRESS NOTES
NEPHROLOGY CLINIC FOLLOWUP NOTE:  Date of clinic visit: 8/26/20     REASON FOR FOLLOWUP AND CHIEF COMPLAINT:  History of polycystic kidney disease and HTN     HISTORY OF PRESENT ILLNESS:  Ms. Samanta Argueta is a 34-year-old female with a h/o of PCKD who presents for f/u. Pt was last seen by us in June 2020. No new issues today, no new c/o's. Pt has no discomfort. Labs and meds reviewed with pt. Lisinopril was being held due to prior pregnancy. Was restarted, and then was switched since last visit to losartan 25 mg po qd for better control of BP.    To review: Pt gave birth to a healthy baby girl in Sept 2019 at 35 weeks of gestation. Pt developed preeclampsia in 3rd trimester, had nephrotic syndrome, and required procardia for BP control.       Availability of Jynarque to treat polycystic kidney disease was discussed again with pt. This medicine slows down the growth of the renal cysts.      PAST MEDICAL HISTORY:  1.  Polycystic kidney disease. Was acquired paternally   2.  Dysmenorrhea.  3.  Migraine headaches.  4.  Maxillary sinusitis.  5.  Hypertriglyceridemia.  6.  Liver cysts.  7.  GERD.  8.  Allergic rhinitis.     PAST SURGICAL HISTORY:  Reviewed.     FAMILY HISTORY:  Reviewed.     ALLERGIES:  Reviewed, to Singulair.     MEDICATIONS:  Fully reviewed    Current Outpatient Medications:     cetirizine (ZYRTEC) 10 MG tablet, , Disp: , Rfl:     diclofenac sodium (VOLTAREN) 1 % Gel, Apply 2 grams topically 3 (three) times daily., Disp: 100 g, Rfl: 30    fish oil-omega-3 fatty acids 300-1,000 mg capsule, Take 2 g by mouth once daily., Disp: , Rfl:     L norgest/e.estradioL-e.estrad (SEASONIQUE) 0.15 mg-30 mcg (84)/10 mcg (7) 3MPk, Take 1 tablet by mouth once daily., Disp: 91 each, Rfl: 2    Lactobacillus rhamnosus GG (CULTURELLE) 10 billion cell capsule, , Disp: , Rfl:     losartan (COZAAR) 50 MG tablet, Take 1 tablet (50 mg total) by mouth once daily., Disp: 30 tablet, Rfl: 11    melatonin 5 mg TbDL,  Take 5 mg by mouth every evening., Disp: , Rfl:     omeprazole (PRILOSEC) 20 MG capsule, Take 1 capsule (20 mg total) by mouth once daily., Disp: 90 capsule, Rfl: 3    sertraline (ZOLOFT) 50 MG tablet, Take 1 tablet (50 mg total) by mouth once daily., Disp: 30 tablet, Rfl: 11     REVIEW OF SYSTEMS:  No recent hospitalizations.  GENERAL:  Negative.  HEAD, EYES, EARS, NOSE, AND THROAT:  Negative.  CARDIAC:  Negative.  PULMONARY:  Negative.  GASTROINTESTINAL:  Negative.  GENITOURINARY:  Negative.  PSYCHOLOGICAL:  Negative.  NEUROLOGICAL:  Negative.  ENDOCRINE:  Negative.  HEMATOLOGIC AND ONCOLOGIC:  Negative.  INFECTIOUS DISEASE:  Negative.  The rest of the review of systems negative.     PHYSICAL EXAMINATION:  VITAL SIGNS:  Blood pressure is 146/100, pulse 86, weight is 77.8 Kg, from 80.7 Kg  GENERAL:  She is cooperative, pleasant.   Ambulating by herself, in no acute distress.    HEENT:  Mucous membranes moist.  NECK:  No JVD.  HEART:  Regular rate and rhythm, S1 and S2 audible.  No rubs.  CHEST:  Clear to auscultation.  No rales or wheezes.  Breathing symmetric, unlabored.  EXTREMITIES: no edema.    Labs reviewed: labs pending todaty  Prior BMP reveiwed  Lab Results   Component Value Date     05/29/2020    K 4.0 05/29/2020     05/29/2020    CO2 24 05/29/2020    BUN 10 05/29/2020    CREATININE 0.8 05/29/2020    CALCIUM 9.1 05/29/2020    ANIONGAP 11 05/29/2020    ESTGFRAFRICA >60 05/29/2020    EGFRNONAA >60 05/29/2020     Lab Results   Component Value Date    WBC 12.42 09/21/2019    HGB 10.3 (L) 09/21/2019    HCT 30.2 (L) 09/21/2019    MCV 85 09/21/2019     09/21/2019        Urine prot/cr ratio is 0.14 g, was in 3rd trimester 4.7 g      ASSESSMENT AND PLAN:  This is a 34-year-old female with polycystic kidney disease presents for followup after delivery at 35 weeks of gestation:   The impression is as follows:     1.  Renal. Normal s Cr on last labs. Results pending toda  Has had stable and normal  renal function  K normal  Nephrotic syndrome in 3rd trimester of pregnancy due to preeclampsia. Has now resolved  Normal u/a     2. H/o of PCKD  Long-term treatment with Jynarque/Tolvaptan AFTER pregnancy was again discussed with pt  Pt has been given printed literature to read  Pt is undecided and thinks she may have another baby, wants to postpone decision until later  Encouraged pt to read about PCKD and Jynarque and join pt support groups on line     3.  Hypertension.  Blood pressure is still not controlled   Will increase losartan from 25 to 50 mg po qd  Asked pt to monitor BP at home and contact me with results     4. Pregnancy: s/p delivery at 35 weeks of gestation in Sept 2019  S/p IVF using egg from partner. Will not pass on PCKD gene     PLAN AND RECOMMENDATION:  As detailed above.    Opportunity for question and discussion provided.  Total time spent 25 minutes including time needed to review the records, the   patient evaluation,this  documentation, discussion with the patient,   more than 50% of the time was spent on coordination of care and counseling.    Level IV visit.  RTC 6 months or sooner if needed.     Malvin Nagy MD

## 2020-09-21 DIAGNOSIS — Z30.41 ENCOUNTER FOR SURVEILLANCE OF CONTRACEPTIVE PILLS: ICD-10-CM

## 2020-09-21 RX ORDER — LEVONORGESTREL / ETHINYL ESTRADIOL AND ETHINYL ESTRADIOL 150-30(84)
1 KIT ORAL DAILY
Qty: 91 EACH | Refills: 0 | Status: SHIPPED | OUTPATIENT
Start: 2020-09-21 | End: 2021-02-24 | Stop reason: SDUPTHER

## 2020-09-28 ENCOUNTER — TELEPHONE (OUTPATIENT)
Dept: OTOLARYNGOLOGY | Facility: CLINIC | Age: 35
End: 2020-09-28

## 2020-10-13 ENCOUNTER — OFFICE VISIT (OUTPATIENT)
Dept: OTOLARYNGOLOGY | Facility: CLINIC | Age: 35
End: 2020-10-13
Payer: COMMERCIAL

## 2020-10-13 VITALS
WEIGHT: 172.63 LBS | SYSTOLIC BLOOD PRESSURE: 130 MMHG | TEMPERATURE: 98 F | DIASTOLIC BLOOD PRESSURE: 92 MMHG | HEART RATE: 100 BPM | BODY MASS INDEX: 31.57 KG/M2

## 2020-10-13 DIAGNOSIS — J30.89 ALLERGIC RHINITIS DUE TO DUST: ICD-10-CM

## 2020-10-13 DIAGNOSIS — J30.1 NON-SEASONAL ALLERGIC RHINITIS DUE TO POLLEN: Primary | ICD-10-CM

## 2020-10-13 DIAGNOSIS — J30.89 ALLERGIC RHINITIS CAUSED BY MOLD: ICD-10-CM

## 2020-10-13 PROCEDURE — 3008F PR BODY MASS INDEX (BMI) DOCUMENTED: ICD-10-PCS | Mod: CPTII,S$GLB,, | Performed by: ORTHOPAEDIC SURGERY

## 2020-10-13 PROCEDURE — 99999 PR PBB SHADOW E&M-EST. PATIENT-LVL III: ICD-10-PCS | Mod: PBBFAC,,, | Performed by: ORTHOPAEDIC SURGERY

## 2020-10-13 PROCEDURE — 99999 PR PBB SHADOW E&M-EST. PATIENT-LVL III: CPT | Mod: PBBFAC,,, | Performed by: ORTHOPAEDIC SURGERY

## 2020-10-13 PROCEDURE — 99214 PR OFFICE/OUTPT VISIT, EST, LEVL IV, 30-39 MIN: ICD-10-PCS | Mod: S$GLB,,, | Performed by: ORTHOPAEDIC SURGERY

## 2020-10-13 PROCEDURE — 3008F BODY MASS INDEX DOCD: CPT | Mod: CPTII,S$GLB,, | Performed by: ORTHOPAEDIC SURGERY

## 2020-10-13 PROCEDURE — 99214 OFFICE O/P EST MOD 30 MIN: CPT | Mod: S$GLB,,, | Performed by: ORTHOPAEDIC SURGERY

## 2020-10-13 NOTE — PROGRESS NOTES
Subjective:      Patient ID: Samanta Espinoza is a 35 y.o. female.    Chief Complaint: Sinus Problem (congestion) and Allergies    Patient is a very pleasant 35 year old female here today for evaluation of chronic sinusitis and allergies.  She has a long history of FESS in 2015 and was on SCIT for several years until 2016 when she moved, has now come back.  She did have COVID in May.  Now, she is again having facial pain and pressure, midface and over nasal dorsum.  She has not required antibiotics in over a year for sinusitis.  Currently, she is taking Zyrtec or Xyzal for allergies.  She is not on any nasal sprays at this time, cannot tolerate taste of Astelin.  She did find that SCIT helped her symptoms, no significant side effects.        Review of Systems   Constitutional: Negative for chills, fatigue, fever and unexpected weight change.   HENT: Positive for congestion, postnasal drip, rhinorrhea and sinus pressure. Negative for dental problem, ear discharge, ear pain, facial swelling, hearing loss, nosebleeds, sneezing, sore throat, tinnitus, trouble swallowing and voice change.    Eyes: Negative for redness, itching and visual disturbance.   Respiratory: Negative for cough, choking, shortness of breath and wheezing.    Cardiovascular: Negative for chest pain and palpitations.   Gastrointestinal: Negative for abdominal pain.        No reflux.   Musculoskeletal: Negative for gait problem.   Skin: Negative for rash.   Allergic/Immunologic: Positive for environmental allergies.   Neurological: Negative for dizziness, light-headedness and headaches.       Objective:       Physical Exam  Constitutional:       General: She is not in acute distress.     Appearance: She is well-developed.   HENT:      Head: Normocephalic and atraumatic.      Right Ear: Tympanic membrane, ear canal and external ear normal.      Left Ear: Tympanic membrane, ear canal and external ear normal.      Nose: Nose normal. No nasal  deformity, septal deviation, mucosal edema or rhinorrhea.      Right Sinus: No maxillary sinus tenderness or frontal sinus tenderness.      Left Sinus: No maxillary sinus tenderness or frontal sinus tenderness.      Comments: Dry nasal secretions, right > left     Mouth/Throat:      Mouth: Mucous membranes are not pale and not dry.      Dentition: No dental caries.      Pharynx: Uvula midline. No oropharyngeal exudate or posterior oropharyngeal erythema.   Eyes:      General: Lids are normal. No scleral icterus.     Extraocular Movements:      Right eye: Normal extraocular motion and no nystagmus.      Left eye: Normal extraocular motion and no nystagmus.      Conjunctiva/sclera: Conjunctivae normal.      Right eye: Right conjunctiva is not injected. No chemosis.     Left eye: Left conjunctiva is not injected. No chemosis.     Pupils: Pupils are equal, round, and reactive to light.   Neck:      Thyroid: No thyroid mass or thyromegaly.      Trachea: Trachea and phonation normal. No tracheal tenderness or tracheal deviation.      Comments: Scar from previous lymph node dissection well healed, no LAD  Pulmonary:      Effort: Pulmonary effort is normal. No respiratory distress.      Breath sounds: No stridor.   Abdominal:      General: There is no distension.   Lymphadenopathy:      Head:      Right side of head: No submental, submandibular, preauricular, posterior auricular or occipital adenopathy.      Left side of head: No submental, submandibular, preauricular, posterior auricular or occipital adenopathy.      Cervical: No cervical adenopathy.   Skin:     General: Skin is warm and dry.      Findings: No erythema or rash.   Neurological:      Mental Status: She is alert and oriented to person, place, and time.      Cranial Nerves: No cranial nerve deficit.   Psychiatric:         Behavior: Behavior normal.         Assessment:       1. Non-seasonal allergic rhinitis due to pollen    2. Allergic rhinitis due to dust     3. Allergic rhinitis caused by mold        Plan:     Non-seasonal allergic rhinitis due to pollen    Allergic rhinitis due to dust    Allergic rhinitis caused by mold    Patient's previous allergy testing was reviewed in great detail.  We discussed that continued use of allergy medications, both oral and intranasal spray, as well as lifestyle and home modifications specific to their allergies remains a viable option.  However, if we are unable to achieve adequate symptom control, I would then consider specific immunotherapy.  We had a long discussion regarding immunotherapy, both sublingual and subcutaneous.  Specifically, we discussed that subcutaneous requires weekly injections and does have a small but real risk of anaphylaxis, approximately 1:1-2 million.  Sublingual is administered at home, and while it does have multiple studies documenting both safety and efficacy, it is not FDA approved.  Either treatment required a commitment to generally 2-3 years of therapy.  The patient has considered these options, and would like to proceed with SLIT.  RTC to see me 6 months to review progress.

## 2020-10-14 NOTE — TELEPHONE ENCOUNTER
Patient:  Samanta Espinoza MRN:  [id number] :  1985  Ordering Physician:  Kiya Leal MD  SLIT formulation - according to MQT Results  RED VIAL:  Sublingually as directed   Bald [place] 1:80 w/v 1mL   [place] Birch 1:20 w/v 1mL   Volod[place] Grass 1:20 w/v 1mL   [place] Grass 10,000 BAU/mL 1mL   Cockroach  1:20 w/v 1mL   Cat Hair 10,000 BAU / mL 1mL   Dog 1:20 w/v 1mL   Central / [place] Tree Mix 1:20 w/v 1mL   Mold Mix #1 1:20 w/v 1mL   National Weed Mix 1:20 w/v 1mL   Grass Mix GS7 10,000 BAU / mL 1mL   Standardized Mite Mix 5,000 AU / mL each 1mL   GREEN VIAL FORMULATION: sublingually as directed   Volume added from RED Maintenance vial  0.25 mL   50% Glycerin  1mL

## 2020-11-03 RX ORDER — SERTRALINE HYDROCHLORIDE 50 MG/1
50 TABLET, FILM COATED ORAL DAILY
Qty: 30 TABLET | Refills: 11 | Status: SHIPPED | OUTPATIENT
Start: 2020-11-03 | End: 2021-11-15 | Stop reason: SDUPTHER

## 2020-11-05 ENCOUNTER — CLINICAL SUPPORT (OUTPATIENT)
Dept: OTOLARYNGOLOGY | Facility: CLINIC | Age: 35
End: 2020-11-05

## 2020-11-05 DIAGNOSIS — J30.9 ALLERGIC RHINITIS, UNSPECIFIED SEASONALITY, UNSPECIFIED TRIGGER: ICD-10-CM

## 2020-11-05 PROCEDURE — 95199 UNLISTED ALL/IMMLG SVC/PX: CPT | Mod: CSM,S$GLB,, | Performed by: ORTHOPAEDIC SURGERY

## 2020-11-05 PROCEDURE — 95199 PR SUBLINGUAL IMMUNOTHERAPY: ICD-10-PCS | Mod: CSM,S$GLB,, | Performed by: ORTHOPAEDIC SURGERY

## 2020-11-05 PROCEDURE — 99499 NO LOS: ICD-10-PCS | Mod: S$GLB,,, | Performed by: ORTHOPAEDIC SURGERY

## 2020-11-05 PROCEDURE — 99499 UNLISTED E&M SERVICE: CPT | Mod: S$GLB,,, | Performed by: ORTHOPAEDIC SURGERY

## 2020-11-05 RX ORDER — EPINEPHRINE 0.3 MG/.3ML
1 INJECTION SUBCUTANEOUS ONCE
Qty: 4 DEVICE | Refills: 0 | Status: SHIPPED | OUTPATIENT
Start: 2020-11-05 | End: 2021-02-24

## 2020-11-05 NOTE — PROGRESS NOTES
Patient was given verbal and written instructions on how to use the sublingual build- up vials.  Patient received first dose in the clinic and waited 20 minutes patient did not have a reaction. Patient had no further questions and will call the clinic when she is ready for her next maintenance vial.    Patient:  Samanta Espinoza MRN:  [id number] :  1985  Ordering Physician:  Kiya Leal MD  SLIT formulation - according to MQT Results         RED VIAL:  Sublingually as directed   Bald [place] 1:80 w/v 1mL   [place] Birch 1:20 w/v 1mL   Volodymyr Grass 1:20 w/v 1mL   [place] Grass 10,000 BAU/mL 1mL   Cockroach  1:20 w/v 1mL   Cat Hair 10,000 BAU / mL 1mL   Dog 1:20 w/v 1mL   Central / [place] Tree Mix 1:20 w/v 1mL   Mold Mix #1 1:20 w/v 1mL   National Weed Mix 1:20 w/v 1mL   Grass Mix GS7 10,000 BAU / mL 1mL   Standardized Mite Mix 5,000 AU / mL each 1mL       GREEN VIAL FORMULATION: sublingually as directed   Volume added from RED Maintenance vial  0.25 mL   50% Glycerin  1mL

## 2020-11-05 NOTE — TELEPHONE ENCOUNTER
Patient:  Samanta Espinoza MRN:  [id number] :  1985  Ordering Physician:  iKya Leal MD  SLIT formulation - according to MQT Results         RED VIAL:  Sublingually as directed   Bald [place] 1:80 w/v 1mL   [place] Birch 1:20 w/v 1mL   Volodymyr Grass 1:20 w/v 1mL   [place] Grass 10,000 BAU/mL 1mL   Cockroach  1:20 w/v 1mL   Cat Hair 10,000 BAU / mL 1mL   Dog 1:20 w/v 1mL   Central / [place] Tree Mix 1:20 w/v 1mL   Mold Mix #1 1:20 w/v 1mL   National Weed Mix 1:20 w/v 1mL   Grass Mix GS7 10,000 BAU / mL 1mL   Standardized Mite Mix 5,000 AU / mL each 1mL       GREEN VIAL FORMULATION: sublingually as directed   Volume added from RED Maintenance vial  0.25 mL   50% Glycerin  1mL

## 2020-11-28 ENCOUNTER — OFFICE VISIT (OUTPATIENT)
Dept: OPHTHALMOLOGY | Facility: CLINIC | Age: 35
End: 2020-11-28
Payer: COMMERCIAL

## 2020-11-28 DIAGNOSIS — H50.52 EXOPHORIA OF BOTH EYES: ICD-10-CM

## 2020-11-28 DIAGNOSIS — H52.223 REGULAR ASTIGMATISM OF BOTH EYES: Primary | ICD-10-CM

## 2020-11-28 PROCEDURE — 99499 UNLISTED E&M SERVICE: CPT | Mod: S$GLB,,, | Performed by: OPTOMETRIST

## 2020-11-28 PROCEDURE — 99999 PR PBB SHADOW E&M-EST. PATIENT-LVL I: ICD-10-PCS | Mod: PBBFAC,,, | Performed by: OPTOMETRIST

## 2020-11-28 PROCEDURE — 99499 NO LOS: ICD-10-PCS | Mod: S$GLB,,, | Performed by: OPTOMETRIST

## 2020-11-28 PROCEDURE — 99999 PR PBB SHADOW E&M-EST. PATIENT-LVL I: CPT | Mod: PBBFAC,,, | Performed by: OPTOMETRIST

## 2020-11-28 NOTE — PROGRESS NOTES
Assessment /Plan     For exam results, see Encounter Report.    Regular astigmatism of both eyes    Exophoria of both eyes      Eyeglass Final Rx     Eyeglass Final Rx       Sphere Cylinder Axis Add    Right -0.50 +0.75 175 +1.00    Left -0.50 +0.50 015 +1.00    Type: PAL    Comments: Pd: 66/64          Eyeglass Final Rx #2       Sphere Cylinder Axis Add    Right +0.50 +0.75 175     Left +0.50 +0.50 015     Type: SVL    Expiration Date: 11/29/2021    Comments: Pd: 66/64              Exo Near>Dist suggestive of CI, but symptoms resolved with +1.00 over manifest in trial frame    RTC 1 yr for undilated eye exam or PRN if any problems.   Discussed above and answered questions.

## 2020-12-19 ENCOUNTER — IMMUNIZATION (OUTPATIENT)
Dept: INTERNAL MEDICINE | Facility: CLINIC | Age: 35
End: 2020-12-19

## 2020-12-19 DIAGNOSIS — Z23 NEED FOR VACCINATION: ICD-10-CM

## 2020-12-19 PROCEDURE — 91300 COVID-19, MRNA, LNP-S, PF, 30 MCG/0.3 ML DOSE VACCINE: CPT | Mod: S$GLB,,, | Performed by: FAMILY MEDICINE

## 2020-12-19 PROCEDURE — 0001A COVID-19, MRNA, LNP-S, PF, 30 MCG/0.3 ML DOSE VACCINE: CPT | Mod: CV19,S$GLB,, | Performed by: FAMILY MEDICINE

## 2020-12-19 PROCEDURE — 91300 COVID-19, MRNA, LNP-S, PF, 30 MCG/0.3 ML DOSE VACCINE: ICD-10-PCS | Mod: S$GLB,,, | Performed by: FAMILY MEDICINE

## 2020-12-19 PROCEDURE — 0001A COVID-19, MRNA, LNP-S, PF, 30 MCG/0.3 ML DOSE VACCINE: ICD-10-PCS | Mod: CV19,S$GLB,, | Performed by: FAMILY MEDICINE

## 2020-12-20 DIAGNOSIS — Z30.41 ENCOUNTER FOR SURVEILLANCE OF CONTRACEPTIVE PILLS: ICD-10-CM

## 2020-12-22 RX ORDER — LEVONORGESTREL / ETHINYL ESTRADIOL AND ETHINYL ESTRADIOL 150-30(84)
1 KIT ORAL DAILY
Qty: 91 EACH | Refills: 0 | OUTPATIENT
Start: 2020-12-22 | End: 2021-12-22

## 2020-12-22 NOTE — TELEPHONE ENCOUNTER
S/w patient. Advised medication cannot be refilled until patient comes in for an appointment, per provider. Patient voiced understanding and states she will schedule a follow up appointment through her portal.

## 2021-01-09 ENCOUNTER — IMMUNIZATION (OUTPATIENT)
Dept: INTERNAL MEDICINE | Facility: CLINIC | Age: 36
End: 2021-01-09
Payer: COMMERCIAL

## 2021-01-09 DIAGNOSIS — Z23 NEED FOR VACCINATION: ICD-10-CM

## 2021-01-09 PROCEDURE — 0002A COVID-19, MRNA, LNP-S, PF, 30 MCG/0.3 ML DOSE VACCINE: CPT | Mod: PBBFAC | Performed by: FAMILY MEDICINE

## 2021-01-09 PROCEDURE — 91300 COVID-19, MRNA, LNP-S, PF, 30 MCG/0.3 ML DOSE VACCINE: CPT | Mod: PBBFAC | Performed by: FAMILY MEDICINE

## 2021-01-18 ENCOUNTER — PATIENT MESSAGE (OUTPATIENT)
Dept: OBSTETRICS AND GYNECOLOGY | Facility: CLINIC | Age: 36
End: 2021-01-18

## 2021-01-19 DIAGNOSIS — Z30.41 ENCOUNTER FOR SURVEILLANCE OF CONTRACEPTIVE PILLS: ICD-10-CM

## 2021-01-19 RX ORDER — LEVONORGESTREL / ETHINYL ESTRADIOL AND ETHINYL ESTRADIOL 150-30(84)
1 KIT ORAL DAILY
Qty: 91 EACH | Refills: 0 | OUTPATIENT
Start: 2021-01-19 | End: 2022-01-19

## 2021-02-24 ENCOUNTER — OFFICE VISIT (OUTPATIENT)
Dept: OBSTETRICS AND GYNECOLOGY | Facility: CLINIC | Age: 36
End: 2021-02-24
Payer: COMMERCIAL

## 2021-02-24 VITALS
HEIGHT: 62 IN | DIASTOLIC BLOOD PRESSURE: 89 MMHG | BODY MASS INDEX: 34.56 KG/M2 | SYSTOLIC BLOOD PRESSURE: 134 MMHG | WEIGHT: 187.81 LBS

## 2021-02-24 DIAGNOSIS — Z12.4 PAP SMEAR FOR CERVICAL CANCER SCREENING: ICD-10-CM

## 2021-02-24 DIAGNOSIS — Z30.41 ENCOUNTER FOR SURVEILLANCE OF CONTRACEPTIVE PILLS: Primary | ICD-10-CM

## 2021-02-24 PROCEDURE — 99999 PR PBB SHADOW E&M-EST. PATIENT-LVL III: CPT | Mod: PBBFAC,,, | Performed by: OBSTETRICS & GYNECOLOGY

## 2021-02-24 PROCEDURE — 3008F BODY MASS INDEX DOCD: CPT | Mod: CPTII,S$GLB,, | Performed by: OBSTETRICS & GYNECOLOGY

## 2021-02-24 PROCEDURE — 1126F AMNT PAIN NOTED NONE PRSNT: CPT | Mod: S$GLB,,, | Performed by: OBSTETRICS & GYNECOLOGY

## 2021-02-24 PROCEDURE — 88175 CYTOPATH C/V AUTO FLUID REDO: CPT

## 2021-02-24 PROCEDURE — 1126F PR PAIN SEVERITY QUANTIFIED, NO PAIN PRESENT: ICD-10-PCS | Mod: S$GLB,,, | Performed by: OBSTETRICS & GYNECOLOGY

## 2021-02-24 PROCEDURE — 87624 HPV HI-RISK TYP POOLED RSLT: CPT

## 2021-02-24 PROCEDURE — 99395 PR PREVENTIVE VISIT,EST,18-39: ICD-10-PCS | Mod: S$GLB,,, | Performed by: OBSTETRICS & GYNECOLOGY

## 2021-02-24 PROCEDURE — 3008F PR BODY MASS INDEX (BMI) DOCUMENTED: ICD-10-PCS | Mod: CPTII,S$GLB,, | Performed by: OBSTETRICS & GYNECOLOGY

## 2021-02-24 PROCEDURE — 99395 PREV VISIT EST AGE 18-39: CPT | Mod: S$GLB,,, | Performed by: OBSTETRICS & GYNECOLOGY

## 2021-02-24 PROCEDURE — 99999 PR PBB SHADOW E&M-EST. PATIENT-LVL III: ICD-10-PCS | Mod: PBBFAC,,, | Performed by: OBSTETRICS & GYNECOLOGY

## 2021-02-24 RX ORDER — LEVONORGESTREL / ETHINYL ESTRADIOL AND ETHINYL ESTRADIOL 150-30(84)
1 KIT ORAL DAILY
Qty: 91 EACH | Refills: 4 | Status: SHIPPED | OUTPATIENT
Start: 2021-02-24 | End: 2022-05-11

## 2021-02-25 ENCOUNTER — OFFICE VISIT (OUTPATIENT)
Dept: ORTHOPEDICS | Facility: CLINIC | Age: 36
End: 2021-02-25
Payer: COMMERCIAL

## 2021-02-25 ENCOUNTER — PATIENT MESSAGE (OUTPATIENT)
Dept: ORTHOPEDICS | Facility: CLINIC | Age: 36
End: 2021-02-25

## 2021-02-25 VITALS
BODY MASS INDEX: 34.56 KG/M2 | HEIGHT: 62 IN | HEART RATE: 85 BPM | WEIGHT: 187.81 LBS | DIASTOLIC BLOOD PRESSURE: 95 MMHG | SYSTOLIC BLOOD PRESSURE: 133 MMHG

## 2021-02-25 DIAGNOSIS — M65.4 DE QUERVAIN'S TENOSYNOVITIS, RIGHT: Primary | ICD-10-CM

## 2021-02-25 PROCEDURE — 20550 TENDON SHEATH: ICD-10-PCS | Mod: RT,S$GLB,, | Performed by: FAMILY MEDICINE

## 2021-02-25 PROCEDURE — 20550 NJX 1 TENDON SHEATH/LIGAMENT: CPT | Mod: RT,S$GLB,, | Performed by: FAMILY MEDICINE

## 2021-02-25 PROCEDURE — 99999 PR PBB SHADOW E&M-EST. PATIENT-LVL III: ICD-10-PCS | Mod: PBBFAC,,, | Performed by: FAMILY MEDICINE

## 2021-02-25 PROCEDURE — 1125F AMNT PAIN NOTED PAIN PRSNT: CPT | Mod: S$GLB,,, | Performed by: FAMILY MEDICINE

## 2021-02-25 PROCEDURE — 99213 OFFICE O/P EST LOW 20 MIN: CPT | Mod: 25,S$GLB,, | Performed by: FAMILY MEDICINE

## 2021-02-25 PROCEDURE — 3008F PR BODY MASS INDEX (BMI) DOCUMENTED: ICD-10-PCS | Mod: CPTII,S$GLB,, | Performed by: FAMILY MEDICINE

## 2021-02-25 PROCEDURE — 1125F PR PAIN SEVERITY QUANTIFIED, PAIN PRESENT: ICD-10-PCS | Mod: S$GLB,,, | Performed by: FAMILY MEDICINE

## 2021-02-25 PROCEDURE — 99999 PR PBB SHADOW E&M-EST. PATIENT-LVL III: CPT | Mod: PBBFAC,,, | Performed by: FAMILY MEDICINE

## 2021-02-25 PROCEDURE — 99213 PR OFFICE/OUTPT VISIT, EST, LEVL III, 20-29 MIN: ICD-10-PCS | Mod: 25,S$GLB,, | Performed by: FAMILY MEDICINE

## 2021-02-25 PROCEDURE — 3008F BODY MASS INDEX DOCD: CPT | Mod: CPTII,S$GLB,, | Performed by: FAMILY MEDICINE

## 2021-02-25 RX ORDER — BETAMETHASONE SODIUM PHOSPHATE AND BETAMETHASONE ACETATE 3; 3 MG/ML; MG/ML
6 INJECTION, SUSPENSION INTRA-ARTICULAR; INTRALESIONAL; INTRAMUSCULAR; SOFT TISSUE
Status: DISCONTINUED | OUTPATIENT
Start: 2021-02-25 | End: 2021-02-25 | Stop reason: HOSPADM

## 2021-02-25 RX ADMIN — BETAMETHASONE SODIUM PHOSPHATE AND BETAMETHASONE ACETATE 6 MG: 3; 3 INJECTION, SUSPENSION INTRA-ARTICULAR; INTRALESIONAL; INTRAMUSCULAR; SOFT TISSUE at 11:02

## 2021-03-02 ENCOUNTER — OFFICE VISIT (OUTPATIENT)
Dept: DERMATOLOGY | Facility: CLINIC | Age: 36
End: 2021-03-02
Payer: COMMERCIAL

## 2021-03-02 DIAGNOSIS — L90.5 SCAR CONDITIONS/SKIN FIBROSIS: Primary | ICD-10-CM

## 2021-03-02 DIAGNOSIS — Z85.820 HISTORY OF MELANOMA: ICD-10-CM

## 2021-03-02 DIAGNOSIS — D48.5 NEOPLASM OF UNCERTAIN BEHAVIOR OF SKIN: ICD-10-CM

## 2021-03-02 DIAGNOSIS — D22.9 MULTIPLE NEVI: ICD-10-CM

## 2021-03-02 DIAGNOSIS — Z86.018 HISTORY OF DYSPLASTIC NEVUS: ICD-10-CM

## 2021-03-02 DIAGNOSIS — Z12.83 SCREENING, MALIGNANT NEOPLASM, SKIN: ICD-10-CM

## 2021-03-02 PROCEDURE — 1126F AMNT PAIN NOTED NONE PRSNT: CPT | Mod: S$GLB,,, | Performed by: DERMATOLOGY

## 2021-03-02 PROCEDURE — 88342 IMHCHEM/IMCYTCHM 1ST ANTB: CPT | Mod: 26,,, | Performed by: PATHOLOGY

## 2021-03-02 PROCEDURE — 11102 PR TANGENTIAL BIOPSY, SKIN, SINGLE LESION: ICD-10-PCS | Mod: S$GLB,,, | Performed by: DERMATOLOGY

## 2021-03-02 PROCEDURE — 99213 PR OFFICE/OUTPT VISIT, EST, LEVL III, 20-29 MIN: ICD-10-PCS | Mod: 25,S$GLB,, | Performed by: DERMATOLOGY

## 2021-03-02 PROCEDURE — 88342 CHG IMMUNOCYTOCHEMISTRY: ICD-10-PCS | Mod: 26,,, | Performed by: PATHOLOGY

## 2021-03-02 PROCEDURE — 88305 TISSUE EXAM BY PATHOLOGIST: ICD-10-PCS | Mod: 26,,, | Performed by: PATHOLOGY

## 2021-03-02 PROCEDURE — 11102 TANGNTL BX SKIN SINGLE LES: CPT | Mod: S$GLB,,, | Performed by: DERMATOLOGY

## 2021-03-02 PROCEDURE — 99213 OFFICE O/P EST LOW 20 MIN: CPT | Mod: 25,S$GLB,, | Performed by: DERMATOLOGY

## 2021-03-02 PROCEDURE — 99999 PR PBB SHADOW E&M-EST. PATIENT-LVL III: CPT | Mod: PBBFAC,,, | Performed by: DERMATOLOGY

## 2021-03-02 PROCEDURE — 1126F PR PAIN SEVERITY QUANTIFIED, NO PAIN PRESENT: ICD-10-PCS | Mod: S$GLB,,, | Performed by: DERMATOLOGY

## 2021-03-02 PROCEDURE — 88305 TISSUE EXAM BY PATHOLOGIST: CPT | Performed by: PATHOLOGY

## 2021-03-02 PROCEDURE — 88305 TISSUE EXAM BY PATHOLOGIST: CPT | Mod: 26,,, | Performed by: PATHOLOGY

## 2021-03-02 PROCEDURE — 99999 PR PBB SHADOW E&M-EST. PATIENT-LVL III: ICD-10-PCS | Mod: PBBFAC,,, | Performed by: DERMATOLOGY

## 2021-03-02 PROCEDURE — 88342 IMHCHEM/IMCYTCHM 1ST ANTB: CPT | Performed by: PATHOLOGY

## 2021-03-04 LAB
CLINICAL INFO: NORMAL
CYTO CVX: NORMAL
CYTOLOGIST CVX/VAG CYTO: NORMAL
CYTOLOGY CMNT CVX/VAG CYTO-IMP: NORMAL
CYTOLOGY PAP THIN PREP EXPLANATION: NORMAL
DATE OF PREVIOUS PAP: NORMAL
DATE PREVIOUS BX: NO
HPV I/H RISK 4 DNA CVX QL NAA+PROBE: NOT DETECTED
LMP START DATE: NORMAL
SPECIMEN SOURCE CVX/VAG CYTO: NORMAL
STAT OF ADQ CVX/VAG CYTO-IMP: NORMAL

## 2021-03-08 LAB
FINAL PATHOLOGIC DIAGNOSIS: NORMAL
GROSS: NORMAL
MICROSCOPIC EXAM: NORMAL

## 2021-03-29 ENCOUNTER — PATIENT MESSAGE (OUTPATIENT)
Dept: NEPHROLOGY | Facility: CLINIC | Age: 36
End: 2021-03-29

## 2021-03-30 ENCOUNTER — LAB VISIT (OUTPATIENT)
Dept: LAB | Facility: HOSPITAL | Age: 36
End: 2021-03-30
Attending: INTERNAL MEDICINE
Payer: COMMERCIAL

## 2021-03-30 DIAGNOSIS — I10 ESSENTIAL HYPERTENSION: ICD-10-CM

## 2021-03-30 LAB
ALBUMIN SERPL BCP-MCNC: 3.6 G/DL (ref 3.5–5.2)
ANION GAP SERPL CALC-SCNC: 9 MMOL/L (ref 8–16)
BUN SERPL-MCNC: 12 MG/DL (ref 6–20)
CALCIUM SERPL-MCNC: 9.1 MG/DL (ref 8.7–10.5)
CHLORIDE SERPL-SCNC: 104 MMOL/L (ref 95–110)
CO2 SERPL-SCNC: 26 MMOL/L (ref 23–29)
CREAT SERPL-MCNC: 0.8 MG/DL (ref 0.5–1.4)
EST. GFR  (AFRICAN AMERICAN): >60 ML/MIN/1.73 M^2
EST. GFR  (NON AFRICAN AMERICAN): >60 ML/MIN/1.73 M^2
GLUCOSE SERPL-MCNC: 100 MG/DL (ref 70–110)
PHOSPHATE SERPL-MCNC: 2.4 MG/DL (ref 2.7–4.5)
POTASSIUM SERPL-SCNC: 3.8 MMOL/L (ref 3.5–5.1)
SODIUM SERPL-SCNC: 139 MMOL/L (ref 136–145)

## 2021-03-30 PROCEDURE — 80069 RENAL FUNCTION PANEL: CPT | Performed by: INTERNAL MEDICINE

## 2021-03-30 PROCEDURE — 36415 COLL VENOUS BLD VENIPUNCTURE: CPT | Performed by: INTERNAL MEDICINE

## 2021-03-31 ENCOUNTER — OFFICE VISIT (OUTPATIENT)
Dept: NEPHROLOGY | Facility: CLINIC | Age: 36
End: 2021-03-31
Payer: COMMERCIAL

## 2021-03-31 DIAGNOSIS — Q61.19 AUTOSOMAL RECESSIVE POLYCYSTIC KIDNEYS: Primary | ICD-10-CM

## 2021-03-31 DIAGNOSIS — E78.1 HYPERTRIGLYCERIDEMIA: ICD-10-CM

## 2021-03-31 DIAGNOSIS — Q61.3 POLYCYSTIC KIDNEY DISEASE: Primary | ICD-10-CM

## 2021-03-31 PROCEDURE — 99214 OFFICE O/P EST MOD 30 MIN: CPT | Mod: 95,,, | Performed by: INTERNAL MEDICINE

## 2021-03-31 PROCEDURE — 99214 PR OFFICE/OUTPT VISIT, EST, LEVL IV, 30-39 MIN: ICD-10-PCS | Mod: 95,,, | Performed by: INTERNAL MEDICINE

## 2021-03-31 RX ORDER — METHYLDOPA 250 MG/1
250 TABLET, FILM COATED ORAL EVERY 12 HOURS
Qty: 60 TABLET | Refills: 11 | Status: SHIPPED | OUTPATIENT
Start: 2021-03-31 | End: 2021-06-03

## 2021-04-06 ENCOUNTER — PATIENT MESSAGE (OUTPATIENT)
Dept: OBSTETRICS AND GYNECOLOGY | Facility: CLINIC | Age: 36
End: 2021-04-06

## 2021-04-08 ENCOUNTER — PATIENT MESSAGE (OUTPATIENT)
Dept: NEPHROLOGY | Facility: CLINIC | Age: 36
End: 2021-04-08

## 2021-04-12 RX ORDER — NIFEDIPINE 30 MG/1
30 TABLET, EXTENDED RELEASE ORAL DAILY
Qty: 30 TABLET | Refills: 11 | Status: SHIPPED | OUTPATIENT
Start: 2021-04-12 | End: 2021-10-20

## 2021-05-06 ENCOUNTER — TELEPHONE (OUTPATIENT)
Dept: OPHTHALMOLOGY | Facility: CLINIC | Age: 36
End: 2021-05-06

## 2021-05-10 ENCOUNTER — PATIENT OUTREACH (OUTPATIENT)
Dept: ADMINISTRATIVE | Facility: HOSPITAL | Age: 36
End: 2021-05-10

## 2021-05-11 ENCOUNTER — PATIENT OUTREACH (OUTPATIENT)
Dept: ADMINISTRATIVE | Facility: HOSPITAL | Age: 36
End: 2021-05-11

## 2021-06-03 ENCOUNTER — OFFICE VISIT (OUTPATIENT)
Dept: INTERNAL MEDICINE | Facility: CLINIC | Age: 36
End: 2021-06-03
Payer: COMMERCIAL

## 2021-06-03 VITALS
HEIGHT: 62 IN | OXYGEN SATURATION: 97 % | BODY MASS INDEX: 33.92 KG/M2 | DIASTOLIC BLOOD PRESSURE: 64 MMHG | HEART RATE: 106 BPM | SYSTOLIC BLOOD PRESSURE: 126 MMHG | TEMPERATURE: 98 F | WEIGHT: 184.31 LBS

## 2021-06-03 DIAGNOSIS — Z00.00 ROUTINE GENERAL MEDICAL EXAMINATION AT A HEALTH CARE FACILITY: Primary | ICD-10-CM

## 2021-06-03 DIAGNOSIS — G43.009 MIGRAINE WITHOUT AURA AND WITHOUT STATUS MIGRAINOSUS, NOT INTRACTABLE: ICD-10-CM

## 2021-06-03 DIAGNOSIS — F41.9 ANXIETY: ICD-10-CM

## 2021-06-03 DIAGNOSIS — J32.0 CHRONIC MAXILLARY SINUSITIS: ICD-10-CM

## 2021-06-03 DIAGNOSIS — K21.9 GASTROESOPHAGEAL REFLUX DISEASE: ICD-10-CM

## 2021-06-03 DIAGNOSIS — E34.8 PINEAL GLAND CYST: ICD-10-CM

## 2021-06-03 DIAGNOSIS — K21.9 GASTROESOPHAGEAL REFLUX DISEASE, UNSPECIFIED WHETHER ESOPHAGITIS PRESENT: ICD-10-CM

## 2021-06-03 DIAGNOSIS — I10 ESSENTIAL HYPERTENSION: ICD-10-CM

## 2021-06-03 PROBLEM — N61.0 MASTITIS: Status: RESOLVED | Noted: 2019-10-29 | Resolved: 2021-06-03

## 2021-06-03 PROCEDURE — 1126F AMNT PAIN NOTED NONE PRSNT: CPT | Mod: S$GLB,,, | Performed by: INTERNAL MEDICINE

## 2021-06-03 PROCEDURE — 99395 PR PREVENTIVE VISIT,EST,18-39: ICD-10-PCS | Mod: S$GLB,,, | Performed by: INTERNAL MEDICINE

## 2021-06-03 PROCEDURE — 3008F PR BODY MASS INDEX (BMI) DOCUMENTED: ICD-10-PCS | Mod: CPTII,S$GLB,, | Performed by: INTERNAL MEDICINE

## 2021-06-03 PROCEDURE — 3008F BODY MASS INDEX DOCD: CPT | Mod: CPTII,S$GLB,, | Performed by: INTERNAL MEDICINE

## 2021-06-03 PROCEDURE — 99395 PREV VISIT EST AGE 18-39: CPT | Mod: S$GLB,,, | Performed by: INTERNAL MEDICINE

## 2021-06-03 PROCEDURE — 1126F PR PAIN SEVERITY QUANTIFIED, NO PAIN PRESENT: ICD-10-PCS | Mod: S$GLB,,, | Performed by: INTERNAL MEDICINE

## 2021-06-03 PROCEDURE — 99999 PR PBB SHADOW E&M-EST. PATIENT-LVL IV: ICD-10-PCS | Mod: PBBFAC,,, | Performed by: INTERNAL MEDICINE

## 2021-06-03 PROCEDURE — 99999 PR PBB SHADOW E&M-EST. PATIENT-LVL IV: CPT | Mod: PBBFAC,,, | Performed by: INTERNAL MEDICINE

## 2021-06-03 RX ORDER — OMEPRAZOLE 20 MG/1
20 CAPSULE, DELAYED RELEASE ORAL DAILY
Qty: 90 CAPSULE | Refills: 3 | Status: SHIPPED | OUTPATIENT
Start: 2021-06-03 | End: 2022-05-30 | Stop reason: SDUPTHER

## 2021-06-28 ENCOUNTER — TELEPHONE (OUTPATIENT)
Dept: NEPHROLOGY | Facility: CLINIC | Age: 36
End: 2021-06-28

## 2021-07-02 ENCOUNTER — HOSPITAL ENCOUNTER (OUTPATIENT)
Dept: RADIOLOGY | Facility: HOSPITAL | Age: 36
Discharge: HOME | End: 2021-07-02
Attending: INTERNAL MEDICINE
Payer: COMMERCIAL

## 2021-07-02 DIAGNOSIS — E34.8 PINEAL GLAND CYST: ICD-10-CM

## 2021-07-02 PROCEDURE — 70551 MRI BRAIN STEM W/O DYE: CPT | Mod: 26,,, | Performed by: RADIOLOGY

## 2021-07-02 PROCEDURE — 70551 MRI BRAIN WITHOUT CONTRAST: ICD-10-PCS | Mod: 26,,, | Performed by: RADIOLOGY

## 2021-07-02 PROCEDURE — 70551 MRI BRAIN STEM W/O DYE: CPT | Mod: TC

## 2021-07-09 ENCOUNTER — SPECIALTY PHARMACY (OUTPATIENT)
Dept: PHARMACY | Facility: CLINIC | Age: 36
End: 2021-07-09

## 2021-07-12 ENCOUNTER — PATIENT MESSAGE (OUTPATIENT)
Dept: OTOLARYNGOLOGY | Facility: CLINIC | Age: 36
End: 2021-07-12

## 2021-09-29 ENCOUNTER — IMMUNIZATION (OUTPATIENT)
Dept: PRIMARY CARE CLINIC | Facility: CLINIC | Age: 36
End: 2021-09-29
Payer: COMMERCIAL

## 2021-09-29 DIAGNOSIS — Z23 NEED FOR VACCINATION: Primary | ICD-10-CM

## 2021-09-29 PROCEDURE — 91300 COVID-19, MRNA, LNP-S, PF, 30 MCG/0.3 ML DOSE VACCINE: CPT | Mod: PBBFAC | Performed by: FAMILY MEDICINE

## 2021-09-29 PROCEDURE — 0003A COVID-19, MRNA, LNP-S, PF, 30 MCG/0.3 ML DOSE VACCINE: CPT | Mod: CV19,PBBFAC | Performed by: FAMILY MEDICINE

## 2021-10-20 ENCOUNTER — TELEPHONE (OUTPATIENT)
Dept: NEUROSURGERY | Facility: CLINIC | Age: 36
End: 2021-10-20

## 2021-10-20 ENCOUNTER — OFFICE VISIT (OUTPATIENT)
Dept: NEUROLOGY | Facility: CLINIC | Age: 36
End: 2021-10-20
Payer: COMMERCIAL

## 2021-10-20 ENCOUNTER — TELEPHONE (OUTPATIENT)
Dept: ORTHOPEDICS | Facility: CLINIC | Age: 36
End: 2021-10-20

## 2021-10-20 VITALS
HEIGHT: 62 IN | RESPIRATION RATE: 16 BRPM | SYSTOLIC BLOOD PRESSURE: 122 MMHG | HEART RATE: 98 BPM | BODY MASS INDEX: 34.48 KG/M2 | DIASTOLIC BLOOD PRESSURE: 68 MMHG | WEIGHT: 187.38 LBS

## 2021-10-20 DIAGNOSIS — C43.9 DESMOPLASTIC MALIGNANT MELANOMA: ICD-10-CM

## 2021-10-20 DIAGNOSIS — Q61.2 AUTOSOMAL DOMINANT ADULT POLYCYSTIC KIDNEY DISEASE: ICD-10-CM

## 2021-10-20 DIAGNOSIS — Z98.891 STATUS POST PRIMARY LOW TRANSVERSE CESAREAN SECTION: ICD-10-CM

## 2021-10-20 DIAGNOSIS — G43.009 MIGRAINE WITHOUT AURA AND WITHOUT STATUS MIGRAINOSUS, NOT INTRACTABLE: Primary | ICD-10-CM

## 2021-10-20 DIAGNOSIS — E78.1 HYPERTRIGLYCERIDEMIA: ICD-10-CM

## 2021-10-20 DIAGNOSIS — R41.3 MEMORY LOSS: ICD-10-CM

## 2021-10-20 DIAGNOSIS — M65.311 TRIGGER FINGER OF RIGHT THUMB: ICD-10-CM

## 2021-10-20 DIAGNOSIS — K21.9 GASTROESOPHAGEAL REFLUX DISEASE, UNSPECIFIED WHETHER ESOPHAGITIS PRESENT: ICD-10-CM

## 2021-10-20 DIAGNOSIS — E34.8 PINEAL GLAND CYST: ICD-10-CM

## 2021-10-20 DIAGNOSIS — I10 ESSENTIAL HYPERTENSION: ICD-10-CM

## 2021-10-20 DIAGNOSIS — F41.9 ANXIETY: ICD-10-CM

## 2021-10-20 DIAGNOSIS — T78.40XA ALLERGY, UNSPECIFIED, INITIAL ENCOUNTER: ICD-10-CM

## 2021-10-20 DIAGNOSIS — K76.89 LIVER CYST: ICD-10-CM

## 2021-10-20 DIAGNOSIS — J30.89 NON-SEASONAL ALLERGIC RHINITIS, UNSPECIFIED TRIGGER: ICD-10-CM

## 2021-10-20 DIAGNOSIS — M65.4 DE QUERVAIN'S TENOSYNOVITIS, RIGHT: ICD-10-CM

## 2021-10-20 PROBLEM — N18.9 CHRONIC KIDNEY DISEASE, UNSPECIFIED: Status: RESOLVED | Noted: 2020-04-02 | Resolved: 2021-10-20

## 2021-10-20 PROBLEM — N64.4 BREAST PAIN: Status: RESOLVED | Noted: 2019-10-02 | Resolved: 2021-10-20

## 2021-10-20 PROBLEM — N18.9 CHRONIC KIDNEY DISEASE, UNSPECIFIED: Status: ACTIVE | Noted: 2020-04-02

## 2021-10-20 PROCEDURE — 3066F PR DOCUMENTATION OF TREATMENT FOR NEPHROPATHY: ICD-10-PCS | Mod: CPTII,S$GLB,, | Performed by: PSYCHIATRY & NEUROLOGY

## 2021-10-20 PROCEDURE — 1160F RVW MEDS BY RX/DR IN RCRD: CPT | Mod: CPTII,S$GLB,, | Performed by: PSYCHIATRY & NEUROLOGY

## 2021-10-20 PROCEDURE — 4010F PR ACE/ARB THEARPY RXD/TAKEN: ICD-10-PCS | Mod: CPTII,S$GLB,, | Performed by: PSYCHIATRY & NEUROLOGY

## 2021-10-20 PROCEDURE — 1159F MED LIST DOCD IN RCRD: CPT | Mod: CPTII,S$GLB,, | Performed by: PSYCHIATRY & NEUROLOGY

## 2021-10-20 PROCEDURE — 99245 PR OFFICE CONSULTATION,LEVEL V: ICD-10-PCS | Mod: S$GLB,,, | Performed by: PSYCHIATRY & NEUROLOGY

## 2021-10-20 PROCEDURE — 3008F PR BODY MASS INDEX (BMI) DOCUMENTED: ICD-10-PCS | Mod: CPTII,S$GLB,, | Performed by: PSYCHIATRY & NEUROLOGY

## 2021-10-20 PROCEDURE — 3074F SYST BP LT 130 MM HG: CPT | Mod: CPTII,S$GLB,, | Performed by: PSYCHIATRY & NEUROLOGY

## 2021-10-20 PROCEDURE — 1160F PR REVIEW ALL MEDS BY PRESCRIBER/CLIN PHARMACIST DOCUMENTED: ICD-10-PCS | Mod: CPTII,S$GLB,, | Performed by: PSYCHIATRY & NEUROLOGY

## 2021-10-20 PROCEDURE — 3078F PR MOST RECENT DIASTOLIC BLOOD PRESSURE < 80 MM HG: ICD-10-PCS | Mod: CPTII,S$GLB,, | Performed by: PSYCHIATRY & NEUROLOGY

## 2021-10-20 PROCEDURE — 99999 PR PBB SHADOW E&M-EST. PATIENT-LVL V: CPT | Mod: PBBFAC,,, | Performed by: PSYCHIATRY & NEUROLOGY

## 2021-10-20 PROCEDURE — 99245 OFF/OP CONSLTJ NEW/EST HI 55: CPT | Mod: S$GLB,,, | Performed by: PSYCHIATRY & NEUROLOGY

## 2021-10-20 PROCEDURE — 4010F ACE/ARB THERAPY RXD/TAKEN: CPT | Mod: CPTII,S$GLB,, | Performed by: PSYCHIATRY & NEUROLOGY

## 2021-10-20 PROCEDURE — 1159F PR MEDICATION LIST DOCUMENTED IN MEDICAL RECORD: ICD-10-PCS | Mod: CPTII,S$GLB,, | Performed by: PSYCHIATRY & NEUROLOGY

## 2021-10-20 PROCEDURE — 3008F BODY MASS INDEX DOCD: CPT | Mod: CPTII,S$GLB,, | Performed by: PSYCHIATRY & NEUROLOGY

## 2021-10-20 PROCEDURE — 3074F PR MOST RECENT SYSTOLIC BLOOD PRESSURE < 130 MM HG: ICD-10-PCS | Mod: CPTII,S$GLB,, | Performed by: PSYCHIATRY & NEUROLOGY

## 2021-10-20 PROCEDURE — 3078F DIAST BP <80 MM HG: CPT | Mod: CPTII,S$GLB,, | Performed by: PSYCHIATRY & NEUROLOGY

## 2021-10-20 PROCEDURE — 99999 PR PBB SHADOW E&M-EST. PATIENT-LVL V: ICD-10-PCS | Mod: PBBFAC,,, | Performed by: PSYCHIATRY & NEUROLOGY

## 2021-10-20 PROCEDURE — 3066F NEPHROPATHY DOC TX: CPT | Mod: CPTII,S$GLB,, | Performed by: PSYCHIATRY & NEUROLOGY

## 2021-10-20 RX ORDER — PROPRANOLOL HYDROCHLORIDE 40 MG/1
40 TABLET ORAL 2 TIMES DAILY
Qty: 180 TABLET | Refills: 3 | Status: SHIPPED | OUTPATIENT
Start: 2021-10-20 | End: 2022-05-11

## 2021-10-20 RX ORDER — RIZATRIPTAN BENZOATE 10 MG/1
10 TABLET ORAL
Qty: 9 TABLET | Refills: 3 | Status: SHIPPED | OUTPATIENT
Start: 2021-10-20 | End: 2022-05-11

## 2021-10-20 RX ORDER — LOSARTAN POTASSIUM 50 MG/1
50 TABLET ORAL DAILY
COMMUNITY
End: 2021-10-21 | Stop reason: SDUPTHER

## 2021-10-21 ENCOUNTER — LAB VISIT (OUTPATIENT)
Dept: LAB | Facility: HOSPITAL | Age: 36
End: 2021-10-21
Attending: PSYCHIATRY & NEUROLOGY
Payer: COMMERCIAL

## 2021-10-21 ENCOUNTER — PATIENT MESSAGE (OUTPATIENT)
Dept: NEPHROLOGY | Facility: CLINIC | Age: 36
End: 2021-10-21
Payer: COMMERCIAL

## 2021-10-21 DIAGNOSIS — E78.1 HYPERTRIGLYCERIDEMIA: ICD-10-CM

## 2021-10-21 DIAGNOSIS — M65.4 DE QUERVAIN'S TENOSYNOVITIS, RIGHT: ICD-10-CM

## 2021-10-21 DIAGNOSIS — T78.40XA ALLERGY, UNSPECIFIED, INITIAL ENCOUNTER: ICD-10-CM

## 2021-10-21 DIAGNOSIS — R41.3 MEMORY LOSS: ICD-10-CM

## 2021-10-21 DIAGNOSIS — F41.9 ANXIETY: ICD-10-CM

## 2021-10-21 DIAGNOSIS — J30.89 NON-SEASONAL ALLERGIC RHINITIS, UNSPECIFIED TRIGGER: ICD-10-CM

## 2021-10-21 DIAGNOSIS — I10 ESSENTIAL HYPERTENSION: ICD-10-CM

## 2021-10-21 DIAGNOSIS — G43.009 MIGRAINE WITHOUT AURA AND WITHOUT STATUS MIGRAINOSUS, NOT INTRACTABLE: ICD-10-CM

## 2021-10-21 DIAGNOSIS — C43.9 DESMOPLASTIC MALIGNANT MELANOMA: ICD-10-CM

## 2021-10-21 DIAGNOSIS — K21.9 GASTROESOPHAGEAL REFLUX DISEASE, UNSPECIFIED WHETHER ESOPHAGITIS PRESENT: ICD-10-CM

## 2021-10-21 DIAGNOSIS — E34.8 PINEAL GLAND CYST: ICD-10-CM

## 2021-10-21 DIAGNOSIS — Z98.891 STATUS POST PRIMARY LOW TRANSVERSE CESAREAN SECTION: ICD-10-CM

## 2021-10-21 DIAGNOSIS — K76.89 LIVER CYST: ICD-10-CM

## 2021-10-21 DIAGNOSIS — M65.311 TRIGGER FINGER OF RIGHT THUMB: ICD-10-CM

## 2021-10-21 DIAGNOSIS — Q61.2 AUTOSOMAL DOMINANT ADULT POLYCYSTIC KIDNEY DISEASE: ICD-10-CM

## 2021-10-21 PROCEDURE — 36415 COLL VENOUS BLD VENIPUNCTURE: CPT | Performed by: PSYCHIATRY & NEUROLOGY

## 2021-10-21 PROCEDURE — 83090 ASSAY OF HOMOCYSTEINE: CPT | Performed by: PSYCHIATRY & NEUROLOGY

## 2021-10-21 PROCEDURE — 82746 ASSAY OF FOLIC ACID SERUM: CPT | Performed by: PSYCHIATRY & NEUROLOGY

## 2021-10-21 PROCEDURE — 82607 VITAMIN B-12: CPT | Performed by: PSYCHIATRY & NEUROLOGY

## 2021-10-21 PROCEDURE — 84443 ASSAY THYROID STIM HORMONE: CPT | Performed by: PSYCHIATRY & NEUROLOGY

## 2021-10-21 PROCEDURE — 84436 ASSAY OF TOTAL THYROXINE: CPT | Performed by: PSYCHIATRY & NEUROLOGY

## 2021-10-21 PROCEDURE — 83921 ORGANIC ACID SINGLE QUANT: CPT | Performed by: PSYCHIATRY & NEUROLOGY

## 2021-10-21 RX ORDER — LOSARTAN POTASSIUM 50 MG/1
50 TABLET ORAL DAILY
Qty: 30 TABLET | Refills: 11 | Status: SHIPPED | OUTPATIENT
Start: 2021-10-21 | End: 2022-05-11

## 2021-10-22 LAB
FOLATE SERPL-MCNC: 8.1 NG/ML (ref 4–24)
HCYS SERPL-SCNC: 5.5 UMOL/L (ref 4–15.5)
T4 SERPL-MCNC: 9.8 UG/DL (ref 4.5–11.5)
TSH SERPL DL<=0.005 MIU/L-ACNC: 1.32 UIU/ML (ref 0.4–4)
VIT B12 SERPL-MCNC: 397 PG/ML (ref 210–950)

## 2021-10-26 ENCOUNTER — TELEPHONE (OUTPATIENT)
Dept: NEUROLOGY | Facility: CLINIC | Age: 36
End: 2021-10-26
Payer: COMMERCIAL

## 2021-10-26 ENCOUNTER — PATIENT MESSAGE (OUTPATIENT)
Dept: NEUROLOGY | Facility: CLINIC | Age: 36
End: 2021-10-26
Payer: COMMERCIAL

## 2021-10-26 LAB — METHYLMALONATE SERPL-SCNC: 0.2 UMOL/L

## 2021-10-27 ENCOUNTER — OFFICE VISIT (OUTPATIENT)
Dept: NEUROSURGERY | Facility: CLINIC | Age: 36
End: 2021-10-27
Payer: COMMERCIAL

## 2021-10-27 DIAGNOSIS — E34.8 PINEAL GLAND CYST: ICD-10-CM

## 2021-10-27 PROCEDURE — 3066F NEPHROPATHY DOC TX: CPT | Mod: CPTII,95,, | Performed by: NEUROLOGICAL SURGERY

## 2021-10-27 PROCEDURE — 1159F MED LIST DOCD IN RCRD: CPT | Mod: CPTII,95,, | Performed by: NEUROLOGICAL SURGERY

## 2021-10-27 PROCEDURE — 1159F PR MEDICATION LIST DOCUMENTED IN MEDICAL RECORD: ICD-10-PCS | Mod: CPTII,95,, | Performed by: NEUROLOGICAL SURGERY

## 2021-10-27 PROCEDURE — 4010F ACE/ARB THERAPY RXD/TAKEN: CPT | Mod: CPTII,95,, | Performed by: NEUROLOGICAL SURGERY

## 2021-10-27 PROCEDURE — 3066F PR DOCUMENTATION OF TREATMENT FOR NEPHROPATHY: ICD-10-PCS | Mod: CPTII,95,, | Performed by: NEUROLOGICAL SURGERY

## 2021-10-27 PROCEDURE — 99203 PR OFFICE/OUTPT VISIT, NEW, LEVL III, 30-44 MIN: ICD-10-PCS | Mod: 95,,, | Performed by: NEUROLOGICAL SURGERY

## 2021-10-27 PROCEDURE — 1160F PR REVIEW ALL MEDS BY PRESCRIBER/CLIN PHARMACIST DOCUMENTED: ICD-10-PCS | Mod: CPTII,95,, | Performed by: NEUROLOGICAL SURGERY

## 2021-10-27 PROCEDURE — 1160F RVW MEDS BY RX/DR IN RCRD: CPT | Mod: CPTII,95,, | Performed by: NEUROLOGICAL SURGERY

## 2021-10-27 PROCEDURE — 4010F PR ACE/ARB THEARPY RXD/TAKEN: ICD-10-PCS | Mod: CPTII,95,, | Performed by: NEUROLOGICAL SURGERY

## 2021-10-27 PROCEDURE — 99203 OFFICE O/P NEW LOW 30 MIN: CPT | Mod: 95,,, | Performed by: NEUROLOGICAL SURGERY

## 2021-11-15 RX ORDER — SERTRALINE HYDROCHLORIDE 50 MG/1
50 TABLET, FILM COATED ORAL DAILY
Qty: 30 TABLET | Refills: 11 | Status: SHIPPED | OUTPATIENT
Start: 2021-11-15 | End: 2022-06-27

## 2021-12-24 ENCOUNTER — PATIENT MESSAGE (OUTPATIENT)
Dept: ADMINISTRATIVE | Facility: OTHER | Age: 36
End: 2021-12-24
Payer: COMMERCIAL

## 2021-12-31 DIAGNOSIS — R06.02 SOB (SHORTNESS OF BREATH): Primary | ICD-10-CM

## 2022-01-02 ENCOUNTER — OCCUPATIONAL HEALTH (OUTPATIENT)
Dept: URGENT CARE | Facility: CLINIC | Age: 37
End: 2022-01-02
Payer: COMMERCIAL

## 2022-01-02 DIAGNOSIS — R06.02 SOB (SHORTNESS OF BREATH): ICD-10-CM

## 2022-01-02 LAB
CTP QC/QA: YES
SARS-COV-2 RDRP RESP QL NAA+PROBE: POSITIVE

## 2022-01-02 PROCEDURE — U0002: ICD-10-PCS | Mod: QW,S$GLB,, | Performed by: PREVENTIVE MEDICINE

## 2022-01-02 PROCEDURE — U0002 COVID-19 LAB TEST NON-CDC: HCPCS | Mod: QW,S$GLB,, | Performed by: PREVENTIVE MEDICINE

## 2022-01-02 NOTE — PATIENT INSTRUCTIONS
You have tested positive for COVID-19 today.  Please note that patients who test positive for COVID-19 are required by the CDC to undergo isolation for 10 days after their symptoms first began.    This isolation starts from the day you first developed symptoms, not the day of your positive test. For example, if your symptoms began on a Monday but tested positive on the following Wednesday, your 10-day isolation begins from that Monday, not the Wednesday you tested positive.    However, if you are asymptomatic (a person who does not have any symptoms) and COVID-19 positive, your 10-day isolation begins on the day you tested positive, regardless of exposure date.    Also, per the CDC guidelines, once your 10 days have passed, and you have not had fever greater than 100.4F in the last 24 hours without taking any fever reducers such as Tylenol (Acetaminophen) or Motrin (Ibuprofen), you may return to your normal activities including social distancing, wearing masks, and frequent handwashing - YOU DO NOT NEED ANOTHER TEST IN ORDER TO END YOUR QUARANTINE.

## 2022-01-03 DIAGNOSIS — U07.1 COVID-19 VIRUS DETECTED: ICD-10-CM

## 2022-01-04 ENCOUNTER — PATIENT MESSAGE (OUTPATIENT)
Dept: INTERNAL MEDICINE | Facility: CLINIC | Age: 37
End: 2022-01-04
Payer: COMMERCIAL

## 2022-01-13 ENCOUNTER — PATIENT MESSAGE (OUTPATIENT)
Dept: INTERNAL MEDICINE | Facility: CLINIC | Age: 37
End: 2022-01-13
Payer: COMMERCIAL

## 2022-02-22 ENCOUNTER — PATIENT MESSAGE (OUTPATIENT)
Dept: RESEARCH | Facility: HOSPITAL | Age: 37
End: 2022-02-22
Payer: COMMERCIAL

## 2022-02-24 ENCOUNTER — PATIENT MESSAGE (OUTPATIENT)
Dept: RESEARCH | Facility: HOSPITAL | Age: 37
End: 2022-02-24
Payer: COMMERCIAL

## 2022-02-25 ENCOUNTER — PATIENT MESSAGE (OUTPATIENT)
Dept: RESEARCH | Facility: HOSPITAL | Age: 37
End: 2022-02-25

## 2022-03-10 ENCOUNTER — SPECIALTY PHARMACY (OUTPATIENT)
Dept: PHARMACY | Facility: CLINIC | Age: 37
End: 2022-03-10
Payer: COMMERCIAL

## 2022-03-10 NOTE — TELEPHONE ENCOUNTER
Barak Ruiz called to request the Follistim and Progesterone in oil prescriptions be send to [facility]. Added pharmacy to preferred list. Routing assigned [facility] for [place].

## 2022-03-10 NOTE — TELEPHONE ENCOUNTER
Patient called to check status on fertility medications. Patient states that she mostly needs Follistim AQ soon, but plan copay is unaffordable. Provided information for fertility pharmacies to check pricing. Advised patient to return call to [facility] once she finds the best pricing so we can transfer. Patient acknowledged.     Armando Vicente, PharmD  Clinical Pharmacist  [facility]  P: [phone]

## 2022-03-10 NOTE — TELEPHONE ENCOUNTER
Also called and left message to clarify route of administration on Pregnyl - subq or IM?  states the on call provider will call back.

## 2022-03-10 NOTE — TELEPHONE ENCOUNTER
Specialty Pharmacy - Refill Coordination    Specialty Medication Orders Linked to Encounter    Flowsheet Row Most Recent Value   Medication #1 chorionic gonadotropin, human (PREGNYL) 10,000 unit injection (Order#[id number], Rx#[id number])          Refill Questions - Documented Responses    Flowsheet Row Most Recent Value   Patient Availability and HIPAA Verification    Does patient want to proceed with activity? Yes   HIPAA/medical authority confirmed? Yes   Relationship to patient of person spoken to? Self   Refill Screening Questions    Changes to allergies? No   Changes to medications? No   New conditions since last clinic visit? No   Unplanned office visit, urgent care, ED, or hospital admission in the last 4 weeks? No   How does patient/caregiver feel medication is working? Excellent   Financial problems or insurance changes? No   How many doses of your specialty medications were missed in the last 4 weeks? 0   Would patient like to speak to a pharmacist? No   When does the patient need to receive the medication? 03/15/22   Refill Delivery Questions    How will the patient receive the medication? Delivery Cynthia   When does the patient need to receive the medication? 03/15/22   Shipping Address Home   Address in [facility] confirmed and updated if neccessary? Yes   Expected Copay ($) 0   Is the patient able to afford the medication copay? Yes   Payment Method zero copay   Days supply of Refill 20   Refill activity completed? Yes   Refill activity plan Refill scheduled   Shipment/Pickup Date: 03/15/22          Current Outpatient Medications   Medication Sig    Allergy Mix Patient Specific SLIT refill    cetirizine (ZYRTEC) 10 MG tablet     chorionic gonadotropin, human (PREGNYL) 10,000 unit injection Mix 1ml diluent into powder. Use as directed.    diclofenac sodium (VOLTAREN) 1 % Gel Apply 2 grams topically 3 (three) times daily.    fish oil-omega-3 fatty acids 300-1,000 mg capsule Take 2 g by mouth  once daily.    follitropin beta (FOLLISTIM AQ) 300 unit/0.36 mL Crtg Administer 75 IU for SQ injection between 4-9 pm as directed    L norgest/e.estradioL-e.estrad (SEASONIQUE) 0.15 mg-30 mcg (84)/10 mcg (7) 3MPk Take 1 tablet by mouth once daily.    Lactobacillus rhamnosus GG (CULTURELLE) 10 billion cell capsule     letrozole (FEMARA) 2.5 mg Tab Take two tablets by mouth once daily    losartan (COZAAR) 50 MG tablet Take 1 tablet (50 mg total) by mouth once daily.    melatonin 5 mg TbDL Take 5 mg by mouth every evening.    omeprazole (PRILOSEC) 20 MG capsule Take 1 capsule (20 mg total) by mouth once daily.    progesterone (PROGESTERONE IN OIL) 50 mg/mL injection Inject 1 mL (50 mg total) into the muscle once daily.    propranoloL (INDERAL) 40 MG tablet Take 1 tablet (40 mg total) by mouth 2 (two) times daily. 1/2 tablet daily for a week, 1/2 tablet twice daily for a week then 1 tablet twice daily    rizatriptan (MAXALT) 10 MG tablet Take 1 tablet (10 mg total) by mouth as needed for Migraine (Max 2-3 times a week).    sertraline (ZOLOFT) 50 MG tablet Take 1 tablet (50 mg total) by mouth once daily.   Last reviewed on 10/29/2021  6:48 AM by Maurilio Lind MD    Review of patient's allergies indicates:   Allergen Reactions    Singulair [montelukast]      Itching all over    Last reviewed on  10/29/2021 6:47 AM by Maurilio Lind      Tasks added this encounter   No tasks added.   Tasks due within next 3 months   No tasks due.     Armando Vicente, PharmD  [facility]  [address] [place] [id number]  Phone: [phone]  Fax: [phone]

## 2022-03-10 NOTE — TELEPHONE ENCOUNTER
Called [facility] and left message for nurse to have Follistim & Progesterone scripts sent to [facility] per patient request

## 2022-03-22 ENCOUNTER — PATIENT MESSAGE (OUTPATIENT)
Dept: RESEARCH | Facility: HOSPITAL | Age: 37
End: 2022-03-22
Payer: COMMERCIAL

## 2022-04-18 ENCOUNTER — PATIENT MESSAGE (OUTPATIENT)
Dept: ADMINISTRATIVE | Facility: OTHER | Age: 37
End: 2022-04-18
Payer: COMMERCIAL

## 2022-04-26 DIAGNOSIS — Q61.3 PKD (POLYCYSTIC KIDNEY DISEASE): Primary | ICD-10-CM

## 2022-05-04 ENCOUNTER — PATIENT MESSAGE (OUTPATIENT)
Dept: OBSTETRICS AND GYNECOLOGY | Facility: CLINIC | Age: 37
End: 2022-05-04
Payer: COMMERCIAL

## 2022-05-04 ENCOUNTER — TELEPHONE (OUTPATIENT)
Dept: OBSTETRICS AND GYNECOLOGY | Facility: CLINIC | Age: 37
End: 2022-05-04
Payer: COMMERCIAL

## 2022-05-04 ENCOUNTER — OFFICE VISIT (OUTPATIENT)
Dept: OBSTETRICS AND GYNECOLOGY | Facility: CLINIC | Age: 37
End: 2022-05-04
Payer: COMMERCIAL

## 2022-05-04 VITALS
WEIGHT: 196.19 LBS | HEIGHT: 62 IN | BODY MASS INDEX: 36.1 KG/M2 | DIASTOLIC BLOOD PRESSURE: 80 MMHG | SYSTOLIC BLOOD PRESSURE: 122 MMHG

## 2022-05-04 DIAGNOSIS — O09.299 HX OF PRE-ECLAMPSIA, PRIOR PREGNANCY, CURRENTLY PREGNANT: ICD-10-CM

## 2022-05-04 DIAGNOSIS — O09.521 HIGH RISK PREGNANCY, MULTIGRAVIDA OF ADVANCED MATERNAL AGE IN FIRST TRIMESTER: ICD-10-CM

## 2022-05-04 DIAGNOSIS — O34.219 HISTORY OF CESAREAN DELIVERY, CURRENTLY PREGNANT: ICD-10-CM

## 2022-05-04 DIAGNOSIS — Z32.01 POSITIVE PREGNANCY TEST: Primary | ICD-10-CM

## 2022-05-04 DIAGNOSIS — O10.919 CHRONIC HYPERTENSION AFFECTING PREGNANCY: ICD-10-CM

## 2022-05-04 DIAGNOSIS — Z13.79 GENETIC SCREENING: ICD-10-CM

## 2022-05-04 DIAGNOSIS — O09.811 PREGNANCY RESULTING FROM IN VITRO FERTILIZATION IN FIRST TRIMESTER: ICD-10-CM

## 2022-05-04 PROCEDURE — 1159F MED LIST DOCD IN RCRD: CPT | Mod: CPTII,S$GLB,, | Performed by: ADVANCED PRACTICE MIDWIFE

## 2022-05-04 PROCEDURE — 87086 URINE CULTURE/COLONY COUNT: CPT | Performed by: ADVANCED PRACTICE MIDWIFE

## 2022-05-04 PROCEDURE — 99999 PR PBB SHADOW E&M-EST. PATIENT-LVL III: CPT | Mod: PBBFAC,,, | Performed by: ADVANCED PRACTICE MIDWIFE

## 2022-05-04 PROCEDURE — 3079F DIAST BP 80-89 MM HG: CPT | Mod: CPTII,S$GLB,, | Performed by: ADVANCED PRACTICE MIDWIFE

## 2022-05-04 PROCEDURE — 3008F BODY MASS INDEX DOCD: CPT | Mod: CPTII,S$GLB,, | Performed by: ADVANCED PRACTICE MIDWIFE

## 2022-05-04 PROCEDURE — 3008F PR BODY MASS INDEX (BMI) DOCUMENTED: ICD-10-PCS | Mod: CPTII,S$GLB,, | Performed by: ADVANCED PRACTICE MIDWIFE

## 2022-05-04 PROCEDURE — 1159F PR MEDICATION LIST DOCUMENTED IN MEDICAL RECORD: ICD-10-PCS | Mod: CPTII,S$GLB,, | Performed by: ADVANCED PRACTICE MIDWIFE

## 2022-05-04 PROCEDURE — 99214 PR OFFICE/OUTPT VISIT, EST, LEVL IV, 30-39 MIN: ICD-10-PCS | Mod: S$GLB,,, | Performed by: ADVANCED PRACTICE MIDWIFE

## 2022-05-04 PROCEDURE — 99999 PR PBB SHADOW E&M-EST. PATIENT-LVL III: ICD-10-PCS | Mod: PBBFAC,,, | Performed by: ADVANCED PRACTICE MIDWIFE

## 2022-05-04 PROCEDURE — 87491 CHLMYD TRACH DNA AMP PROBE: CPT | Performed by: ADVANCED PRACTICE MIDWIFE

## 2022-05-04 PROCEDURE — 3074F SYST BP LT 130 MM HG: CPT | Mod: CPTII,S$GLB,, | Performed by: ADVANCED PRACTICE MIDWIFE

## 2022-05-04 PROCEDURE — 99214 OFFICE O/P EST MOD 30 MIN: CPT | Mod: S$GLB,,, | Performed by: ADVANCED PRACTICE MIDWIFE

## 2022-05-04 PROCEDURE — 3079F PR MOST RECENT DIASTOLIC BLOOD PRESSURE 80-89 MM HG: ICD-10-PCS | Mod: CPTII,S$GLB,, | Performed by: ADVANCED PRACTICE MIDWIFE

## 2022-05-04 PROCEDURE — 3074F PR MOST RECENT SYSTOLIC BLOOD PRESSURE < 130 MM HG: ICD-10-PCS | Mod: CPTII,S$GLB,, | Performed by: ADVANCED PRACTICE MIDWIFE

## 2022-05-04 PROCEDURE — 87591 N.GONORRHOEAE DNA AMP PROB: CPT | Performed by: ADVANCED PRACTICE MIDWIFE

## 2022-05-04 RX ORDER — ASPIRIN 81 MG/1
81 TABLET ORAL DAILY
Status: ON HOLD | COMMUNITY
End: 2022-12-03 | Stop reason: HOSPADM

## 2022-05-04 NOTE — TELEPHONE ENCOUNTER
----- Message from Phyllis Madden sent at 5/4/2022  9:28 AM CDT -----  Good Morning  Patient would like talk to nurse    Please call and advise

## 2022-05-04 NOTE — TELEPHONE ENCOUNTER
RTN pt call to Schedule appt, Pt states someone from [facility] was calling in.. Tried to rtn pt call no answer.     Victoria HOUSE LPN  OB/GYN

## 2022-05-05 ENCOUNTER — LAB VISIT (OUTPATIENT)
Dept: LAB | Facility: HOSPITAL | Age: 37
End: 2022-05-05
Attending: INTERNAL MEDICINE
Payer: COMMERCIAL

## 2022-05-05 DIAGNOSIS — Z00.00 ROUTINE GENERAL MEDICAL EXAMINATION AT A HEALTH CARE FACILITY: ICD-10-CM

## 2022-05-05 DIAGNOSIS — Z32.01 POSITIVE PREGNANCY TEST: ICD-10-CM

## 2022-05-05 LAB
CHOLEST SERPL-MCNC: 180 MG/DL (ref 120–199)
CHOLEST/HDLC SERPL: 3.4 {RATIO} (ref 2–5)
HDLC SERPL-MCNC: 53 MG/DL (ref 40–75)
HDLC SERPL: 29.4 % (ref 20–50)
LDLC SERPL CALC-MCNC: 94.2 MG/DL (ref 63–159)
NONHDLC SERPL-MCNC: 127 MG/DL
TRIGL SERPL-MCNC: 164 MG/DL (ref 30–150)

## 2022-05-05 PROCEDURE — 86762 RUBELLA ANTIBODY: CPT | Performed by: ADVANCED PRACTICE MIDWIFE

## 2022-05-05 PROCEDURE — 86592 SYPHILIS TEST NON-TREP QUAL: CPT | Performed by: ADVANCED PRACTICE MIDWIFE

## 2022-05-05 PROCEDURE — 86901 BLOOD TYPING SEROLOGIC RH(D): CPT | Performed by: ADVANCED PRACTICE MIDWIFE

## 2022-05-05 PROCEDURE — 80061 LIPID PANEL: CPT | Performed by: INTERNAL MEDICINE

## 2022-05-05 PROCEDURE — 87389 HIV-1 AG W/HIV-1&-2 AB AG IA: CPT | Performed by: ADVANCED PRACTICE MIDWIFE

## 2022-05-05 PROCEDURE — 36415 COLL VENOUS BLD VENIPUNCTURE: CPT | Performed by: ADVANCED PRACTICE MIDWIFE

## 2022-05-05 PROCEDURE — 85025 COMPLETE CBC W/AUTO DIFF WBC: CPT | Performed by: ADVANCED PRACTICE MIDWIFE

## 2022-05-05 PROCEDURE — 80074 ACUTE HEPATITIS PANEL: CPT | Performed by: ADVANCED PRACTICE MIDWIFE

## 2022-05-05 PROCEDURE — 83020 HEMOGLOBIN ELECTROPHORESIS: CPT | Performed by: ADVANCED PRACTICE MIDWIFE

## 2022-05-06 DIAGNOSIS — Q61.19 AUTOSOMAL RECESSIVE POLYCYSTIC KIDNEYS: Primary | ICD-10-CM

## 2022-05-06 LAB
ABO + RH BLD: NORMAL
BACTERIA UR CULT: NORMAL
BACTERIA UR CULT: NORMAL
BASOPHILS # BLD AUTO: 0.02 K/UL (ref 0–0.2)
BASOPHILS NFR BLD: 0.2 % (ref 0–1.9)
BLD GP AB SCN CELLS X3 SERPL QL: NORMAL
DIFFERENTIAL METHOD: ABNORMAL
EOSINOPHIL # BLD AUTO: 0.2 K/UL (ref 0–0.5)
EOSINOPHIL NFR BLD: 2.8 % (ref 0–8)
ERYTHROCYTE [DISTWIDTH] IN BLOOD BY AUTOMATED COUNT: 13.2 % (ref 11.5–14.5)
HCT VFR BLD AUTO: 37.5 % (ref 37–48.5)
HGB BLD-MCNC: 11.9 G/DL (ref 12–16)
IMM GRANULOCYTES # BLD AUTO: 0.02 K/UL (ref 0–0.04)
IMM GRANULOCYTES NFR BLD AUTO: 0.2 % (ref 0–0.5)
LYMPHOCYTES # BLD AUTO: 1 K/UL (ref 1–4.8)
LYMPHOCYTES NFR BLD: 11.4 % (ref 18–48)
MCH RBC QN AUTO: 27.4 PG (ref 27–31)
MCHC RBC AUTO-ENTMCNC: 31.7 G/DL (ref 32–36)
MCV RBC AUTO: 86 FL (ref 82–98)
MONOCYTES # BLD AUTO: 0.3 K/UL (ref 0.3–1)
MONOCYTES NFR BLD: 3.7 % (ref 4–15)
NEUTROPHILS # BLD AUTO: 6.9 K/UL (ref 1.8–7.7)
NEUTROPHILS NFR BLD: 81.7 % (ref 38–73)
NRBC BLD-RTO: 0 /100 WBC
PLATELET # BLD AUTO: 283 K/UL (ref 150–450)
PMV BLD AUTO: 10 FL (ref 9.2–12.9)
RBC # BLD AUTO: 4.35 M/UL (ref 4–5.4)
RPR SER QL: NORMAL
RUBV IGG SER-ACNC: 21.5 IU/ML
RUBV IGG SER-IMP: REACTIVE
WBC # BLD AUTO: 8.49 K/UL (ref 3.9–12.7)

## 2022-05-08 PROBLEM — Z32.01 POSITIVE PREGNANCY TEST: Status: ACTIVE | Noted: 2022-05-08

## 2022-05-08 PROBLEM — O09.521 HIGH RISK PREGNANCY, MULTIGRAVIDA OF ADVANCED MATERNAL AGE IN FIRST TRIMESTER: Status: ACTIVE | Noted: 2022-05-08

## 2022-05-08 PROBLEM — O09.811 PREGNANCY RESULTING FROM IN VITRO FERTILIZATION IN FIRST TRIMESTER: Status: ACTIVE | Noted: 2022-05-08

## 2022-05-08 PROBLEM — O09.299 HX OF PRE-ECLAMPSIA, PRIOR PREGNANCY, CURRENTLY PREGNANT: Status: ACTIVE | Noted: 2022-05-08

## 2022-05-08 PROBLEM — O34.219 HISTORY OF CESAREAN DELIVERY, CURRENTLY PREGNANT: Status: ACTIVE | Noted: 2019-09-20

## 2022-05-08 LAB
C TRACH DNA SPEC QL NAA+PROBE: NOT DETECTED
N GONORRHOEA DNA SPEC QL NAA+PROBE: NOT DETECTED

## 2022-05-08 NOTE — PROGRESS NOTES
CHIEF COMPLAINT:   Patient presents with      Possible Pregnancy        HISTORY OF PRESENT ILLNESS  Samanta Espinoza 37 y.o.  presents for pregnancy risk assessment.   The patient has no complaints today.  No nausea or vomiting. No bleeding or pain.  Pregnancy was planned and is product of IVF with Dr. STACEY Ivy with [facility]. States that she and partner used her partner's eggs. Her previous pregnancy is also a result of IVF. Partner is supportive of pregnancy.  Lives at home with wife and daughter, Sara (age 2.5).  Has 2 dogs at home, no cats.  Works at [employer], FT as a nurse.  Denies domestic abuse.  Denies chemical/pesticide/radiation exposure.   OB history:      LMP: Patient's last menstrual period was 2022.  EDC: 12/15/22  EGA: 7w 6d per US with Dr Ivy on 22      Health Maintenance   Topic Date Due    TETANUS VACCINE  2029    Hepatitis C Screening  Completed    Lipid Panel  Completed       Past Medical History:   Diagnosis Date    Anemia     Autosomal recessive polycystic kidneys     GERD (gastroesophageal reflux disease)     History of uterine fibroid     Hyperlipidemia     Hypertension     Liver disease     Mastitis 10/29/2019    Melanoma 2016    left superior helix (breslow dept 0.67mm)    Mental disorder     Migraine headache     PONV (postoperative nausea and vomiting)     post op nausea    Postpartum depression        Past Surgical History:   Procedure Laterality Date     SECTION N/A 2019    Procedure:  SECTION;  Surgeon: Grazyna Price MD;  Location: [facility] L[facility];  Service: OB/GYN;  Laterality: N/A;    HYSTEROSCOPIC POLYPECTOMY OF UTERUS N/A 1/10/2019    Procedure: POLYPECTOMY, UTERUS, HYSTEROSCOPIC;  Surgeon: Jolanta Sifuentes MD;  Location: [facility] operated by [facility] OR;  Service: OB/GYN;  Laterality: N/A;    LYMPH NODE BIOPSY      MALIGNANT SKIN LESION EXCISION  06/10/2016    NASAL SEPTUM SURGERY      RENAL EXPLORATION          Family History   Problem Relation Age of Onset    Hypertension Father     Kidney disease Father     Diabetes Paternal Grandmother     Cancer Maternal Grandmother     Thrombophilia Neg Hx     Breast cancer Neg Hx     Colon cancer Neg Hx     Ovarian cancer Neg Hx     Eczema Neg Hx     Lupus Neg Hx     Psoriasis Neg Hx     Melanoma Neg Hx     Uterine cancer Neg Hx     Cervical cancer Neg Hx        Social History     Socioeconomic History    Marital status:     Number of children: 1   Occupational History    Occupation: Optometry      Employer: [employer]     Comment: [employer]-    Tobacco Use    Smoking status: Never Smoker    Smokeless tobacco: Never Used   Substance and Sexual Activity    Alcohol use: Not Currently     Comment: socially    Drug use: No    Sexual activity: Yes     Partners: Female     Birth control/protection: None   Other Topics Concern    Are you pregnant or think you may be? No    Breast-feeding No       Current Outpatient Medications   Medication Sig Dispense Refill    Allergy Mix Patient Specific SLIT refill 1 mL 3    aspirin (ECOTRIN) 81 MG EC tablet Take 81 mg by mouth once daily.      cetirizine (ZYRTEC) 10 MG tablet       fish oil-omega-3 fatty acids 300-1,000 mg capsule Take 2 g by mouth once daily.      Lactobacillus rhamnosus GG (CULTURELLE) 10 billion cell capsule       omeprazole (PRILOSEC) 20 MG capsule Take 1 capsule (20 mg total) by mouth once daily. 90 capsule 3    progesterone (PROGESTERONE IN OIL) 50 mg/mL injection Inject 1 mL (50 mg total) into the muscle once daily. 20 mL 3    sertraline (ZOLOFT) 50 MG tablet Take 1 tablet (50 mg total) by mouth once daily. 30 tablet 11    chorionic gonadotropin, human (PREGNYL) 10,000 unit injection Mix 1mL diluent into powder and inject into the skin as directed. (Patient not taking: Reported on 5/4/2022) 1 each 0    diclofenac sodium (VOLTAREN) 1 % Gel Apply 2 grams topically 3 (three) times  daily. (Patient not taking: Reported on 5/4/2022) 100 g 30    follitropin beta (FOLLISTIM AQ) 300 unit/0.36 mL Crtg Administer 75 IU for SQ injection between 4-9 pm as directed (Patient not taking: Reported on 5/4/2022) 2.1 mL 0    L norgest/e.estradioL-e.estrad (SEASONIQUE) 0.15 mg-30 mcg (84)/10 mcg (7) 3MPk Take 1 tablet by mouth once daily. (Patient not taking: Reported on 5/4/2022) 91 each 4    letrozole (FEMARA) 2.5 mg Tab Take two tablets by mouth once daily (Patient not taking: Reported on 5/4/2022.) 10 tablet 0    losartan (COZAAR) 50 MG tablet Take 1 tablet (50 mg total) by mouth once daily. (Patient not taking: Reported on 5/4/2022) 30 tablet 11    melatonin 5 mg TbDL Take 5 mg by mouth every evening.      propranoloL (INDERAL) 40 MG tablet Take 1 tablet (40 mg total) by mouth 2 (two) times daily. 1/2 tablet daily for a week, 1/2 tablet twice daily for a week then 1 tablet twice daily (Patient not taking: Reported on 5/4/2022) 180 tablet 3    rizatriptan (MAXALT) 10 MG tablet Take 1 tablet (10 mg total) by mouth as needed for Migraine (Max 2-3 times a week). (Patient not taking: Reported on 5/4/2022) 9 tablet 3     No current facility-administered medications for this visit.       Review of patient's allergies indicates:   Allergen Reactions    Singulair [montelukast]      Itching all over         PHYSICAL EXAM   Vitals:    05/04/22 1508   BP: 122/80        PAIN SCALE: 0/10 None    PHYSICAL EXAM    ROS:  GENERAL: No fever, chills, fatigability or weight loss.  CV: Denies chest pain  PULM: Denies shortness of breath or wheezing.  ABDOMEN: Appetite fine. No weight loss. Denies diarrhea and abdominal pain.  URINARY: No flank pain, dysuria or hematuria.  REPRODUCTIVE: No abnormal vaginal bleeding.       PE:   APPEARANCE: Well nourished, well developed, in no acute distress  CHEST: Clear to auscultation bilaterally  CV: Regular rate and rhythm  ABDOMEN: Soft. No tenderness or masses.   PELVIC:   VULVA:  No lesions. Normal female genitalia.  URETHRAL MEATUS: Normal size and location, no lesions, no prolapse.  URETHRA: No masses, tenderness, prolapse or scarring.  VAGINA: Moist and well rugated, no discharge, no significant cystocele or rectocele.  ANUS PERINEUM: No lesions, no relaxation, no external hemorrhoids.     US from [facility]: SIUP, CRL, GS, YS seen c/w dates. FHT seen. US date 5/2/22. EDC per US: 12/15/22    UPT +    A/P:     -      Patient was counseled today on A.C.S. Pap guidelines and recommendations for yearly pelvic exams, mammograms and monthly self breast exams; to see her PCP for other health maintenance and pregnancy.   -      Patient's medications and medical history reviewed with patient and implications in pregnancy.   -      Pregnancy course discussed and 'AtoZ' book given. Patient was counseled on proper weight gain based on the [place] [facility]'s recommendations based on her pre-pregnancy weight. Discussed foods to avoid in pregnancy (i.e. sushi, fish that are high in mercury, deli meat, and unpasteurized cheeses). Discussed prenatal vitamin options (i.e. stool softener, DHA).  -     Discussed risk of Toxoplasmosis transmission from pets and reviewed risk reduction techniques, pt has dogs, no cats.  -     Discused increased risks with AMA status.   -     Chromosomal abnormality risk discussed and available testing offered - Pt requests MT21, lab orders given for pt to do at 10wks.   -     Pt was counseled on exercise in pregnancy and weight gain recommendations.  -     Last pap smear on 2/24/21 WNL.   -     Oriented to practice including CNM collaboration.   -     Follow-up initial OB with US.   -     Labs today   -     Genprobe dylan Hurtado

## 2022-05-10 LAB
HGB A2 MFR BLD HPLC: 2.7 % (ref 2.2–3.2)
HGB FRACT BLD ELPH-IMP: NORMAL
HGB FRACT BLD ELPH-IMP: NORMAL

## 2022-05-11 PROBLEM — O10.919 CHRONIC HYPERTENSION AFFECTING PREGNANCY: Status: ACTIVE | Noted: 2022-05-11

## 2022-05-12 LAB
HAV IGM SERPL QL IA: NEGATIVE
HBV CORE IGM SERPL QL IA: NEGATIVE
HBV SURFACE AG SERPL QL IA: NEGATIVE
HCV AB SERPL QL IA: NEGATIVE
HIV 1+2 AB+HIV1 P24 AG SERPL QL IA: NEGATIVE

## 2022-05-13 RX ORDER — LOTEPREDNOL ETABONATE 5 MG/ML
1 SUSPENSION/ DROPS OPHTHALMIC 4 TIMES DAILY
Qty: 2 ML | Refills: 0 | Status: SHIPPED | OUTPATIENT
Start: 2022-05-13 | End: 2022-05-20 | Stop reason: SDUPTHER

## 2022-05-18 ENCOUNTER — LAB VISIT (OUTPATIENT)
Dept: LAB | Facility: HOSPITAL | Age: 37
End: 2022-05-18
Attending: ADVANCED PRACTICE MIDWIFE
Payer: COMMERCIAL

## 2022-05-18 DIAGNOSIS — Z13.79 GENETIC SCREENING: ICD-10-CM

## 2022-05-18 PROCEDURE — 36415 COLL VENOUS BLD VENIPUNCTURE: CPT | Performed by: ADVANCED PRACTICE MIDWIFE

## 2022-05-20 ENCOUNTER — TELEPHONE (OUTPATIENT)
Dept: OPHTHALMOLOGY | Facility: CLINIC | Age: 37
End: 2022-05-20
Payer: COMMERCIAL

## 2022-05-20 RX ORDER — LOTEPREDNOL ETABONATE 5 MG/ML
1 SUSPENSION/ DROPS OPHTHALMIC 4 TIMES DAILY
Qty: 5 ML | Refills: 0 | Status: SHIPPED | OUTPATIENT
Start: 2022-05-20 | End: 2022-06-02

## 2022-05-24 ENCOUNTER — PATIENT MESSAGE (OUTPATIENT)
Dept: OBSTETRICS AND GYNECOLOGY | Facility: CLINIC | Age: 37
End: 2022-05-24
Payer: COMMERCIAL

## 2022-05-30 DIAGNOSIS — K21.9 GASTROESOPHAGEAL REFLUX DISEASE: ICD-10-CM

## 2022-05-30 RX ORDER — OMEPRAZOLE 20 MG/1
20 CAPSULE, DELAYED RELEASE ORAL DAILY
Qty: 90 CAPSULE | Refills: 0 | Status: SHIPPED | OUTPATIENT
Start: 2022-05-30 | End: 2022-09-10 | Stop reason: SDUPTHER

## 2022-05-30 NOTE — TELEPHONE ENCOUNTER
Care Due:                  Date            Visit Type   Department     Provider  --------------------------------------------------------------------------------                                ZAINA NEW                              PATIENT      [facility] INTERNAL  Last Visit: 06-      VISIT/[facility]   MEDICINE       Jamel Meier  Next Visit: None Scheduled  None         None Found                                                            Last  Test          Frequency    Reason                     Performed    Due Date  --------------------------------------------------------------------------------    Office Visit  12 months..  omeprazole...............  06- 05-    Health [facility] Embedded Care Gaps. Reference number: [id number]. 5/30/2022   12:01:25 PM CDT

## 2022-06-02 ENCOUNTER — PROCEDURE VISIT (OUTPATIENT)
Dept: OBSTETRICS AND GYNECOLOGY | Facility: CLINIC | Age: 37
End: 2022-06-02
Payer: COMMERCIAL

## 2022-06-02 ENCOUNTER — INITIAL PRENATAL (OUTPATIENT)
Dept: OBSTETRICS AND GYNECOLOGY | Facility: CLINIC | Age: 37
End: 2022-06-02
Payer: COMMERCIAL

## 2022-06-02 ENCOUNTER — PATIENT MESSAGE (OUTPATIENT)
Dept: ADMINISTRATIVE | Facility: OTHER | Age: 37
End: 2022-06-02
Payer: COMMERCIAL

## 2022-06-02 VITALS
SYSTOLIC BLOOD PRESSURE: 126 MMHG | WEIGHT: 200.38 LBS | DIASTOLIC BLOOD PRESSURE: 84 MMHG | BODY MASS INDEX: 36.65 KG/M2

## 2022-06-02 DIAGNOSIS — O09.299 HX OF PRE-ECLAMPSIA, PRIOR PREGNANCY, CURRENTLY PREGNANT: ICD-10-CM

## 2022-06-02 DIAGNOSIS — O09.522 HIGH RISK PREGNANCY, MULTIGRAVIDA OF ADVANCED MATERNAL AGE IN SECOND TRIMESTER: Primary | ICD-10-CM

## 2022-06-02 DIAGNOSIS — O09.812 PREGNANCY RESULTING FROM IN VITRO FERTILIZATION IN SECOND TRIMESTER: ICD-10-CM

## 2022-06-02 DIAGNOSIS — O34.219 HISTORY OF CESAREAN DELIVERY, CURRENTLY PREGNANT: ICD-10-CM

## 2022-06-02 DIAGNOSIS — Z32.01 POSITIVE PREGNANCY TEST: ICD-10-CM

## 2022-06-02 PROCEDURE — 0500F PR INITIAL PRENATAL CARE VISIT: ICD-10-PCS | Mod: CPTII,S$GLB,, | Performed by: ADVANCED PRACTICE MIDWIFE

## 2022-06-02 PROCEDURE — 0500F INITIAL PRENATAL CARE VISIT: CPT | Mod: CPTII,S$GLB,, | Performed by: ADVANCED PRACTICE MIDWIFE

## 2022-06-02 PROCEDURE — 99999 PR PBB SHADOW E&M-EST. PATIENT-LVL III: ICD-10-PCS | Mod: PBBFAC,,, | Performed by: ADVANCED PRACTICE MIDWIFE

## 2022-06-02 PROCEDURE — 76801 OB US < 14 WKS SINGLE FETUS: CPT | Mod: S$GLB,,, | Performed by: OBSTETRICS & GYNECOLOGY

## 2022-06-02 PROCEDURE — 76801 US OB/GYN PROCEDURE (VIEWPOINT): ICD-10-PCS | Mod: S$GLB,,, | Performed by: OBSTETRICS & GYNECOLOGY

## 2022-06-02 PROCEDURE — 99999 PR PBB SHADOW E&M-EST. PATIENT-LVL III: CPT | Mod: PBBFAC,,, | Performed by: ADVANCED PRACTICE MIDWIFE

## 2022-06-03 PROBLEM — O09.812 PREGNANCY RESULTING FROM IN VITRO FERTILIZATION IN SECOND TRIMESTER: Status: ACTIVE | Noted: 2022-05-08

## 2022-06-03 PROBLEM — O09.522 HIGH RISK PREGNANCY, MULTIGRAVIDA OF ADVANCED MATERNAL AGE IN SECOND TRIMESTER: Status: ACTIVE | Noted: 2022-05-08

## 2022-06-03 NOTE — PROGRESS NOTES
37 y.o. female  at 12w0d by US  Doing well without concerns, questions answered  Instructed on connected moms and order placed  Updated on risks of TOLAC and hospital policy, will meet with Dr. Grazyna Price in T3  /84   Wt 90.9 kg (200 lb 6.4 oz)   LMP 2022   BMI 36.65 kg/m²   Hx Pre-eclampsia, on ASA 81mg Daily  Ultrasound today SVIUP, fetus grossly WNL with normal cardiac activity, normal uterus, cervix and adnexae, no fluid in cul-de-sac, reviewed with pt and wife  MT21 negative, male  Reviewed warning signs and how/when to call.  RTC x 4 wks, call or present sooner prn.

## 2022-06-06 ENCOUNTER — PATIENT MESSAGE (OUTPATIENT)
Dept: OBSTETRICS AND GYNECOLOGY | Facility: CLINIC | Age: 37
End: 2022-06-06
Payer: COMMERCIAL

## 2022-06-20 ENCOUNTER — PATIENT MESSAGE (OUTPATIENT)
Dept: OBSTETRICS AND GYNECOLOGY | Facility: CLINIC | Age: 37
End: 2022-06-20

## 2022-06-20 ENCOUNTER — ROUTINE PRENATAL (OUTPATIENT)
Dept: OBSTETRICS AND GYNECOLOGY | Facility: CLINIC | Age: 37
End: 2022-06-20
Payer: COMMERCIAL

## 2022-06-20 ENCOUNTER — PATIENT MESSAGE (OUTPATIENT)
Dept: OBSTETRICS AND GYNECOLOGY | Facility: CLINIC | Age: 37
End: 2022-06-20
Payer: COMMERCIAL

## 2022-06-20 VITALS
DIASTOLIC BLOOD PRESSURE: 88 MMHG | SYSTOLIC BLOOD PRESSURE: 126 MMHG | WEIGHT: 200.81 LBS | BODY MASS INDEX: 36.73 KG/M2

## 2022-06-20 DIAGNOSIS — Z87.59 HISTORY OF SEVERE PRE-ECLAMPSIA: ICD-10-CM

## 2022-06-20 DIAGNOSIS — Z3A.14 PREGNANCY WITH 14 COMPLETED WEEKS GESTATION: Primary | ICD-10-CM

## 2022-06-20 DIAGNOSIS — I10 ESSENTIAL HYPERTENSION: ICD-10-CM

## 2022-06-20 DIAGNOSIS — Z98.891 H/O: C-SECTION: ICD-10-CM

## 2022-06-20 PROCEDURE — 99999 PR PBB SHADOW E&M-EST. PATIENT-LVL III: ICD-10-PCS | Mod: PBBFAC,,, | Performed by: OBSTETRICS & GYNECOLOGY

## 2022-06-20 PROCEDURE — 0502F SUBSEQUENT PRENATAL CARE: CPT | Mod: CPTII,S$GLB,, | Performed by: OBSTETRICS & GYNECOLOGY

## 2022-06-20 PROCEDURE — 84156 ASSAY OF PROTEIN URINE: CPT | Performed by: OBSTETRICS & GYNECOLOGY

## 2022-06-20 PROCEDURE — 0502F PR SUBSEQUENT PRENATAL CARE: ICD-10-PCS | Mod: CPTII,S$GLB,, | Performed by: OBSTETRICS & GYNECOLOGY

## 2022-06-20 PROCEDURE — 99999 PR PBB SHADOW E&M-EST. PATIENT-LVL III: CPT | Mod: PBBFAC,,, | Performed by: OBSTETRICS & GYNECOLOGY

## 2022-06-20 RX ORDER — LABETALOL 100 MG/1
100 TABLET, FILM COATED ORAL 2 TIMES DAILY
Qty: 60 TABLET | Refills: 11 | Status: SHIPPED | OUTPATIENT
Start: 2022-06-20 | End: 2022-06-27

## 2022-06-20 NOTE — PROGRESS NOTES
Presents to discuss elevated BPs. This AM at home 143/96. Labetalol just escripted today. Counseled on risks of preE recurrence. +connected moms.  Needs baseline protein:creatine ratio. Previous LTCS at 35w for severe preE. Desires PAUL: approved if spontaneous labor

## 2022-06-21 LAB
CREAT UR-MCNC: 63 MG/DL (ref 15–325)
PROT UR-MCNC: <7 MG/DL (ref 0–15)
PROT/CREAT UR: NORMAL MG/G{CREAT} (ref 0–0.2)

## 2022-06-27 ENCOUNTER — ROUTINE PRENATAL (OUTPATIENT)
Dept: OBSTETRICS AND GYNECOLOGY | Facility: CLINIC | Age: 37
End: 2022-06-27
Payer: COMMERCIAL

## 2022-06-27 VITALS
DIASTOLIC BLOOD PRESSURE: 78 MMHG | WEIGHT: 202.38 LBS | BODY MASS INDEX: 37.02 KG/M2 | SYSTOLIC BLOOD PRESSURE: 124 MMHG

## 2022-06-27 DIAGNOSIS — F41.9 ANXIETY: ICD-10-CM

## 2022-06-27 DIAGNOSIS — O09.299 HX OF PRE-ECLAMPSIA, PRIOR PREGNANCY, CURRENTLY PREGNANT: ICD-10-CM

## 2022-06-27 DIAGNOSIS — O34.219 HISTORY OF CESAREAN DELIVERY, CURRENTLY PREGNANT: ICD-10-CM

## 2022-06-27 DIAGNOSIS — Z36.89 ENCOUNTER FOR FETAL ANATOMIC SURVEY: ICD-10-CM

## 2022-06-27 DIAGNOSIS — O10.919 CHRONIC HYPERTENSION AFFECTING PREGNANCY: ICD-10-CM

## 2022-06-27 DIAGNOSIS — O09.812 PREGNANCY RESULTING FROM IN VITRO FERTILIZATION IN SECOND TRIMESTER: ICD-10-CM

## 2022-06-27 DIAGNOSIS — O09.522 HIGH RISK PREGNANCY, MULTIGRAVIDA OF ADVANCED MATERNAL AGE IN SECOND TRIMESTER: Primary | ICD-10-CM

## 2022-06-27 PROCEDURE — 0502F SUBSEQUENT PRENATAL CARE: CPT | Mod: CPTII,S$GLB,, | Performed by: ADVANCED PRACTICE MIDWIFE

## 2022-06-27 PROCEDURE — 99999 PR PBB SHADOW E&M-EST. PATIENT-LVL III: ICD-10-PCS | Mod: PBBFAC,,, | Performed by: ADVANCED PRACTICE MIDWIFE

## 2022-06-27 PROCEDURE — 0502F PR SUBSEQUENT PRENATAL CARE: ICD-10-PCS | Mod: CPTII,S$GLB,, | Performed by: ADVANCED PRACTICE MIDWIFE

## 2022-06-27 PROCEDURE — 99999 PR PBB SHADOW E&M-EST. PATIENT-LVL III: CPT | Mod: PBBFAC,,, | Performed by: ADVANCED PRACTICE MIDWIFE

## 2022-06-27 RX ORDER — SERTRALINE HYDROCHLORIDE 100 MG/1
100 TABLET, FILM COATED ORAL DAILY
Qty: 30 TABLET | Refills: 2 | Status: SHIPPED | OUTPATIENT
Start: 2022-06-27 | End: 2022-10-03 | Stop reason: SDUPTHER

## 2022-06-27 RX ORDER — LABETALOL 100 MG/1
100 TABLET, FILM COATED ORAL 2 TIMES DAILY
Qty: 60 TABLET | Refills: 11
Start: 2022-06-27 | End: 2022-07-18 | Stop reason: SDUPTHER

## 2022-06-27 NOTE — PROGRESS NOTES
37 y.o. female  at 15w4d by US here for TEO  Has begun feeling flutters/FM, denies VB, LOF or cramping  Doing well, feeling better since starting Labetalol. Blood pressures at home have been normal. Denies HA & blurry vision.  C/O increase in anxiety and feels like Zoloft isn't helping as well as usual. RX sent for Zoloft 100 mg, encouraged to give 2 weeks to see changes and to call if not helping.  /78   Wt 91.8 kg (202 lb 6.1 oz)   LMP 2022   BMI 37.02 kg/m²   TW lbs   Anatomy US ordered for NV  Reviewed warning signs, normal FM, pregnancy precautions and how/when to call.  RTC x 5 wks for TEO and Anatomy US, call or present sooner prn.     B.Yosef

## 2022-07-11 ENCOUNTER — PATIENT MESSAGE (OUTPATIENT)
Dept: OBSTETRICS AND GYNECOLOGY | Facility: CLINIC | Age: 37
End: 2022-07-11
Payer: COMMERCIAL

## 2022-07-25 ENCOUNTER — PATIENT MESSAGE (OUTPATIENT)
Dept: OBSTETRICS AND GYNECOLOGY | Facility: CLINIC | Age: 37
End: 2022-07-25
Payer: COMMERCIAL

## 2022-07-25 RX ORDER — LABETALOL 100 MG/1
100 TABLET, FILM COATED ORAL 2 TIMES DAILY
Qty: 60 TABLET | Refills: 11 | OUTPATIENT
Start: 2022-07-25 | End: 2023-07-25

## 2022-07-25 RX ORDER — LABETALOL 100 MG/1
100 TABLET, FILM COATED ORAL 2 TIMES DAILY
Qty: 60 TABLET | Refills: 11
Start: 2022-07-25 | End: 2022-07-25 | Stop reason: SDUPTHER

## 2022-07-25 RX ORDER — LABETALOL 100 MG/1
100 TABLET, FILM COATED ORAL 2 TIMES DAILY
Qty: 60 TABLET | Refills: 11 | Status: SHIPPED | OUTPATIENT
Start: 2022-07-25 | End: 2022-12-13

## 2022-07-27 ENCOUNTER — PATIENT MESSAGE (OUTPATIENT)
Dept: ADMINISTRATIVE | Facility: HOSPITAL | Age: 37
End: 2022-07-27
Payer: COMMERCIAL

## 2022-07-28 ENCOUNTER — LAB VISIT (OUTPATIENT)
Dept: LAB | Facility: HOSPITAL | Age: 37
End: 2022-07-28
Attending: INTERNAL MEDICINE
Payer: COMMERCIAL

## 2022-07-28 DIAGNOSIS — Q61.19 AUTOSOMAL RECESSIVE POLYCYSTIC KIDNEYS: ICD-10-CM

## 2022-07-28 LAB
ALBUMIN SERPL BCP-MCNC: 3.2 G/DL (ref 3.5–5.2)
ANION GAP SERPL CALC-SCNC: 11 MMOL/L (ref 8–16)
BASOPHILS # BLD AUTO: 0.02 K/UL (ref 0–0.2)
BASOPHILS NFR BLD: 0.2 % (ref 0–1.9)
BILIRUB UR QL STRIP: NEGATIVE
BUN SERPL-MCNC: 9 MG/DL (ref 6–20)
CALCIUM SERPL-MCNC: 9.7 MG/DL (ref 8.7–10.5)
CHLORIDE SERPL-SCNC: 102 MMOL/L (ref 95–110)
CLARITY UR: CLEAR
CO2 SERPL-SCNC: 21 MMOL/L (ref 23–29)
COLOR UR: YELLOW
CREAT SERPL-MCNC: 0.6 MG/DL (ref 0.5–1.4)
CREAT UR-MCNC: 109 MG/DL (ref 15–325)
DIFFERENTIAL METHOD: ABNORMAL
EOSINOPHIL # BLD AUTO: 0.1 K/UL (ref 0–0.5)
EOSINOPHIL NFR BLD: 1.2 % (ref 0–8)
ERYTHROCYTE [DISTWIDTH] IN BLOOD BY AUTOMATED COUNT: 13.2 % (ref 11.5–14.5)
EST. GFR  (AFRICAN AMERICAN): >60 ML/MIN/1.73 M^2
EST. GFR  (NON AFRICAN AMERICAN): >60 ML/MIN/1.73 M^2
GLUCOSE SERPL-MCNC: 96 MG/DL (ref 70–110)
GLUCOSE UR QL STRIP: NEGATIVE
HCT VFR BLD AUTO: 30.7 % (ref 37–48.5)
HGB BLD-MCNC: 10 G/DL (ref 12–16)
HGB UR QL STRIP: NEGATIVE
IMM GRANULOCYTES # BLD AUTO: 0.03 K/UL (ref 0–0.04)
IMM GRANULOCYTES NFR BLD AUTO: 0.4 % (ref 0–0.5)
KETONES UR QL STRIP: NEGATIVE
LEUKOCYTE ESTERASE UR QL STRIP: NEGATIVE
LYMPHOCYTES # BLD AUTO: 1.9 K/UL (ref 1–4.8)
LYMPHOCYTES NFR BLD: 22.5 % (ref 18–48)
MCH RBC QN AUTO: 28.3 PG (ref 27–31)
MCHC RBC AUTO-ENTMCNC: 32.6 G/DL (ref 32–36)
MCV RBC AUTO: 87 FL (ref 82–98)
MONOCYTES # BLD AUTO: 0.3 K/UL (ref 0.3–1)
MONOCYTES NFR BLD: 3.6 % (ref 4–15)
NEUTROPHILS # BLD AUTO: 5.9 K/UL (ref 1.8–7.7)
NEUTROPHILS NFR BLD: 72.1 % (ref 38–73)
NITRITE UR QL STRIP: NEGATIVE
NRBC BLD-RTO: 0 /100 WBC
PH UR STRIP: 7 [PH] (ref 5–8)
PHOSPHATE SERPL-MCNC: 3.7 MG/DL (ref 2.7–4.5)
PLATELET # BLD AUTO: 271 K/UL (ref 150–450)
PMV BLD AUTO: 10.2 FL (ref 9.2–12.9)
POTASSIUM SERPL-SCNC: 3.9 MMOL/L (ref 3.5–5.1)
PROT UR QL STRIP: NEGATIVE
PROT UR-MCNC: <7 MG/DL (ref 0–15)
PROT/CREAT UR: NORMAL MG/G{CREAT} (ref 0–0.2)
RBC # BLD AUTO: 3.53 M/UL (ref 4–5.4)
SODIUM SERPL-SCNC: 134 MMOL/L (ref 136–145)
SP GR UR STRIP: 1.01 (ref 1–1.03)
URN SPEC COLLECT METH UR: NORMAL
WBC # BLD AUTO: 8.24 K/UL (ref 3.9–12.7)

## 2022-07-28 PROCEDURE — 84156 ASSAY OF PROTEIN URINE: CPT | Performed by: INTERNAL MEDICINE

## 2022-07-28 PROCEDURE — 80069 RENAL FUNCTION PANEL: CPT | Performed by: INTERNAL MEDICINE

## 2022-07-28 PROCEDURE — 36415 COLL VENOUS BLD VENIPUNCTURE: CPT | Performed by: INTERNAL MEDICINE

## 2022-07-28 PROCEDURE — 81003 URINALYSIS AUTO W/O SCOPE: CPT | Performed by: INTERNAL MEDICINE

## 2022-07-28 PROCEDURE — 85025 COMPLETE CBC W/AUTO DIFF WBC: CPT | Performed by: INTERNAL MEDICINE

## 2022-08-01 ENCOUNTER — PROCEDURE VISIT (OUTPATIENT)
Dept: OBSTETRICS AND GYNECOLOGY | Facility: CLINIC | Age: 37
End: 2022-08-01
Payer: COMMERCIAL

## 2022-08-01 ENCOUNTER — LAB VISIT (OUTPATIENT)
Dept: LAB | Facility: HOSPITAL | Age: 37
End: 2022-08-01
Attending: INTERNAL MEDICINE
Payer: COMMERCIAL

## 2022-08-01 ENCOUNTER — OFFICE VISIT (OUTPATIENT)
Dept: NEPHROLOGY | Facility: CLINIC | Age: 37
End: 2022-08-01
Payer: COMMERCIAL

## 2022-08-01 ENCOUNTER — ROUTINE PRENATAL (OUTPATIENT)
Dept: OBSTETRICS AND GYNECOLOGY | Facility: CLINIC | Age: 37
End: 2022-08-01
Payer: COMMERCIAL

## 2022-08-01 VITALS
WEIGHT: 208.56 LBS | SYSTOLIC BLOOD PRESSURE: 105 MMHG | DIASTOLIC BLOOD PRESSURE: 73 MMHG | BODY MASS INDEX: 38.15 KG/M2

## 2022-08-01 VITALS
RESPIRATION RATE: 18 BRPM | HEART RATE: 88 BPM | HEIGHT: 62 IN | WEIGHT: 207.69 LBS | SYSTOLIC BLOOD PRESSURE: 100 MMHG | BODY MASS INDEX: 38.22 KG/M2 | DIASTOLIC BLOOD PRESSURE: 66 MMHG

## 2022-08-01 DIAGNOSIS — O09.522 HIGH RISK PREGNANCY, MULTIGRAVIDA OF ADVANCED MATERNAL AGE IN SECOND TRIMESTER: Primary | ICD-10-CM

## 2022-08-01 DIAGNOSIS — O09.299 HX OF PRE-ECLAMPSIA, PRIOR PREGNANCY, CURRENTLY PREGNANT: ICD-10-CM

## 2022-08-01 DIAGNOSIS — O09.812 PREGNANCY RESULTING FROM IN VITRO FERTILIZATION IN SECOND TRIMESTER: ICD-10-CM

## 2022-08-01 DIAGNOSIS — O99.012 ANEMIA DURING PREGNANCY IN SECOND TRIMESTER: ICD-10-CM

## 2022-08-01 DIAGNOSIS — Z36.89 ENCOUNTER FOR FETAL ANATOMIC SURVEY: ICD-10-CM

## 2022-08-01 DIAGNOSIS — Z36.2 ENCOUNTER FOR FOLLOW-UP ULTRASOUND OF FETAL ANATOMY: ICD-10-CM

## 2022-08-01 DIAGNOSIS — O10.919 CHRONIC HYPERTENSION AFFECTING PREGNANCY: ICD-10-CM

## 2022-08-01 DIAGNOSIS — O34.219 HISTORY OF CESAREAN DELIVERY, CURRENTLY PREGNANT: ICD-10-CM

## 2022-08-01 DIAGNOSIS — Q61.3 POLYCYSTIC KIDNEY DISEASE: Primary | ICD-10-CM

## 2022-08-01 LAB
FERRITIN SERPL-MCNC: 32 NG/ML (ref 20–300)
IRON SERPL-MCNC: 76 UG/DL (ref 30–160)
SATURATED IRON: 15 % (ref 20–50)
TOTAL IRON BINDING CAPACITY: 505 UG/DL (ref 250–450)
TRANSFERRIN SERPL-MCNC: 341 MG/DL (ref 200–375)

## 2022-08-01 PROCEDURE — 99999 PR PBB SHADOW E&M-EST. PATIENT-LVL III: CPT | Mod: PBBFAC,,, | Performed by: ADVANCED PRACTICE MIDWIFE

## 2022-08-01 PROCEDURE — 3074F PR MOST RECENT SYSTOLIC BLOOD PRESSURE < 130 MM HG: ICD-10-PCS | Mod: CPTII,S$GLB,, | Performed by: INTERNAL MEDICINE

## 2022-08-01 PROCEDURE — 0502F PR SUBSEQUENT PRENATAL CARE: ICD-10-PCS | Mod: CPTII,S$GLB,, | Performed by: ADVANCED PRACTICE MIDWIFE

## 2022-08-01 PROCEDURE — 3078F DIAST BP <80 MM HG: CPT | Mod: CPTII,S$GLB,, | Performed by: INTERNAL MEDICINE

## 2022-08-01 PROCEDURE — 1159F MED LIST DOCD IN RCRD: CPT | Mod: CPTII,S$GLB,, | Performed by: INTERNAL MEDICINE

## 2022-08-01 PROCEDURE — 76805 US OB/GYN PROCEDURE (VIEWPOINT): ICD-10-PCS | Mod: S$GLB,,, | Performed by: OBSTETRICS & GYNECOLOGY

## 2022-08-01 PROCEDURE — 36415 COLL VENOUS BLD VENIPUNCTURE: CPT | Performed by: ADVANCED PRACTICE MIDWIFE

## 2022-08-01 PROCEDURE — 3066F NEPHROPATHY DOC TX: CPT | Mod: CPTII,S$GLB,, | Performed by: INTERNAL MEDICINE

## 2022-08-01 PROCEDURE — 99999 PR PBB SHADOW E&M-EST. PATIENT-LVL III: CPT | Mod: PBBFAC,,, | Performed by: INTERNAL MEDICINE

## 2022-08-01 PROCEDURE — 3008F PR BODY MASS INDEX (BMI) DOCUMENTED: ICD-10-PCS | Mod: CPTII,S$GLB,, | Performed by: INTERNAL MEDICINE

## 2022-08-01 PROCEDURE — 3074F SYST BP LT 130 MM HG: CPT | Mod: CPTII,S$GLB,, | Performed by: INTERNAL MEDICINE

## 2022-08-01 PROCEDURE — 99999 PR PBB SHADOW E&M-EST. PATIENT-LVL III: ICD-10-PCS | Mod: PBBFAC,,, | Performed by: INTERNAL MEDICINE

## 2022-08-01 PROCEDURE — 3008F BODY MASS INDEX DOCD: CPT | Mod: CPTII,S$GLB,, | Performed by: INTERNAL MEDICINE

## 2022-08-01 PROCEDURE — 99215 OFFICE O/P EST HI 40 MIN: CPT | Mod: S$GLB,,, | Performed by: INTERNAL MEDICINE

## 2022-08-01 PROCEDURE — 1159F PR MEDICATION LIST DOCUMENTED IN MEDICAL RECORD: ICD-10-PCS | Mod: CPTII,S$GLB,, | Performed by: INTERNAL MEDICINE

## 2022-08-01 PROCEDURE — 3066F PR DOCUMENTATION OF TREATMENT FOR NEPHROPATHY: ICD-10-PCS | Mod: CPTII,S$GLB,, | Performed by: INTERNAL MEDICINE

## 2022-08-01 PROCEDURE — 0502F SUBSEQUENT PRENATAL CARE: CPT | Mod: CPTII,S$GLB,, | Performed by: ADVANCED PRACTICE MIDWIFE

## 2022-08-01 PROCEDURE — 99999 PR PBB SHADOW E&M-EST. PATIENT-LVL III: ICD-10-PCS | Mod: PBBFAC,,, | Performed by: ADVANCED PRACTICE MIDWIFE

## 2022-08-01 PROCEDURE — 3078F PR MOST RECENT DIASTOLIC BLOOD PRESSURE < 80 MM HG: ICD-10-PCS | Mod: CPTII,S$GLB,, | Performed by: INTERNAL MEDICINE

## 2022-08-01 PROCEDURE — 84466 ASSAY OF TRANSFERRIN: CPT | Performed by: ADVANCED PRACTICE MIDWIFE

## 2022-08-01 PROCEDURE — 76805 OB US >/= 14 WKS SNGL FETUS: CPT | Mod: S$GLB,,, | Performed by: OBSTETRICS & GYNECOLOGY

## 2022-08-01 PROCEDURE — 99215 PR OFFICE/OUTPT VISIT, EST, LEVL V, 40-54 MIN: ICD-10-PCS | Mod: S$GLB,,, | Performed by: INTERNAL MEDICINE

## 2022-08-01 PROCEDURE — 82728 ASSAY OF FERRITIN: CPT | Performed by: ADVANCED PRACTICE MIDWIFE

## 2022-08-01 NOTE — PROGRESS NOTES
NEPHROLOGY CLINIC FOLLOWUP NOTE:  Date of clinic visit: 8/1/22     REASON FOR FOLLOWUP AND CHIEF COMPLAINT:  History of polycystic kidney disease and HTN, with new consideration for pt being currently pregnant     HISTORY OF PRESENT ILLNESS:  Ms. Samanta Argueta is a 37-year-old female with a h/o of PCKD who presents for f/u. Pt was last seen by us in March 2021, and had messages exchanged with us as documented since then. chart was reviewed. Pt is currently pregnant at 20 weeks of gestation. No new c/o's. Pt has no discomfort reported. Labs and meds reviewed with pt. Meds specifically further reviewed. Losartan previously prescribed in Oct 2021 has already been stopped. Pt taking labetalol at the dose reviewed. \     To review: Pt gave birth to a healthy baby girl in Sept 2019 at 35 weeks of gestation. Pt developed preeclampsia in 3rd trimester, had nephrotic syndrome, and required procardia for BP control.       Also to review, availability of Jynarque to treat polycystic kidney disease (when not pregnant) has been discussed with pt. This medicine slows down the growth of the renal cysts.      PAST MEDICAL HISTORY:  1.  Polycystic kidney disease. Was acquired paternally   2.  Dysmenorrhea.  3.  Migraine headaches.  4.  Maxillary sinusitis.  5.  Hypertriglyceridemia.  6.  Liver cysts.  7.  GERD.  8.  Allergic rhinitis.     PAST SURGICAL HISTORY:  Reviewed.     FAMILY HISTORY:  Reviewed.     ALLERGIES:  Reviewed, to Singulair.     MEDICATIONS:  Fully reviewed    Current Outpatient Medications:     aspirin (ECOTRIN) 81 MG EC tablet, Take 81 mg by mouth once daily., Disp: , Rfl:     cetirizine (ZYRTEC) 10 MG tablet, , Disp: , Rfl:     ferrous sulfate (IRON ORAL), Take by mouth., Disp: , Rfl:     fish oil-omega-3 fatty acids 300-1,000 mg capsule, Take 2 g by mouth once daily., Disp: , Rfl:     labetaloL (NORMODYNE) 100 MG tablet, Take 1 tablet (100 mg total) by mouth 2 (two) times daily., Disp: 60 tablet,  Rfl: 11    Lactobacillus rhamnosus GG (CULTURELLE) 10 billion cell capsule, , Disp: , Rfl:     omeprazole (PRILOSEC) 20 MG capsule, Take 1 capsule (20 mg total) by mouth once daily., Disp: 90 capsule, Rfl: 0    PNV/iron/folic acid (PRENATAL VITAMIN-IRON-FA ORAL), Take by mouth., Disp: , Rfl:     sertraline (ZOLOFT) 100 MG tablet, Take 1 tablet (100 mg total) by mouth once daily., Disp: 30 tablet, Rfl: 2     REVIEW OF SYSTEMS:  No recent hospitalizations.  GENERAL:  Negative.  HEAD, EYES, EARS, NOSE, AND THROAT:  Negative.  CARDIAC:  Negative.  PULMONARY:  Negative.  GASTROINTESTINAL:  Negative.  GENITOURINARY:  Negative.  PSYCHOLOGICAL:  Negative.  NEUROLOGICAL:  Negative.  ENDOCRINE:  Negative.  HEMATOLOGIC AND ONCOLOGIC:  Negative.  INFECTIOUS DISEASE:  Negative.  The rest of the review of systems negative.     PHYSICAL EXAMINATION:  VITAL SIGNS:    In NAD  GENERAL:  She is cooperative, pleasant.    Speech and thought process normal  RRR s1 and s2 audible  CTA B  Ext's no edema     Labs reviewed:   BMP  Lab Results   Component Value Date     (L) 07/28/2022    K 3.9 07/28/2022     07/28/2022    CO2 21 (L) 07/28/2022    BUN 9 07/28/2022    CREATININE 0.6 07/28/2022    CALCIUM 9.7 07/28/2022    ANIONGAP 11 07/28/2022    ESTGFRAFRICA >60.0 07/28/2022    EGFRNONAA >60.0 07/28/2022     Lab Results   Component Value Date    WBC 8.24 07/28/2022    HGB 10.0 (L) 07/28/2022    HCT 30.7 (L) 07/28/2022    MCV 87 07/28/2022     07/28/2022     U/a: no protein, no blood  Urine prot/cr ratio is 64 mg, had in 3rd trimester of last pregnance 4.7 g        ASSESSMENT AND PLAN:  This is a 37 year-old female with polycystic kidney disease presents for f/u. Pt is pregnant in her 2nd trimester.   The impression is as follows:     1.  Renal. Normal s Cr, stable, unchanged  Normal renal function  K normal  Hyponatremia: physiologic, related to pregnancy, is mild  Hypoalbulinemia: related to pregnancy,  physiologic  Metabolic acidosis: physiologic, response to pregnancy induced respiratory alkalosis, is mild  Minimal proteinuria, microalbuminuria, mild.  To compare, had nephrotic syndrome in 3rd trimester of pregnancy of last pregnancy due to preeclampsia.   Normal u/a      2. H/o of PCKD  Long-term treatment with Jynarque/Tolvaptan AFTER pregnancy has been discussed with pt (when not pregnant)  Pt has printed literature to read     3.  Hypertension. Well controlled to low  On labetalol 100 mg po bid, may change to 1/2 (50 mg) po bid to tid. Discussed with pt.     4. Pregnancy: at 20 weeks of gestation  (to review, last pregnancy had IVF using egg from partner. Will not pass on PCKD gene; this pregnancy, I did not ask).       PLAN AND RECOMMENDATION:  As detailed above.    Opportunity for question and discussion provided.  Total time spent 40 minutes including time needed to review the records, the   patient evaluation,this  documentation, discussion with the patient,   more than 50% of the time was spent on coordination of care and counseling.    Level V visit.  RTC 6 months or sooner if needed. OK to overbook to my clinic.     Malvin Nagy MD

## 2022-08-01 NOTE — PROGRESS NOTES
37 y.o. female  at 20w4d by US  Starting to feel flutters/FM, denies VB, LOF or cramping  Doing ok,  Nephrology did labs and showed anemia, will do TIBC, ferritin, and iron today, pt takes iron supplement daily   /73   Wt 94.6 kg (208 lb 8.9 oz)   LMP 2022   BMI 38.15 kg/m²    Remains on labetalol  TW lbs   Reviewed anatomy US today with breech presentation, anterior placenta, 3VC, ALTON WNL, EFW 51%, 15oz, male gender, CL 38.1mm, suboptimal heart, spine, will repeat at nv  Reviewed warning signs, normal FM,  labor precautions and how/when to call.  RTC x 4 wks, call or present sooner prn.

## 2022-09-01 ENCOUNTER — PATIENT MESSAGE (OUTPATIENT)
Dept: ADMINISTRATIVE | Facility: OTHER | Age: 37
End: 2022-09-01
Payer: COMMERCIAL

## 2022-09-01 ENCOUNTER — ROUTINE PRENATAL (OUTPATIENT)
Dept: OBSTETRICS AND GYNECOLOGY | Facility: CLINIC | Age: 37
End: 2022-09-01
Payer: COMMERCIAL

## 2022-09-01 ENCOUNTER — PROCEDURE VISIT (OUTPATIENT)
Dept: OBSTETRICS AND GYNECOLOGY | Facility: CLINIC | Age: 37
End: 2022-09-01
Payer: COMMERCIAL

## 2022-09-01 VITALS
SYSTOLIC BLOOD PRESSURE: 130 MMHG | BODY MASS INDEX: 38.35 KG/M2 | DIASTOLIC BLOOD PRESSURE: 72 MMHG | WEIGHT: 209.69 LBS

## 2022-09-01 DIAGNOSIS — Z67.91 RH NEGATIVE STATE IN ANTEPARTUM PERIOD: ICD-10-CM

## 2022-09-01 DIAGNOSIS — O10.919 CHRONIC HYPERTENSION AFFECTING PREGNANCY: ICD-10-CM

## 2022-09-01 DIAGNOSIS — Z36.2 ENCOUNTER FOR FOLLOW-UP ULTRASOUND OF FETAL ANATOMY: ICD-10-CM

## 2022-09-01 DIAGNOSIS — O26.899 RH NEGATIVE STATE IN ANTEPARTUM PERIOD: ICD-10-CM

## 2022-09-01 DIAGNOSIS — O34.219 HISTORY OF CESAREAN DELIVERY, CURRENTLY PREGNANT: ICD-10-CM

## 2022-09-01 DIAGNOSIS — O09.522 HIGH RISK PREGNANCY, MULTIGRAVIDA OF ADVANCED MATERNAL AGE IN SECOND TRIMESTER: Primary | ICD-10-CM

## 2022-09-01 DIAGNOSIS — O09.299 HX OF PRE-ECLAMPSIA, PRIOR PREGNANCY, CURRENTLY PREGNANT: ICD-10-CM

## 2022-09-01 PROCEDURE — 99999 PR PBB SHADOW E&M-EST. PATIENT-LVL III: ICD-10-PCS | Mod: PBBFAC,,, | Performed by: ADVANCED PRACTICE MIDWIFE

## 2022-09-01 PROCEDURE — 0502F PR SUBSEQUENT PRENATAL CARE: ICD-10-PCS | Mod: CPTII,S$GLB,, | Performed by: ADVANCED PRACTICE MIDWIFE

## 2022-09-01 PROCEDURE — 76816 OB US FOLLOW-UP PER FETUS: CPT | Mod: S$GLB,,, | Performed by: OBSTETRICS & GYNECOLOGY

## 2022-09-01 PROCEDURE — 76816 US OB/GYN PROCEDURE (VIEWPOINT): ICD-10-PCS | Mod: S$GLB,,, | Performed by: OBSTETRICS & GYNECOLOGY

## 2022-09-01 PROCEDURE — 0502F SUBSEQUENT PRENATAL CARE: CPT | Mod: CPTII,S$GLB,, | Performed by: ADVANCED PRACTICE MIDWIFE

## 2022-09-01 PROCEDURE — 99999 PR PBB SHADOW E&M-EST. PATIENT-LVL III: CPT | Mod: PBBFAC,,, | Performed by: ADVANCED PRACTICE MIDWIFE

## 2022-09-02 PROBLEM — Z67.91 RH NEGATIVE STATE IN ANTEPARTUM PERIOD: Status: ACTIVE | Noted: 2022-09-02

## 2022-09-02 PROBLEM — O26.899 RH NEGATIVE STATE IN ANTEPARTUM PERIOD: Status: ACTIVE | Noted: 2022-09-02

## 2022-09-03 NOTE — PROGRESS NOTES
37 y.o. female  at 25w0d by US here for TEO and F/U US  Reports +FM, denies cramping, LOF, or VB  Doing well, feeling very tired. Recommended to take zoloft 1/2 dose in am and 1/2 dose at lunch to decrease common side effects.  /72   Wt 95.1 kg (209 lb 10.5 oz)   LMP 2022   BMI 38.35 kg/m²   TW lbs   Remains on labetalol, BP stable.   F/U US today, reviewed with pt and SO: The anatomy visualized appears normal. Anatomy complete. Fetal growth and MVP normal. EFW 2#3oz (983g), 77%. MVP 5.2cm. Cephalic presentation. Anterior placenta, 3V cord.  Discussed need for Rhogam at NV, R/B/A of Tdap during pregnancy. Will take at NV. Upcoming 28 week labwork and orders placed. Pt aware.   Referral for Heme/Onc ????  Reviewed warning signs, normal FM/FKCs,  labor precautions and how/when to call.  RTC x 3 wks, call or present sooner prn.     B.Yosef

## 2022-09-10 DIAGNOSIS — K21.9 GASTROESOPHAGEAL REFLUX DISEASE: ICD-10-CM

## 2022-09-10 NOTE — TELEPHONE ENCOUNTER
Care Due:                  Date            Visit Type   Department     Provider  --------------------------------------------------------------------------------                                ZAINA NEW                              PATIENT      [facility] INTERNAL  Last Visit: 06-      VISIT/[facility]   MEDICINE       Jamel Meier  Next Visit: None Scheduled  None         None Found                                                            Last  Test          Frequency    Reason                     Performed    Due Date  --------------------------------------------------------------------------------    Office Visit  12 months..  omeprazole...............  06- 05-    Health [facility] Embedded Care Gaps. Reference number: [id number]. 9/10/2022   10:44:46 AM CDT

## 2022-09-12 RX ORDER — OMEPRAZOLE 20 MG/1
20 CAPSULE, DELAYED RELEASE ORAL DAILY
Qty: 90 CAPSULE | Refills: 0 | Status: SHIPPED | OUTPATIENT
Start: 2022-09-12 | End: 2022-12-20 | Stop reason: SDUPTHER

## 2022-09-19 ENCOUNTER — LAB VISIT (OUTPATIENT)
Dept: LAB | Facility: HOSPITAL | Age: 37
End: 2022-09-19
Attending: ADVANCED PRACTICE MIDWIFE
Payer: COMMERCIAL

## 2022-09-19 ENCOUNTER — ROUTINE PRENATAL (OUTPATIENT)
Dept: OBSTETRICS AND GYNECOLOGY | Facility: CLINIC | Age: 37
End: 2022-09-19
Payer: COMMERCIAL

## 2022-09-19 VITALS
SYSTOLIC BLOOD PRESSURE: 118 MMHG | DIASTOLIC BLOOD PRESSURE: 82 MMHG | BODY MASS INDEX: 38.63 KG/M2 | WEIGHT: 211.19 LBS

## 2022-09-19 DIAGNOSIS — O26.899 RH NEGATIVE STATE IN ANTEPARTUM PERIOD: ICD-10-CM

## 2022-09-19 DIAGNOSIS — Z67.91 RH NEGATIVE STATE IN ANTEPARTUM PERIOD: ICD-10-CM

## 2022-09-19 DIAGNOSIS — Z23 NEED FOR DIPHTHERIA-TETANUS-PERTUSSIS (TDAP) VACCINE: ICD-10-CM

## 2022-09-19 DIAGNOSIS — O09.522 HIGH RISK PREGNANCY, MULTIGRAVIDA OF ADVANCED MATERNAL AGE IN SECOND TRIMESTER: Primary | ICD-10-CM

## 2022-09-19 DIAGNOSIS — O10.919 CHRONIC HYPERTENSION AFFECTING PREGNANCY: ICD-10-CM

## 2022-09-19 DIAGNOSIS — O09.299 HX OF PRE-ECLAMPSIA, PRIOR PREGNANCY, CURRENTLY PREGNANT: ICD-10-CM

## 2022-09-19 LAB
ALBUMIN SERPL BCP-MCNC: 3.1 G/DL (ref 3.5–5.2)
ALP SERPL-CCNC: 85 U/L (ref 55–135)
ALT SERPL W/O P-5'-P-CCNC: 8 U/L (ref 10–44)
ANION GAP SERPL CALC-SCNC: 12 MMOL/L (ref 8–16)
AST SERPL-CCNC: 10 U/L (ref 10–40)
BASOPHILS # BLD AUTO: 0.01 K/UL (ref 0–0.2)
BASOPHILS NFR BLD: 0.1 % (ref 0–1.9)
BILIRUB SERPL-MCNC: 0.2 MG/DL (ref 0.1–1)
BUN SERPL-MCNC: 10 MG/DL (ref 6–20)
CALCIUM SERPL-MCNC: 11.3 MG/DL (ref 8.7–10.5)
CHLORIDE SERPL-SCNC: 100 MMOL/L (ref 95–110)
CO2 SERPL-SCNC: 22 MMOL/L (ref 23–29)
CREAT SERPL-MCNC: 0.6 MG/DL (ref 0.5–1.4)
DIFFERENTIAL METHOD: ABNORMAL
EOSINOPHIL # BLD AUTO: 0.1 K/UL (ref 0–0.5)
EOSINOPHIL NFR BLD: 0.5 % (ref 0–8)
ERYTHROCYTE [DISTWIDTH] IN BLOOD BY AUTOMATED COUNT: 13.4 % (ref 11.5–14.5)
EST. GFR  (NO RACE VARIABLE): >60 ML/MIN/1.73 M^2
GLUCOSE SERPL-MCNC: 111 MG/DL (ref 70–140)
GLUCOSE SERPL-MCNC: 113 MG/DL (ref 70–110)
HCT VFR BLD AUTO: 31.3 % (ref 37–48.5)
HGB BLD-MCNC: 9.9 G/DL (ref 12–16)
HIV 1+2 AB+HIV1 P24 AG SERPL QL IA: NORMAL
IMM GRANULOCYTES # BLD AUTO: 0.07 K/UL (ref 0–0.04)
IMM GRANULOCYTES NFR BLD AUTO: 0.7 % (ref 0–0.5)
LYMPHOCYTES # BLD AUTO: 1.4 K/UL (ref 1–4.8)
LYMPHOCYTES NFR BLD: 15.3 % (ref 18–48)
MCH RBC QN AUTO: 28.4 PG (ref 27–31)
MCHC RBC AUTO-ENTMCNC: 31.6 G/DL (ref 32–36)
MCV RBC AUTO: 90 FL (ref 82–98)
MONOCYTES # BLD AUTO: 0.5 K/UL (ref 0.3–1)
MONOCYTES NFR BLD: 5.1 % (ref 4–15)
NEUTROPHILS # BLD AUTO: 7.4 K/UL (ref 1.8–7.7)
NEUTROPHILS NFR BLD: 78.3 % (ref 38–73)
NRBC BLD-RTO: 0 /100 WBC
PLATELET # BLD AUTO: 264 K/UL (ref 150–450)
PMV BLD AUTO: 10.4 FL (ref 9.2–12.9)
POTASSIUM SERPL-SCNC: 3.6 MMOL/L (ref 3.5–5.1)
PROT SERPL-MCNC: 6.3 G/DL (ref 6–8.4)
RBC # BLD AUTO: 3.49 M/UL (ref 4–5.4)
SODIUM SERPL-SCNC: 134 MMOL/L (ref 136–145)
WBC # BLD AUTO: 9.41 K/UL (ref 3.9–12.7)

## 2022-09-19 PROCEDURE — 99999 PR PBB SHADOW E&M-EST. PATIENT-LVL III: ICD-10-PCS | Mod: PBBFAC,,, | Performed by: ADVANCED PRACTICE MIDWIFE

## 2022-09-19 PROCEDURE — 90471 TDAP VACCINE GREATER THAN OR EQUAL TO 7YO IM: ICD-10-PCS | Mod: 59,S$GLB,, | Performed by: ADVANCED PRACTICE MIDWIFE

## 2022-09-19 PROCEDURE — 99999 PR PBB SHADOW E&M-EST. PATIENT-LVL III: CPT | Mod: PBBFAC,,, | Performed by: ADVANCED PRACTICE MIDWIFE

## 2022-09-19 PROCEDURE — 85025 COMPLETE CBC W/AUTO DIFF WBC: CPT | Performed by: ADVANCED PRACTICE MIDWIFE

## 2022-09-19 PROCEDURE — 90715 TDAP VACCINE GREATER THAN OR EQUAL TO 7YO IM: ICD-10-PCS | Mod: S$GLB,,, | Performed by: ADVANCED PRACTICE MIDWIFE

## 2022-09-19 PROCEDURE — 90715 TDAP VACCINE 7 YRS/> IM: CPT | Mod: S$GLB,,, | Performed by: ADVANCED PRACTICE MIDWIFE

## 2022-09-19 PROCEDURE — 96372 RHO (D) IMMUNE GLOBULIN: ICD-10-PCS | Mod: S$GLB,,, | Performed by: OBSTETRICS & GYNECOLOGY

## 2022-09-19 PROCEDURE — 0502F PR SUBSEQUENT PRENATAL CARE: ICD-10-PCS | Mod: CPTII,S$GLB,, | Performed by: ADVANCED PRACTICE MIDWIFE

## 2022-09-19 PROCEDURE — 82950 GLUCOSE TEST: CPT | Performed by: ADVANCED PRACTICE MIDWIFE

## 2022-09-19 PROCEDURE — 96372 THER/PROPH/DIAG INJ SC/IM: CPT | Mod: S$GLB,,, | Performed by: OBSTETRICS & GYNECOLOGY

## 2022-09-19 PROCEDURE — 36415 COLL VENOUS BLD VENIPUNCTURE: CPT | Mod: PO | Performed by: ADVANCED PRACTICE MIDWIFE

## 2022-09-19 PROCEDURE — 86592 SYPHILIS TEST NON-TREP QUAL: CPT | Performed by: ADVANCED PRACTICE MIDWIFE

## 2022-09-19 PROCEDURE — 90471 IMMUNIZATION ADMIN: CPT | Mod: 59,S$GLB,, | Performed by: ADVANCED PRACTICE MIDWIFE

## 2022-09-19 PROCEDURE — 0502F SUBSEQUENT PRENATAL CARE: CPT | Mod: CPTII,S$GLB,, | Performed by: ADVANCED PRACTICE MIDWIFE

## 2022-09-19 PROCEDURE — 87389 HIV-1 AG W/HIV-1&-2 AB AG IA: CPT | Performed by: ADVANCED PRACTICE MIDWIFE

## 2022-09-19 PROCEDURE — 80053 COMPREHEN METABOLIC PANEL: CPT | Performed by: ADVANCED PRACTICE MIDWIFE

## 2022-09-19 PROCEDURE — 84156 ASSAY OF PROTEIN URINE: CPT | Performed by: ADVANCED PRACTICE MIDWIFE

## 2022-09-19 NOTE — NURSING
Patient is here for encounter for Tdap.     Verified patient with 2 patient identifiers. Allergies and medications reviewed.   Tdap given IM to left deltoid using aseptic technique.   No discomfort noted. Patient tolerated well.      Patient advised to wait 15 minutes in lobby to monitor for reaction.   Patient verbalized understanding.

## 2022-09-19 NOTE — NURSING
Patient is here for encounter for Rhogam     Verified patient with 2 patient identifiers. Allergies and medications reviewed.   Rhogam given IM to right ventrogluteal using aseptic technique.   No discomfort noted. Patient tolerated well.     Patient advised to wait 15 minutes in lobby to monitor for reaction.   Patient verbalized understanding.

## 2022-09-19 NOTE — PROGRESS NOTES
37 y.o. female  at 27w4d by US  Reports + FM, denies VB, LOF or CTX  Doing well without concerns  /82   Wt 95.8 kg (211 lb 3.2 oz)   LMP 2022   BMI 38.63 kg/m²   TWG: 10 lbs   28wk labs today (A NEG)  Rhogam given today   Tdap given today   Discussed recommendations for delivery timing and weekly BPPs starting at 32 weeks d/t CHTN on medication  Reviewed warning signs, normal FKCs,  labor precautions and how/when to call.  RTC x 2 wks, call or present sooner prn.

## 2022-09-20 LAB
CREAT UR-MCNC: 103 MG/DL (ref 15–325)
PROT UR-MCNC: 14 MG/DL (ref 0–15)
PROT/CREAT UR: 0.14 MG/G{CREAT} (ref 0–0.2)
RPR SER QL: NORMAL

## 2022-09-23 ENCOUNTER — PATIENT MESSAGE (OUTPATIENT)
Dept: OBSTETRICS AND GYNECOLOGY | Facility: CLINIC | Age: 37
End: 2022-09-23
Payer: COMMERCIAL

## 2022-09-30 ENCOUNTER — PATIENT OUTREACH (OUTPATIENT)
Dept: ADMINISTRATIVE | Facility: HOSPITAL | Age: 37
End: 2022-09-30
Payer: COMMERCIAL

## 2022-10-04 RX ORDER — SERTRALINE HYDROCHLORIDE 100 MG/1
100 TABLET, FILM COATED ORAL DAILY
Qty: 30 TABLET | Refills: 2 | Status: SHIPPED | OUTPATIENT
Start: 2022-10-04 | End: 2023-01-15 | Stop reason: SDUPTHER

## 2022-10-05 ENCOUNTER — ROUTINE PRENATAL (OUTPATIENT)
Dept: OBSTETRICS AND GYNECOLOGY | Facility: CLINIC | Age: 37
End: 2022-10-05
Payer: COMMERCIAL

## 2022-10-05 VITALS
SYSTOLIC BLOOD PRESSURE: 110 MMHG | DIASTOLIC BLOOD PRESSURE: 78 MMHG | BODY MASS INDEX: 39.19 KG/M2 | WEIGHT: 214.31 LBS

## 2022-10-05 DIAGNOSIS — O10.919 CHRONIC HYPERTENSION AFFECTING PREGNANCY: ICD-10-CM

## 2022-10-05 DIAGNOSIS — Z67.91 RH NEGATIVE STATE IN ANTEPARTUM PERIOD: ICD-10-CM

## 2022-10-05 DIAGNOSIS — O09.522 HIGH RISK PREGNANCY, MULTIGRAVIDA OF ADVANCED MATERNAL AGE IN SECOND TRIMESTER: Primary | ICD-10-CM

## 2022-10-05 DIAGNOSIS — O09.812 PREGNANCY RESULTING FROM IN VITRO FERTILIZATION IN SECOND TRIMESTER: ICD-10-CM

## 2022-10-05 DIAGNOSIS — O26.899 RH NEGATIVE STATE IN ANTEPARTUM PERIOD: ICD-10-CM

## 2022-10-05 PROCEDURE — 99999 PR PBB SHADOW E&M-EST. PATIENT-LVL II: CPT | Mod: PBBFAC,,, | Performed by: ADVANCED PRACTICE MIDWIFE

## 2022-10-05 PROCEDURE — 0502F PR SUBSEQUENT PRENATAL CARE: ICD-10-PCS | Mod: CPTII,S$GLB,, | Performed by: ADVANCED PRACTICE MIDWIFE

## 2022-10-05 PROCEDURE — 0502F SUBSEQUENT PRENATAL CARE: CPT | Mod: CPTII,S$GLB,, | Performed by: ADVANCED PRACTICE MIDWIFE

## 2022-10-05 PROCEDURE — 99999 PR PBB SHADOW E&M-EST. PATIENT-LVL II: ICD-10-PCS | Mod: PBBFAC,,, | Performed by: ADVANCED PRACTICE MIDWIFE

## 2022-10-05 NOTE — PROGRESS NOTES
37 y.o. female  at 29w6d by US  Doing well without concerns   /78   Wt 97.2 kg (214 lb 4.6 oz)   LMP 2022   BMI 39.19 kg/m²   TW lbs   CHTN, VSS on labetolol 100mg BID  Hx severe Pre-eclampsia, VSS, labs WNL, will continue to monitor  Anxiety, stable on zoloft 100mg  Previous c/s, desires , approved  IVF, will begin weekly BPPs at 32 weeks  Reviewed warning signs, normal FM/FKCs,  labor precautions and how/when to call.  RTC x 2 wks, call or present sooner prn.

## 2022-10-10 ENCOUNTER — PATIENT MESSAGE (OUTPATIENT)
Dept: OBSTETRICS AND GYNECOLOGY | Facility: CLINIC | Age: 37
End: 2022-10-10
Payer: COMMERCIAL

## 2022-10-19 ENCOUNTER — PROCEDURE VISIT (OUTPATIENT)
Dept: OBSTETRICS AND GYNECOLOGY | Facility: CLINIC | Age: 37
End: 2022-10-19
Payer: COMMERCIAL

## 2022-10-19 ENCOUNTER — PATIENT MESSAGE (OUTPATIENT)
Dept: OBSTETRICS AND GYNECOLOGY | Facility: CLINIC | Age: 37
End: 2022-10-19
Payer: COMMERCIAL

## 2022-10-19 ENCOUNTER — ROUTINE PRENATAL (OUTPATIENT)
Dept: OBSTETRICS AND GYNECOLOGY | Facility: CLINIC | Age: 37
End: 2022-10-19
Payer: COMMERCIAL

## 2022-10-19 VITALS
SYSTOLIC BLOOD PRESSURE: 119 MMHG | BODY MASS INDEX: 39.68 KG/M2 | DIASTOLIC BLOOD PRESSURE: 81 MMHG | WEIGHT: 216.94 LBS

## 2022-10-19 DIAGNOSIS — O09.523 HIGH RISK PREGNANCY, MULTIGRAVIDA OF ADVANCED MATERNAL AGE IN THIRD TRIMESTER: ICD-10-CM

## 2022-10-19 DIAGNOSIS — O09.813 PREGNANCY RESULTING FROM IN VITRO FERTILIZATION IN THIRD TRIMESTER: ICD-10-CM

## 2022-10-19 DIAGNOSIS — O10.919 CHRONIC HYPERTENSION AFFECTING PREGNANCY: ICD-10-CM

## 2022-10-19 DIAGNOSIS — O99.013 ANEMIA DURING PREGNANCY IN THIRD TRIMESTER: Primary | ICD-10-CM

## 2022-10-19 DIAGNOSIS — O34.219 HISTORY OF CESAREAN DELIVERY, CURRENTLY PREGNANT: ICD-10-CM

## 2022-10-19 DIAGNOSIS — O09.299 HX OF PRE-ECLAMPSIA, PRIOR PREGNANCY, CURRENTLY PREGNANT: ICD-10-CM

## 2022-10-19 DIAGNOSIS — O09.522 HIGH RISK PREGNANCY, MULTIGRAVIDA OF ADVANCED MATERNAL AGE IN SECOND TRIMESTER: ICD-10-CM

## 2022-10-19 PROCEDURE — 76819 US OB/GYN PROCEDURE (VIEWPOINT): ICD-10-PCS | Mod: S$GLB,,, | Performed by: OBSTETRICS & GYNECOLOGY

## 2022-10-19 PROCEDURE — 0502F SUBSEQUENT PRENATAL CARE: CPT | Mod: CPTII,S$GLB,, | Performed by: ADVANCED PRACTICE MIDWIFE

## 2022-10-19 PROCEDURE — 76819 FETAL BIOPHYS PROFIL W/O NST: CPT | Mod: S$GLB,,, | Performed by: OBSTETRICS & GYNECOLOGY

## 2022-10-19 PROCEDURE — 76816 OB US FOLLOW-UP PER FETUS: CPT | Mod: S$GLB,,, | Performed by: OBSTETRICS & GYNECOLOGY

## 2022-10-19 PROCEDURE — 99999 PR PBB SHADOW E&M-EST. PATIENT-LVL III: ICD-10-PCS | Mod: PBBFAC,,, | Performed by: ADVANCED PRACTICE MIDWIFE

## 2022-10-19 PROCEDURE — 76816 US OB/GYN PROCEDURE (VIEWPOINT): ICD-10-PCS | Mod: S$GLB,,, | Performed by: OBSTETRICS & GYNECOLOGY

## 2022-10-19 PROCEDURE — 99999 PR PBB SHADOW E&M-EST. PATIENT-LVL III: CPT | Mod: PBBFAC,,, | Performed by: ADVANCED PRACTICE MIDWIFE

## 2022-10-19 PROCEDURE — 0502F PR SUBSEQUENT PRENATAL CARE: ICD-10-PCS | Mod: CPTII,S$GLB,, | Performed by: ADVANCED PRACTICE MIDWIFE

## 2022-10-20 ENCOUNTER — PATIENT MESSAGE (OUTPATIENT)
Dept: ADMINISTRATIVE | Facility: HOSPITAL | Age: 37
End: 2022-10-20
Payer: COMMERCIAL

## 2022-10-20 DIAGNOSIS — O10.919 CHRONIC HYPERTENSION AFFECTING PREGNANCY: Primary | ICD-10-CM

## 2022-10-20 DIAGNOSIS — O34.219 HISTORY OF CESAREAN DELIVERY, CURRENTLY PREGNANT: ICD-10-CM

## 2022-10-24 ENCOUNTER — ROUTINE PRENATAL (OUTPATIENT)
Dept: OBSTETRICS AND GYNECOLOGY | Facility: CLINIC | Age: 37
End: 2022-10-24
Payer: COMMERCIAL

## 2022-10-24 ENCOUNTER — PROCEDURE VISIT (OUTPATIENT)
Dept: OBSTETRICS AND GYNECOLOGY | Facility: CLINIC | Age: 37
End: 2022-10-24
Payer: COMMERCIAL

## 2022-10-24 VITALS
SYSTOLIC BLOOD PRESSURE: 120 MMHG | DIASTOLIC BLOOD PRESSURE: 78 MMHG | WEIGHT: 218.94 LBS | BODY MASS INDEX: 40.04 KG/M2

## 2022-10-24 DIAGNOSIS — O99.013 ANEMIA DURING PREGNANCY IN THIRD TRIMESTER: ICD-10-CM

## 2022-10-24 DIAGNOSIS — O09.522 HIGH RISK PREGNANCY, MULTIGRAVIDA OF ADVANCED MATERNAL AGE IN SECOND TRIMESTER: Primary | ICD-10-CM

## 2022-10-24 DIAGNOSIS — O34.219 HISTORY OF CESAREAN DELIVERY, CURRENTLY PREGNANT: ICD-10-CM

## 2022-10-24 DIAGNOSIS — O10.919 CHRONIC HYPERTENSION AFFECTING PREGNANCY: ICD-10-CM

## 2022-10-24 DIAGNOSIS — O09.522 HIGH RISK PREGNANCY, MULTIGRAVIDA OF ADVANCED MATERNAL AGE IN SECOND TRIMESTER: ICD-10-CM

## 2022-10-24 DIAGNOSIS — O09.299 HX OF PRE-ECLAMPSIA, PRIOR PREGNANCY, CURRENTLY PREGNANT: ICD-10-CM

## 2022-10-24 PROCEDURE — 0502F SUBSEQUENT PRENATAL CARE: CPT | Mod: CPTII,S$GLB,, | Performed by: ADVANCED PRACTICE MIDWIFE

## 2022-10-24 PROCEDURE — 99999 PR PBB SHADOW E&M-EST. PATIENT-LVL III: ICD-10-PCS | Mod: PBBFAC,,, | Performed by: ADVANCED PRACTICE MIDWIFE

## 2022-10-24 PROCEDURE — 99999 PR PBB SHADOW E&M-EST. PATIENT-LVL III: CPT | Mod: PBBFAC,,, | Performed by: ADVANCED PRACTICE MIDWIFE

## 2022-10-24 PROCEDURE — 0502F PR SUBSEQUENT PRENATAL CARE: ICD-10-PCS | Mod: CPTII,S$GLB,, | Performed by: ADVANCED PRACTICE MIDWIFE

## 2022-10-24 PROCEDURE — 76819 US OB/GYN PROCEDURE (VIEWPOINT): ICD-10-PCS | Mod: S$GLB,,, | Performed by: OBSTETRICS & GYNECOLOGY

## 2022-10-24 PROCEDURE — 76819 FETAL BIOPHYS PROFIL W/O NST: CPT | Mod: S$GLB,,, | Performed by: OBSTETRICS & GYNECOLOGY

## 2022-10-24 NOTE — PROGRESS NOTES
37 y.o. female  at 32w4d by US  Doing well without concerns   /78   Wt 99.3 kg (218 lb 14.7 oz)   LMP 2022   BMI 40.04 kg/m²   TW lbs   Ultrasound today with cephalic presentation, anterior placenta, 3VC, ALTON 21.2cm, MVP 7.0cm, BPP 8/8, reviewed with pt and spouse  CHTN, VSS  Anemia, iron daily   Repeat c/s scheduled with Dr. Grazyna Price 22  Reviewed warning signs, normal FM/FKCs,  labor precautions and how/when to call.  RTC x 1 wks, call or present sooner prn.

## 2022-10-28 NOTE — PROGRESS NOTES
37 y.o. female  at 31w6d by US on 10/19/22  Reports + FM, denies VB, LOF or CTX  Doing well without concerns. PT desires TOLAC but would like a C/S scheduled at 38 weeks if not delivered by then.   US: FHTs 166bpm, FM visualized, vertex, anterior placenta with 3 vessel cord. Normal amount of AF. AGA with EFW at the 63%, BPP 8/8  /81   Wt 98.4 kg (216 lb 14.9 oz)   LMP 2022   BMI 39.68 kg/m²   TW lbs  Reviewed warning signs, normal FKCs,  labor precautions and how/when to call.  RTC x 1 wks, call or present sooner prn.     T. Givens URIEL

## 2022-10-31 ENCOUNTER — ROUTINE PRENATAL (OUTPATIENT)
Dept: OBSTETRICS AND GYNECOLOGY | Facility: CLINIC | Age: 37
End: 2022-10-31
Payer: COMMERCIAL

## 2022-10-31 ENCOUNTER — PROCEDURE VISIT (OUTPATIENT)
Dept: OBSTETRICS AND GYNECOLOGY | Facility: CLINIC | Age: 37
End: 2022-10-31
Payer: COMMERCIAL

## 2022-10-31 VITALS
SYSTOLIC BLOOD PRESSURE: 124 MMHG | WEIGHT: 220.44 LBS | DIASTOLIC BLOOD PRESSURE: 75 MMHG | BODY MASS INDEX: 40.32 KG/M2

## 2022-10-31 DIAGNOSIS — O10.919 CHRONIC HYPERTENSION AFFECTING PREGNANCY: ICD-10-CM

## 2022-10-31 DIAGNOSIS — O99.013 ANEMIA DURING PREGNANCY IN THIRD TRIMESTER: ICD-10-CM

## 2022-10-31 DIAGNOSIS — O34.219 HISTORY OF CESAREAN DELIVERY, CURRENTLY PREGNANT: ICD-10-CM

## 2022-10-31 DIAGNOSIS — O09.299 HX OF PRE-ECLAMPSIA, PRIOR PREGNANCY, CURRENTLY PREGNANT: ICD-10-CM

## 2022-10-31 DIAGNOSIS — O09.813 PREGNANCY RESULTING FROM IN VITRO FERTILIZATION IN THIRD TRIMESTER: Primary | ICD-10-CM

## 2022-10-31 DIAGNOSIS — O09.522 HIGH RISK PREGNANCY, MULTIGRAVIDA OF ADVANCED MATERNAL AGE IN SECOND TRIMESTER: ICD-10-CM

## 2022-10-31 PROCEDURE — 0502F PR SUBSEQUENT PRENATAL CARE: ICD-10-PCS | Mod: CPTII,S$GLB,, | Performed by: ADVANCED PRACTICE MIDWIFE

## 2022-10-31 PROCEDURE — 76819 US OB/GYN PROCEDURE (VIEWPOINT): ICD-10-PCS | Mod: S$GLB,,, | Performed by: OBSTETRICS & GYNECOLOGY

## 2022-10-31 PROCEDURE — 76819 FETAL BIOPHYS PROFIL W/O NST: CPT | Mod: S$GLB,,, | Performed by: OBSTETRICS & GYNECOLOGY

## 2022-10-31 PROCEDURE — 99999 PR PBB SHADOW E&M-EST. PATIENT-LVL III: ICD-10-PCS | Mod: PBBFAC,,, | Performed by: ADVANCED PRACTICE MIDWIFE

## 2022-10-31 PROCEDURE — 0502F SUBSEQUENT PRENATAL CARE: CPT | Mod: CPTII,S$GLB,, | Performed by: ADVANCED PRACTICE MIDWIFE

## 2022-10-31 PROCEDURE — 99999 PR PBB SHADOW E&M-EST. PATIENT-LVL III: CPT | Mod: PBBFAC,,, | Performed by: ADVANCED PRACTICE MIDWIFE

## 2022-10-31 NOTE — PROGRESS NOTES
37 y.o. female  at 33w4d by US  Reports + FM, denies VB, LOF or CTX  Doing well without concerns.   US: , vertex, anterior placenta, 3 vessel cord, BPP 8/8, ALTON 24.2, MVP 9.3cm  /75   Wt 100 kg (220 lb 7.4 oz)   LMP 2022   BMI 40.32 kg/m²   TW lbs   Reviewed warning signs, normal FKCs,  labor precautions and how/when to call.  RTC x 1 wks, call or present sooner prn.     T. Givens SNM

## 2022-11-01 PROBLEM — O09.523 HIGH RISK PREGNANCY, MULTIGRAVIDA OF ADVANCED MATERNAL AGE IN THIRD TRIMESTER: Status: ACTIVE | Noted: 2022-05-08

## 2022-11-07 ENCOUNTER — ROUTINE PRENATAL (OUTPATIENT)
Dept: OBSTETRICS AND GYNECOLOGY | Facility: CLINIC | Age: 37
End: 2022-11-07
Payer: COMMERCIAL

## 2022-11-07 ENCOUNTER — PROCEDURE VISIT (OUTPATIENT)
Dept: OBSTETRICS AND GYNECOLOGY | Facility: CLINIC | Age: 37
End: 2022-11-07
Payer: COMMERCIAL

## 2022-11-07 VITALS
WEIGHT: 219.13 LBS | DIASTOLIC BLOOD PRESSURE: 78 MMHG | BODY MASS INDEX: 40.08 KG/M2 | SYSTOLIC BLOOD PRESSURE: 132 MMHG

## 2022-11-07 DIAGNOSIS — O99.013 ANEMIA DURING PREGNANCY IN THIRD TRIMESTER: ICD-10-CM

## 2022-11-07 DIAGNOSIS — O09.812 PREGNANCY RESULTING FROM IN VITRO FERTILIZATION IN SECOND TRIMESTER: ICD-10-CM

## 2022-11-07 DIAGNOSIS — O09.523 HIGH RISK PREGNANCY, MULTIGRAVIDA OF ADVANCED MATERNAL AGE IN THIRD TRIMESTER: Primary | ICD-10-CM

## 2022-11-07 DIAGNOSIS — O09.522 HIGH RISK PREGNANCY, MULTIGRAVIDA OF ADVANCED MATERNAL AGE IN SECOND TRIMESTER: ICD-10-CM

## 2022-11-07 DIAGNOSIS — O10.919 CHRONIC HYPERTENSION AFFECTING PREGNANCY: ICD-10-CM

## 2022-11-07 DIAGNOSIS — O34.219 HISTORY OF CESAREAN DELIVERY, CURRENTLY PREGNANT: ICD-10-CM

## 2022-11-07 DIAGNOSIS — O09.299 HX OF PRE-ECLAMPSIA, PRIOR PREGNANCY, CURRENTLY PREGNANT: ICD-10-CM

## 2022-11-07 DIAGNOSIS — O09.813 PREGNANCY RESULTING FROM IN VITRO FERTILIZATION IN THIRD TRIMESTER: ICD-10-CM

## 2022-11-07 PROCEDURE — 99999 PR PBB SHADOW E&M-EST. PATIENT-LVL III: CPT | Mod: PBBFAC,,, | Performed by: ADVANCED PRACTICE MIDWIFE

## 2022-11-07 PROCEDURE — 76816 OB US FOLLOW-UP PER FETUS: CPT | Mod: S$GLB,,, | Performed by: OBSTETRICS & GYNECOLOGY

## 2022-11-07 PROCEDURE — 0502F PR SUBSEQUENT PRENATAL CARE: ICD-10-PCS | Mod: CPTII,S$GLB,, | Performed by: ADVANCED PRACTICE MIDWIFE

## 2022-11-07 PROCEDURE — 76819 FETAL BIOPHYS PROFIL W/O NST: CPT | Mod: S$GLB,,, | Performed by: OBSTETRICS & GYNECOLOGY

## 2022-11-07 PROCEDURE — 99999 PR PBB SHADOW E&M-EST. PATIENT-LVL III: ICD-10-PCS | Mod: PBBFAC,,, | Performed by: ADVANCED PRACTICE MIDWIFE

## 2022-11-07 PROCEDURE — 76816 US OB/GYN PROCEDURE (VIEWPOINT): ICD-10-PCS | Mod: S$GLB,,, | Performed by: OBSTETRICS & GYNECOLOGY

## 2022-11-07 PROCEDURE — 0502F SUBSEQUENT PRENATAL CARE: CPT | Mod: CPTII,S$GLB,, | Performed by: ADVANCED PRACTICE MIDWIFE

## 2022-11-07 PROCEDURE — 76819 US OB/GYN PROCEDURE (VIEWPOINT): ICD-10-PCS | Mod: S$GLB,,, | Performed by: OBSTETRICS & GYNECOLOGY

## 2022-11-07 NOTE — PROGRESS NOTES
37 y.o. female  at 34w4d by US  Reports + FM, denies VB, LOF or regular CTX  Doing well without concerns.   /78   Wt 99.4 kg (219 lb 2.2 oz)   LMP 2022   BMI 40.08 kg/m²   US: FHR 153bpm, FM visualized, vertex, anterior placenta with 3 vessel cord. MVP 9.3cm, ALTON 21.1cm, BPP 8/8. Fetal growth 78% and AC >99%  TW lbs   GBS at next visit.   Reviewed warning signs, normal FKCs, labor precautions and how/when to call.  RTC x 1 wks, call or present sooner prn.     T. Givens URIEL

## 2022-11-11 ENCOUNTER — TELEPHONE (OUTPATIENT)
Dept: PREADMISSION TESTING | Facility: HOSPITAL | Age: 37
End: 2022-11-11
Payer: COMMERCIAL

## 2022-11-11 NOTE — TELEPHONE ENCOUNTER
Pre op instructions reviewed with patient via phone:    To confirm, you surgeon has scheduled you for a .  Surgery is scheduled 22.  Your doctor will instruct on the time of  your surgery.    Please report to [facility] 4th floor at time instructed by your doctor.    IMPORTANT INSTRUCTIONS!!  Do not eat anything 8 hours prior to surgery.    You may have water and clear juice 3 hours prior to surgery.    OK to brush teeth, no gum, candy or mints!    Do not shave pubic hair 7 days prior to surgery  Do not shave legs 3 days prior to surgery    SHOWERING INSTRUCTIONS:   The night before and morning prior to coming to the hospital:    In the shower, it is best practice to wash and rinse your hair with shampoo, rinse completely     Wet entire body and wash with anti-bacterial soap, rinse completely    With your hand, apply one packet of Hibiclens soap to abdomen from under breasts to above pubic bone, gently scrubbing for 5 minutes.  Do not scrub your skin too hard  Avoid getting Hibiclens on your head, face, or genitals (private parts)    Once you have completed the wash, rinse the Hibiclens thoroughly.      Do not wash with regular soap or anti-bacterial soap after using the Hibiclens.    Pat yourself dry using clean dry towel.  DO NOT apply any lotions or powder to abdomen.    Put on clean clothes.    It is also recommended best practice to remove all artificial nails/polish prior to surgery.    Please remove all jewelery/piercings prior to arrival.

## 2022-11-14 ENCOUNTER — PROCEDURE VISIT (OUTPATIENT)
Dept: OBSTETRICS AND GYNECOLOGY | Facility: CLINIC | Age: 37
End: 2022-11-14
Payer: COMMERCIAL

## 2022-11-14 ENCOUNTER — ROUTINE PRENATAL (OUTPATIENT)
Dept: OBSTETRICS AND GYNECOLOGY | Facility: CLINIC | Age: 37
End: 2022-11-14
Payer: COMMERCIAL

## 2022-11-14 VITALS
DIASTOLIC BLOOD PRESSURE: 82 MMHG | WEIGHT: 221.81 LBS | BODY MASS INDEX: 40.56 KG/M2 | SYSTOLIC BLOOD PRESSURE: 121 MMHG

## 2022-11-14 DIAGNOSIS — O34.219 HISTORY OF CESAREAN DELIVERY, CURRENTLY PREGNANT: ICD-10-CM

## 2022-11-14 DIAGNOSIS — O10.919 CHRONIC HYPERTENSION AFFECTING PREGNANCY: ICD-10-CM

## 2022-11-14 DIAGNOSIS — O09.523 HIGH RISK PREGNANCY, MULTIGRAVIDA OF ADVANCED MATERNAL AGE IN THIRD TRIMESTER: ICD-10-CM

## 2022-11-14 DIAGNOSIS — O09.812 PREGNANCY RESULTING FROM IN VITRO FERTILIZATION IN SECOND TRIMESTER: ICD-10-CM

## 2022-11-14 DIAGNOSIS — O09.522 HIGH RISK PREGNANCY, MULTIGRAVIDA OF ADVANCED MATERNAL AGE IN SECOND TRIMESTER: ICD-10-CM

## 2022-11-14 DIAGNOSIS — Z3A.35 35 WEEKS GESTATION OF PREGNANCY: ICD-10-CM

## 2022-11-14 DIAGNOSIS — O99.013 ANEMIA DURING PREGNANCY IN THIRD TRIMESTER: Primary | ICD-10-CM

## 2022-11-14 DIAGNOSIS — O09.299 HX OF PRE-ECLAMPSIA, PRIOR PREGNANCY, CURRENTLY PREGNANT: ICD-10-CM

## 2022-11-14 PROCEDURE — 99999 PR PBB SHADOW E&M-EST. PATIENT-LVL III: ICD-10-PCS | Mod: PBBFAC,,, | Performed by: ADVANCED PRACTICE MIDWIFE

## 2022-11-14 PROCEDURE — 0502F SUBSEQUENT PRENATAL CARE: CPT | Mod: CPTII,S$GLB,, | Performed by: ADVANCED PRACTICE MIDWIFE

## 2022-11-14 PROCEDURE — 0502F PR SUBSEQUENT PRENATAL CARE: ICD-10-PCS | Mod: CPTII,S$GLB,, | Performed by: ADVANCED PRACTICE MIDWIFE

## 2022-11-14 PROCEDURE — 76819 US OB/GYN PROCEDURE (VIEWPOINT): ICD-10-PCS | Mod: S$GLB,,, | Performed by: OBSTETRICS & GYNECOLOGY

## 2022-11-14 PROCEDURE — 99999 PR PBB SHADOW E&M-EST. PATIENT-LVL III: CPT | Mod: PBBFAC,,, | Performed by: ADVANCED PRACTICE MIDWIFE

## 2022-11-14 PROCEDURE — 76819 FETAL BIOPHYS PROFIL W/O NST: CPT | Mod: S$GLB,,, | Performed by: OBSTETRICS & GYNECOLOGY

## 2022-11-14 NOTE — PROGRESS NOTES
37 y.o. female  at 35w4d  Reports + FM, denies VB, LOF or regular CTX  Doing well without concerns   Taking normadyne daily  BPP 8/8, MVP 6.8cm  TW lbs   GBS collected today   The skin of the suprapubic region was evaluated and appears intact.  Counseled the patient to shower daily and to wash this area with an antibacterial soap such as Dial daily.  Advised her to not shave the hair from this area from now until after delivery.  I also counseled the patient to place antibacterial hand soap in all her bathrooms and kitchen at home to help facilitate proper hand hygiene practices before and after delivery.   Reviewed warning signs, normal FKCs, labor precautions and how/when to call.  RTC x 1 wks, call or present sooner prn.     I spent a total of 15 minutes on the day of the visit.This includes face to face time and non-face to face time preparing to see the patient (eg, review of tests), Obtaining and/or reviewing separately obtained history, Documenting clinical information in the electronic or other health record, Independently interpreting resultsand communicating results to the patient/family/caregiver, or Care coordination.

## 2022-11-16 ENCOUNTER — PATIENT MESSAGE (OUTPATIENT)
Dept: OBSTETRICS AND GYNECOLOGY | Facility: CLINIC | Age: 37
End: 2022-11-16

## 2022-11-16 ENCOUNTER — ROUTINE PRENATAL (OUTPATIENT)
Dept: OBSTETRICS AND GYNECOLOGY | Facility: CLINIC | Age: 37
End: 2022-11-16
Payer: COMMERCIAL

## 2022-11-16 VITALS
SYSTOLIC BLOOD PRESSURE: 122 MMHG | BODY MASS INDEX: 40.73 KG/M2 | WEIGHT: 222.69 LBS | DIASTOLIC BLOOD PRESSURE: 80 MMHG

## 2022-11-16 DIAGNOSIS — O09.813 PREGNANCY RESULTING FROM IN VITRO FERTILIZATION IN THIRD TRIMESTER: Primary | ICD-10-CM

## 2022-11-16 DIAGNOSIS — O34.219 HISTORY OF CESAREAN DELIVERY, CURRENTLY PREGNANT: ICD-10-CM

## 2022-11-16 DIAGNOSIS — I10 ESSENTIAL HYPERTENSION: ICD-10-CM

## 2022-11-16 DIAGNOSIS — Z67.91 RH NEGATIVE STATE IN ANTEPARTUM PERIOD: ICD-10-CM

## 2022-11-16 DIAGNOSIS — O09.523 HIGH RISK PREGNANCY, MULTIGRAVIDA OF ADVANCED MATERNAL AGE IN THIRD TRIMESTER: ICD-10-CM

## 2022-11-16 DIAGNOSIS — O26.899 RH NEGATIVE STATE IN ANTEPARTUM PERIOD: ICD-10-CM

## 2022-11-16 DIAGNOSIS — O09.299 HX OF PRE-ECLAMPSIA, PRIOR PREGNANCY, CURRENTLY PREGNANT: ICD-10-CM

## 2022-11-16 PROCEDURE — 0502F PR SUBSEQUENT PRENATAL CARE: ICD-10-PCS | Mod: CPTII,S$GLB,, | Performed by: OBSTETRICS & GYNECOLOGY

## 2022-11-16 PROCEDURE — 0502F SUBSEQUENT PRENATAL CARE: CPT | Mod: CPTII,S$GLB,, | Performed by: OBSTETRICS & GYNECOLOGY

## 2022-11-16 PROCEDURE — 99999 PR PBB SHADOW E&M-EST. PATIENT-LVL III: ICD-10-PCS | Mod: PBBFAC,,, | Performed by: OBSTETRICS & GYNECOLOGY

## 2022-11-16 PROCEDURE — 99999 PR PBB SHADOW E&M-EST. PATIENT-LVL III: CPT | Mod: PBBFAC,,, | Performed by: OBSTETRICS & GYNECOLOGY

## 2022-11-16 RX ORDER — OXYTOCIN/RINGER'S LACTATE 30/500 ML
334 PLASTIC BAG, INJECTION (ML) INTRAVENOUS ONCE
Status: CANCELLED | OUTPATIENT
Start: 2022-11-16 | End: 2022-11-16

## 2022-11-16 RX ORDER — OXYTOCIN/RINGER'S LACTATE 30/500 ML
95 PLASTIC BAG, INJECTION (ML) INTRAVENOUS ONCE
Status: CANCELLED | OUTPATIENT
Start: 2022-11-16 | End: 2022-11-16

## 2022-11-16 RX ORDER — MISOPROSTOL 100 UG/1
800 TABLET ORAL
Status: CANCELLED | OUTPATIENT
Start: 2022-11-16

## 2022-11-16 RX ORDER — METHYLERGONOVINE MALEATE 0.2 MG/ML
200 INJECTION INTRAVENOUS
Status: CANCELLED | OUTPATIENT
Start: 2022-11-16

## 2022-11-16 RX ORDER — SODIUM CHLORIDE, SODIUM LACTATE, POTASSIUM CHLORIDE, CALCIUM CHLORIDE 600; 310; 30; 20 MG/100ML; MG/100ML; MG/100ML; MG/100ML
INJECTION, SOLUTION INTRAVENOUS CONTINUOUS
Status: CANCELLED | OUTPATIENT
Start: 2022-11-16

## 2022-11-16 RX ORDER — SODIUM CITRATE AND CITRIC ACID MONOHYDRATE 334; 500 MG/5ML; MG/5ML
30 SOLUTION ORAL
Status: CANCELLED | OUTPATIENT
Start: 2022-11-16

## 2022-11-16 RX ORDER — FAMOTIDINE 10 MG/ML
20 INJECTION INTRAVENOUS
Status: CANCELLED | OUTPATIENT
Start: 2022-11-16

## 2022-11-16 RX ORDER — CARBOPROST TROMETHAMINE 250 UG/ML
250 INJECTION, SOLUTION INTRAMUSCULAR
Status: CANCELLED | OUTPATIENT
Start: 2022-11-16

## 2022-11-16 NOTE — PROGRESS NOTES
Presents for routine OB visit with pre-op.  BP well-controlled on labetalol.  Preeclampsia ROS is negative.  Pt is scheduled for repeat C/S with me on 12/1/22.  Surgery consents reviewed in detail and signed.  All questions answered.  No Hibiclens packs available in the office to give the patient.  NEEDS HIBICLENS PACKS AT NEXT VISIT  L&D precautions.  RTC 1 week for routine OB with BPP

## 2022-11-22 ENCOUNTER — HOSPITAL ENCOUNTER (OUTPATIENT)
Facility: HOSPITAL | Age: 37
LOS: 1 days | Discharge: HOME | End: 2022-11-22
Attending: OBSTETRICS & GYNECOLOGY | Admitting: OBSTETRICS & GYNECOLOGY
Payer: COMMERCIAL

## 2022-11-22 VITALS
RESPIRATION RATE: 18 BRPM | OXYGEN SATURATION: 96 % | SYSTOLIC BLOOD PRESSURE: 137 MMHG | HEART RATE: 95 BPM | DIASTOLIC BLOOD PRESSURE: 79 MMHG | TEMPERATURE: 98 F

## 2022-11-22 DIAGNOSIS — O26.893 VAGINAL DISCHARGE DURING PREGNANCY IN THIRD TRIMESTER: ICD-10-CM

## 2022-11-22 DIAGNOSIS — N89.8 VAGINAL DISCHARGE DURING PREGNANCY IN THIRD TRIMESTER: ICD-10-CM

## 2022-11-22 PROCEDURE — 99211 OFF/OP EST MAY X REQ PHY/QHP: CPT | Mod: 25

## 2022-11-22 PROCEDURE — 99213 OFFICE O/P EST LOW 20 MIN: CPT | Mod: 25,,, | Performed by: ADVANCED PRACTICE MIDWIFE

## 2022-11-22 PROCEDURE — 59025 FETAL NON-STRESS TEST: CPT

## 2022-11-22 PROCEDURE — 59025 FETAL NON-STRESS TEST: CPT | Mod: 26,,, | Performed by: ADVANCED PRACTICE MIDWIFE

## 2022-11-22 PROCEDURE — 99213 PR OFFICE/OUTPT VISIT, EST, LEVL III, 20-29 MIN: ICD-10-PCS | Mod: 25,,, | Performed by: ADVANCED PRACTICE MIDWIFE

## 2022-11-22 PROCEDURE — 59025 OBTAIN FETAL NONSTRESS TEST (NST): ICD-10-PCS | Mod: 26,,, | Performed by: ADVANCED PRACTICE MIDWIFE

## 2022-11-22 RX ORDER — ONDANSETRON 8 MG/1
8 TABLET, ORALLY DISINTEGRATING ORAL EVERY 8 HOURS PRN
Status: DISCONTINUED | OUTPATIENT
Start: 2022-11-22 | End: 2022-11-22 | Stop reason: HOSPADM

## 2022-11-22 RX ORDER — SODIUM CHLORIDE, SODIUM LACTATE, POTASSIUM CHLORIDE, CALCIUM CHLORIDE 600; 310; 30; 20 MG/100ML; MG/100ML; MG/100ML; MG/100ML
INJECTION, SOLUTION INTRAVENOUS CONTINUOUS
Status: DISCONTINUED | OUTPATIENT
Start: 2022-11-22 | End: 2022-11-22 | Stop reason: HOSPADM

## 2022-11-22 RX ORDER — PROCHLORPERAZINE EDISYLATE 5 MG/ML
5 INJECTION INTRAMUSCULAR; INTRAVENOUS EVERY 6 HOURS PRN
Status: DISCONTINUED | OUTPATIENT
Start: 2022-11-22 | End: 2022-11-22 | Stop reason: HOSPADM

## 2022-11-22 RX ORDER — ACETAMINOPHEN 500 MG
500 TABLET ORAL EVERY 6 HOURS PRN
Status: DISCONTINUED | OUTPATIENT
Start: 2022-11-22 | End: 2022-11-22 | Stop reason: HOSPADM

## 2022-11-22 NOTE — HPI
37 y.o.  at 36w5d presents to L&D with complaints of possible SROM. She states that at 0530 she got  up to urinate and feels like she kept leaking after she had emptied her bladder. She then wore a pad and felt like she was still leaking fluid.

## 2022-11-22 NOTE — H&P
[facility] - Labor & Delivery  Obstetrics  History & Physical    Patient Name: Maria Del Carmen Espinoza  MRN: [id number]  Admission Date: 2022  Primary Care Provider: Jamel Meier MD    Subjective:     Principal Problem:Vaginal discharge during pregnancy in third trimester    History of Present Illness:  37 y.o.  at 36w5d presents to L&D with complaints of possible SROM. She states that at 0530 she got  up to urinate and feels like she kept leaking after she had emptied her bladder. She then wore a pad and felt like she was still leaking fluid.       Obstetric HPI:  Patient reports some contractions, active fetal movement, No vaginal bleeding , possible loss of fluid     This pregnancy has been complicated by   Previous pregnancy with Pre-e  AMA  CHTN  Previous   IVF pregnancy    OB History    Para Term  AB Living   2 1 0 1 0 1   SAB IAB Ectopic Multiple Live Births   0 0 0 0 1      # Outcome Date GA Lbr Epi/2nd Weight Sex Delivery Anes PTL Lv   2 Current            1  19 35w0d  2.65 kg (5 lb 13.5 oz) F CS-LTranv EPI Y KRIS      Birth Comments: nicu attended      Name: CRISTOBAL, GIRL MARIA DEL CARMEN      Apgar1: 9  Apgar5: 9     Past Medical History:   Diagnosis Date    Anemia     Autosomal recessive polycystic kidneys     GERD (gastroesophageal reflux disease)     History of uterine fibroid     Hyperlipidemia     Hypertension     Liver disease     Mastitis 10/29/2019    Melanoma 2016    left superior helix (breslow dept 0.67mm)    Mental disorder     Migraine headache     PONV (postoperative nausea and vomiting)     post op nausea    Postpartum depression      Past Surgical History:   Procedure Laterality Date     SECTION N/A 2019    Procedure:  SECTION;  Surgeon: Grazyna Price MD;  Location: [facility] L&D;  Service: OB/GYN;  Laterality: N/A;    HYSTEROSCOPIC POLYPECTOMY OF UTERUS N/A 1/10/2019    Procedure: POLYPECTOMY, UTERUS,  HYSTEROSCOPIC;  Surgeon: Jolanta Sifuentes MD;  Location: [facility];  Service: OB/GYN;  Laterality: N/A;    LYMPH NODE BIOPSY      MALIGNANT SKIN LESION EXCISION  06/10/2016    NASAL SEPTUM SURGERY      RENAL EXPLORATION  2011       PTA Medications   Medication Sig    aspirin (ECOTRIN) 81 MG EC tablet Take 81 mg by mouth once daily.    cetirizine (ZYRTEC) 10 MG tablet     ferrous sulfate (IRON ORAL) Take by mouth.    fish oil-omega-3 fatty acids 300-1,000 mg capsule Take 2 g by mouth once daily.    labetaloL (NORMODYNE) 100 MG tablet Take 1 tablet (100 mg total) by mouth 2 (two) times daily.    Lactobacillus rhamnosus GG (CULTURELLE) 10 billion cell capsule     omeprazole (PRILOSEC) 20 MG capsule Take 1 capsule (20 mg total) by mouth once daily.    PNV/iron/folic acid (PRENATAL VITAMIN-IRON-FA ORAL) Take by mouth.    sertraline (ZOLOFT) 100 MG tablet Take 1 tablet (100 mg total) by mouth once daily.       Review of patient's allergies indicates:   Allergen Reactions    Singulair [montelukast]      Itching all over        Family History       Problem Relation (Age of Onset)    Cancer Maternal Grandmother    Diabetes Paternal Grandmother    Hypertension Father    Kidney disease Father          Tobacco Use    Smoking status: Never    Smokeless tobacco: Never   Substance and Sexual Activity    Alcohol use: Not Currently     Comment: socially    Drug use: No    Sexual activity: Yes     Partners: Female     Birth control/protection: None     Review of Systems   Respiratory:  Negative for shortness of breath.    Cardiovascular:  Negative for chest pain.   Gastrointestinal:  Positive for abdominal pain. Negative for nausea and vomiting.   Genitourinary:  Positive for vaginal discharge. Negative for vaginal bleeding.   Neurological:  Negative for headaches.   All other systems reviewed and are negative.   Objective:     Vital Signs (Most Recent):  Temp: 98.4 °F (36.9 °C) (11/22/22 0930)  Pulse: 95 (11/22/22  1005)  Resp: 18 (22 0930)  BP: 137/79 (22 1005)  SpO2: 96 % (22 0930) Vital Signs (24h Range):  Temp:  [98.4 °F (36.9 °C)] 98.4 °F (36.9 °C)  Pulse:  [] 95  Resp:  [18] 18  SpO2:  [96 %] 96 %  BP: (132-137)/(79-87) 137/79        There is no height or weight on file to calculate BMI.    FHT: 135 Cat 1 (reassuring)  TOCO:  Q 10-25 minutes    Physical Exam:   Constitutional: She is oriented to person, place, and time. She appears well-developed and well-nourished.    HENT:   Head: Normocephalic.      Cardiovascular:  Normal rate and regular rhythm.             Pulmonary/Chest: Effort normal.        Abdominal: Soft.     Genitourinary:    Vagina and uterus normal.      Genitourinary Comments: SSE reveals cervix closed, negative for pooling. Thin discharge noted in posterior vaginal vault. Nitrazine negative. Fern negative. Wet prep negative.              Musculoskeletal: Normal range of motion and moves all extremeties.       Neurological: She is alert and oriented to person, place, and time. She has normal reflexes.    Skin: Skin is warm and dry.      Cervix:  Dilation:  0  Per spec exam     Significant Labs:  Lab Results   Component Value Date    GROUPTRH A NEG 2022    HEPBSAG Negative 2022    STREPBCULT  2019     Results reported to: Jami Mendez RN 2019  09:32    STREPBCULT (A) 2019     STREPTOCOCCUS AGALACTIAE (GROUP B)  Beta-hemolytic streptococci are routinely susceptible to   penicillins,cephalosporins and carbapenems.         I have personallly reviewed all pertinent lab results from the last 24 hours.    Assessment/Plan:     37 y.o. female  at 36w5d for:    * Vaginal discharge during pregnancy in third trimester  Negative Nitrazine/pooling/fern/wet prep        Ricarda Darling CNM  Obstetrics  [facility] - [facility] Delivery

## 2022-11-22 NOTE — PROCEDURES
Samanta Espinoza is a 37 y.o. female patient.    Temp: 98.4 °F (36.9 °C) (11/22/22 0930)  Pulse: 95 (11/22/22 1005)  Resp: 18 (11/22/22 0930)  BP: 137/79 (11/22/22 1005)  SpO2: 96 % (11/22/22 0930)       Obtain Fetal nonstress test (NST)    Date/Time: 11/22/2022 10:20 AM  Performed by: Ricarda Darling CNM  Authorized by: Ricarda Darling CNM     Nonstress Test:     Variability:  6-25 BPM    Decelerations:  None    Accelerations:  15 bpm    Acoustic Stimulator: No      Baseline:  130    Uterine Irritability: No      Contractions:  Irregular    Contraction Frequency:  10-30  Biophysical Profile:     Nonstress Test Interpretation: reactive      Overall Impression:  Reassuring  Post-procedure:     Patient tolerance:  Patient tolerated the procedure well with no immediate complications    11/22/2022

## 2022-11-22 NOTE — DISCHARGE SUMMARY
[facility] - Labor & Delivery  Obstetrics  Discharge Summary      Patient Name: Samanta Espinoza  MRN: [id number]  Admission Date: 2022  Hospital Length of Stay: 1 days  Discharge Date and Time: No discharge date for patient encounter.  Attending Physician: Grazyna Price MD   Discharging Provider: Ricarda Darling CNM   Primary Care Provider: Jamel Meier MD    HPI: 37 y.o.  at 36w5d presents to L&D with complaints of possible SROM. She states that at 0530 she got  up to urinate and feels like she kept leaking after she had emptied her bladder. She then wore a pad and felt like she was still leaking fluid.       FHT: 125 Cat 1 (reassuring)  TOCO:  Q 5-30 minutes    Procedure(s) (LRB):   SECTION (N/A)     Hospital Course:   NST  R/O SROM  D/C home with strict FKC/Labor/Bleeding/SROM precautions           Final Active Diagnoses:    Diagnosis Date Noted POA    PRINCIPAL PROBLEM:  Vaginal discharge during pregnancy in third trimester [O26.893, N89.8] 2022 Unknown      Problems Resolved During this Admission:        Significant Diagnostic Studies: Labs: All labs within the past 24 hours have been reviewed      Immunizations     None          This patient has no babies on file.  Pending Diagnostic Studies:     None          Discharged Condition: stable    Disposition: Home or Self Care    Follow Up:   Follow-up Information     Zach Mattson CNM Follow up.    Specialty: Obstetrics and Gynecology  Why: routine prenatal care  Contact information:  [address]  [place] [address] [phone]                       Patient Instructions:      Notify your health care provider if you experience any of the following:  temperature >100.4     Notify your health care provider if you experience any of the following:  persistent nausea and vomiting or diarrhea     Notify your health care provider if you experience any of the following:  severe uncontrolled pain     Notify your health care  provider if you experience any of the following:  difficulty breathing or increased cough     Notify your health care provider if you experience any of the following:  severe persistent headache     Medications:  Current Discharge Medication List      CONTINUE these medications which have NOT CHANGED    Details   aspirin (ECOTRIN) 81 MG EC tablet Take 81 mg by mouth once daily.      cetirizine (ZYRTEC) 10 MG tablet       ferrous sulfate (IRON ORAL) Take by mouth.      fish oil-omega-3 fatty acids 300-1,000 mg capsule Take 2 g by mouth once daily.      labetaloL (NORMODYNE) 100 MG tablet Take 1 tablet (100 mg total) by mouth 2 (two) times daily.  Qty: 60 tablet, Refills: 11    Comments: .      Lactobacillus rhamnosus GG (CULTURELLE) 10 billion cell capsule       omeprazole (PRILOSEC) 20 MG capsule Take 1 capsule (20 mg total) by mouth once daily.  Qty: 90 capsule, Refills: 0    Associated Diagnoses: Gastroesophageal reflux disease      PNV/iron/folic acid (PRENATAL VITAMIN-IRON-FA ORAL) Take by mouth.      sertraline (ZOLOFT) 100 MG tablet Take 1 tablet (100 mg total) by mouth once daily.  Qty: 30 tablet, Refills: 2             Ricarda Darling CNM  Obstetrics  [facility] - [facility]

## 2022-11-22 NOTE — SUBJECTIVE & OBJECTIVE
Obstetric HPI:  Patient reports some contractions, active fetal movement, No vaginal bleeding , possible loss of fluid     This pregnancy has been complicated by   Previous pregnancy with Pre-e  AMA  CHTN  Previous   IVF pregnancy    OB History    Para Term  AB Living   2 1 0 1 0 1   SAB IAB Ectopic Multiple Live Births   0 0 0 0 1      # Outcome Date GA Lbr Epi/2nd Weight Sex Delivery Anes PTL Lv   2 Current            1  19 35w0d  2.65 kg (5 lb 13.5 oz) F CS-LTranv EPI Y KRIS      Birth Comments: nicu attended      Name: CRISTOBAL, TONI JOYCE      Apgar1: 9  Apgar5: 9     Past Medical History:   Diagnosis Date    Anemia     Autosomal recessive polycystic kidneys     GERD (gastroesophageal reflux disease)     History of uterine fibroid     Hyperlipidemia     Hypertension     Liver disease     Mastitis 10/29/2019    Melanoma 2016    left superior helix (breslow dept 0.67mm)    Mental disorder     Migraine headache     PONV (postoperative nausea and vomiting)     post op nausea    Postpartum depression      Past Surgical History:   Procedure Laterality Date     SECTION N/A 2019    Procedure:  SECTION;  Surgeon: Grazyna Price MD;  Location: [facility] L&D;  Service: OB/GYN;  Laterality: N/A;    HYSTEROSCOPIC POLYPECTOMY OF UTERUS N/A 1/10/2019    Procedure: POLYPECTOMY, UTERUS, HYSTEROSCOPIC;  Surgeon: Jolanta Sifuentes MD;  Location: [facility] OR;  Service: OB/GYN;  Laterality: N/A;    LYMPH NODE BIOPSY      MALIGNANT SKIN LESION EXCISION  06/10/2016    NASAL SEPTUM SURGERY      RENAL EXPLORATION         PTA Medications   Medication Sig    aspirin (ECOTRIN) 81 MG EC tablet Take 81 mg by mouth once daily.    cetirizine (ZYRTEC) 10 MG tablet     ferrous sulfate (IRON ORAL) Take by mouth.    fish oil-omega-3 fatty acids 300-1,000 mg capsule Take 2 g by mouth once daily.    labetaloL (NORMODYNE) 100 MG tablet Take 1 tablet (100 mg total) by mouth 2 (two)  times daily.    Lactobacillus rhamnosus GG (CULTURELLE) 10 billion cell capsule     omeprazole (PRILOSEC) 20 MG capsule Take 1 capsule (20 mg total) by mouth once daily.    PNV/iron/folic acid (PRENATAL VITAMIN-IRON-FA ORAL) Take by mouth.    sertraline (ZOLOFT) 100 MG tablet Take 1 tablet (100 mg total) by mouth once daily.       Review of patient's allergies indicates:   Allergen Reactions    Singulair [montelukast]      Itching all over        Family History       Problem Relation (Age of Onset)    Cancer Maternal Grandmother    Diabetes Paternal Grandmother    Hypertension Father    Kidney disease Father          Tobacco Use    Smoking status: Never    Smokeless tobacco: Never   Substance and Sexual Activity    Alcohol use: Not Currently     Comment: socially    Drug use: No    Sexual activity: Yes     Partners: Female     Birth control/protection: None     Review of Systems   Respiratory:  Negative for shortness of breath.    Cardiovascular:  Negative for chest pain.   Gastrointestinal:  Positive for abdominal pain. Negative for nausea and vomiting.   Genitourinary:  Positive for vaginal discharge. Negative for vaginal bleeding.   Neurological:  Negative for headaches.   All other systems reviewed and are negative.   Objective:     Vital Signs (Most Recent):  Temp: 98.4 °F (36.9 °C) (11/22/22 0930)  Pulse: 95 (11/22/22 1005)  Resp: 18 (11/22/22 0930)  BP: 137/79 (11/22/22 1005)  SpO2: 96 % (11/22/22 0930) Vital Signs (24h Range):  Temp:  [98.4 °F (36.9 °C)] 98.4 °F (36.9 °C)  Pulse:  [] 95  Resp:  [18] 18  SpO2:  [96 %] 96 %  BP: (132-137)/(79-87) 137/79        There is no height or weight on file to calculate BMI.    FHT: 135 Cat 1 (reassuring)  TOCO:  Q 10-25 minutes    Physical Exam:   Constitutional: She is oriented to person, place, and time. She appears well-developed and well-nourished.    HENT:   Head: Normocephalic.      Cardiovascular:  Normal rate and regular rhythm.              Pulmonary/Chest: Effort normal.        Abdominal: Soft.     Genitourinary:    Vagina and uterus normal.      Genitourinary Comments: SSE reveals cervix closed, negative for pooling. Thin discharge noted in posterior vaginal vault. Nitrazine negative. Fern negative. Wet prep negative.              Musculoskeletal: Normal range of motion and moves all extremeties.       Neurological: She is alert and oriented to person, place, and time. She has normal reflexes.    Skin: Skin is warm and dry.      Cervix:  Dilation:  0  Per spec exam     Significant Labs:  Lab Results   Component Value Date    GROUPTRH A NEG 05/05/2022    HEPBSAG Negative 05/05/2022    STREPBCULT  09/20/2019     Results reported to: Jami Mendez RN 09/24/2019  09:32    STREPBCULT (A) 09/20/2019     STREPTOCOCCUS AGALACTIAE (GROUP B)  Beta-hemolytic streptococci are routinely susceptible to   penicillins,cephalosporins and carbapenems.         I have personallly reviewed all pertinent lab results from the last 24 hours.

## 2022-11-22 NOTE — DISCHARGE INSTRUCTIONS

## 2022-11-25 ENCOUNTER — PROCEDURE VISIT (OUTPATIENT)
Dept: OBSTETRICS AND GYNECOLOGY | Facility: CLINIC | Age: 37
End: 2022-11-25
Payer: COMMERCIAL

## 2022-11-25 ENCOUNTER — ROUTINE PRENATAL (OUTPATIENT)
Dept: OBSTETRICS AND GYNECOLOGY | Facility: CLINIC | Age: 37
End: 2022-11-25
Payer: COMMERCIAL

## 2022-11-25 VITALS
SYSTOLIC BLOOD PRESSURE: 124 MMHG | BODY MASS INDEX: 41.49 KG/M2 | DIASTOLIC BLOOD PRESSURE: 84 MMHG | WEIGHT: 226.88 LBS

## 2022-11-25 DIAGNOSIS — O09.812 PREGNANCY RESULTING FROM IN VITRO FERTILIZATION IN SECOND TRIMESTER: ICD-10-CM

## 2022-11-25 DIAGNOSIS — O09.813 PREGNANCY RESULTING FROM IN VITRO FERTILIZATION IN THIRD TRIMESTER: Primary | ICD-10-CM

## 2022-11-25 DIAGNOSIS — O09.299 HX OF PRE-ECLAMPSIA, PRIOR PREGNANCY, CURRENTLY PREGNANT: ICD-10-CM

## 2022-11-25 DIAGNOSIS — O34.219 HISTORY OF CESAREAN DELIVERY, CURRENTLY PREGNANT: ICD-10-CM

## 2022-11-25 DIAGNOSIS — O10.919 CHRONIC HYPERTENSION AFFECTING PREGNANCY: ICD-10-CM

## 2022-11-25 DIAGNOSIS — O09.523 HIGH RISK PREGNANCY, MULTIGRAVIDA OF ADVANCED MATERNAL AGE IN THIRD TRIMESTER: ICD-10-CM

## 2022-11-25 DIAGNOSIS — O09.522 HIGH RISK PREGNANCY, MULTIGRAVIDA OF ADVANCED MATERNAL AGE IN SECOND TRIMESTER: ICD-10-CM

## 2022-11-25 PROBLEM — N89.8 VAGINAL DISCHARGE DURING PREGNANCY IN THIRD TRIMESTER: Status: RESOLVED | Noted: 2022-11-22 | Resolved: 2022-11-25

## 2022-11-25 PROBLEM — O26.893 VAGINAL DISCHARGE DURING PREGNANCY IN THIRD TRIMESTER: Status: RESOLVED | Noted: 2022-11-22 | Resolved: 2022-11-25

## 2022-11-25 PROCEDURE — 76819 FETAL BIOPHYS PROFIL W/O NST: CPT | Mod: S$GLB,,, | Performed by: OBSTETRICS & GYNECOLOGY

## 2022-11-25 PROCEDURE — 0502F PR SUBSEQUENT PRENATAL CARE: ICD-10-PCS | Mod: CPTII,S$GLB,, | Performed by: ADVANCED PRACTICE MIDWIFE

## 2022-11-25 PROCEDURE — 87081 CULTURE SCREEN ONLY: CPT | Performed by: ADVANCED PRACTICE MIDWIFE

## 2022-11-25 PROCEDURE — 0502F SUBSEQUENT PRENATAL CARE: CPT | Mod: CPTII,S$GLB,, | Performed by: ADVANCED PRACTICE MIDWIFE

## 2022-11-25 PROCEDURE — 99999 PR PBB SHADOW E&M-EST. PATIENT-LVL III: ICD-10-PCS | Mod: PBBFAC,,, | Performed by: ADVANCED PRACTICE MIDWIFE

## 2022-11-25 PROCEDURE — 76819 US OB/GYN PROCEDURE (VIEWPOINT): ICD-10-PCS | Mod: S$GLB,,, | Performed by: OBSTETRICS & GYNECOLOGY

## 2022-11-25 PROCEDURE — 99999 PR PBB SHADOW E&M-EST. PATIENT-LVL III: CPT | Mod: PBBFAC,,, | Performed by: ADVANCED PRACTICE MIDWIFE

## 2022-11-28 LAB — BACTERIA SPEC AEROBE CULT: NORMAL

## 2022-11-28 NOTE — PROGRESS NOTES
37 y.o. female  at 37w1d by US  Doing well without concerns, ready for baby, excited about delivery  Hibaclens packets unavailable in clinic, instructions reviewed, pt's wife has some   /84   Wt 102.9 kg (226 lb 13.7 oz)   LMP 2022   BMI 41.49 kg/m²   TW lbs   Ultrasound today with cephalic presentation, anterior placenta, ALTON 13.0cm, MVP 7.4cm, BPP 8/8, reviewed with pt and wife  Reviewed warning signs, normal FM/FKCs, labor precautions and how/when to call.

## 2022-11-29 ENCOUNTER — PATIENT MESSAGE (OUTPATIENT)
Dept: OBSTETRICS AND GYNECOLOGY | Facility: HOSPITAL | Age: 37
End: 2022-11-29
Payer: COMMERCIAL

## 2022-11-29 DIAGNOSIS — R05.1 ACUTE COUGH: ICD-10-CM

## 2022-11-29 RX ORDER — BENZONATATE 200 MG/1
200 CAPSULE ORAL 3 TIMES DAILY PRN
Qty: 30 CAPSULE | Refills: 0 | Status: SHIPPED | OUTPATIENT
Start: 2022-11-29 | End: 2022-12-09

## 2022-12-01 ENCOUNTER — HOSPITAL ENCOUNTER (INPATIENT)
Facility: HOSPITAL | Age: 37
LOS: 2 days | Discharge: HOME | End: 2022-12-03
Attending: OBSTETRICS & GYNECOLOGY | Admitting: OBSTETRICS & GYNECOLOGY
Payer: COMMERCIAL

## 2022-12-01 ENCOUNTER — ANESTHESIA EVENT (OUTPATIENT)
Dept: OBSTETRICS AND GYNECOLOGY | Facility: HOSPITAL | Age: 37
End: 2022-12-01
Payer: COMMERCIAL

## 2022-12-01 ENCOUNTER — ANESTHESIA (OUTPATIENT)
Dept: OBSTETRICS AND GYNECOLOGY | Facility: HOSPITAL | Age: 37
End: 2022-12-01
Payer: COMMERCIAL

## 2022-12-01 DIAGNOSIS — Z67.91 RH NEGATIVE STATE IN ANTEPARTUM PERIOD: ICD-10-CM

## 2022-12-01 DIAGNOSIS — I10 ESSENTIAL HYPERTENSION: ICD-10-CM

## 2022-12-01 DIAGNOSIS — O09.523 HIGH RISK PREGNANCY, MULTIGRAVIDA OF ADVANCED MATERNAL AGE IN THIRD TRIMESTER: ICD-10-CM

## 2022-12-01 DIAGNOSIS — O34.219 HISTORY OF CESAREAN DELIVERY, CURRENTLY PREGNANT: ICD-10-CM

## 2022-12-01 DIAGNOSIS — O09.813 PREGNANCY RESULTING FROM IN VITRO FERTILIZATION IN THIRD TRIMESTER: ICD-10-CM

## 2022-12-01 DIAGNOSIS — O26.899 RH NEGATIVE STATE IN ANTEPARTUM PERIOD: ICD-10-CM

## 2022-12-01 DIAGNOSIS — O09.299 HX OF PRE-ECLAMPSIA, PRIOR PREGNANCY, CURRENTLY PREGNANT: ICD-10-CM

## 2022-12-01 DIAGNOSIS — Z98.891 STATUS POST REPEAT LOW TRANSVERSE CESAREAN SECTION: Primary | ICD-10-CM

## 2022-12-01 PROBLEM — O99.213 OBESITY IN PREGNANCY, ANTEPARTUM, THIRD TRIMESTER: Status: ACTIVE | Noted: 2022-12-01

## 2022-12-01 LAB
ABO + RH BLD: NORMAL
ABO + RH BLD: NORMAL
BASOPHILS # BLD AUTO: 0.01 K/UL (ref 0–0.2)
BASOPHILS NFR BLD: 0.1 % (ref 0–1.9)
BLD GP AB SCN CELLS X3 SERPL QL: NORMAL
CREAT SERPL-MCNC: 0.6 MG/DL (ref 0.5–1.4)
DIFFERENTIAL METHOD: ABNORMAL
EOSINOPHIL # BLD AUTO: 0.1 K/UL (ref 0–0.5)
EOSINOPHIL NFR BLD: 1 % (ref 0–8)
ERYTHROCYTE [DISTWIDTH] IN BLOOD BY AUTOMATED COUNT: 13.4 % (ref 11.5–14.5)
EST. GFR  (NO RACE VARIABLE): >60 ML/MIN/1.73 M^2
FETAL CELL SCN BLD QL ROSETTE: NORMAL
HCT VFR BLD AUTO: 32.5 % (ref 37–48.5)
HGB BLD-MCNC: 10.7 G/DL (ref 12–16)
IMM GRANULOCYTES # BLD AUTO: 0.14 K/UL (ref 0–0.04)
IMM GRANULOCYTES NFR BLD AUTO: 1.3 % (ref 0–0.5)
LYMPHOCYTES # BLD AUTO: 1.9 K/UL (ref 1–4.8)
LYMPHOCYTES NFR BLD: 18.2 % (ref 18–48)
MCH RBC QN AUTO: 27.3 PG (ref 27–31)
MCHC RBC AUTO-ENTMCNC: 32.9 G/DL (ref 32–36)
MCV RBC AUTO: 83 FL (ref 82–98)
MONOCYTES # BLD AUTO: 0.6 K/UL (ref 0.3–1)
MONOCYTES NFR BLD: 5.7 % (ref 4–15)
NEUTROPHILS # BLD AUTO: 7.8 K/UL (ref 1.8–7.7)
NEUTROPHILS NFR BLD: 73.7 % (ref 38–73)
NRBC BLD-RTO: 0 /100 WBC
PLATELET # BLD AUTO: 250 K/UL (ref 150–450)
PMV BLD AUTO: 10.7 FL (ref 9.2–12.9)
RBC # BLD AUTO: 3.92 M/UL (ref 4–5.4)
WBC # BLD AUTO: 10.52 K/UL (ref 3.9–12.7)

## 2022-12-01 PROCEDURE — 25000003 PHARM REV CODE 250: Performed by: NURSE ANESTHETIST, CERTIFIED REGISTERED

## 2022-12-01 PROCEDURE — 82565 ASSAY OF CREATININE: CPT | Performed by: OBSTETRICS & GYNECOLOGY

## 2022-12-01 PROCEDURE — 59514 PR CESAREAN DELIVERY ONLY: ICD-10-PCS | Mod: AS,AT,, | Performed by: PHYSICIAN ASSISTANT

## 2022-12-01 PROCEDURE — 71000039 HC RECOVERY, EACH ADD'L HOUR: Performed by: OBSTETRICS & GYNECOLOGY

## 2022-12-01 PROCEDURE — 36004725 HC OB OR TIME LEV III - EA ADD 15 MIN: Performed by: OBSTETRICS & GYNECOLOGY

## 2022-12-01 PROCEDURE — 86901 BLOOD TYPING SEROLOGIC RH(D): CPT | Performed by: OBSTETRICS & GYNECOLOGY

## 2022-12-01 PROCEDURE — 37000008 HC ANESTHESIA 1ST 15 MINUTES: Performed by: OBSTETRICS & GYNECOLOGY

## 2022-12-01 PROCEDURE — 51702 INSERT TEMP BLADDER CATH: CPT

## 2022-12-01 PROCEDURE — 63600175 PHARM REV CODE 636 W HCPCS: Performed by: OBSTETRICS & GYNECOLOGY

## 2022-12-01 PROCEDURE — 59510 CESAREAN DELIVERY: CPT | Mod: AT,,, | Performed by: OBSTETRICS & GYNECOLOGY

## 2022-12-01 PROCEDURE — 27200710 HC EPIDURAL INFUSION PUMP SET: Performed by: STUDENT IN AN ORGANIZED HEALTH CARE EDUCATION/TRAINING PROGRAM

## 2022-12-01 PROCEDURE — 85461 HEMOGLOBIN FETAL: CPT | Performed by: OBSTETRICS & GYNECOLOGY

## 2022-12-01 PROCEDURE — 36004724 HC OB OR TIME LEV III - 1ST 15 MIN: Performed by: OBSTETRICS & GYNECOLOGY

## 2022-12-01 PROCEDURE — 85025 COMPLETE CBC W/AUTO DIFF WBC: CPT | Performed by: OBSTETRICS & GYNECOLOGY

## 2022-12-01 PROCEDURE — 27800516 HC TRAY, EPIDURAL COMBO: Performed by: STUDENT IN AN ORGANIZED HEALTH CARE EDUCATION/TRAINING PROGRAM

## 2022-12-01 PROCEDURE — 59514 CESAREAN DELIVERY ONLY: CPT | Mod: AS,AT,, | Performed by: PHYSICIAN ASSISTANT

## 2022-12-01 PROCEDURE — 25000003 PHARM REV CODE 250: Performed by: OBSTETRICS & GYNECOLOGY

## 2022-12-01 PROCEDURE — 63600175 PHARM REV CODE 636 W HCPCS: Performed by: NURSE ANESTHETIST, CERTIFIED REGISTERED

## 2022-12-01 PROCEDURE — 86901 BLOOD TYPING SEROLOGIC RH(D): CPT | Mod: 91 | Performed by: OBSTETRICS & GYNECOLOGY

## 2022-12-01 PROCEDURE — 37000009 HC ANESTHESIA EA ADD 15 MINS: Performed by: OBSTETRICS & GYNECOLOGY

## 2022-12-01 PROCEDURE — 86592 SYPHILIS TEST NON-TREP QUAL: CPT | Performed by: OBSTETRICS & GYNECOLOGY

## 2022-12-01 PROCEDURE — 11000001 HC ACUTE MED/SURG PRIVATE ROOM

## 2022-12-01 PROCEDURE — 59510 PR FULL ROUT OBSTE CARE,CESAREAN DELIV: ICD-10-PCS | Mod: AT,,, | Performed by: OBSTETRICS & GYNECOLOGY

## 2022-12-01 PROCEDURE — 71000033 HC RECOVERY, INTIAL HOUR: Performed by: OBSTETRICS & GYNECOLOGY

## 2022-12-01 RX ORDER — SODIUM CHLORIDE, SODIUM LACTATE, POTASSIUM CHLORIDE, CALCIUM CHLORIDE 600; 310; 30; 20 MG/100ML; MG/100ML; MG/100ML; MG/100ML
INJECTION, SOLUTION INTRAVENOUS CONTINUOUS
Status: DISCONTINUED | OUTPATIENT
Start: 2022-12-01 | End: 2022-12-01

## 2022-12-01 RX ORDER — ACETAMINOPHEN 325 MG/1
650 TABLET ORAL EVERY 6 HOURS PRN
Status: DISCONTINUED | OUTPATIENT
Start: 2022-12-01 | End: 2022-12-03 | Stop reason: HOSPADM

## 2022-12-01 RX ORDER — SERTRALINE HYDROCHLORIDE 50 MG/1
100 TABLET, FILM COATED ORAL NIGHTLY
Status: DISCONTINUED | OUTPATIENT
Start: 2022-12-01 | End: 2022-12-03 | Stop reason: HOSPADM

## 2022-12-01 RX ORDER — OXYTOCIN/RINGER'S LACTATE 30/500 ML
PLASTIC BAG, INJECTION (ML) INTRAVENOUS CONTINUOUS PRN
Status: DISCONTINUED | OUTPATIENT
Start: 2022-12-01 | End: 2022-12-01

## 2022-12-01 RX ORDER — CEFAZOLIN SODIUM 2 G/50ML
2 SOLUTION INTRAVENOUS
Status: DISCONTINUED | OUTPATIENT
Start: 2022-12-01 | End: 2022-12-01

## 2022-12-01 RX ORDER — ONDANSETRON 2 MG/ML
INJECTION INTRAMUSCULAR; INTRAVENOUS
Status: DISCONTINUED | OUTPATIENT
Start: 2022-12-01 | End: 2022-12-01

## 2022-12-01 RX ORDER — IBUPROFEN 800 MG/1
800 TABLET ORAL EVERY 8 HOURS
Status: DISCONTINUED | OUTPATIENT
Start: 2022-12-02 | End: 2022-12-03 | Stop reason: HOSPADM

## 2022-12-01 RX ORDER — SERTRALINE HYDROCHLORIDE 25 MG/1
100 TABLET, FILM COATED ORAL DAILY
Status: DISCONTINUED | OUTPATIENT
Start: 2022-12-01 | End: 2022-12-01

## 2022-12-01 RX ORDER — OXYTOCIN/RINGER'S LACTATE 30/500 ML
95 PLASTIC BAG, INJECTION (ML) INTRAVENOUS ONCE
Status: DISCONTINUED | OUTPATIENT
Start: 2022-12-01 | End: 2022-12-01

## 2022-12-01 RX ORDER — ENOXAPARIN SODIUM 100 MG/ML
40 INJECTION SUBCUTANEOUS EVERY 12 HOURS
Status: DISCONTINUED | OUTPATIENT
Start: 2022-12-02 | End: 2022-12-03 | Stop reason: HOSPADM

## 2022-12-01 RX ORDER — HYDROCODONE BITARTRATE AND ACETAMINOPHEN 5; 325 MG/1; MG/1
1 TABLET ORAL EVERY 4 HOURS PRN
Status: DISCONTINUED | OUTPATIENT
Start: 2022-12-01 | End: 2022-12-02

## 2022-12-01 RX ORDER — CARBOPROST TROMETHAMINE 250 UG/ML
250 INJECTION, SOLUTION INTRAMUSCULAR
Status: DISCONTINUED | OUTPATIENT
Start: 2022-12-01 | End: 2022-12-01

## 2022-12-01 RX ORDER — FAMOTIDINE 10 MG/ML
20 INJECTION INTRAVENOUS
Status: DISCONTINUED | OUTPATIENT
Start: 2022-12-01 | End: 2022-12-01

## 2022-12-01 RX ORDER — CEFAZOLIN SODIUM 1 G/3ML
INJECTION, POWDER, FOR SOLUTION INTRAMUSCULAR; INTRAVENOUS
Status: DISCONTINUED | OUTPATIENT
Start: 2022-12-01 | End: 2022-12-01

## 2022-12-01 RX ORDER — PROMETHAZINE HYDROCHLORIDE 25 MG/ML
INJECTION, SOLUTION INTRAMUSCULAR; INTRAVENOUS
Status: DISCONTINUED | OUTPATIENT
Start: 2022-12-01 | End: 2022-12-01

## 2022-12-01 RX ORDER — SODIUM CITRATE AND CITRIC ACID MONOHYDRATE 334; 500 MG/5ML; MG/5ML
30 SOLUTION ORAL
Status: DISCONTINUED | OUTPATIENT
Start: 2022-12-01 | End: 2022-12-01

## 2022-12-01 RX ORDER — PROCHLORPERAZINE EDISYLATE 5 MG/ML
5 INJECTION INTRAMUSCULAR; INTRAVENOUS EVERY 6 HOURS PRN
Status: DISCONTINUED | OUTPATIENT
Start: 2022-12-01 | End: 2022-12-03 | Stop reason: HOSPADM

## 2022-12-01 RX ORDER — OXYTOCIN/RINGER'S LACTATE 30/500 ML
334 PLASTIC BAG, INJECTION (ML) INTRAVENOUS ONCE
Status: DISCONTINUED | OUTPATIENT
Start: 2022-12-01 | End: 2022-12-01

## 2022-12-01 RX ORDER — SODIUM CHLORIDE, SODIUM LACTATE, POTASSIUM CHLORIDE, CALCIUM CHLORIDE 600; 310; 30; 20 MG/100ML; MG/100ML; MG/100ML; MG/100ML
INJECTION, SOLUTION INTRAVENOUS CONTINUOUS PRN
Status: DISCONTINUED | OUTPATIENT
Start: 2022-12-01 | End: 2022-12-01

## 2022-12-01 RX ORDER — OXYTOCIN/RINGER'S LACTATE 30/500 ML
95 PLASTIC BAG, INJECTION (ML) INTRAVENOUS ONCE
Status: COMPLETED | OUTPATIENT
Start: 2022-12-01 | End: 2022-12-01

## 2022-12-01 RX ORDER — DIPHENHYDRAMINE HCL 25 MG
25 CAPSULE ORAL EVERY 4 HOURS PRN
Status: DISCONTINUED | OUTPATIENT
Start: 2022-12-01 | End: 2022-12-03 | Stop reason: HOSPADM

## 2022-12-01 RX ORDER — DEXAMETHASONE SODIUM PHOSPHATE 4 MG/ML
INJECTION, SOLUTION INTRA-ARTICULAR; INTRALESIONAL; INTRAMUSCULAR; INTRAVENOUS; SOFT TISSUE
Status: DISCONTINUED | OUTPATIENT
Start: 2022-12-01 | End: 2022-12-01

## 2022-12-01 RX ORDER — MISOPROSTOL 200 UG/1
800 TABLET ORAL
Status: DISCONTINUED | OUTPATIENT
Start: 2022-12-01 | End: 2022-12-01

## 2022-12-01 RX ORDER — BUPIVACAINE HYDROCHLORIDE AND EPINEPHRINE 5; 5 MG/ML; UG/ML
INJECTION, SOLUTION EPIDURAL; INTRACAUDAL; PERINEURAL
Status: DISCONTINUED | OUTPATIENT
Start: 2022-12-01 | End: 2022-12-01

## 2022-12-01 RX ORDER — NALBUPHINE HYDROCHLORIDE 10 MG/ML
10 INJECTION, SOLUTION INTRAMUSCULAR; INTRAVENOUS; SUBCUTANEOUS ONCE AS NEEDED
Status: DISCONTINUED | OUTPATIENT
Start: 2022-12-01 | End: 2022-12-03 | Stop reason: HOSPADM

## 2022-12-01 RX ORDER — METHYLERGONOVINE MALEATE 0.2 MG/ML
200 INJECTION INTRAVENOUS
Status: DISCONTINUED | OUTPATIENT
Start: 2022-12-01 | End: 2022-12-01

## 2022-12-01 RX ORDER — KETOROLAC TROMETHAMINE 30 MG/ML
30 INJECTION, SOLUTION INTRAMUSCULAR; INTRAVENOUS
Status: COMPLETED | OUTPATIENT
Start: 2022-12-01 | End: 2022-12-02

## 2022-12-01 RX ORDER — PRENATAL WITH FERROUS FUM AND FOLIC ACID 3080; 920; 120; 400; 22; 1.84; 3; 20; 10; 1; 12; 200; 27; 25; 2 [IU]/1; [IU]/1; MG/1; [IU]/1; MG/1; MG/1; MG/1; MG/1; MG/1; MG/1; UG/1; MG/1; MG/1; MG/1; MG/1
1 TABLET ORAL DAILY
Status: DISCONTINUED | OUTPATIENT
Start: 2022-12-01 | End: 2022-12-03 | Stop reason: HOSPADM

## 2022-12-01 RX ORDER — AMMONIA 15 % (W/V)
0.3 AMPUL (EA) INHALATION CONTINUOUS PRN
Status: DISCONTINUED | OUTPATIENT
Start: 2022-12-01 | End: 2022-12-03 | Stop reason: HOSPADM

## 2022-12-01 RX ORDER — BENZONATATE 100 MG/1
200 CAPSULE ORAL 3 TIMES DAILY PRN
Status: DISCONTINUED | OUTPATIENT
Start: 2022-12-01 | End: 2022-12-03 | Stop reason: HOSPADM

## 2022-12-01 RX ORDER — MORPHINE SULFATE 1 MG/ML
INJECTION, SOLUTION EPIDURAL; INTRATHECAL; INTRAVENOUS
Status: DISCONTINUED | OUTPATIENT
Start: 2022-12-01 | End: 2022-12-01

## 2022-12-01 RX ORDER — DOCUSATE SODIUM 100 MG/1
100 CAPSULE, LIQUID FILLED ORAL DAILY
Status: DISCONTINUED | OUTPATIENT
Start: 2022-12-01 | End: 2022-12-03 | Stop reason: HOSPADM

## 2022-12-01 RX ORDER — PHENYLEPHRINE HYDROCHLORIDE 10 MG/ML
INJECTION INTRAVENOUS
Status: DISCONTINUED | OUTPATIENT
Start: 2022-12-01 | End: 2022-12-01

## 2022-12-01 RX ORDER — ONDANSETRON 8 MG/1
8 TABLET, ORALLY DISINTEGRATING ORAL EVERY 8 HOURS PRN
Status: DISCONTINUED | OUTPATIENT
Start: 2022-12-01 | End: 2022-12-03 | Stop reason: HOSPADM

## 2022-12-01 RX ORDER — LABETALOL 100 MG/1
100 TABLET, FILM COATED ORAL 2 TIMES DAILY
Status: DISCONTINUED | OUTPATIENT
Start: 2022-12-01 | End: 2022-12-03 | Stop reason: HOSPADM

## 2022-12-01 RX ORDER — SODIUM CHLORIDE 0.9 % (FLUSH) 0.9 %
10 SYRINGE (ML) INJECTION
Status: DISCONTINUED | OUTPATIENT
Start: 2022-12-01 | End: 2022-12-03 | Stop reason: HOSPADM

## 2022-12-01 RX ORDER — DIPHENHYDRAMINE HYDROCHLORIDE 50 MG/ML
25 INJECTION INTRAMUSCULAR; INTRAVENOUS EVERY 4 HOURS PRN
Status: DISCONTINUED | OUTPATIENT
Start: 2022-12-01 | End: 2022-12-03 | Stop reason: HOSPADM

## 2022-12-01 RX ORDER — HYDROMORPHONE HYDROCHLORIDE 2 MG/ML
1 INJECTION, SOLUTION INTRAMUSCULAR; INTRAVENOUS; SUBCUTANEOUS EVERY 4 HOURS PRN
Status: DISCONTINUED | OUTPATIENT
Start: 2022-12-01 | End: 2022-12-02

## 2022-12-01 RX ORDER — DEXMEDETOMIDINE HYDROCHLORIDE 100 UG/ML
INJECTION, SOLUTION INTRAVENOUS
Status: DISCONTINUED | OUTPATIENT
Start: 2022-12-01 | End: 2022-12-01

## 2022-12-01 RX ORDER — BUPIVACAINE HYDROCHLORIDE 7.5 MG/ML
INJECTION, SOLUTION EPIDURAL; RETROBULBAR
Status: DISCONTINUED | OUTPATIENT
Start: 2022-12-01 | End: 2022-12-01

## 2022-12-01 RX ORDER — ONDANSETRON 2 MG/ML
4 INJECTION INTRAMUSCULAR; INTRAVENOUS EVERY 12 HOURS PRN
Status: DISCONTINUED | OUTPATIENT
Start: 2022-12-01 | End: 2022-12-03 | Stop reason: HOSPADM

## 2022-12-01 RX ORDER — HYDROCODONE BITARTRATE AND ACETAMINOPHEN 10; 325 MG/1; MG/1
1 TABLET ORAL EVERY 4 HOURS PRN
Status: DISCONTINUED | OUTPATIENT
Start: 2022-12-01 | End: 2022-12-02

## 2022-12-01 RX ORDER — KETOROLAC TROMETHAMINE 30 MG/ML
INJECTION, SOLUTION INTRAMUSCULAR; INTRAVENOUS
Status: DISCONTINUED | OUTPATIENT
Start: 2022-12-01 | End: 2022-12-01

## 2022-12-01 RX ADMIN — CEFAZOLIN 2 G: 1 INJECTION, POWDER, FOR SOLUTION INTRAMUSCULAR; INTRAVENOUS at 07:12

## 2022-12-01 RX ADMIN — SODIUM CHLORIDE, SODIUM LACTATE, POTASSIUM CHLORIDE, AND CALCIUM CHLORIDE: 600; 310; 30; 20 INJECTION, SOLUTION INTRAVENOUS at 07:12

## 2022-12-01 RX ADMIN — SERTRALINE HYDROCHLORIDE 100 MG: 50 TABLET ORAL at 09:12

## 2022-12-01 RX ADMIN — PROMETHAZINE HYDROCHLORIDE 12.5 MG: 25 INJECTION INTRAMUSCULAR; INTRAVENOUS at 08:12

## 2022-12-01 RX ADMIN — PHENYLEPHRINE HYDROCHLORIDE 200 MCG: 10 INJECTION INTRAVENOUS at 07:12

## 2022-12-01 RX ADMIN — DEXAMETHASONE SODIUM PHOSPHATE 8 MG: 4 INJECTION, SOLUTION INTRA-ARTICULAR; INTRALESIONAL; INTRAMUSCULAR; INTRAVENOUS; SOFT TISSUE at 08:12

## 2022-12-01 RX ADMIN — HYDROMORPHONE HYDROCHLORIDE 1 MG: 2 INJECTION INTRAMUSCULAR; INTRAVENOUS; SUBCUTANEOUS at 06:12

## 2022-12-01 RX ADMIN — ONDANSETRON 8 MG: 2 INJECTION, SOLUTION INTRAMUSCULAR; INTRAVENOUS at 06:12

## 2022-12-01 RX ADMIN — MORPHINE SULFATE 0.1 MG: 1 INJECTION, SOLUTION EPIDURAL; INTRATHECAL; INTRAVENOUS at 07:12

## 2022-12-01 RX ADMIN — BUPIVACAINE HYDROCHLORIDE AND EPINEPHRINE BITARTRATE 30 ML: 5; .005 INJECTION, SOLUTION EPIDURAL; INTRACAUDAL; PERINEURAL at 08:12

## 2022-12-01 RX ADMIN — DEXMEDETOMIDINE HYDROCHLORIDE 5 MCG: 100 INJECTION, SOLUTION, CONCENTRATE INTRAVENOUS at 07:12

## 2022-12-01 RX ADMIN — LABETALOL HYDROCHLORIDE 100 MG: 100 TABLET, FILM COATED ORAL at 09:12

## 2022-12-01 RX ADMIN — Medication 334 ML/HR: at 07:12

## 2022-12-01 RX ADMIN — KETOROLAC TROMETHAMINE 30 MG: 30 INJECTION, SOLUTION INTRAMUSCULAR; INTRAVENOUS at 10:12

## 2022-12-01 RX ADMIN — KETOROLAC TROMETHAMINE 30 MG: 30 INJECTION, SOLUTION INTRAMUSCULAR at 08:12

## 2022-12-01 RX ADMIN — HYDROCODONE BITARTRATE AND ACETAMINOPHEN 1 TABLET: 5; 325 TABLET ORAL at 04:12

## 2022-12-01 RX ADMIN — KETOROLAC TROMETHAMINE 30 MG: 30 INJECTION, SOLUTION INTRAMUSCULAR; INTRAVENOUS at 04:12

## 2022-12-01 RX ADMIN — Medication 95 MILLI-UNITS/MIN: at 09:12

## 2022-12-01 RX ADMIN — DOCUSATE SODIUM 100 MG: 100 CAPSULE, LIQUID FILLED ORAL at 11:12

## 2022-12-01 RX ADMIN — BUPIVACAINE HYDROCHLORIDE 1.6 ML: 7.5 INJECTION, SOLUTION EPIDURAL; RETROBULBAR at 07:12

## 2022-12-01 NOTE — PLAN OF CARE
[facility] - Labor [facility] Delivery  Discharge Assessment    Primary Care Provider: Jamel Meier MD     OB Screen (most recent)       OB Screen - 22 0838          OB SCREEN    Assessment Type Discharge Planning Assessment     Source of Information patient;health record     Received Prenatal Care Yes     Any indications/suspicions for None     Is this a teen pregnancy No     Is the baby in NICU No     Indication for adoption/Safe Haven No     Indication for DME/post-acute needs No     HIV (+) No     Any congenital  disorders No     Fetal demise/ death No

## 2022-12-01 NOTE — H&P
[facility] - Labor & Delivery  Obstetrics  History & Physical    Patient Name: Maria Del Carmen Espinoza  MRN: [id number]  Admission Date: 2022  Primary Care Provider: Jamel Meier MD    Subjective:     Principal Problem:History of  delivery, currently pregnant    History of Present Illness:   at 38wga presents for scheduled, elective repeat  delivery.      Obstetric HPI:  Patient reports None contractions, active fetal movement, No vaginal bleeding , No loss of fluid     This pregnancy has been complicated by   1. CHTN: well-controlled on labetalol 100mg bid  2. History of  delivery  3. History of preeclampsia  4. Rh negative  5. Recent cough: developed this past week; no help with Mucinex; tessalon perles helped  6. Advanced maternal age  7. IVF pregnancy conceived with wife's egg and donor sperm    OB History    Para Term  AB Living   2 1 0 1 0 1   SAB IAB Ectopic Multiple Live Births   0 0 0 0 1      # Outcome Date GA Lbr Epi/2nd Weight Sex Delivery Anes PTL Lv   2 Current            1  19 35w0d  2.65 kg (5 lb 13.5 oz) F CS-LTranv EPI Y KRIS      Birth Comments: nicu attended      Name: CRISTOBAL, GIRL MARIA DEL CARMEN      Apgar1: 9  Apgar5: 9     Past Medical History:   Diagnosis Date    Anemia     Autosomal recessive polycystic kidneys     GERD (gastroesophageal reflux disease)     History of uterine fibroid     Hyperlipidemia     Hypertension     Liver disease     Mastitis 10/29/2019    Melanoma 2016    left superior helix (breslow dept 0.67mm)    Mental disorder     Migraine headache     PONV (postoperative nausea and vomiting)     post op nausea    Postpartum depression      Past Surgical History:   Procedure Laterality Date     SECTION N/A 2019    Procedure:  SECTION;  Surgeon: Grazyna Price MD;  Location: [facility] L&D;  Service: OB/GYN;  Laterality: N/A;    HYSTEROSCOPIC POLYPECTOMY OF UTERUS N/A 1/10/2019     Procedure: POLYPECTOMY, UTERUS, HYSTEROSCOPIC;  Surgeon: Jolanta Sifuentes MD;  Location: [facility];  Service: OB/GYN;  Laterality: N/A;    LYMPH NODE BIOPSY      MALIGNANT SKIN LESION EXCISION  06/10/2016    NASAL SEPTUM SURGERY      RENAL EXPLORATION  2011       PTA Medications   Medication Sig    aspirin (ECOTRIN) 81 MG EC tablet Take 81 mg by mouth once daily.    benzonatate (TESSALON) 200 MG capsule Take 1 capsule (200 mg total) by mouth 3 (three) times daily as needed for Cough.    cetirizine (ZYRTEC) 10 MG tablet     ferrous sulfate (IRON ORAL) Take by mouth.    fish oil-omega-3 fatty acids 300-1,000 mg capsule Take 2 g by mouth once daily.    labetaloL (NORMODYNE) 100 MG tablet Take 1 tablet (100 mg total) by mouth 2 (two) times daily.    Lactobacillus rhamnosus GG (CULTURELLE) 10 billion cell capsule     omeprazole (PRILOSEC) 20 MG capsule Take 1 capsule (20 mg total) by mouth once daily.    PNV/iron/folic acid (PRENATAL VITAMIN-IRON-FA ORAL) Take by mouth.    sertraline (ZOLOFT) 100 MG tablet Take 1 tablet (100 mg total) by mouth once daily.       Review of patient's allergies indicates:   Allergen Reactions    Singulair [montelukast]      Itching all over        Family History       Problem Relation (Age of Onset)    Cancer Maternal Grandmother    Diabetes Paternal Grandmother    Hypertension Father    Kidney disease Father          Tobacco Use    Smoking status: Never    Smokeless tobacco: Never   Substance and Sexual Activity    Alcohol use: Not Currently     Comment: socially    Drug use: No    Sexual activity: Yes     Partners: Female     Birth control/protection: None     Review of Systems   Constitutional:  Negative for activity change, fatigue, fever and unexpected weight change.   Respiratory:  Positive for cough. Negative for shortness of breath and wheezing.    Cardiovascular:  Positive for leg swelling (recently developed).   Gastrointestinal:  Negative for abdominal pain,  bloating, constipation, diarrhea, nausea and vomiting.   Endocrine: Negative for hair loss and hot flashes.   Genitourinary:  Negative for dysmenorrhea, dyspareunia, dysuria, frequency, genital sores, hematuria, pelvic pain, urgency, vaginal bleeding, vaginal discharge, vaginal pain, postcoital bleeding and vaginal odor.   Integumentary:  Negative for rash, hair changes, breast mass, nipple discharge and breast skin changes.   Hematological:  Negative for adenopathy.   Breast: Negative for mass, mastodynia, nipple discharge and skin changes   Objective:     Vital Signs (Most Recent):  Temp: 97.7 °F (36.5 °C) (12/01/22 0541)  Pulse: 101 (12/01/22 0545)  Resp: 16 (12/01/22 0541)  BP: 127/86 (12/01/22 0545)  SpO2: 98 % (12/01/22 0545) Vital Signs (24h Range):  Temp:  [97.7 °F (36.5 °C)] 97.7 °F (36.5 °C)  Pulse:  [] 101  Resp:  [16] 16  SpO2:  [98 %] 98 %  BP: (127-128)/(83-86) 127/86     Weight: 102.9 kg (226 lb 13.7 oz)  Body mass index is 41.49 kg/m².    FHT: Cat 1 (reassuring)  TOCO:  Q 0 minutes    Physical Exam:   Constitutional: She is oriented to person, place, and time. She appears well-developed and well-nourished. No distress.    HENT:   Head: Normocephalic and atraumatic.     Neck: No thyromegaly present.     Pulmonary/Chest: Effort normal.        Abdominal: Soft. She exhibits no distension and no mass. There is no abdominal tenderness. There is no rebound and no guarding.   Uterus gravid, soft, and non-tender             Musculoskeletal: Edema (1+ BL LE swelling) present.       Neurological: She is alert and oriented to person, place, and time.    Skin: No rash noted.    Psychiatric: She has a normal mood and affect. Her behavior is normal. Judgment and thought content normal.          Significant Labs:  Lab Results   Component Value Date    GROUPTRH A NEG 05/05/2022    HEPBSAG Negative 05/05/2022    STREPBCULT No Group B Streptococcus isolated 11/25/2022       CBC:   Recent Labs   Lab 12/01/22  0602    WBC 10.52   RBC 3.92*   HGB 10.7*   HCT 32.5*      MCV 83   MCH 27.3   MCHC 32.9     Assessment/Plan:     37 y.o. female  at 38w0d for:    * History of  delivery, currently pregnant  X 1; desires repeat .  Consents were reviewed in detail and signed in the office.  All questions answered.  Proceed with repeat LTCS.    Obesity in pregnancy, antepartum, third trimester  BMI 41.  Lovenox ppx PP while in the hospital    Acute cough  Continue tessalon perles as needed    Rh negative state in antepartum period  Rhogam studies postpartum    Anemia during pregnancy in third trimester  Asymptomatic.  Hb/Hct 10.7/32.5  Iron supplementation    Chronic hypertension affecting pregnancy  BP controlled on labetalol 100mg bid.  Per M guidelines, proceed with delivery at 38wga.    Pregnancy resulting from in vitro fertilization in third trimester  Conceived with wife's egg and donor sperm.  Fetal anatomy and well-being reassuring        Grazyna Price MD  Obstetrics  [facility] - Labor & Delivery

## 2022-12-01 NOTE — ASSESSMENT & PLAN NOTE
BP controlled on labetalol 100mg bid.  Per [facility] guidelines, proceed with delivery at 38wga.

## 2022-12-01 NOTE — LACTATION NOTE
"For the entirety of this note, "Mother" is referring to birth mother.    Lactation to room. Upon entering room, mother has infant to the right breast in the football position. Nutritive sucks with frequent audible swallows noted, mother denies pain. Discussed signs of proper positioning and deep asymmetrical latch. Upper lip rolled inward and does not manually crystal. Infant becomes sleepy and self detaches. Nipple reddened around the base from 4-8 o'clock, slight blanching in tip of nipple.    Feeding cues resume. Mother is able to latch infant deeply to the breast. Occasionally, infant thrusts nipple out of mouth and requires re-latching. Infant feeds until content and self detaches. Nipple with slight compression, blanching noted to tip, with intact blister noted. Nipple care discussed and preformed. Left nipple WNL.     Hand expression completed on left side. 5 mL EBM easily expressed. Correct label verified with mother; milk labeled at beside then properly stored in breast milk refrigerator.     Mother verbalizes understanding of all education and counseling. Mother denies any further lactation needs or concerns at this time. Discussed lactation availability. Encouraged mother to call for assistance when needs arise.    "

## 2022-12-01 NOTE — LACTATION NOTE
"Lactation rounds: For the entirety of this note, "Mother" is referring to birth mother.    Mother reports exclusive pumping for first child due to difficult latch despite frequent outpatient lactation care. Mother states she has a history of an over supply with reoccurring plugged ducts and 2 occurences of mastitis.     Mother reports this infant fed well after birth. She denies pain associated with the feeding and reports hearing swallows. Infant was supplemented with EBM due to LGA status. Initial blood glucose WNL. Discussed the importance of cue based feedings on demand, unrestricted access to the breast, and frequent uninterrupted skin to skin contact. Parents are choosing to delay bath at this time. Encouraged delay of bath for a minimum of 24 hours.     Mother verbalizes understanding of expected  behaviors and output for the first 48 hours of life.   Risk and implications of artificial nipples and non medically indicated formula supplementation discussed.      Mother denies any further lactation needs or concerns at this time. Encouraged mother to call for assistance when desired or when infant is showing signs of hunger. Mother verbalizes understanding of all education and counseling.    "

## 2022-12-01 NOTE — ASSESSMENT & PLAN NOTE
X 1; desires repeat .  Consents were reviewed in detail and signed in the office.  All questions answered.  Proceed with repeat LTCS.

## 2022-12-01 NOTE — OP NOTE
"[facility] - Labor & Delivery   Section   Operative Note    SUMMARY     Date of Procedure: 2022     Procedure: Procedure(s) (LRB):   SECTION (N/A)    Surgeon(s) and Role:     * Grazyna Price MD - Primary    Assistant:  Bridget Beckham Pa-C, assistance necessary for completion of the surgery    Pre-Operative Diagnosis:    IUP at 38wga  History of  delivery  Chronic hypertension in pregnancy    Post-Operative Diagnosis:    IUP at 38wga  History of  delivery  Chronic hypertension in pregnancy    Anesthesia: Spinal/Epidural    Technical Procedures Used: repeat low transverse  delivery via Pfannensteil skin incision           Description of the Findings of the Procedure: Normal uterus, tubes, and ovaries.  Bladder adhesions to the lower uterine segment.  Copious amount of clear amniotic fluid.  Male infant "Ramana", cephalic presentation, 3dg69ol, APGARS 8/9.  3 vessel cord.  Placenta delivered spontaneously, intact.  Small serosal tear at the dome of the bladder oversewn with 3-0 chromic in a double layer.  No full thickness injury.    Significant Surgical Tasks Conducted by the Assistant(s), if Applicable: retraction, exposure, skin closure    Complications: No    Blood Loss: * 400 mL *     With patient in supine position, the legs are  and Jolley Catheter placed and positioning to supine done.   Abdomen prepped with Chloroprep and 3 minute drying time allowed prior to draping of the abdomen.   Time out taken with OR team members.  Pfannenstiel Incision made through the skin, transverse fascial incision developed, rectus muscles  in the midline and the peritoneum entered.   Bladder adhesions noted to the lower uterine segment taken down with the Metzenbaum scissors.  The lower uterine segment and position of the fetus identified.   Bladder flap taken down through transverse peritoneal incision.    Low Transverse Incision made through well developer lower uterine " segment and extended laterally with blunt dissection.   Copious and Clear fluid noted.  Infant delivered from vertex presentation using vacuum assistance due to challenging delivery of the fetal head.  Vacuum released after fetal head was delivered, and remainder of the infant delivered easily.  Cord clamped after one minute and  handed to attending nurse.  Cord blood taken, placenta delivered.  The uterus was exteriorized.  The edges of the uterine incision are grasped with Elmore clamps at the angles and the inferior and superior midline edges of the incision.    Closure with running lock 0 Chromic, starting at each angle, tying in the midline.   Observation for bleeding with suture of any bleeding along the hysterotomy line.   With good hemostasis noted, the anterior pelvis is rinsed with sterile saline.   Right and left adnexa with normal anatomy.  Uterus was returned to the abdomen.  Inspection of the bladder revealed a thin area at the dome of the bladder consistent with a serosal tear, but no full thickness injury.  This was oversewn with 3-0 chromic running stitch in two layers.     Closure of the abdomen with 2 0 Vicryl running of the peritoneum, fascial closure with 0 looped PDS starting at the left angle and tying the knot at the right angle.  Subcutaneous closure with 2-0 plain gut running.  Skin closure with 4 0 Monocryl subcuticular.  Wound dressed with Aquasel and pressure dressing.          Specimens:   Specimen (24h ago, onward)      None            Condition: Good    Disposition: PACU - hemodynamically stable.    Attestation: Good

## 2022-12-01 NOTE — ANESTHESIA PROCEDURE NOTES
Spinal    Diagnosis: IUP repeat CS  Patient location during procedure: OR  Start time: 12/1/2022 7:06 AM  Timeout: 12/1/2022 7:06 AM  End time: 12/1/2022 7:14 AM    Staffing  Authorizing Provider: Wili Antunez CRNA  Performing Provider: Wili Antunez CRNA    Preanesthetic Checklist  Completed: patient identified, IV checked, site marked, risks and benefits discussed, surgical consent, monitors and equipment checked, pre-op evaluation and timeout performed  Spinal Block  Patient position: sitting  Prep: Betadine  Patient monitoring: heart rate, cardiac monitor and continuous pulse ox  Approach: midline  Location: L3-4  Injection technique: single shot  CSF Fluid: clear free-flowing CSF  Needle  Needle type: Dylon   Needle gauge: 25 G  Needle length: 3.5 in  Additional Documentation: no paresthesia on injection and negative aspiration for heme  Needle localization: anatomical landmarks  Assessment  Ease of block: easy  Patient's tolerance of the procedure: comfortable throughout block and no complaints

## 2022-12-01 NOTE — SUBJECTIVE & OBJECTIVE
Obstetric HPI:  Patient reports None contractions, active fetal movement, No vaginal bleeding , No loss of fluid     This pregnancy has been complicated by   CHTN: well-controlled on labetalol 100mg bid  History of  delivery  History of preeclampsia  Rh negative  Recent cough: developed this past week; no help with Mucinex; tessalon perles helped  Advanced maternal age  IVF pregnancy conceived with wife's egg and donor sperm    OB History    Para Term  AB Living   2 1 0 1 0 1   SAB IAB Ectopic Multiple Live Births   0 0 0 0 1      # Outcome Date GA Lbr Epi/2nd Weight Sex Delivery Anes PTL Lv   2 Current            1  19 35w0d  2.65 kg (5 lb 13.5 oz) F CS-LTranv EPI Y KRIS      Birth Comments: nicu attended      Name: CRISTOBAL, TONI JOYCE      Apgar1: 9  Apgar5: 9     Past Medical History:   Diagnosis Date    Anemia     Autosomal recessive polycystic kidneys     GERD (gastroesophageal reflux disease)     History of uterine fibroid     Hyperlipidemia     Hypertension     Liver disease     Mastitis 10/29/2019    Melanoma 2016    left superior helix (breslow dept 0.67mm)    Mental disorder     Migraine headache     PONV (postoperative nausea and vomiting)     post op nausea    Postpartum depression      Past Surgical History:   Procedure Laterality Date     SECTION N/A 2019    Procedure:  SECTION;  Surgeon: Grazyna Price MD;  Location: [facility] L&D;  Service: OB/GYN;  Laterality: N/A;    HYSTEROSCOPIC POLYPECTOMY OF UTERUS N/A 1/10/2019    Procedure: POLYPECTOMY, UTERUS, HYSTEROSCOPIC;  Surgeon: Jolanta Sifuentes MD;  Location: [facility] OR;  Service: OB/GYN;  Laterality: N/A;    LYMPH NODE BIOPSY      MALIGNANT SKIN LESION EXCISION  06/10/2016    NASAL SEPTUM SURGERY      RENAL EXPLORATION         PTA Medications   Medication Sig    aspirin (ECOTRIN) 81 MG EC tablet Take 81 mg by mouth once daily.    benzonatate (TESSALON) 200 MG capsule Take 1 capsule  (200 mg total) by mouth 3 (three) times daily as needed for Cough.    cetirizine (ZYRTEC) 10 MG tablet     ferrous sulfate (IRON ORAL) Take by mouth.    fish oil-omega-3 fatty acids 300-1,000 mg capsule Take 2 g by mouth once daily.    labetaloL (NORMODYNE) 100 MG tablet Take 1 tablet (100 mg total) by mouth 2 (two) times daily.    Lactobacillus rhamnosus GG (CULTURELLE) 10 billion cell capsule     omeprazole (PRILOSEC) 20 MG capsule Take 1 capsule (20 mg total) by mouth once daily.    PNV/iron/folic acid (PRENATAL VITAMIN-IRON-FA ORAL) Take by mouth.    sertraline (ZOLOFT) 100 MG tablet Take 1 tablet (100 mg total) by mouth once daily.       Review of patient's allergies indicates:   Allergen Reactions    Singulair [montelukast]      Itching all over        Family History       Problem Relation (Age of Onset)    Cancer Maternal Grandmother    Diabetes Paternal Grandmother    Hypertension Father    Kidney disease Father          Tobacco Use    Smoking status: Never    Smokeless tobacco: Never   Substance and Sexual Activity    Alcohol use: Not Currently     Comment: socially    Drug use: No    Sexual activity: Yes     Partners: Female     Birth control/protection: None     Review of Systems   Constitutional:  Negative for activity change, fatigue, fever and unexpected weight change.   Respiratory:  Positive for cough. Negative for shortness of breath and wheezing.    Cardiovascular:  Positive for leg swelling (recently developed).   Gastrointestinal:  Negative for abdominal pain, bloating, constipation, diarrhea, nausea and vomiting.   Endocrine: Negative for hair loss and hot flashes.   Genitourinary:  Negative for dysmenorrhea, dyspareunia, dysuria, frequency, genital sores, hematuria, pelvic pain, urgency, vaginal bleeding, vaginal discharge, vaginal pain, postcoital bleeding and vaginal odor.   Integumentary:  Negative for rash, hair changes, breast mass, nipple discharge and breast skin changes.    Hematological:  Negative for adenopathy.   Breast: Negative for mass, mastodynia, nipple discharge and skin changes   Objective:     Vital Signs (Most Recent):  Temp: 97.7 °F (36.5 °C) (12/01/22 0541)  Pulse: 101 (12/01/22 0545)  Resp: 16 (12/01/22 0541)  BP: 127/86 (12/01/22 0545)  SpO2: 98 % (12/01/22 0545) Vital Signs (24h Range):  Temp:  [97.7 °F (36.5 °C)] 97.7 °F (36.5 °C)  Pulse:  [] 101  Resp:  [16] 16  SpO2:  [98 %] 98 %  BP: (127-128)/(83-86) 127/86     Weight: 102.9 kg (226 lb 13.7 oz)  Body mass index is 41.49 kg/m².    FHT: Cat 1 (reassuring)  TOCO:  Q 0 minutes    Physical Exam:   Constitutional: She is oriented to person, place, and time. She appears well-developed and well-nourished. No distress.    HENT:   Head: Normocephalic and atraumatic.     Neck: No thyromegaly present.     Pulmonary/Chest: Effort normal.        Abdominal: Soft. She exhibits no distension and no mass. There is no abdominal tenderness. There is no rebound and no guarding.   Uterus gravid, soft, and non-tender             Musculoskeletal: Edema (1+ BL LE swelling) present.       Neurological: She is alert and oriented to person, place, and time.    Skin: No rash noted.    Psychiatric: She has a normal mood and affect. Her behavior is normal. Judgment and thought content normal.          Significant Labs:  Lab Results   Component Value Date    GROUPTRH A NEG 05/05/2022    HEPBSAG Negative 05/05/2022    STREPBCULT No Group B Streptococcus isolated 11/25/2022       CBC:   Recent Labs   Lab 12/01/22  0602   WBC 10.52   RBC 3.92*   HGB 10.7*   HCT 32.5*      MCV 83   MCH 27.3   MCHC 32.9

## 2022-12-01 NOTE — ANESTHESIA PROCEDURE NOTES
Peripheral Block    Patient location during procedure: OR   Block not for primary anesthetic.  Reason for block: at surgeon's request and post-op pain management   Post-op Pain Location: midline lower ABD   Start time: 12/1/2022 8:30 AM  Timeout: 12/1/2022 8:30 AM   End time: 12/1/2022 8:36 AM    Staffing  Authorizing Provider: Darrell Horn MD  Performing Provider: Wili Antunez CRNA    Preanesthetic Checklist  Completed: patient identified, IV checked, site marked, risks and benefits discussed, surgical consent, monitors and equipment checked, pre-op evaluation and timeout performed  Peripheral Block  Patient position: supine  Prep: ChloraPrep  Patient monitoring: heart rate, cardiac monitor, continuous pulse ox and frequent blood pressure checks  Block type: TAP  Laterality: bilateral  Injection technique: single shot  Needle  Needle type: Stimuplex   Needle gauge: 22 G  Needle length: 4 in  Needle localization: anatomical landmarks and ultrasound guidance   -ultrasound image captured on disc.  Assessment  Injection assessment: negative aspiration and negative parasthesia  Heart rate change: no  Slow fractionated injection: yes  Pain Tolerance: comfortable throughout block and no complaints      Additional Notes  NO APPARENT ANESTHESIA COMPLICATIONS

## 2022-12-01 NOTE — L&D DELIVERY NOTE
"[facility]   Section   Operative Note    SUMMARY     Date of Procedure: 2022     Procedure: Procedure(s) (LRB):   SECTION (N/A)    Surgeon(s) and Role:     * Grazyna Price MD - Primary    Assistant:  Bridget Beckham Pa-C, assistance necessary for completion of the surgery    Pre-Operative Diagnosis:    IUP at 38wga  History of  delivery  Chronic hypertension in pregnancy    Post-Operative Diagnosis:    IUP at 38wga  History of  delivery  Chronic hypertension in pregnancy    Anesthesia: Spinal/Epidural    Technical Procedures Used: repeat low transverse  delivery via Pfannensteil skin incision           Description of the Findings of the Procedure: Normal uterus, tubes, and ovaries.  Bladder adhesions to the lower uterine segment.  Copious amount of clear amniotic fluid.  Male infant "Ramana", cephalic presentation, 6op95mz, APGARS 8/9.  3 vessel cord.  Placenta delivered spontaneously, intact.  Small serosal tear at the dome of the bladder oversewn with 3-0 chromic in a double layer.  No full thickness injury.    Significant Surgical Tasks Conducted by the Assistant(s), if Applicable: retraction, exposure, skin closure    Complications: No    Blood Loss: * 400 mL *     With patient in supine position, the legs are  and Jolley Catheter placed and positioning to supine done.   Abdomen prepped with Chloroprep and 3 minute drying time allowed prior to draping of the abdomen.   Time out taken with OR team members.  Pfannenstiel Incision made through the skin, transverse fascial incision developed, rectus muscles  in the midline and the peritoneum entered.   Bladder  adhesions noted to the lower uterine segment taken down with the Metzenbaum scissors.  The lower uterine segment and position of the fetus identified.   Bladder flap taken down through transverse peritoneal incision.    Low Transverse Incision made through well developer lower " uterine segment and extended laterally with blunt dissection.   Copious and Clear fluid noted.  Infant delivered from vertex presentation using vacuum assistance due to challenging delivery of the fetal head.  Vacuum released after fetal head was delivered, and remainder of the infant delivered easily.  Cord clamped after one minute and  handed to attending nurse.  Cord blood taken, placenta delivered.  The uterus was exteriorized.  The edges of the uterine incision are grasped with Elmore clamps at the angles and the inferior and superior midline edges of the incision.    Closure with running lock 0 Chromic, starting at each angle, tying in the midline.   Observation for bleeding with suture of any bleeding along the hysterotomy line.   With good hemostasis noted, the anterior pelvis is rinsed with sterile saline.   Right and left adnexa with normal anatomy.  Uterus was returned to the abdomen.  Inspection of the bladder revealed a thin area at the dome of the bladder consistent with a serosal tear, but no full thickness injury.  This was oversewn with 3-0 chromic running stitch in two layers.     Closure of the abdomen with 2 0 Vicryl running of the peritoneum, fascial closure with 0 looped PDS starting at the left angle and tying the knot at the right angle.  Subcutaneous closure with 2-0 plain gut running.  Skin closure with 4 0 Monocryl subcuticular.  Wound dressed with Aquasel and pressure dressing.          Specimens:   Specimen (24h ago, onward)      None            Condition: Good    Disposition: PACU - hemodynamically stable.    Attestation: Good         Delivery Information for Jordan Espinoza    Birth information:  YOB: 2023   Time of birth: 7:38 AM   Sex: male   Head Delivery Date/Time: 2022  7:38 AM   Delivery type: , Low Transverse   Gestational Age: 38w0d    Delivery Providers    Delivering clinician: Grazyna Price MD   Provider Role    Bridget SLADANA  "Karena, [place] Registered Nurse    Kathrin Allen, RN Registered Nurse    Roberto Nails, [facility]    Bridget Beckham PA-C Physician Assistant    Wili Antunez, CRNA Nurse Anesthetist              Measurements    Weight: 4020 g  Weight (lbs): 8 lb 13.8 oz  Length: 50.2 cm  Length (in): 19.75"  Head circumference: 36.8 cm  Chest circumference: 36.8 cm  Abdominal girth: 36.2 cm         Apgars    Living status: Living  Apgars:  1 min.:  5 min.:  10 min.:  15 min.:  20 min.:    Skin color:  0  1       Heart rate:  2  2       Reflex irritability:  2  2       Muscle tone:  2  2       Respiratory effort:  2  2       Total:  8  9       Apgars assigned by: RILEY ALLEN RN         Operative Delivery    Forceps attempted?: No  Vacuum extractor attempted?: Yes  Vacuum type: Kiwi, flat  First attempt time vacuum applied: 2022 07:36:51  First attempt time vacuum removed: 2022 07:38:05  Number of pop offs: 0  Number of pulls with vacuum: 1  Total vacuum application time: 74 seconds  Vacuum applied by: DR. HILDA ALEXANDER  Failed vacuum delivery?: No         Shoulder Dystocia    Shoulder dystocia present?: No           Presentation    Presentation: Vertex           Interventions/Resuscitation    Method: Bulb Suctioning, Tactile Stimulation, Deep Suctioning       Cord    Vessels: 3 vessels  Complications: None  Delayed Cord Clamping?: Yes  Cord Clamped Date/Time: 2022  7:39 AM  Cord Blood Disposition: Lab  Gases Sent?: No  Stem Cell Collection (by MD): No       Placenta    Placenta delivery date/time: 2022 0740  Placenta removal: Manual removal  Placenta appearance: Intact  Placenta disposition: discarded           Labor Events:       labor: No     Labor Onset Date/Time:         Dilation Complete Date/Time:         Start Pushing Date/Time:         Start Pushing Date/Time:       Rupture Date/Time:            Rupture type:            Fluid Amount:         Fluid Color:         Fluid Odor:       "   Membrane Status:                  steroids: None     Antibiotics given for GBS: No     Induction: none     Indications for induction:        Augmentation:       Indications for augmentation:       Labor complications: None     Additional complications:          Cervical ripening:                     Delivery:      Episiotomy: None     Indication for Episiotomy:       Perineal Lacerations: None Repaired:      Periurethral Laceration:   Repaired:     Labial Laceration:   Repaired:     Sulcus Laceration:   Repaired:     Vaginal Laceration:   Repaired:     Cervical Laceration:   Repaired:     Repair suture: None     Repair # of packets:       Last Value - EBL - Nursing (mL):       Sum - EBL - Nursing (mL): 0     Last Value - EBL - Anesthesia (mL):        Calculated QBL (mL):         Vaginal Sweep Performed: No     Surgicount Correct: Yes       Other providers:       Anesthesia    Method: Spinal          Details (if applicable):  Trial of Labor No    Categorization: Repeat    Priority: Routine   Indications for : Repeat Section   Incision Type: low transverse     Additional  information:  Forceps:    Vacuum:    Breech:    Observed anomalies    Other (Comments):

## 2022-12-01 NOTE — ANESTHESIA PREPROCEDURE EVALUATION
2022  Samanta Espinoza is a 37 y.o., female.      Pre-op Assessment    I have reviewed the Patient Summary Reports.     I have reviewed the Nursing Notes.    I have reviewed the Medications.     Review of Systems  Anesthesia Hx:  Neg history of prior surgery. Denies Family Hx of Anesthesia complications.   Denies Personal Hx of Anesthesia complications.   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   Hepatic/GI:   GERD    OB/GYN/PEDS:  Planned Repeat     Neurological:   Headaches    Psych:   Psychiatric History          Physical Exam  General: Well nourished, Cooperative and Alert    Airway:  Mallampati: II   Mouth Opening: Normal  TM Distance: Normal  Tongue: Normal  Neck ROM: Normal ROM    Dental:  Intact    Chest/Lungs:  Clear to auscultation, Normal Respiratory Rate    Heart:  Rate: Normal  Rhythm: Regular Rhythm  Sounds: Normal        Anesthesia Plan  Type of Anesthesia, risks & benefits discussed:    Anesthesia Type: Spinal  Intra-op Monitoring Plan: Standard ASA Monitors  Post Op Pain Control Plan: multimodal analgesia and intrathecal opioid  Informed Consent: Informed consent signed with the Patient and all parties understand the risks and agree with anesthesia plan.  All questions answered. Patient consented to blood products? Yes  ASA Score: 2  Day of Surgery Review of History & Physical: H&P Update referred to the surgeon/provider.    Ready For Surgery From Anesthesia Perspective.     .

## 2022-12-01 NOTE — TRANSFER OF CARE
"Anesthesia Transfer of Care Note    Patient: Samanta Espinoza    Procedure(s) Performed: Procedure(s) (LRB):   SECTION (N/A)    Patient location: Labor and Delivery    Anesthesia Type: spinal    Transport from OR: Transported from OR on room air with adequate spontaneous ventilation    Post pain: adequate analgesia    Post assessment: no apparent anesthetic complications and tolerated procedure well    Post vital signs: stable    Level of consciousness: awake and alert    Nausea/Vomiting: no nausea/vomiting    Complications: none    Transfer of care protocol was followed      Last vitals:   Visit Vitals  /64   Pulse 78   Temp 36.2 °C (97.2 °F)   Resp 18   Ht 5' 2" (1.575 m)   Wt 102.9 kg (226 lb 13.7 oz)   LMP 2022   SpO2 99%   Breastfeeding No   BMI 41.49 kg/m²     "

## 2022-12-01 NOTE — ANESTHESIA POSTPROCEDURE EVALUATION
Anesthesia Post Evaluation    Patient: Samanta Espinoza    Procedure(s) Performed: Procedure(s) (LRB):   SECTION (N/A)    Final Anesthesia Type: spinal      Patient location during evaluation: labor & delivery  Patient participation: Yes- Able to Participate  Level of consciousness: awake and alert, awake and oriented  Post-procedure vital signs: reviewed and stable  Pain management: adequate  Airway patency: patent    PONV status at discharge: No PONV  Anesthetic complications: no      Cardiovascular status: blood pressure returned to baseline and hemodynamically stable  Respiratory status: unassisted and spontaneous ventilation  Hydration status: euvolemic  Follow-up not needed.          Vitals Value Taken Time   /64 22 0843   Temp 36.2 °C (97.2 °F) 22 0843   Pulse 78 22 0843   Resp 18 22 0843   SpO2 99 % 22 0843         No case tracking events are documented in the log.      Pain/Annette Score: No data recorded

## 2022-12-02 PROBLEM — O09.813 PREGNANCY RESULTING FROM IN VITRO FERTILIZATION IN THIRD TRIMESTER: Status: RESOLVED | Noted: 2022-05-08 | Resolved: 2022-12-02

## 2022-12-02 PROBLEM — O09.523 HIGH RISK PREGNANCY, MULTIGRAVIDA OF ADVANCED MATERNAL AGE IN THIRD TRIMESTER: Status: RESOLVED | Noted: 2022-05-08 | Resolved: 2022-12-02

## 2022-12-02 LAB
INJECT RH IG VOL PATIENT: NORMAL ML
RPR SER QL: NORMAL

## 2022-12-02 PROCEDURE — 63600519 RHOGAM PHARM REV CODE 636 ALT 250 W HCPCS: Performed by: OBSTETRICS & GYNECOLOGY

## 2022-12-02 PROCEDURE — 25000003 PHARM REV CODE 250: Performed by: OBSTETRICS & GYNECOLOGY

## 2022-12-02 PROCEDURE — 11000001 HC ACUTE MED/SURG PRIVATE ROOM

## 2022-12-02 PROCEDURE — 63600175 PHARM REV CODE 636 W HCPCS: Performed by: OBSTETRICS & GYNECOLOGY

## 2022-12-02 RX ORDER — OXYCODONE AND ACETAMINOPHEN 7.5; 325 MG/1; MG/1
1 TABLET ORAL EVERY 4 HOURS PRN
Status: DISCONTINUED | OUTPATIENT
Start: 2022-12-02 | End: 2022-12-03 | Stop reason: HOSPADM

## 2022-12-02 RX ORDER — OXYCODONE AND ACETAMINOPHEN 5; 325 MG/1; MG/1
1 TABLET ORAL EVERY 4 HOURS PRN
Status: DISCONTINUED | OUTPATIENT
Start: 2022-12-02 | End: 2022-12-03 | Stop reason: HOSPADM

## 2022-12-02 RX ORDER — SIMETHICONE 80 MG
1 TABLET,CHEWABLE ORAL 3 TIMES DAILY PRN
Status: DISCONTINUED | OUTPATIENT
Start: 2022-12-02 | End: 2022-12-03 | Stop reason: HOSPADM

## 2022-12-02 RX ADMIN — IBUPROFEN 800 MG: 800 TABLET, FILM COATED ORAL at 04:12

## 2022-12-02 RX ADMIN — HYDROCODONE BITARTRATE AND ACETAMINOPHEN 1 TABLET: 5; 325 TABLET ORAL at 08:12

## 2022-12-02 RX ADMIN — LABETALOL HYDROCHLORIDE 100 MG: 100 TABLET, FILM COATED ORAL at 09:12

## 2022-12-02 RX ADMIN — OXYCODONE AND ACETAMINOPHEN 1 TABLET: 7.5; 325 TABLET ORAL at 11:12

## 2022-12-02 RX ADMIN — LABETALOL HYDROCHLORIDE 100 MG: 100 TABLET, FILM COATED ORAL at 08:12

## 2022-12-02 RX ADMIN — DOCUSATE SODIUM 100 MG: 100 CAPSULE, LIQUID FILLED ORAL at 08:12

## 2022-12-02 RX ADMIN — HYDROCODONE BITARTRATE AND ACETAMINOPHEN 1 TABLET: 10; 325 TABLET ORAL at 12:12

## 2022-12-02 RX ADMIN — KETOROLAC TROMETHAMINE 30 MG: 30 INJECTION, SOLUTION INTRAMUSCULAR; INTRAVENOUS at 04:12

## 2022-12-02 RX ADMIN — SERTRALINE HYDROCHLORIDE 100 MG: 50 TABLET ORAL at 09:12

## 2022-12-02 RX ADMIN — HUMAN RHO(D) IMMUNE GLOBULIN 300 MCG: 300 INJECTION, SOLUTION INTRAMUSCULAR at 10:12

## 2022-12-02 RX ADMIN — OXYCODONE AND ACETAMINOPHEN 1 TABLET: 7.5; 325 TABLET ORAL at 07:12

## 2022-12-02 RX ADMIN — PRENATAL VITAMINS-IRON FUMARATE 27 MG IRON-FOLIC ACID 0.8 MG TABLET 1 TABLET: at 08:12

## 2022-12-02 RX ADMIN — SIMETHICONE 80 MG: 80 TABLET, CHEWABLE ORAL at 06:12

## 2022-12-02 RX ADMIN — ENOXAPARIN SODIUM 40 MG: 40 INJECTION SUBCUTANEOUS at 08:12

## 2022-12-02 RX ADMIN — KETOROLAC TROMETHAMINE 30 MG: 30 INJECTION, SOLUTION INTRAMUSCULAR; INTRAVENOUS at 10:12

## 2022-12-02 NOTE — LACTATION NOTE
"This note was copied from a baby's chart.  Lactation called to room for latch assistance: Infant output and weight loss WNL.    Upon entering room, baby crying. Mothers attempting to calm infant. They state that baby  well the first few times, but has "regressed" overnight and states that he has been very fussy. Mothers have been hand expressing colostrum and syringe feeding baby.     Attempted to assist mother with latching baby at this time. Infant placed skin to skin with birth mother to calm. Infant calms briefly; assisted mother with positioning infant in the football position on the right breast. Baby begins crying; breast placed in baby's mouth and EBM expressed into mouth. Crying continues. Swaddled baby in an attempt to calm and latch baby. Infant continues crying after repeated attempts and baby removed from breast.     Mothers overwhelmed and asked about supplementing with formula with hopes that baby will calm down. Long discussion had and emotional support given. Discussed adequacy of colostrum. Instructed mother on normal  feeding and sleeping patterns. Encouraged mother to breastfeed infant a minimum of 8 times in 24 hours prior to supplementation to promote appropriate breast stimulation for adequate milk supply. Discussed with mother preferred alternative feeding methods, such as supplement infant at breast via SNS, syringe, spoon, or cup feeding. Discussed risks and encouraged mother to avoid artificial nipples and bottles. Mother chooses to supplement infant via bottle. Mothers taught how to safely pace bottle feed. Baby gaggy and spilling formula out the sides of mouth; improved with cheek support. Infant content after 12 mL.    Because baby is being supplemented away from the breast, mother was:   - informed that breastfeeding support and assistance is available as needed  - encouraged to express milk from both breasts each time a supplement is given  - encouraged to use her own " collected milk as a first choice for supplementation  Mother was encouraged to request assistance as needed and voices understanding.     Birth mother states that she does not want to pump due to breast issues (reoccurring mastitis, plugged ducts, oversupply) that she had with her 3 year old; states that she will hand express.    Plan:    Feed based on feeding cues.  Skin to skin every 2-3 hours if no feeding cues.  Notify bedside nurse if no feeding 3 hours from beginning of last feeding.  Attempt feeding baby for 5 minutes. If feeding is not nutritive;   Supplement with all expressed breast milk available (from previous pumping/hand expression session) and formula.  Hand express and collect all available colustrum for baby, save for next feeding.     Expected oral intake per feeding (according to [employer] Breastfeeding [employer]) & expected output for each day of life:  Day 2: 5-15 mL per feeding, 2 voids, 2 stools  Day 3: 15-30 mL per feeding, 3 voids, 3 stools  Day 4: 30-60 mL per feeding, 4 voids, 3 stools    Mother denies any further lactation needs or concerns at this time. Encouraged mother to call for assistance when desired or when infant is showing signs of hunger. Lactation availability discussed. Mother verbalizes understanding of all education and counseling.

## 2022-12-02 NOTE — HOSPITAL COURSE
22: Underwent elective repeat . Tolerated procedure well, transferred to mother/baby unit.   22: POD 1 s/p RCS. Doing well this morning, no acute concerns.   12/3/22--stable for discharge pod #2

## 2022-12-02 NOTE — ASSESSMENT & PLAN NOTE
Underwent RCS on 12/1/22.  - POD 1  - Tolerating PO  - Pain managed on current regimen  - Ambulating  - Voiding spontaneously  - Doing well

## 2022-12-02 NOTE — PLAN OF CARE
Patient progressing well. Bonding appropriately with . Pain controlled with Toradol and Norco. Ambulating independently and voiding without difficulty. Aquacel intact. Firm fundus. Light vaginal bleeding. Vital signs stable. Will continue to monitor.           Problem: Adult Inpatient Plan of Care  Goal: Plan of Care Review  Outcome: Ongoing, Progressing

## 2022-12-02 NOTE — LACTATION NOTE
This note was copied from a baby's chart.  Lactation rounds:    Visited birth mother in room, Dayanara at bedside; states that infant took about 20 mL of formula with cheek support. Infant currently sleeping. Attempted breastfeeding first, but baby wasn't latching. Discussed with mother infant's  possible oral restrictions. Informed her of outpatient lactation services; appears overwhelmed. Lots of emotional support given by Dayanara and myself. Informed mother that if she decides to pump at anytime, to ask her nurse or lactation. Mother verbalizes understanding and states that she will discuss options with her wife when she returns.    Mother denies any further lactation needs or concerns at this time. Encouraged mother to call for assistance when desired or when infant is showing signs of hunger. Lactation availability discussed. Mother verbalizes understanding of all education and counseling.

## 2022-12-02 NOTE — PROGRESS NOTES
[facility] ([facility])  Obstetrics  Postpartum Progress Note    Patient Name: Samanta Espinoza  MRN: [id number]  Admission Date: 2022  Hospital Length of Stay: 1 days  Attending Physician: Grazyna Price MD  Primary Care Provider: Jamel Meier MD    Subjective:     Principal Problem:Status post repeat low transverse  section    Hospital Course:  22: Underwent elective repeat . Tolerated procedure well, transferred to mother/baby unit.   22: POD 1 s/p RCS. Doing well this morning, no acute concerns.       Interval History: POD 1 s/p RCS.    She is doing well this morning. She is tolerating a regular diet without nausea or vomiting. She is voiding spontaneously. She is ambulating. She has passed flatus, and has not a BM. Vaginal bleeding is mild. She denies fever or chills. Abdominal pain is moderate and controlled with oral medications. She Is breastfeeding.     Objective:     Vital Signs (Most Recent):  Temp: 99 °F (37.2 °C) (22 0816)  Pulse: 85 (22 0816)  Resp: 17 (22 0835)  BP: 133/81 (22)  SpO2: 97 % (22) Vital Signs (24h Range):  Temp:  [97.9 °F (36.6 °C)-99 °F (37.2 °C)] 99 °F (37.2 °C)  Pulse:  [71-85] 85  Resp:  [16-32] 17  SpO2:  [95 %-97 %] 97 %  BP: (107-133)/(65-81) 133/81     Weight: 102.9 kg (226 lb 13.7 oz)  Body mass index is 41.49 kg/m².      Intake/Output Summary (Last 24 hours) at 2022 1015  Last data filed at 2022 0630  Gross per 24 hour   Intake --   Output 1950 ml   Net -1950 ml         Significant Labs:  Lab Results   Component Value Date    GROUPTRH A NEG 2022    HEPBSAG Negative 2022    STREPBCULT No Group B Streptococcus isolated 2022     Recent Labs   Lab 22  0602   HGB 10.7*   HCT 32.5*       I have personallly reviewed all pertinent lab results from the last 24 hours.  Recent Lab Results         22  1407   22  1324        Creatinine 0.6         eGFR  >60         Group & Rh A NEG         Rh Immune Globulin   RHG [id number] Issued       Karin - FMH (Fetal Bleed Screen) NEG                 Physical Exam:   Constitutional: She is oriented to person, place, and time. She appears well-developed. No distress.    HENT:   Head: Normocephalic and atraumatic.    Eyes: Conjunctivae are normal. Right eye exhibits no discharge. Left eye exhibits no discharge. No scleral icterus.     Cardiovascular:  Normal rate and regular rhythm.             Pulmonary/Chest: Effort normal and breath sounds normal. No respiratory distress.        Abdominal: Soft. Bowel sounds are normal. She exhibits abdominal incision (Pfannenstiel incision with acquacel dressing in place, CDI. Appropriately TTP.). She exhibits no distension.   Fundus firm and below the level of the umbilicus             Musculoskeletal: Normal range of motion.       Neurological: She is alert and oriented to person, place, and time.    Skin: Skin is warm and dry. She is not diaphoretic.          Assessment/Plan:     37 y.o. female  for:    * Status post repeat low transverse  section  Underwent RCS on 22.  - POD 1  - Tolerating PO  - Pain not managed well on norco, will change to percocet  - Ambulating  - Voiding spontaneously  - Doing well      Obesity in pregnancy, antepartum, third trimester  - Lovenox ppx PP while in the hospital    Acute cough  - Continue tessalon perles as needed  - Lungs clear on exam    Rh negative state in antepartum period  - Rhogam postpartum if indicated    Anemia during pregnancy in third trimester  - Asymptomatic.  Hb/Hct 10.7/32.5 on day of delivery  - Iron supplementation    Chronic hypertension affecting pregnancy  - BP controlled on labetalol 100mg BID        Disposition: As patient meets milestones, will plan to discharge tomorrow.    Bridget Beckham PA-C  Obstetrics  [facility] ([facility])

## 2022-12-03 VITALS
DIASTOLIC BLOOD PRESSURE: 67 MMHG | HEART RATE: 97 BPM | HEIGHT: 62 IN | RESPIRATION RATE: 16 BRPM | TEMPERATURE: 98 F | BODY MASS INDEX: 41.75 KG/M2 | SYSTOLIC BLOOD PRESSURE: 121 MMHG | WEIGHT: 226.88 LBS | OXYGEN SATURATION: 96 %

## 2022-12-03 PROCEDURE — 99024 POSTOP FOLLOW-UP VISIT: CPT | Mod: ,,, | Performed by: OBSTETRICS & GYNECOLOGY

## 2022-12-03 PROCEDURE — 63600175 PHARM REV CODE 636 W HCPCS: Performed by: OBSTETRICS & GYNECOLOGY

## 2022-12-03 PROCEDURE — 25000003 PHARM REV CODE 250: Performed by: OBSTETRICS & GYNECOLOGY

## 2022-12-03 PROCEDURE — 99024 PR POST-OP FOLLOW-UP VISIT: ICD-10-PCS | Mod: ,,, | Performed by: OBSTETRICS & GYNECOLOGY

## 2022-12-03 RX ORDER — IBUPROFEN 800 MG/1
800 TABLET ORAL EVERY 8 HOURS
Qty: 30 TABLET | Refills: 0 | Status: SHIPPED | OUTPATIENT
Start: 2022-12-03 | End: 2022-12-13

## 2022-12-03 RX ORDER — OXYCODONE AND ACETAMINOPHEN 5; 325 MG/1; MG/1
1 TABLET ORAL EVERY 4 HOURS PRN
Qty: 20 TABLET | Refills: 0 | Status: SHIPPED | OUTPATIENT
Start: 2022-12-03 | End: 2022-12-08

## 2022-12-03 RX ADMIN — IBUPROFEN 800 MG: 800 TABLET, FILM COATED ORAL at 06:12

## 2022-12-03 RX ADMIN — PRENATAL VITAMINS-IRON FUMARATE 27 MG IRON-FOLIC ACID 0.8 MG TABLET 1 TABLET: at 08:12

## 2022-12-03 RX ADMIN — LABETALOL HYDROCHLORIDE 100 MG: 100 TABLET, FILM COATED ORAL at 08:12

## 2022-12-03 RX ADMIN — OXYCODONE AND ACETAMINOPHEN 1 TABLET: 7.5; 325 TABLET ORAL at 06:12

## 2022-12-03 RX ADMIN — ENOXAPARIN SODIUM 40 MG: 40 INJECTION SUBCUTANEOUS at 08:12

## 2022-12-03 RX ADMIN — DOCUSATE SODIUM 100 MG: 100 CAPSULE, LIQUID FILLED ORAL at 08:12

## 2022-12-03 RX ADMIN — SIMETHICONE 80 MG: 80 TABLET, CHEWABLE ORAL at 06:12

## 2022-12-03 NOTE — ASSESSMENT & PLAN NOTE
Underwent RCS on 12/1/22.  - POD 1  - Tolerating PO  - Pain managed on current regimen  - Ambulating  - Voiding spontaneously  - Doing well  12/03/2022  S/p rpt c/section  Afebrile  Tolerating diet, pain better controlled  +void  Stable for discharge

## 2022-12-03 NOTE — PROGRESS NOTES
[facility] ([facility])  Obstetrics  Postpartum Progress Note    Patient Name: Samanta Espinoza  MRN: [id number]  Admission Date: 2022  Hospital Length of Stay: 2 days  Attending Physician: Grazyna Price MD  Primary Care Provider: Jamel Meier MD    Subjective:     Principal Problem:Status post repeat low transverse  section    Hospital Course:  22: Underwent elective repeat . Tolerated procedure well, transferred to mother/baby unit.   22: POD 1 s/p RCS. Doing well this morning, no acute concerns.   12/3/22--stable for discharge pod #2      Interval History:   Pod #2    She is doing well this morning. She is tolerating a regular diet without nausea or vomiting. She is voiding spontaneously. She is ambulating. She has passed flatus, and has not a BM. Vaginal bleeding is mild. She denies fever or chills. Abdominal pain is mild and controlled with oral medications. She Is breastfeeding. She desires circumcision for her male baby: yes.    Objective:     Vital Signs (Most Recent):  Temp: 98.1 °F (36.7 °C) (22)  Pulse: 84 (22)  Resp: 16 (22)  BP: 122/70 (22)  SpO2: 96 % (22) Vital Signs (24h Range):  Temp:  [97.9 °F (36.6 °C)-98.8 °F (37.1 °C)] 98.1 °F (36.7 °C)  Pulse:  [84-92] 84  Resp:  [16-18] 16  SpO2:  [96 %-97 %] 96 %  BP: (122-141)/(69-85) 122/70     Weight: 102.9 kg (226 lb 13.7 oz)  Body mass index is 41.49 kg/m².    No intake or output data in the 24 hours ending 22 0901      Significant Labs:  Lab Results   Component Value Date    GROUPTRH A NEG 2022    HEPBSAG Negative 2022    STREPBCULT No Group B Streptococcus isolated 2022     No results for input(s): HGB, HCT in the last 48 hours.    I have personallly reviewed all pertinent lab results from the last 24 hours.  Recent Lab Results       None            Physical Exam:   Constitutional: She is oriented to person, place, and  time. She appears well-developed.    HENT:   Head: Normocephalic.    Eyes: Pupils are equal, round, and reactive to light.     Cardiovascular:  Normal rate and regular rhythm.             Pulmonary/Chest: Effort normal and breath sounds normal.        Abdominal: Soft. Bowel sounds are normal. She exhibits abdominal incision (aquasel dressing intact).     Genitourinary:    Genitourinary Comments: Firm,non tender             Musculoskeletal: Normal range of motion and moves all extremeties. No edema.       Neurological: She is alert and oriented to person, place, and time.    Skin: Skin is warm and dry.    Psychiatric: She has a normal mood and affect. Her behavior is normal. Judgment and thought content normal.     Review of Systems    Assessment/Plan:     37 y.o. female  for:    * Status post repeat low transverse  section  Underwent RCS on 22.  - POD 1  - Tolerating PO  - Pain managed on current regimen  - Ambulating  - Voiding spontaneously  - Doing well  2022  S/p rpt c/section  Afebrile  Tolerating diet, pain better controlled  +void  Stable for discharge        Obesity in pregnancy, antepartum, third trimester  - Lovenox ppx PP while in the hospital    Acute cough  - Continue tessalon perles as needed  - Lungs clear on exam    Rh negative state in antepartum period  - Rhogam postpartum if indicated    Anemia during pregnancy in third trimester  - Asymptomatic.  Hb/Hct 10.7/32.5 on day of delivery  - Iron supplementation    Chronic hypertension affecting pregnancy  - BP controlled on labetalol 100mg BID  2022  bp remains controlled on usual dose of labetalol        Disposition: stable for discharge  Ivet Hernandez MD  Obstetrics  [facility] ([facility])

## 2022-12-03 NOTE — DISCHARGE SUMMARY
[facility] ([facility])  Obstetrics  Discharge Summary      Patient Name: Samanta Espinoza  MRN: [id number]  Admission Date: 2022  Hospital Length of Stay: 2 days  Discharge Date and Time:  2022 9:10 AM  Attending Physician: Grazyna Price MD   Discharging Provider: Ivet Hernandez MD   Primary Care Provider: Jamel Meier MD    HPI:  at 37wga presents for scheduled, elective repeat  delivery.          Procedure(s) (LRB):   SECTION (N/A)     Hospital Course:   22: Underwent elective repeat . Tolerated procedure well, transferred to mother/baby unit.   22: POD 1 s/p RCS. Doing well this morning, no acute concerns.   12/3/22--stable for discharge pod #2           Final Active Diagnoses:    Diagnosis Date Noted POA    PRINCIPAL PROBLEM:  Status post repeat low transverse  section [Z98.891] 2019 Not Applicable    Obesity in pregnancy, antepartum, third trimester [O99.213] 2022 Yes    Acute cough [R05.1] 2022 Yes    Rh negative state in antepartum period [O26.899, Z67.91] 2022 Yes    Anemia during pregnancy in third trimester [O99.013] 2022 Yes    Chronic hypertension affecting pregnancy [O10.919] 2022 Yes      Problems Resolved During this Admission:    Diagnosis Date Noted Date Resolved POA    Pregnancy resulting from in vitro fertilization in third trimester [O09.813] 2022 Not Applicable    High risk pregnancy, multigravida of advanced maternal age in third trimester [O09.523] 2022 Not Applicable        Significant Diagnostic Studies: Labs: All labs within the past 24 hours have been reviewed      Feeding Method: breast    Immunizations     Date Immunization Status Dose Route/Site Given by    22 1044 Rho (D) Immune Globulin - IM Given 300 mcg Intramuscular/Left upper quad. gluteus Cristina Blanco RN          Delivery:    Episiotomy: None   Lacerations:  None   Repair suture: None   Repair # of packets: 9   Blood loss (ml):       Birth information:  YOB: 2023   Time of birth: 7:38 AM   Sex: male   Delivery type: , Low Transverse   Gestational Age: 38w0d    Delivery Clinician:      Other providers:       Additional  information:  Forceps:    Vacuum:    Breech:    Observed anomalies      Living?:           APGARS  One minute Five minutes Ten minutes   Skin color:         Heart rate:         Grimace:         Muscle tone:         Breathing:         Totals: 8  9        Placenta: Delivered:       appearance    Pending Diagnostic Studies:     None          Discharged Condition: good    Disposition: Home or Self Care    Follow Up:   Follow-up Information     [facility] 20[facility].    Why: Dressing removaland BP check           Grazyna Price MD Follow up in 1 month(s).    Specialties: Obstetrics and Gynecology, Obstetrics  Why: Postpartum  Contact information:  [address] [place] [address] [address]o [place] [address]  [phone]                       Patient Instructions:      Pelvic Rest   Order Comments: X6 wks --no tampons, intercourse, douching     Other restrictions (specify):   Order Comments: Showers only for 6 wks     Lifting restrictions   Order Comments: No lifting over 10-15lbs for 6 wks     Medications:  Current Discharge Medication List      START taking these medications    Details   ibuprofen (ADVIL,MOTRIN) 800 MG tablet Take 1 tablet (800 mg total) by mouth every 8 (eight) hours. for 10 days  Qty: 30 tablet, Refills: 0      oxyCODONE-acetaminophen (PERCOCET) 5-325 mg per tablet Take 1 tablet by mouth every 4 (four) hours as needed for Pain.  Qty: 20 tablet, Refills: 0    Comments: Quantity prescribed more than 7 day supply? No         CONTINUE these medications which have NOT CHANGED    Details   benzonatate (TESSALON) 200 MG capsule Take 1 capsule (200 mg total) by mouth 3 (three) times daily as needed for Cough.  Qty: 30  capsule, Refills: 0    Associated Diagnoses: Acute cough      cetirizine (ZYRTEC) 10 MG tablet       ferrous sulfate (IRON ORAL) Take by mouth.      labetaloL (NORMODYNE) 100 MG tablet Take 1 tablet (100 mg total) by mouth 2 (two) times daily.  Qty: 60 tablet, Refills: 11    Comments: .      omeprazole (PRILOSEC) 20 MG capsule Take 1 capsule (20 mg total) by mouth once daily.  Qty: 90 capsule, Refills: 0    Associated Diagnoses: Gastroesophageal reflux disease      sertraline (ZOLOFT) 100 MG tablet Take 1 tablet (100 mg total) by mouth once daily.  Qty: 30 tablet, Refills: 2         STOP taking these medications       aspirin (ECOTRIN) 81 MG EC tablet Comments:   Reason for Stopping:         fish oil-omega-3 fatty acids 300-1,000 mg capsule Comments:   Reason for Stopping:         Lactobacillus rhamnosus GG (CULTURELLE) 10 billion cell capsule Comments:   Reason for Stopping:         PNV/iron/folic acid (PRENATAL VITAMIN-IRON-FA ORAL) Comments:   Reason for Stopping:               Ievt Hernandez MD  Obstetrics  [facility]

## 2022-12-03 NOTE — PROGRESS NOTES
Discharge instructions completed and questions answered. Patient brought to vehicle via wheelchair in stable condition.

## 2022-12-03 NOTE — ASSESSMENT & PLAN NOTE
- BP controlled on labetalol 100mg BID  12/03/2022  bp remains controlled on usual dose of labetalol

## 2022-12-03 NOTE — DISCHARGE INSTRUCTIONS
"Mother Self Care:    Activity: Avoid strenuous exercise and get adequate rest.  No driving until the physician consent given.  Emotional Changes: Most women find birth to be a time of great emotional upheaval.  Sense of loss, mood swings, fatigue, anxiety, and feeling "let down" are common.  If feelings worsen or last more than a week, call your physician.  Breast Care/Breastfeeding: Wear a bra for comfort.  Keep nipples dry and apply your own breast milk or lanolin cream as needed for soreness.  Engorgement can be relieved with warm, moist heat before feedings.  You may also take Ibuprofen.  Breast Care/Bottle Feeding: Wear support bra 24 hours a day for one week.  Avoid stimulation to breasts.  You may use ice packs for discomfort.  Carlos-Care/Vaginal Bleeding: Remember to use your carlos-bottle after urinating.  Your flow will change from red, to pink, to yellow/white color over a period of 2 weeks.  Menstruation will return in 3-8 weeks, or longer if breastfeeding.  Episiotomy Vaginal Delivery: Stitches will dissolve within 10 days to 3 weeks.  Warm baths, tucks, and dermoplast will promote healing.  Avoid bubble baths or strong soaps.   Section/Tubal Ligation: Keep incision clean and dry. You may shower, but avoid baths.  Sexual Activity/Pelvic Rest: No sexual activity, tampons, or douching until your physician gives you consent.  Diet: Continue to eat from the five basic food groups, including plenty of protein, fruits, vegetables, and whole grains.  Limit empty calories and high fat foods.  Drink enough fluids to satisfy thirst and add an extra 500 calories for breastfeeding.  Constipation/Hemorrhoids: Drink plenty of water.  You may take a stool softener or natural laxative (Metamucil). You may use tucks or hemorrhoid ointment and soak in a warm tub.    CALL YOUR OB DOCTOR IF ANY OF THE FOLLOWING OCCURS:  *Heavy bleeding - saturating a pad an hour or passing any large (2-3 inches in size) blood " clots.  *Any pain, redness, or tenderness in lower leg.  *You cannot care for yourself or your baby.  *Any signs of infection-      - Temperature greater than 100.5 degrees F      - Foul smelling vaginal discharge and/or incisional drainage      - Increased episiotomy or incisional pain      - Hot, hard, red or sore area on breast      - Flu-like symptoms      - Any urgency, frequency or burning with urination    Return To the Hospital for further Evaluation:  Headache not relieved by tylenol or ibuprofen  Blurry vision, double vision, seeing spots, or flashing lights  Feeling faint or passing out  Right epigastric pain  Difficulty breathing  Swelling in hands, face, or feet  Any of these symptoms accompanied by nausea/vomiting  Gaining more than 5 pounds in one week  Seizures  These symptoms could be an indication of elevated blood pressure.       If you have any questions that need to be answered immediately please call the Labor & Delivery Unit at [phone] and ask to speak to a nurse.

## 2022-12-03 NOTE — PLAN OF CARE
POC reviewed with no concerns. Ambulation and hydration encouraged. Fundus firm without massage bleeding is light. Bonding well with infant. Aquacel intact with small amount of dried drainage. Pain adequately managed. Frequent checks made to ensure safety and comfort. Bed low, call bell within reach. Will continue to monitor.

## 2022-12-03 NOTE — LACTATION NOTE
Lactation Rounds:    Infant weight loss -6.2% 4 voids and 4 stools documented in the last 24 hours.     Spoke with primary nurse. Patient is strictly formula feeding. Patient does not want to see lactation. Lactation remains available as needed.

## 2022-12-03 NOTE — SUBJECTIVE & OBJECTIVE
Interval History:   Pod #2    She is doing well this morning. She is tolerating a regular diet without nausea or vomiting. She is voiding spontaneously. She is ambulating. She has passed flatus, and has not a BM. Vaginal bleeding is mild. She denies fever or chills. Abdominal pain is mild and controlled with oral medications. She Is breastfeeding. She desires circumcision for her male baby: yes.    Objective:     Vital Signs (Most Recent):  Temp: 98.1 °F (36.7 °C) (12/03/22 0752)  Pulse: 84 (12/03/22 0752)  Resp: 16 (12/03/22 0752)  BP: 122/70 (12/03/22 0752)  SpO2: 96 % (12/03/22 0752) Vital Signs (24h Range):  Temp:  [97.9 °F (36.6 °C)-98.8 °F (37.1 °C)] 98.1 °F (36.7 °C)  Pulse:  [84-92] 84  Resp:  [16-18] 16  SpO2:  [96 %-97 %] 96 %  BP: (122-141)/(69-85) 122/70     Weight: 102.9 kg (226 lb 13.7 oz)  Body mass index is 41.49 kg/m².    No intake or output data in the 24 hours ending 12/03/22 0901      Significant Labs:  Lab Results   Component Value Date    GROUPTRH A NEG 12/01/2022    HEPBSAG Negative 05/05/2022    STREPBCULT No Group B Streptococcus isolated 11/25/2022     No results for input(s): HGB, HCT in the last 48 hours.    I have personallly reviewed all pertinent lab results from the last 24 hours.  Recent Lab Results       None            Physical Exam:   Constitutional: She is oriented to person, place, and time. She appears well-developed.    HENT:   Head: Normocephalic.    Eyes: Pupils are equal, round, and reactive to light.     Cardiovascular:  Normal rate and regular rhythm.             Pulmonary/Chest: Effort normal and breath sounds normal.        Abdominal: Soft. Bowel sounds are normal. She exhibits abdominal incision (aquasel dressing intact).     Genitourinary:    Genitourinary Comments: Firm,non tender             Musculoskeletal: Normal range of motion and moves all extremeties. No edema.       Neurological: She is alert and oriented to person, place, and time.    Skin: Skin is warm and  dry.    Psychiatric: She has a normal mood and affect. Her behavior is normal. Judgment and thought content normal.     Review of Systems

## 2022-12-05 ENCOUNTER — PATIENT MESSAGE (OUTPATIENT)
Dept: OBSTETRICS AND GYNECOLOGY | Facility: CLINIC | Age: 37
End: 2022-12-05
Payer: COMMERCIAL

## 2022-12-08 ENCOUNTER — HOSPITAL ENCOUNTER (OUTPATIENT)
Dept: RADIOLOGY | Facility: HOSPITAL | Age: 37
Discharge: HOME | End: 2022-12-08
Attending: PHYSICIAN ASSISTANT
Payer: COMMERCIAL

## 2022-12-08 ENCOUNTER — OFFICE VISIT (OUTPATIENT)
Dept: OBSTETRICS AND GYNECOLOGY | Facility: CLINIC | Age: 37
End: 2022-12-08
Payer: COMMERCIAL

## 2022-12-08 VITALS
DIASTOLIC BLOOD PRESSURE: 86 MMHG | WEIGHT: 210.75 LBS | SYSTOLIC BLOOD PRESSURE: 126 MMHG | BODY MASS INDEX: 38.55 KG/M2

## 2022-12-08 DIAGNOSIS — I10 ESSENTIAL HYPERTENSION: ICD-10-CM

## 2022-12-08 DIAGNOSIS — Z98.891 STATUS POST REPEAT LOW TRANSVERSE CESAREAN SECTION: Primary | ICD-10-CM

## 2022-12-08 DIAGNOSIS — R05.9 COUGH, UNSPECIFIED TYPE: ICD-10-CM

## 2022-12-08 PROCEDURE — 71046 XR CHEST PA AND LATERAL: ICD-10-PCS | Mod: 26,,, | Performed by: RADIOLOGY

## 2022-12-08 PROCEDURE — 3008F PR BODY MASS INDEX (BMI) DOCUMENTED: ICD-10-PCS | Mod: CPTII,S$GLB,, | Performed by: PHYSICIAN ASSISTANT

## 2022-12-08 PROCEDURE — 71046 X-RAY EXAM CHEST 2 VIEWS: CPT | Mod: 26,,, | Performed by: RADIOLOGY

## 2022-12-08 PROCEDURE — 71046 X-RAY EXAM CHEST 2 VIEWS: CPT | Mod: TC

## 2022-12-08 PROCEDURE — 99999 PR PBB SHADOW E&M-EST. PATIENT-LVL III: CPT | Mod: PBBFAC,,, | Performed by: PHYSICIAN ASSISTANT

## 2022-12-08 PROCEDURE — 1159F PR MEDICATION LIST DOCUMENTED IN MEDICAL RECORD: ICD-10-PCS | Mod: CPTII,S$GLB,, | Performed by: PHYSICIAN ASSISTANT

## 2022-12-08 PROCEDURE — 0503F PR POSTPARTUM CARE VISIT: ICD-10-PCS | Mod: CPTII,S$GLB,, | Performed by: PHYSICIAN ASSISTANT

## 2022-12-08 PROCEDURE — 0503F POSTPARTUM CARE VISIT: CPT | Mod: CPTII,S$GLB,, | Performed by: PHYSICIAN ASSISTANT

## 2022-12-08 PROCEDURE — 99999 PR PBB SHADOW E&M-EST. PATIENT-LVL III: ICD-10-PCS | Mod: PBBFAC,,, | Performed by: PHYSICIAN ASSISTANT

## 2022-12-08 PROCEDURE — 1159F MED LIST DOCD IN RCRD: CPT | Mod: CPTII,S$GLB,, | Performed by: PHYSICIAN ASSISTANT

## 2022-12-08 PROCEDURE — 3066F NEPHROPATHY DOC TX: CPT | Mod: CPTII,S$GLB,, | Performed by: PHYSICIAN ASSISTANT

## 2022-12-08 PROCEDURE — 3079F PR MOST RECENT DIASTOLIC BLOOD PRESSURE 80-89 MM HG: ICD-10-PCS | Mod: CPTII,S$GLB,, | Performed by: PHYSICIAN ASSISTANT

## 2022-12-08 PROCEDURE — 3008F BODY MASS INDEX DOCD: CPT | Mod: CPTII,S$GLB,, | Performed by: PHYSICIAN ASSISTANT

## 2022-12-08 PROCEDURE — 3074F PR MOST RECENT SYSTOLIC BLOOD PRESSURE < 130 MM HG: ICD-10-PCS | Mod: CPTII,S$GLB,, | Performed by: PHYSICIAN ASSISTANT

## 2022-12-08 PROCEDURE — 3079F DIAST BP 80-89 MM HG: CPT | Mod: CPTII,S$GLB,, | Performed by: PHYSICIAN ASSISTANT

## 2022-12-08 PROCEDURE — 3066F PR DOCUMENTATION OF TREATMENT FOR NEPHROPATHY: ICD-10-PCS | Mod: CPTII,S$GLB,, | Performed by: PHYSICIAN ASSISTANT

## 2022-12-08 PROCEDURE — 3074F SYST BP LT 130 MM HG: CPT | Mod: CPTII,S$GLB,, | Performed by: PHYSICIAN ASSISTANT

## 2022-12-08 RX ORDER — HYDROCODONE BITARTRATE AND ACETAMINOPHEN 7.5; 325 MG/1; MG/1
1 TABLET ORAL EVERY 6 HOURS PRN
Qty: 15 TABLET | Refills: 0 | Status: SHIPPED | OUTPATIENT
Start: 2022-12-08 | End: 2023-02-15

## 2022-12-08 NOTE — PROGRESS NOTES
OBGYN Post-op Clinic  History and Physical    Patient Name: Samanta Espinoza  YOB: 1985 (37 y.o.)  MRN: [id number]  Today's Date: 2022    Referring Md:   No referring provider defined for this encounter.    SUBJECTIVE:     Chief Complaint: Post-op  Dressing Removal    History of Present Illness:  Samanta Espinoza is a 37 y.o. female  who presents to the clinic today for acquacel dressing removal and BP check. BP looks great today in clinic at 126/86, however patient states that is occasionally elevated in the 140s/90s at home. No vision changes/disturbances or intractable headaches. Patient mentions that she has been experiencing a cough for the last several weeks (started prior to giving birth) and that the cough has become wet. Has tried OTC medications to no avail; avoiding Sudafed due to her HTN.       Review of patient's allergies indicates:   Allergen Reactions    Singulair [montelukast]      Itching all over       Past Medical History:   Diagnosis Date    Anemia     Autosomal recessive polycystic kidneys     GERD (gastroesophageal reflux disease)     High risk pregnancy, multigravida of advanced maternal age in third trimester 2022    History of uterine fibroid     Hyperlipidemia     Hypertension     Liver disease     Mastitis 10/29/2019    Melanoma 2016    left superior helix (breslow dept 0.67mm)    Mental disorder     Migraine headache     PONV (postoperative nausea and vomiting)     post op nausea    Postpartum depression     Pregnancy resulting from in vitro fertilization in third trimester 2022    [facility] IVF with [facility], Dr STACEY Ivy     Past Surgical History:   Procedure Laterality Date     SECTION N/A 2019    Procedure:  SECTION;  Surgeon: Grazyna Price MD;  Location: [facility] L&D;  Service: OB/GYN;  Laterality: N/A;     SECTION N/A 2022    Procedure:  SECTION;  Surgeon: Grazyna  MD Dee;  Location: [facility] L&D;  Service: OB/GYN;  Laterality: N/A;    HYSTEROSCOPIC POLYPECTOMY OF UTERUS N/A 1/10/2019    Procedure: POLYPECTOMY, UTERUS, HYSTEROSCOPIC;  Surgeon: Jolanta Sifuentes MD;  Location: [facility];  Service: OB/GYN;  Laterality: N/A;    LYMPH NODE BIOPSY      MALIGNANT SKIN LESION EXCISION  06/10/2016    NASAL SEPTUM SURGERY      RENAL EXPLORATION  2011     Family History   Problem Relation Age of Onset    Hypertension Father     Kidney disease Father     Diabetes Paternal Grandmother     Cancer Maternal Grandmother     Thrombophilia Neg Hx     Breast cancer Neg Hx     Colon cancer Neg Hx     Ovarian cancer Neg Hx     Eczema Neg Hx     Lupus Neg Hx     Psoriasis Neg Hx     Melanoma Neg Hx     Uterine cancer Neg Hx     Cervical cancer Neg Hx      Social History     Tobacco Use    Smoking status: Never    Smokeless tobacco: Never   Substance Use Topics    Alcohol use: Not Currently     Comment: socially    Drug use: No        Review of Systems:  Review of Systems   Constitutional:  Negative for chills and fever.   HENT:  Negative for congestion and sore throat.    Eyes:  Negative for blurred vision, double vision and photophobia.   Respiratory:  Positive for cough. Negative for hemoptysis.    Cardiovascular:  Negative for chest pain.   Gastrointestinal:  Positive for abdominal pain (Incisional, appropriate). Negative for constipation, diarrhea, heartburn, nausea and vomiting.   Genitourinary:  Negative for dysuria.   Musculoskeletal:  Negative for myalgias.   Skin:  Negative for rash.   Neurological:  Negative for weakness.     OBJECTIVE:     Vital Signs (Most Recent)  /86   Wt 95.6 kg (210 lb 12.2 oz)   LMP 03/07/2022   Breastfeeding No   BMI 38.55 kg/m²     Physical Exam  Vitals and nursing note reviewed.   Constitutional:       General: She is not in acute distress.     Appearance: Normal appearance.   HENT:      Head: Normocephalic and atraumatic.   Eyes:      General: No  scleral icterus.        Right eye: No discharge.         Left eye: No discharge.      Conjunctiva/sclera: Conjunctivae normal.   Pulmonary:      Effort: Pulmonary effort is normal. No respiratory distress.      Breath sounds: No stridor. Rales (LLL) present.   Abdominal:      Palpations: Abdomen is soft.      Tenderness: There is abdominal tenderness (Appropriately TTP).      Comments: Aquacel dressing over Pfannenstiel incision removed in clinic. Incision intact with no erythema, warmth, or drainage.    Musculoskeletal:         General: Normal range of motion.      Cervical back: Normal range of motion.      Right lower leg: No edema.      Left lower leg: No edema.   Skin:     General: Skin is warm and dry.   Neurological:      General: No focal deficit present.      Mental Status: She is alert and oriented to person, place, and time.   Psychiatric:         Mood and Affect: Mood normal.         Behavior: Behavior normal.         Thought Content: Thought content normal.         Judgment: Judgment normal.       ASSESSMENT/PLAN:     Samanta Espinoza is a 37 y.o. female was seen today for dressing change and blood pressure check. Due to patient's persistent/worsening cough at crackles on auscultation, will send her for CXR. Advised patient to continue avoiding Sudafed due to HTN. Patient to monitor BP at home three times daily (30 minutes after morning dose, middle of the day, and 30 minutes after evening dose). Monday she will message me with her BP readings and we can adjust medication if needed. Patient verbalized understanding.   I insured patient has a scheduled postpartum visit within one month. Once again, I reviewed all restrictions & limitations with the patient and instructed her to call clinic with any concerning issues or symptoms.  Instructed the importance of showering daily, no tub baths, using an antibacterial soap. Patient verbalized understanding.     Samanta was seen today for post-op  evaluation.    Diagnoses and all orders for this visit:    Status post repeat low transverse  section  -     HYDROcodone-acetaminophen (NORCO) 7.5-325 mg per tablet; Take 1 tablet by mouth every 6 (six) hours as needed for Pain.       - Showers only, pelvic rest, and no heavy lifting/strenuous activity for 6 weeks       - Ibuprofen PRN for pain       - Keep f/u appointment with surgeon in a few weeks    Cough, unspecified type  -     X-Ray Chest PA And Lateral; Future  - Avoid Sudafed  - Continue to take Tessalon Perles PRN    Essential hypertension        - Continue Labetalol 100mg BID        - Monitor BP this weekend and send message with readings         Birdget Beckham, [facility], PA-C  OBGYN - Surgery  [facility]

## 2022-12-13 ENCOUNTER — PATIENT MESSAGE (OUTPATIENT)
Dept: OBSTETRICS AND GYNECOLOGY | Facility: CLINIC | Age: 37
End: 2022-12-13
Payer: COMMERCIAL

## 2022-12-13 DIAGNOSIS — O10.919 CHRONIC HYPERTENSION AFFECTING PREGNANCY: Primary | ICD-10-CM

## 2022-12-13 RX ORDER — LABETALOL 200 MG/1
200 TABLET, FILM COATED ORAL 2 TIMES DAILY
Qty: 60 TABLET | Refills: 11 | Status: SHIPPED | OUTPATIENT
Start: 2022-12-13 | End: 2023-02-13 | Stop reason: ALTCHOICE

## 2022-12-14 ENCOUNTER — PATIENT MESSAGE (OUTPATIENT)
Dept: OBSTETRICS AND GYNECOLOGY | Facility: CLINIC | Age: 37
End: 2022-12-14
Payer: COMMERCIAL

## 2022-12-16 ENCOUNTER — PATIENT MESSAGE (OUTPATIENT)
Dept: OBSTETRICS AND GYNECOLOGY | Facility: CLINIC | Age: 37
End: 2022-12-16
Payer: COMMERCIAL

## 2022-12-20 DIAGNOSIS — K21.9 GASTROESOPHAGEAL REFLUX DISEASE: ICD-10-CM

## 2022-12-21 RX ORDER — OMEPRAZOLE 20 MG/1
20 CAPSULE, DELAYED RELEASE ORAL DAILY
Qty: 90 CAPSULE | Refills: 0 | Status: SHIPPED | OUTPATIENT
Start: 2022-12-21 | End: 2023-03-31 | Stop reason: SDUPTHER

## 2022-12-21 NOTE — TELEPHONE ENCOUNTER
Care Due:                  Date            Visit Type   Department     Provider  --------------------------------------------------------------------------------                                [facility]                              PATIENT      [facility] INTERNAL  Last Visit: 06-      VISIT/[facility]   MEDICINE       Jamel Meier  Next Visit: None Scheduled  None         None Found                                                            Last  Test          Frequency    Reason                     Performed    Due Date  --------------------------------------------------------------------------------    Office Visit  12 months..  omeprazole...............  06- 05-    [facility] Embedded Care Gaps. Reference number: [id number]. 12/20/2022   9:53:02 PM CST

## 2022-12-22 ENCOUNTER — PATIENT MESSAGE (OUTPATIENT)
Dept: OBSTETRICS AND GYNECOLOGY | Facility: CLINIC | Age: 37
End: 2022-12-22
Payer: COMMERCIAL

## 2023-01-05 ENCOUNTER — POSTPARTUM VISIT (OUTPATIENT)
Dept: OBSTETRICS AND GYNECOLOGY | Facility: CLINIC | Age: 38
End: 2023-01-05
Payer: COMMERCIAL

## 2023-01-05 VITALS
SYSTOLIC BLOOD PRESSURE: 124 MMHG | DIASTOLIC BLOOD PRESSURE: 82 MMHG | BODY MASS INDEX: 36.6 KG/M2 | HEIGHT: 62 IN | WEIGHT: 198.88 LBS

## 2023-01-05 DIAGNOSIS — Z98.891 STATUS POST REPEAT LOW TRANSVERSE CESAREAN SECTION: Primary | ICD-10-CM

## 2023-01-05 PROCEDURE — 0503F PR POSTPARTUM CARE VISIT: ICD-10-PCS | Mod: CPTII,S$GLB,, | Performed by: ADVANCED PRACTICE MIDWIFE

## 2023-01-05 PROCEDURE — 99999 PR PBB SHADOW E&M-EST. PATIENT-LVL III: CPT | Mod: PBBFAC,,, | Performed by: ADVANCED PRACTICE MIDWIFE

## 2023-01-05 PROCEDURE — 99999 PR PBB SHADOW E&M-EST. PATIENT-LVL III: ICD-10-PCS | Mod: PBBFAC,,, | Performed by: ADVANCED PRACTICE MIDWIFE

## 2023-01-05 PROCEDURE — 0503F POSTPARTUM CARE VISIT: CPT | Mod: CPTII,S$GLB,, | Performed by: ADVANCED PRACTICE MIDWIFE

## 2023-01-18 RX ORDER — SERTRALINE HYDROCHLORIDE 100 MG/1
100 TABLET, FILM COATED ORAL DAILY
Qty: 30 TABLET | Refills: 2 | Status: SHIPPED | OUTPATIENT
Start: 2023-01-18 | End: 2023-02-13 | Stop reason: SDUPTHER

## 2023-01-20 PROBLEM — O26.899 RH NEGATIVE STATE IN ANTEPARTUM PERIOD: Status: RESOLVED | Noted: 2022-09-02 | Resolved: 2023-01-20

## 2023-01-20 PROBLEM — D64.9 ANEMIA: Status: ACTIVE | Noted: 2022-08-01

## 2023-01-20 PROBLEM — Z98.891 STATUS POST REPEAT LOW TRANSVERSE CESAREAN SECTION: Status: RESOLVED | Noted: 2019-09-20 | Resolved: 2023-01-20

## 2023-01-20 PROBLEM — I10 CHRONIC HYPERTENSION: Status: ACTIVE | Noted: 2022-05-11

## 2023-01-20 PROBLEM — O99.213 OBESITY IN PREGNANCY, ANTEPARTUM, THIRD TRIMESTER: Status: RESOLVED | Noted: 2022-12-01 | Resolved: 2023-01-20

## 2023-01-20 PROBLEM — Z67.91 RH NEGATIVE STATE IN ANTEPARTUM PERIOD: Status: RESOLVED | Noted: 2022-09-02 | Resolved: 2023-01-20

## 2023-01-20 PROBLEM — O09.299 HX OF PRE-ECLAMPSIA, PRIOR PREGNANCY, CURRENTLY PREGNANT: Status: RESOLVED | Noted: 2022-05-08 | Resolved: 2023-01-20

## 2023-01-20 NOTE — PROGRESS NOTES
"Subjective:       Samanta Espinoza is a 37 y.o. female who presents for a postpartum visit. She is 4 week postpartum following a low cervical transverse  section. I have fully reviewed the prenatal and intrapartum course. The delivery was at 38 gestational weeks. Outcome: repeat  section, low transverse incision. Anesthesia: spinal. Postpartum course has been uneventful. Baby's course has been uneventful in relation to birth but experienced a skin infection that required hospitalization at 2 weeks. Baby is feeding by bottle - . Bleeding no bleeding. Bowel function is normal. Bladder function is normal. Patient is not sexually active. Contraception method is none. Postpartum depression screening: negative.    The following portions of the patient's history were reviewed and updated as appropriate: current medications, past family history, past medical history, past social history, past surgical history, and problem list.    Review of Systems  Pertinent items are noted in HPI.     Objective:      /82   Ht 5' 2" (1.575 m)   Wt 90.2 kg (198 lb 13.7 oz)   LMP 2022   Breastfeeding No   BMI 36.37 kg/m²    General:  alert, appears stated age, and cooperative               Abdomen: soft, non-tender; bowel sounds normal; no masses,  no organomegaly and incision approximated and healing well                                  Assessment:       Routine postpartum exam.    Plan:      1. Contraception: none  3. Follow up in:1yr for Annual Exam.     EMMY TREVINO     "

## 2023-01-24 ENCOUNTER — TELEPHONE (OUTPATIENT)
Dept: DERMATOLOGY | Facility: CLINIC | Age: 38
End: 2023-01-24
Payer: COMMERCIAL

## 2023-01-24 NOTE — TELEPHONE ENCOUNTER
Attempted to reach pt in regards to rescheduling appt 2-13-23 due to md being out of office. Message left

## 2023-02-13 ENCOUNTER — OFFICE VISIT (OUTPATIENT)
Dept: INTERNAL MEDICINE | Facility: CLINIC | Age: 38
End: 2023-02-13
Payer: COMMERCIAL

## 2023-02-13 ENCOUNTER — PATIENT MESSAGE (OUTPATIENT)
Dept: OBSTETRICS AND GYNECOLOGY | Facility: CLINIC | Age: 38
End: 2023-02-13
Payer: COMMERCIAL

## 2023-02-13 ENCOUNTER — PATIENT MESSAGE (OUTPATIENT)
Dept: NEUROLOGY | Facility: CLINIC | Age: 38
End: 2023-02-13
Payer: COMMERCIAL

## 2023-02-13 VITALS
HEIGHT: 62 IN | OXYGEN SATURATION: 98 % | BODY MASS INDEX: 37.64 KG/M2 | TEMPERATURE: 98 F | SYSTOLIC BLOOD PRESSURE: 120 MMHG | DIASTOLIC BLOOD PRESSURE: 86 MMHG | WEIGHT: 204.56 LBS | HEART RATE: 93 BPM

## 2023-02-13 DIAGNOSIS — E78.1 HYPERTRIGLYCERIDEMIA: ICD-10-CM

## 2023-02-13 DIAGNOSIS — I10 ESSENTIAL HYPERTENSION: ICD-10-CM

## 2023-02-13 DIAGNOSIS — Z00.00 ROUTINE GENERAL MEDICAL EXAMINATION AT A HEALTH CARE FACILITY: Primary | ICD-10-CM

## 2023-02-13 DIAGNOSIS — Q61.19 AUTOSOMAL RECESSIVE POLYCYSTIC KIDNEYS: ICD-10-CM

## 2023-02-13 DIAGNOSIS — G43.009 MIGRAINE WITHOUT AURA AND WITHOUT STATUS MIGRAINOSUS, NOT INTRACTABLE: ICD-10-CM

## 2023-02-13 DIAGNOSIS — R41.3 MEMORY LOSS: ICD-10-CM

## 2023-02-13 DIAGNOSIS — C43.9 DESMOPLASTIC MALIGNANT MELANOMA: ICD-10-CM

## 2023-02-13 DIAGNOSIS — F41.9 ANXIETY: ICD-10-CM

## 2023-02-13 PROBLEM — R05.1 ACUTE COUGH: Status: RESOLVED | Noted: 2022-11-29 | Resolved: 2023-02-13

## 2023-02-13 PROCEDURE — 3074F SYST BP LT 130 MM HG: CPT | Mod: CPTII,S$GLB,, | Performed by: INTERNAL MEDICINE

## 2023-02-13 PROCEDURE — 99999 PR PBB SHADOW E&M-EST. PATIENT-LVL IV: ICD-10-PCS | Mod: PBBFAC,,, | Performed by: INTERNAL MEDICINE

## 2023-02-13 PROCEDURE — 4010F ACE/ARB THERAPY RXD/TAKEN: CPT | Mod: CPTII,S$GLB,, | Performed by: INTERNAL MEDICINE

## 2023-02-13 PROCEDURE — 1159F PR MEDICATION LIST DOCUMENTED IN MEDICAL RECORD: ICD-10-PCS | Mod: CPTII,S$GLB,, | Performed by: INTERNAL MEDICINE

## 2023-02-13 PROCEDURE — 3079F PR MOST RECENT DIASTOLIC BLOOD PRESSURE 80-89 MM HG: ICD-10-PCS | Mod: CPTII,S$GLB,, | Performed by: INTERNAL MEDICINE

## 2023-02-13 PROCEDURE — 4010F PR ACE/ARB THEARPY RXD/TAKEN: ICD-10-PCS | Mod: CPTII,S$GLB,, | Performed by: INTERNAL MEDICINE

## 2023-02-13 PROCEDURE — 3008F BODY MASS INDEX DOCD: CPT | Mod: CPTII,S$GLB,, | Performed by: INTERNAL MEDICINE

## 2023-02-13 PROCEDURE — 3079F DIAST BP 80-89 MM HG: CPT | Mod: CPTII,S$GLB,, | Performed by: INTERNAL MEDICINE

## 2023-02-13 PROCEDURE — 3074F PR MOST RECENT SYSTOLIC BLOOD PRESSURE < 130 MM HG: ICD-10-PCS | Mod: CPTII,S$GLB,, | Performed by: INTERNAL MEDICINE

## 2023-02-13 PROCEDURE — 99999 PR PBB SHADOW E&M-EST. PATIENT-LVL IV: CPT | Mod: PBBFAC,,, | Performed by: INTERNAL MEDICINE

## 2023-02-13 PROCEDURE — 3008F PR BODY MASS INDEX (BMI) DOCUMENTED: ICD-10-PCS | Mod: CPTII,S$GLB,, | Performed by: INTERNAL MEDICINE

## 2023-02-13 PROCEDURE — 1159F MED LIST DOCD IN RCRD: CPT | Mod: CPTII,S$GLB,, | Performed by: INTERNAL MEDICINE

## 2023-02-13 PROCEDURE — 99395 PR PREVENTIVE VISIT,EST,18-39: ICD-10-PCS | Mod: S$GLB,,, | Performed by: INTERNAL MEDICINE

## 2023-02-13 PROCEDURE — 99395 PREV VISIT EST AGE 18-39: CPT | Mod: S$GLB,,, | Performed by: INTERNAL MEDICINE

## 2023-02-13 RX ORDER — LOSARTAN POTASSIUM 50 MG/1
50 TABLET ORAL DAILY
Qty: 90 TABLET | Refills: 1 | Status: SHIPPED | OUTPATIENT
Start: 2023-02-13 | End: 2023-12-15

## 2023-02-13 RX ORDER — PROPRANOLOL HYDROCHLORIDE 40 MG/1
40 TABLET ORAL 2 TIMES DAILY
Qty: 60 TABLET | Refills: 2 | Status: SHIPPED | OUTPATIENT
Start: 2023-02-13 | End: 2023-12-15 | Stop reason: SDUPTHER

## 2023-02-13 RX ORDER — RIZATRIPTAN BENZOATE 10 MG/1
10 TABLET ORAL
Qty: 9 TABLET | Refills: 2 | Status: SHIPPED | OUTPATIENT
Start: 2023-02-13 | End: 2023-12-15 | Stop reason: SDUPTHER

## 2023-02-13 RX ORDER — SERTRALINE HYDROCHLORIDE 100 MG/1
100 TABLET, FILM COATED ORAL DAILY
Qty: 90 TABLET | Refills: 1 | Status: SHIPPED | OUTPATIENT
Start: 2023-02-13 | End: 2023-09-13 | Stop reason: SDUPTHER

## 2023-02-13 NOTE — PROGRESS NOTES
Subjective:      Patient ID: Samanta Espinoza is a 38 y.o. female.    Chief Complaint: Annual Exam    Hypertension  This is a chronic problem. The current episode started more than 1 year ago. The problem is unchanged. The problem is controlled. Associated symptoms include anxiety, headaches and neck pain. Pertinent negatives include no blurred vision, chest pain, malaise/fatigue, orthopnea, palpitations, peripheral edema, PND, shortness of breath or sweats. Agents associated with hypertension include decongestants. The current treatment provides mild improvement. Compliance problems include medication side effects.        38 y.o. with  Patient Active Problem List   Diagnosis    Autosomal recessive polycystic kidneys    GERD (gastroesophageal reflux disease)    Liver cyst    Migraine headache    Allergic rhinitis    Pineal gland cyst    Desmoplastic malignant melanoma    Hypertriglyceridemia    Trigger finger of right thumb    De Quervain's tenosynovitis, right    Anxiety    Essential hypertension    Allergy, unspecified, initial encounter    Memory loss    Anemia     Past Medical History:   Diagnosis Date    Acute cough 11/29/2022    Anemia     Autosomal recessive polycystic kidneys     Chronic hypertension 5/11/2022    Labetalol started 6/20/20    GERD (gastroesophageal reflux disease)     High risk pregnancy, multigravida of advanced maternal age in third trimester 5/8/2022    History of uterine fibroid     Hyperlipidemia     Hypertension     Liver disease     Mastitis 10/29/2019    Melanoma 04/25/2016    left superior helix (breslow dept 0.67mm)    Mental disorder     Migraine headache     PONV (postoperative nausea and vomiting)     post op nausea    Postpartum depression     Pregnancy resulting from in vitro fertilization in third trimester 5/8/2022    [facility] IVF with [facility], Dr STACEY Ivy       Here today for annual prev exam.  Compliant with meds without significant side effects.  Energy and appetite are good.     Not breastfeeding.    Past Surgical History:   Procedure Laterality Date     SECTION N/A 2019    Procedure:  SECTION;  Surgeon: Grazyna Price MD;  Location: [facility] L&D;  Service: OB/GYN;  Laterality: N/A;     SECTION N/A 2022    Procedure:  SECTION;  Surgeon: Grazyna Price MD;  Location: [facility] L&D;  Service: OB/GYN;  Laterality: N/A;     SECTION  19,22    HYSTEROSCOPIC POLYPECTOMY OF UTERUS N/A 01/10/2019    Procedure: POLYPECTOMY, UTERUS, HYSTEROSCOPIC;  Surgeon: Jolanta Sifuentes MD;  Location: [facility];  Service: OB/GYN;  Laterality: N/A;    LYMPH NODE BIOPSY      MALIGNANT SKIN LESION EXCISION  06/10/2016    NASAL SEPTUM SURGERY      RENAL EXPLORATION       Social History     Socioeconomic History    Marital status:     Number of children: 1   Occupational History    Occupation: Optometry      Employer: [employer]     Comment: [employer]- [place]   Tobacco Use    Smoking status: Never    Smokeless tobacco: Never   Substance and Sexual Activity    Alcohol use: Yes     Alcohol/week: 2.0 standard drinks     Types: 2 Cans of beer per week     Comment: socially    Drug use: Never    Sexual activity: Yes     Partners: Female     Birth control/protection: None   Other Topics Concern    Are you pregnant or think you may be? No    Breast-feeding No     family history includes Cancer in her maternal grandmother and maternal uncle; Diabetes in her paternal grandmother; Hypertension in her father; Kidney disease in her father.      Review of Systems   Constitutional:  Negative for chills, fever and malaise/fatigue.   HENT:  Negative for ear pain and sore throat.    Eyes:  Negative for blurred vision.   Respiratory:  Negative for cough and shortness of breath.    Cardiovascular:  Negative for chest pain, palpitations, orthopnea and PND.   Gastrointestinal:  Negative for abdominal pain and blood in stool.   Genitourinary:   "Negative for dysuria and hematuria.   Musculoskeletal:  Positive for neck pain.   Neurological:  Positive for headaches. Negative for seizures and syncope.   Objective:   /86 (BP Location: Right arm, Patient Position: Sitting, BP Method: Large (Manual))   Pulse 93   Temp 97.7 °F (36.5 °C) (Tympanic)   Ht 5' 2" (1.575 m)   Wt 92.8 kg (204 lb 9.4 oz)   SpO2 98%   BMI 37.42 kg/m²     Physical Exam  Constitutional:       General: She is not in acute distress.     Appearance: She is well-developed.   HENT:      Head: Normocephalic and atraumatic.   Eyes:      Extraocular Movements: Extraocular movements intact.   Neck:      Thyroid: No thyromegaly.   Cardiovascular:      Rate and Rhythm: Normal rate and regular rhythm.   Pulmonary:      Breath sounds: Normal breath sounds. No wheezing or rales.   Abdominal:      General: Bowel sounds are normal.      Palpations: Abdomen is soft.      Tenderness: There is no abdominal tenderness.   Musculoskeletal:         General: No swelling.      Cervical back: Neck supple. No rigidity.   Lymphadenopathy:      Cervical: No cervical adenopathy.   Skin:     General: Skin is warm and dry.   Neurological:      Mental Status: She is alert and oriented to person, place, and time.   Psychiatric:         Behavior: Behavior normal.       Lab Results   Component Value Date    WBC 10.52 12/01/2022    HGB 10.7 (L) 12/01/2022    HGB 9.9 (L) 09/19/2022    HGB 10.0 (L) 07/28/2022    HCT 32.5 (L) 12/01/2022    MCV 83 12/01/2022    MCV 90 09/19/2022    MCV 87 07/28/2022     12/01/2022    CHOL 180 05/05/2022    TRIG 164 (H) 05/05/2022    HDL 53 05/05/2022    LDLCALC 94.2 05/05/2022    LDLCALC 130.2 06/04/2014    ALT 8 (L) 09/19/2022    AST 10 09/19/2022     (L) 09/19/2022    K 3.6 09/19/2022     09/19/2022    CO2 22 (L) 09/19/2022    BUN 10 09/19/2022    CREATININE 0.6 12/01/2022    CREATININE 0.6 09/19/2022    CREATININE 0.6 07/28/2022    EGFRNORACEVR >60 12/01/2022    " EGFRNORACEVR >60.0 09/19/2022    TSH 1.323 10/21/2021    TSH 2.135 12/14/2018    TSH 2.14 12/14/2018     (H) 09/19/2022    HGBA1C 5.1 02/16/2015    KXHYVRDR97RI 68 12/14/2018          The ASCVD Risk score (Michael DK, et al., 2019) failed to calculate for the following reasons:    The 2019 ASCVD risk score is only valid for ages 40 to 79     Assessment:     1. Routine general medical examination at a health care facility    2. Essential hypertension    3. Autosomal recessive polycystic kidneys    4. Memory loss    5. Migraine without aura and without status migrainosus, not intractable    6. Hypertriglyceridemia    7. Desmoplastic malignant melanoma    8. Anxiety      Plan:   1. Routine general medical examination at a health care facility  Heart healthy diet, regular exercise, and regular use of sunscreen.   HM reviewed  -     Lipid Panel; Future; Expected date: 08/12/2023  -     Hemoglobin A1C; Future; Expected date: 08/12/2023  -     Comprehensive Metabolic Panel; Future; Expected date: 08/12/2023  -     CBC Auto Differential; Future; Expected date: 08/12/2023  -     TSH; Future; Expected date: 08/12/2023    2. Essential hypertension  Overview:  Labetalol 100mg BID started 6/20/22    Assessment & Plan:  Wants to go back to losartan    Orders:  -     losartan (COZAAR) 50 MG tablet; Take 1 tablet (50 mg total) by mouth once daily.  Dispense: 90 tablet; Refill: 1    3. Autosomal recessive polycystic kidneys  Overview:  Stable. Sees [facility]      4. Memory loss  Overview:  Cont f/u and recs as per neurology      5. Migraine without aura and without status migrainosus, not intractable  Assessment & Plan:  Pt will resume propranolol. dc'd as decreased HAs during pregnance      6. Hypertriglyceridemia    7. Desmoplastic malignant melanoma  Overview:  4/17/19 6 monthly derm exams-neg since    Assessment & Plan:  Reports seeing Dermatology regularly.  She will schedule follow-up as she is due.      8.  Anxiety  Overview:  *Zoloft increased to 100mg QD 6/27/22    Worsened after childbirth. Started sertraline. Very helpful. Failed dc med at least twice.     Assessment & Plan:  Stable on sertraline.      Other orders  -     sertraline (ZOLOFT) 100 MG tablet; Take 1 tablet (100 mg total) by mouth once daily.  Dispense: 90 tablet; Refill: 1        There are no Patient Instructions on file for this visit.    Future Appointments   Date Time Provider Department Center   3/7/2023  9:00 AM Ana Chase NP [facility]   4/5/2023  9:00 AM Malvin Nagy MD [facility] NEPHRO  Medical C   4/24/2023 10:00 AM Lala Dangelo MD [facility] DERM [place]   4/26/2023  2:40 PM Fred Juarez MD [facility] NEURO  Medical C   8/7/2023  7:45 AM LABORATORY, [facility] LAB [facility]   8/14/2023 10:00 AM Jamel Meier MD [facility]       Lab Frequency Next Occurrence   CTA Head and Neck (xpd) Once 10/20/2021   Protein / creatinine ratio, urine Once 04/26/2022   Urinalysis Once 04/26/2022   Renal Function Panel Once 08/01/2022   CBC Auto Differential Once 08/01/2022   Urinalysis Once 08/01/2022   US OB/GYN Procedure (Viewpoint)-Future Once a week    US OB/GYN Procedure ([facility]point)-Future Once a week        No follow-ups on file.

## 2023-02-15 RX ORDER — LEVONORGESTREL / ETHINYL ESTRADIOL AND ETHINYL ESTRADIOL 150-30(84)
1 KIT ORAL DAILY
Qty: 90 EACH | Refills: 3 | Status: SHIPPED | OUTPATIENT
Start: 2023-02-15 | End: 2023-12-15 | Stop reason: SDUPTHER

## 2023-02-21 ENCOUNTER — PATIENT MESSAGE (OUTPATIENT)
Dept: OBSTETRICS AND GYNECOLOGY | Facility: CLINIC | Age: 38
End: 2023-02-21
Payer: COMMERCIAL

## 2023-02-22 ENCOUNTER — OFFICE VISIT (OUTPATIENT)
Dept: FAMILY MEDICINE | Facility: CLINIC | Age: 38
End: 2023-02-22
Payer: COMMERCIAL

## 2023-02-22 PROCEDURE — 4010F ACE/ARB THERAPY RXD/TAKEN: CPT | Mod: CPTII,95,, | Performed by: NURSE PRACTITIONER

## 2023-02-22 PROCEDURE — 99213 OFFICE O/P EST LOW 20 MIN: CPT | Mod: 95,,, | Performed by: NURSE PRACTITIONER

## 2023-02-22 PROCEDURE — 99213 PR OFFICE/OUTPT VISIT, EST, LEVL III, 20-29 MIN: ICD-10-PCS | Mod: 95,,, | Performed by: NURSE PRACTITIONER

## 2023-02-22 PROCEDURE — 4010F PR ACE/ARB THEARPY RXD/TAKEN: ICD-10-PCS | Mod: CPTII,95,, | Performed by: NURSE PRACTITIONER

## 2023-02-22 NOTE — PROGRESS NOTES
Subjective:       Patient ID: Samanta Espinoza is a 38 y.o. female.    Chief Complaint: No chief complaint on file.  The patient location is: [place]   The chief complaint leading to consultation is: depression     Visit type: audiovisual    Face to Face time with patient: 15  15 minutes of total time spent on the encounter, which includes face to face time and non-face to face time preparing to see the patient (eg, review of tests), Obtaining and/or reviewing separately obtained history, Documenting clinical information in the electronic or other health record, Independently interpreting results (not separately reported) and communicating results to the patient/family/caregiver, or Care coordination (not separately reported).         Each patient to whom he or she provides medical services by telemedicine is:  (1) informed of the relationship between the physician and patient and the respective role of any other health care provider with respect to management of the patient; and (2) notified that he or she may decline to receive medical services by telemedicine and may withdraw from such care at any time.    Notes:  pt reports to clinic with chief complaint of worsening post partem depression.  Currently on zoloft without relief.  Sleep quality is improving.  No SI or HI.  Great support system.  Worries a lot  HPI  Review of Systems   Constitutional:  Positive for activity change and unexpected weight change.   HENT:  Negative for hearing loss, rhinorrhea and trouble swallowing.    Eyes:  Negative for discharge and visual disturbance.   Respiratory:  Positive for chest tightness. Negative for wheezing.    Cardiovascular:  Negative for chest pain and palpitations.   Gastrointestinal:  Negative for blood in stool, constipation, diarrhea and vomiting.   Endocrine: Negative for polydipsia and polyuria.   Genitourinary:  Negative for difficulty urinating, dysuria, hematuria and menstrual problem.    Musculoskeletal:  Positive for arthralgias. Negative for joint swelling and neck pain.   Neurological:  Positive for weakness and headaches.   Psychiatric/Behavioral:  Positive for dysphoric mood. Negative for confusion.      Objective:      Physical Exam  HENT:      Head: Normocephalic.      Right Ear: External ear normal.      Left Ear: External ear normal.   Eyes:      Extraocular Movements: Extraocular movements intact.   Pulmonary:      Effort: Pulmonary effort is normal.   Musculoskeletal:         General: Normal range of motion.      Cervical back: Normal range of motion.   Skin:     General: Skin is warm and dry.   Neurological:      Mental Status: She is alert and oriented to person, place, and time.   Psychiatric:         Behavior: Behavior normal.       Assessment:       1. Postpartum depression          Plan:   Postpartum depression  Increase zoloft to 1.5 tablet if no relief will add wellbutrin.  Short interval follow up    No follow-ups on file.

## 2023-03-08 ENCOUNTER — PATIENT MESSAGE (OUTPATIENT)
Dept: FAMILY MEDICINE | Facility: CLINIC | Age: 38
End: 2023-03-08
Payer: COMMERCIAL

## 2023-03-08 RX ORDER — BUPROPION HYDROCHLORIDE 150 MG/1
150 TABLET ORAL DAILY
Qty: 30 TABLET | Refills: 1 | Status: SHIPPED | OUTPATIENT
Start: 2023-03-08 | End: 2023-12-15

## 2023-03-13 ENCOUNTER — PATIENT MESSAGE (OUTPATIENT)
Dept: INTERNAL MEDICINE | Facility: CLINIC | Age: 38
End: 2023-03-13
Payer: COMMERCIAL

## 2023-03-13 DIAGNOSIS — E66.01 SEVERE OBESITY (BMI 35.0-35.9 WITH COMORBIDITY): ICD-10-CM

## 2023-03-13 DIAGNOSIS — I10 ESSENTIAL HYPERTENSION: Primary | ICD-10-CM

## 2023-03-13 NOTE — TELEPHONE ENCOUNTER
Usually done in conjunction with lifestyle and wellness clinic, diet, exercise. I placed a referral to lifestyle and wellness to get things started for her.

## 2023-03-31 DIAGNOSIS — K21.9 GASTROESOPHAGEAL REFLUX DISEASE: ICD-10-CM

## 2023-03-31 RX ORDER — OMEPRAZOLE 20 MG/1
20 CAPSULE, DELAYED RELEASE ORAL DAILY
Qty: 90 CAPSULE | Refills: 1 | Status: SHIPPED | OUTPATIENT
Start: 2023-03-31 | End: 2023-09-13 | Stop reason: SDUPTHER

## 2023-03-31 NOTE — TELEPHONE ENCOUNTER
No new care gaps identified.  [facility] Embedded Care Gaps. Reference number: [id number]. 3/31/2023   9:28:12 AM CDT

## 2023-03-31 NOTE — TELEPHONE ENCOUNTER
Refill Routing Note   Medication(s) are not appropriate for processing by [facility] for the following reason(s):      Drug-disease interaction    [facility] action(s):  Defer       Medication Therapy Plan: Pregnancy Warning: omeprazole  Medication reconciliation completed: No     Appointments  past 12m or future 3m with PCP    Date Provider   Last Visit   2/13/2023 Jamel Meier MD   Next Visit   8/14/2023 Jamel Meier MD   ED visits in past 90 days: 0        Note composed:9:41 AM 03/31/2023

## 2023-03-31 NOTE — TELEPHONE ENCOUNTER
Refill Routing Note   Medication(s) are not appropriate for processing by [facility] for the following reason(s):      Pregnancy warning    [facility] action(s):  Defer          Pharmacist review requested: Yes     Appointments  past 12m or future 3m with PCP    Date Provider   Last Visit   2/13/2023 Jamel Meier MD   Next Visit   8/14/2023 Jamel Meier MD   ED visits in past 90 days: 0        Note composed:2:18 PM 03/31/2023

## 2023-04-05 ENCOUNTER — PATIENT MESSAGE (OUTPATIENT)
Dept: PRIMARY CARE CLINIC | Facility: CLINIC | Age: 38
End: 2023-04-05
Payer: COMMERCIAL

## 2023-05-19 ENCOUNTER — PATIENT MESSAGE (OUTPATIENT)
Dept: ADMINISTRATIVE | Facility: OTHER | Age: 38
End: 2023-05-19
Payer: COMMERCIAL

## 2023-06-05 ENCOUNTER — LAB VISIT (OUTPATIENT)
Dept: LAB | Facility: HOSPITAL | Age: 38
End: 2023-06-05
Attending: INTERNAL MEDICINE
Payer: COMMERCIAL

## 2023-06-05 DIAGNOSIS — Q61.3 POLYCYSTIC KIDNEY DISEASE: ICD-10-CM

## 2023-06-05 LAB
ALBUMIN SERPL BCP-MCNC: 3.9 G/DL (ref 3.5–5.2)
ANION GAP SERPL CALC-SCNC: 9 MMOL/L (ref 8–16)
BASOPHILS # BLD AUTO: 0.04 K/UL (ref 0–0.2)
BASOPHILS NFR BLD: 0.5 % (ref 0–1.9)
BUN SERPL-MCNC: 12 MG/DL (ref 6–20)
CALCIUM SERPL-MCNC: 9.5 MG/DL (ref 8.7–10.5)
CHLORIDE SERPL-SCNC: 104 MMOL/L (ref 95–110)
CO2 SERPL-SCNC: 26 MMOL/L (ref 23–29)
CREAT SERPL-MCNC: 0.7 MG/DL (ref 0.5–1.4)
DIFFERENTIAL METHOD: ABNORMAL
EOSINOPHIL # BLD AUTO: 0.1 K/UL (ref 0–0.5)
EOSINOPHIL NFR BLD: 1.6 % (ref 0–8)
ERYTHROCYTE [DISTWIDTH] IN BLOOD BY AUTOMATED COUNT: 13.5 % (ref 11.5–14.5)
EST. GFR  (NO RACE VARIABLE): >60 ML/MIN/1.73 M^2
GLUCOSE SERPL-MCNC: 81 MG/DL (ref 70–110)
HCT VFR BLD AUTO: 37.9 % (ref 37–48.5)
HGB BLD-MCNC: 12.1 G/DL (ref 12–16)
IMM GRANULOCYTES # BLD AUTO: 0.01 K/UL (ref 0–0.04)
IMM GRANULOCYTES NFR BLD AUTO: 0.1 % (ref 0–0.5)
LYMPHOCYTES # BLD AUTO: 1.9 K/UL (ref 1–4.8)
LYMPHOCYTES NFR BLD: 23.4 % (ref 18–48)
MCH RBC QN AUTO: 26.4 PG (ref 27–31)
MCHC RBC AUTO-ENTMCNC: 31.9 G/DL (ref 32–36)
MCV RBC AUTO: 83 FL (ref 82–98)
MONOCYTES # BLD AUTO: 0.4 K/UL (ref 0.3–1)
MONOCYTES NFR BLD: 5.4 % (ref 4–15)
NEUTROPHILS # BLD AUTO: 5.7 K/UL (ref 1.8–7.7)
NEUTROPHILS NFR BLD: 69 % (ref 38–73)
NRBC BLD-RTO: 0 /100 WBC
PHOSPHATE SERPL-MCNC: 3.1 MG/DL (ref 2.7–4.5)
PLATELET # BLD AUTO: 308 K/UL (ref 150–450)
PMV BLD AUTO: 10.4 FL (ref 9.2–12.9)
POTASSIUM SERPL-SCNC: 3.9 MMOL/L (ref 3.5–5.1)
RBC # BLD AUTO: 4.59 M/UL (ref 4–5.4)
SODIUM SERPL-SCNC: 139 MMOL/L (ref 136–145)
WBC # BLD AUTO: 8.19 K/UL (ref 3.9–12.7)

## 2023-06-05 PROCEDURE — 80069 RENAL FUNCTION PANEL: CPT | Performed by: INTERNAL MEDICINE

## 2023-06-05 PROCEDURE — 36415 COLL VENOUS BLD VENIPUNCTURE: CPT | Performed by: INTERNAL MEDICINE

## 2023-06-05 PROCEDURE — 85025 COMPLETE CBC W/AUTO DIFF WBC: CPT | Performed by: INTERNAL MEDICINE

## 2023-06-08 ENCOUNTER — PATIENT MESSAGE (OUTPATIENT)
Dept: NEPHROLOGY | Facility: CLINIC | Age: 38
End: 2023-06-08
Payer: COMMERCIAL

## 2023-06-09 ENCOUNTER — TELEPHONE (OUTPATIENT)
Dept: NEPHROLOGY | Facility: CLINIC | Age: 38
End: 2023-06-09
Payer: COMMERCIAL

## 2023-06-09 ENCOUNTER — OFFICE VISIT (OUTPATIENT)
Dept: DERMATOLOGY | Facility: CLINIC | Age: 38
End: 2023-06-09
Payer: COMMERCIAL

## 2023-06-09 DIAGNOSIS — L90.5 SCAR CONDITIONS/SKIN FIBROSIS: Primary | ICD-10-CM

## 2023-06-09 DIAGNOSIS — D18.01 HEMANGIOMA OF SKIN: ICD-10-CM

## 2023-06-09 DIAGNOSIS — Z85.820 HISTORY OF MELANOMA: ICD-10-CM

## 2023-06-09 DIAGNOSIS — Z86.018 HISTORY OF DYSPLASTIC NEVUS: ICD-10-CM

## 2023-06-09 DIAGNOSIS — Z12.83 SCREENING, MALIGNANT NEOPLASM, SKIN: ICD-10-CM

## 2023-06-09 DIAGNOSIS — D22.9 MULTIPLE NEVI: ICD-10-CM

## 2023-06-09 PROCEDURE — 1159F PR MEDICATION LIST DOCUMENTED IN MEDICAL RECORD: ICD-10-PCS | Mod: CPTII,S$GLB,, | Performed by: DERMATOLOGY

## 2023-06-09 PROCEDURE — 99214 OFFICE O/P EST MOD 30 MIN: CPT | Mod: S$GLB,,, | Performed by: DERMATOLOGY

## 2023-06-09 PROCEDURE — 99999 PR PBB SHADOW E&M-EST. PATIENT-LVL III: CPT | Mod: PBBFAC,,, | Performed by: DERMATOLOGY

## 2023-06-09 PROCEDURE — 1160F PR REVIEW ALL MEDS BY PRESCRIBER/CLIN PHARMACIST DOCUMENTED: ICD-10-PCS | Mod: CPTII,S$GLB,, | Performed by: DERMATOLOGY

## 2023-06-09 PROCEDURE — 4010F ACE/ARB THERAPY RXD/TAKEN: CPT | Mod: CPTII,S$GLB,, | Performed by: DERMATOLOGY

## 2023-06-09 PROCEDURE — 1160F RVW MEDS BY RX/DR IN RCRD: CPT | Mod: CPTII,S$GLB,, | Performed by: DERMATOLOGY

## 2023-06-09 PROCEDURE — 99999 PR PBB SHADOW E&M-EST. PATIENT-LVL III: ICD-10-PCS | Mod: PBBFAC,,, | Performed by: DERMATOLOGY

## 2023-06-09 PROCEDURE — 4010F PR ACE/ARB THEARPY RXD/TAKEN: ICD-10-PCS | Mod: CPTII,S$GLB,, | Performed by: DERMATOLOGY

## 2023-06-09 PROCEDURE — 1159F MED LIST DOCD IN RCRD: CPT | Mod: CPTII,S$GLB,, | Performed by: DERMATOLOGY

## 2023-06-09 PROCEDURE — 99214 PR OFFICE/OUTPT VISIT, EST, LEVL IV, 30-39 MIN: ICD-10-PCS | Mod: S$GLB,,, | Performed by: DERMATOLOGY

## 2023-06-09 NOTE — TELEPHONE ENCOUNTER
Called pt to inform that Dr. Nagy will be out of the office today doing hospital rounds and to offer new appointment 6/16/23 same time and location. Pt did not answer, left detailed voice message. Also sent [employer] message

## 2023-06-09 NOTE — PROGRESS NOTES
Subjective:      Patient ID:  Samanta Espinoza is a 38 y.o. female who presents for   Chief Complaint   Patient presents with    Skin Check     FBSE.     No new concerns.      Hx of desmoplastic melanoma of the left superior helix (breslow depth 0.67 mm, s/p excision c/ [facility] by Dr. Brian on 6/10/16), mildly atypical nevi of the right abdomen and left upper arm (s/p biopsy on 5/19/20), last seen on 3/2/21. Denies bleeding, tender, growing, or concerning lesions.      This is a high risk patient here to check for the development of new lesions. Denies lymphadenopathy, weight loss, or night sweats.      Review of Systems   Constitutional:  Negative for fever, chills, weight loss, fatigue, night sweats and malaise.   HENT:  Negative for headaches.    Respiratory:  Negative for cough and shortness of breath.    Gastrointestinal:  Negative for indigestion.   Skin:  Positive for activity-related sunscreen use. Negative for daily sunscreen use, recent sunburn and wears hat.   Neurological:  Negative for headaches.   Hematologic/Lymphatic: Negative for adenopathy. Does not bruise/bleed easily.     Objective:   Physical Exam   Constitutional: She appears well-developed and well-nourished. No distress.   Musculoskeletal: Lymphadenopathy:      Cervical: No cervical adenopathy.      Upper Body:      Right upper body: No axillary adenopathy.      Left upper body: No axillary adenopathy.      Lower Body: No right inguinal adenopathy. No left inguinal adenopathy.     Lymphadenopathy:     She has no cervical adenopathy. No inguinal adenopathy noted on the right or left side.   Neurological: She is alert and oriented to person, place, and time. She is not disoriented.   Psychiatric: She has a normal mood and affect.   Skin:   Areas Examined (abnormalities noted in diagram):   Scalp / Hair Palpated and Inspected  Head / Face Inspection Performed  Neck Inspection Performed  Chest / Axilla Inspection Performed  Abdomen  Inspection Performed  Genitals / Buttocks / Groin Inspection Performed  Back Inspection Performed  RUE Inspected  LUE Inspection Performed  RLE Inspected  LLE Inspection Performed  Nails and Digits Inspection Performed                   Diagram Legend     Erythematous scaling macule/papule c/w actinic keratosis       Vascular papule c/w angioma      Pigmented verrucoid papule/plaque c/w seborrheic keratosis      Yellow umbilicated papule c/w sebaceous hyperplasia      Irregularly shaped tan macule c/w lentigo     1-2 mm smooth white papules consistent with Milia      Movable subcutaneous cyst with punctum c/w epidermal inclusion cyst      Subcutaneous movable cyst c/w pilar cyst      Firm pink to brown papule c/w dermatofibroma      Pedunculated fleshy papule(s) c/w skin tag(s)      Evenly pigmented macule c/w junctional nevus     Mildly variegated pigmented, slightly irregular-bordered macule c/w mildly atypical nevus      Flesh colored to evenly pigmented papule c/w intradermal nevus       Pink pearly papule/plaque c/w basal cell carcinoma      Erythematous hyperkeratotic cursted plaque c/w SCC      Surgical scar with no sign of skin cancer recurrence      Open and closed comedones      Inflammatory papules and pustules      Verrucoid papule consistent consistent with wart     Erythematous eczematous patches and plaques     Dystrophic onycholytic nail with subungual debris c/w onychomycosis     Umbilicated papule    Erythematous-base heme-crusted tan verrucoid plaque consistent with inflamed seborrheic keratosis     Erythematous Silvery Scaling Plaque c/w Psoriasis     See annotation      Assessment / Plan:        Scar conditions/skin fibrosis  Screening, malignant neoplasm, skin  History of melanoma  Scar of the left superior helix, hx of MM (Breslow depth 0.67 mm, excised on 6/10/16). No evidence of recurrence on physical exam today. No lymphadenopathy appreciated of cervical, axillary, or inguinal lymph nodes.  Continue annual skin exams and monthly self exams. Reviewed ABCDEF of melanoma. Daily sunscreen advised.    Multiple nevi  History of dysplastic nevus  Reassurance given.  Discussed ABCDEF of melanoma and changes for patient to look for.  AAD Handout given. Discussed importance of daily use of sunscreen which is broad-spectrum and has a minimum SPF of 30.    Hemangioma of skin  Reassurance given.  Lesions are benign.           Follow up in about 1 year (around 6/9/2024).

## 2023-06-26 ENCOUNTER — PATIENT MESSAGE (OUTPATIENT)
Dept: ADMINISTRATIVE | Facility: OTHER | Age: 38
End: 2023-06-26
Payer: COMMERCIAL

## 2023-09-13 DIAGNOSIS — K21.9 GASTROESOPHAGEAL REFLUX DISEASE: ICD-10-CM

## 2023-09-13 RX ORDER — OMEPRAZOLE 20 MG/1
20 CAPSULE, DELAYED RELEASE ORAL DAILY
Qty: 90 CAPSULE | Refills: 1 | Status: SHIPPED | OUTPATIENT
Start: 2023-09-13 | End: 2023-12-15 | Stop reason: SDUPTHER

## 2023-09-13 RX ORDER — SERTRALINE HYDROCHLORIDE 100 MG/1
100 TABLET, FILM COATED ORAL DAILY
Qty: 90 TABLET | Refills: 1 | Status: SHIPPED | OUTPATIENT
Start: 2023-09-13 | End: 2023-12-15 | Stop reason: SDUPTHER

## 2023-09-13 NOTE — TELEPHONE ENCOUNTER
Refill Decision Note   Samanta Espinoza  is requesting a refill authorization.  Brief Assessment and Rationale for Refill:  Approve     Medication Therapy Plan:         Comments:     Note composed:8:55 AM 09/13/2023

## 2023-09-13 NOTE — TELEPHONE ENCOUNTER
No care due was identified.  [facility] Embedded Care Due Messages. Reference number: [id number].   9/13/2023 8:00:04 AM CDT

## 2023-09-13 NOTE — TELEPHONE ENCOUNTER
No care due was identified.  [facility] Embedded Care Due Messages. Reference number: [id number].   9/13/2023 7:59:34 AM CDT

## 2023-11-10 ENCOUNTER — TELEPHONE (OUTPATIENT)
Dept: OBSTETRICS AND GYNECOLOGY | Facility: CLINIC | Age: 38
End: 2023-11-10
Payer: COMMERCIAL

## 2023-11-10 NOTE — TELEPHONE ENCOUNTER
Attempted to call patient with no answer, left voicemail and my chart to inform that Dr Park will be in surgery on 12/15/23 until 9am and we have moved her appointment to 9:30am that day.

## 2023-12-06 RX ORDER — RIZATRIPTAN BENZOATE 10 MG/1
10 TABLET ORAL
Qty: 9 TABLET | Refills: 2 | OUTPATIENT
Start: 2023-12-06 | End: 2024-01-05

## 2023-12-15 ENCOUNTER — LAB VISIT (OUTPATIENT)
Dept: LAB | Facility: HOSPITAL | Age: 38
End: 2023-12-15
Attending: PHYSICIAN ASSISTANT
Payer: COMMERCIAL

## 2023-12-15 ENCOUNTER — OFFICE VISIT (OUTPATIENT)
Dept: INTERNAL MEDICINE | Facility: CLINIC | Age: 38
End: 2023-12-15
Payer: COMMERCIAL

## 2023-12-15 ENCOUNTER — OFFICE VISIT (OUTPATIENT)
Dept: OBSTETRICS AND GYNECOLOGY | Facility: CLINIC | Age: 38
End: 2023-12-15
Payer: COMMERCIAL

## 2023-12-15 VITALS
DIASTOLIC BLOOD PRESSURE: 80 MMHG | OXYGEN SATURATION: 100 % | BODY MASS INDEX: 30.87 KG/M2 | SYSTOLIC BLOOD PRESSURE: 132 MMHG | HEIGHT: 62 IN | HEART RATE: 86 BPM | WEIGHT: 167.75 LBS | TEMPERATURE: 98 F

## 2023-12-15 VITALS
BODY MASS INDEX: 30.83 KG/M2 | SYSTOLIC BLOOD PRESSURE: 114 MMHG | WEIGHT: 167.56 LBS | DIASTOLIC BLOOD PRESSURE: 72 MMHG | HEIGHT: 62 IN

## 2023-12-15 DIAGNOSIS — K21.9 GASTROESOPHAGEAL REFLUX DISEASE WITHOUT ESOPHAGITIS: ICD-10-CM

## 2023-12-15 DIAGNOSIS — K21.9 GASTROESOPHAGEAL REFLUX DISEASE, UNSPECIFIED WHETHER ESOPHAGITIS PRESENT: ICD-10-CM

## 2023-12-15 DIAGNOSIS — F41.9 ANXIETY: ICD-10-CM

## 2023-12-15 DIAGNOSIS — N92.0 MENORRHAGIA WITH REGULAR CYCLE: ICD-10-CM

## 2023-12-15 DIAGNOSIS — D64.9 ANEMIA, UNSPECIFIED TYPE: ICD-10-CM

## 2023-12-15 DIAGNOSIS — Q61.3 POLYCYSTIC KIDNEY DISEASE: ICD-10-CM

## 2023-12-15 DIAGNOSIS — M25.50 POLYARTHRALGIA: ICD-10-CM

## 2023-12-15 DIAGNOSIS — R11.0 NAUSEA: ICD-10-CM

## 2023-12-15 DIAGNOSIS — I10 ESSENTIAL HYPERTENSION: ICD-10-CM

## 2023-12-15 DIAGNOSIS — Z00.00 ROUTINE HEALTH MAINTENANCE: ICD-10-CM

## 2023-12-15 DIAGNOSIS — E78.1 HYPERTRIGLYCERIDEMIA: ICD-10-CM

## 2023-12-15 DIAGNOSIS — Q61.19 AUTOSOMAL RECESSIVE POLYCYSTIC KIDNEYS: Primary | ICD-10-CM

## 2023-12-15 DIAGNOSIS — C43.9 DESMOPLASTIC MALIGNANT MELANOMA: ICD-10-CM

## 2023-12-15 DIAGNOSIS — E66.09 CLASS 1 OBESITY DUE TO EXCESS CALORIES WITH BODY MASS INDEX (BMI) OF 30.0 TO 30.9 IN ADULT, UNSPECIFIED WHETHER SERIOUS COMORBIDITY PRESENT: ICD-10-CM

## 2023-12-15 DIAGNOSIS — Z01.419 WELL WOMAN EXAM WITH ROUTINE GYNECOLOGICAL EXAM: Primary | ICD-10-CM

## 2023-12-15 DIAGNOSIS — G43.009 MIGRAINE WITHOUT AURA AND WITHOUT STATUS MIGRAINOSUS, NOT INTRACTABLE: ICD-10-CM

## 2023-12-15 DIAGNOSIS — F40.243 ANXIETY WITH FLYING: ICD-10-CM

## 2023-12-15 LAB
25(OH)D3+25(OH)D2 SERPL-MCNC: 117 NG/ML (ref 30–96)
ALBUMIN SERPL BCP-MCNC: 3.8 G/DL (ref 3.5–5.2)
ALP SERPL-CCNC: 59 U/L (ref 55–135)
ALT SERPL W/O P-5'-P-CCNC: 14 U/L (ref 10–44)
ANION GAP SERPL CALC-SCNC: 9 MMOL/L (ref 8–16)
AST SERPL-CCNC: 15 U/L (ref 10–40)
BASOPHILS # BLD AUTO: 0.03 K/UL (ref 0–0.2)
BASOPHILS # BLD AUTO: 0.03 K/UL (ref 0–0.2)
BASOPHILS NFR BLD: 0.5 % (ref 0–1.9)
BASOPHILS NFR BLD: 0.5 % (ref 0–1.9)
BILIRUB SERPL-MCNC: 0.3 MG/DL (ref 0.1–1)
BUN SERPL-MCNC: 15 MG/DL (ref 6–20)
CALCIUM SERPL-MCNC: 9.3 MG/DL (ref 8.7–10.5)
CHLORIDE SERPL-SCNC: 104 MMOL/L (ref 95–110)
CHOLEST SERPL-MCNC: 168 MG/DL (ref 120–199)
CHOLEST/HDLC SERPL: 4.5 {RATIO} (ref 2–5)
CK SERPL-CCNC: 41 U/L (ref 20–180)
CO2 SERPL-SCNC: 26 MMOL/L (ref 23–29)
CREAT SERPL-MCNC: 0.8 MG/DL (ref 0.5–1.4)
CRP SERPL-MCNC: 12.5 MG/L (ref 0–8.2)
DIFFERENTIAL METHOD: ABNORMAL
DIFFERENTIAL METHOD: ABNORMAL
EOSINOPHIL # BLD AUTO: 0.1 K/UL (ref 0–0.5)
EOSINOPHIL # BLD AUTO: 0.1 K/UL (ref 0–0.5)
EOSINOPHIL NFR BLD: 1.4 % (ref 0–8)
EOSINOPHIL NFR BLD: 1.4 % (ref 0–8)
ERYTHROCYTE [DISTWIDTH] IN BLOOD BY AUTOMATED COUNT: 13.5 % (ref 11.5–14.5)
ERYTHROCYTE [DISTWIDTH] IN BLOOD BY AUTOMATED COUNT: 13.5 % (ref 11.5–14.5)
ERYTHROCYTE [SEDIMENTATION RATE] IN BLOOD BY PHOTOMETRIC METHOD: 10 MM/HR (ref 0–36)
EST. GFR  (NO RACE VARIABLE): >60 ML/MIN/1.73 M^2
ESTIMATED AVG GLUCOSE: 97 MG/DL (ref 68–131)
GLUCOSE SERPL-MCNC: 90 MG/DL (ref 70–110)
HBA1C MFR BLD: 5 % (ref 4–5.6)
HCT VFR BLD AUTO: 38.2 % (ref 37–48.5)
HCT VFR BLD AUTO: 38.2 % (ref 37–48.5)
HDLC SERPL-MCNC: 37 MG/DL (ref 40–75)
HDLC SERPL: 22 % (ref 20–50)
HGB BLD-MCNC: 12.7 G/DL (ref 12–16)
HGB BLD-MCNC: 12.7 G/DL (ref 12–16)
IMM GRANULOCYTES # BLD AUTO: 0.01 K/UL (ref 0–0.04)
IMM GRANULOCYTES # BLD AUTO: 0.01 K/UL (ref 0–0.04)
IMM GRANULOCYTES NFR BLD AUTO: 0.2 % (ref 0–0.5)
IMM GRANULOCYTES NFR BLD AUTO: 0.2 % (ref 0–0.5)
LDLC SERPL CALC-MCNC: 99.8 MG/DL (ref 63–159)
LYMPHOCYTES # BLD AUTO: 1.9 K/UL (ref 1–4.8)
LYMPHOCYTES # BLD AUTO: 1.9 K/UL (ref 1–4.8)
LYMPHOCYTES NFR BLD: 28.5 % (ref 18–48)
LYMPHOCYTES NFR BLD: 28.5 % (ref 18–48)
MCH RBC QN AUTO: 27.5 PG (ref 27–31)
MCH RBC QN AUTO: 27.5 PG (ref 27–31)
MCHC RBC AUTO-ENTMCNC: 33.2 G/DL (ref 32–36)
MCHC RBC AUTO-ENTMCNC: 33.2 G/DL (ref 32–36)
MCV RBC AUTO: 83 FL (ref 82–98)
MCV RBC AUTO: 83 FL (ref 82–98)
MONOCYTES # BLD AUTO: 0.3 K/UL (ref 0.3–1)
MONOCYTES # BLD AUTO: 0.3 K/UL (ref 0.3–1)
MONOCYTES NFR BLD: 3.8 % (ref 4–15)
MONOCYTES NFR BLD: 3.8 % (ref 4–15)
NEUTROPHILS # BLD AUTO: 4.3 K/UL (ref 1.8–7.7)
NEUTROPHILS # BLD AUTO: 4.3 K/UL (ref 1.8–7.7)
NEUTROPHILS NFR BLD: 65.6 % (ref 38–73)
NEUTROPHILS NFR BLD: 65.6 % (ref 38–73)
NONHDLC SERPL-MCNC: 131 MG/DL
NRBC BLD-RTO: 0 /100 WBC
NRBC BLD-RTO: 0 /100 WBC
PLATELET # BLD AUTO: 278 K/UL (ref 150–450)
PLATELET # BLD AUTO: 278 K/UL (ref 150–450)
PMV BLD AUTO: 10.3 FL (ref 9.2–12.9)
PMV BLD AUTO: 10.3 FL (ref 9.2–12.9)
POTASSIUM SERPL-SCNC: 4 MMOL/L (ref 3.5–5.1)
PROT SERPL-MCNC: 7.6 G/DL (ref 6–8.4)
RBC # BLD AUTO: 4.61 M/UL (ref 4–5.4)
RBC # BLD AUTO: 4.61 M/UL (ref 4–5.4)
SODIUM SERPL-SCNC: 139 MMOL/L (ref 136–145)
TRIGL SERPL-MCNC: 156 MG/DL (ref 30–150)
TSH SERPL DL<=0.005 MIU/L-ACNC: 2.72 UIU/ML (ref 0.4–4)
TSH SERPL DL<=0.005 MIU/L-ACNC: 2.72 UIU/ML (ref 0.4–4)
VIT B12 SERPL-MCNC: 885 PG/ML (ref 210–950)
WBC # BLD AUTO: 6.53 K/UL (ref 3.9–12.7)
WBC # BLD AUTO: 6.53 K/UL (ref 3.9–12.7)

## 2023-12-15 PROCEDURE — 3078F DIAST BP <80 MM HG: CPT | Mod: CPTII,S$GLB,, | Performed by: OBSTETRICS & GYNECOLOGY

## 2023-12-15 PROCEDURE — 82550 ASSAY OF CK (CPK): CPT | Performed by: PHYSICIAN ASSISTANT

## 2023-12-15 PROCEDURE — 82306 VITAMIN D 25 HYDROXY: CPT | Performed by: PHYSICIAN ASSISTANT

## 2023-12-15 PROCEDURE — 3078F PR MOST RECENT DIASTOLIC BLOOD PRESSURE < 80 MM HG: ICD-10-PCS | Mod: CPTII,S$GLB,, | Performed by: OBSTETRICS & GYNECOLOGY

## 2023-12-15 PROCEDURE — 3079F DIAST BP 80-89 MM HG: CPT | Mod: CPTII,S$GLB,, | Performed by: PHYSICIAN ASSISTANT

## 2023-12-15 PROCEDURE — 80061 LIPID PANEL: CPT | Performed by: PHYSICIAN ASSISTANT

## 2023-12-15 PROCEDURE — 1160F PR REVIEW ALL MEDS BY PRESCRIBER/CLIN PHARMACIST DOCUMENTED: ICD-10-PCS | Mod: CPTII,S$GLB,, | Performed by: OBSTETRICS & GYNECOLOGY

## 2023-12-15 PROCEDURE — 3008F PR BODY MASS INDEX (BMI) DOCUMENTED: ICD-10-PCS | Mod: CPTII,S$GLB,, | Performed by: OBSTETRICS & GYNECOLOGY

## 2023-12-15 PROCEDURE — 3008F BODY MASS INDEX DOCD: CPT | Mod: CPTII,S$GLB,, | Performed by: OBSTETRICS & GYNECOLOGY

## 2023-12-15 PROCEDURE — 83036 HEMOGLOBIN GLYCOSYLATED A1C: CPT | Performed by: PHYSICIAN ASSISTANT

## 2023-12-15 PROCEDURE — 3008F PR BODY MASS INDEX (BMI) DOCUMENTED: ICD-10-PCS | Mod: CPTII,S$GLB,, | Performed by: PHYSICIAN ASSISTANT

## 2023-12-15 PROCEDURE — 82607 VITAMIN B-12: CPT | Performed by: PHYSICIAN ASSISTANT

## 2023-12-15 PROCEDURE — 3008F BODY MASS INDEX DOCD: CPT | Mod: CPTII,S$GLB,, | Performed by: PHYSICIAN ASSISTANT

## 2023-12-15 PROCEDURE — 99214 PR OFFICE/OUTPT VISIT, EST, LEVL IV, 30-39 MIN: ICD-10-PCS | Mod: S$GLB,,, | Performed by: PHYSICIAN ASSISTANT

## 2023-12-15 PROCEDURE — 99999 PR PBB SHADOW E&M-EST. PATIENT-LVL IV: ICD-10-PCS | Mod: PBBFAC,,, | Performed by: OBSTETRICS & GYNECOLOGY

## 2023-12-15 PROCEDURE — 3074F SYST BP LT 130 MM HG: CPT | Mod: CPTII,S$GLB,, | Performed by: OBSTETRICS & GYNECOLOGY

## 2023-12-15 PROCEDURE — 3074F PR MOST RECENT SYSTOLIC BLOOD PRESSURE < 130 MM HG: ICD-10-PCS | Mod: CPTII,S$GLB,, | Performed by: OBSTETRICS & GYNECOLOGY

## 2023-12-15 PROCEDURE — 99999 PR PBB SHADOW E&M-EST. PATIENT-LVL IV: ICD-10-PCS | Mod: PBBFAC,,, | Performed by: PHYSICIAN ASSISTANT

## 2023-12-15 PROCEDURE — 80053 COMPREHEN METABOLIC PANEL: CPT | Performed by: PHYSICIAN ASSISTANT

## 2023-12-15 PROCEDURE — 1159F PR MEDICATION LIST DOCUMENTED IN MEDICAL RECORD: ICD-10-PCS | Mod: CPTII,S$GLB,, | Performed by: OBSTETRICS & GYNECOLOGY

## 2023-12-15 PROCEDURE — 4010F PR ACE/ARB THEARPY RXD/TAKEN: ICD-10-PCS | Mod: CPTII,S$GLB,, | Performed by: PHYSICIAN ASSISTANT

## 2023-12-15 PROCEDURE — 99214 OFFICE O/P EST MOD 30 MIN: CPT | Mod: S$GLB,,, | Performed by: PHYSICIAN ASSISTANT

## 2023-12-15 PROCEDURE — 36415 COLL VENOUS BLD VENIPUNCTURE: CPT | Performed by: PHYSICIAN ASSISTANT

## 2023-12-15 PROCEDURE — 99999 PR PBB SHADOW E&M-EST. PATIENT-LVL IV: CPT | Mod: PBBFAC,,, | Performed by: OBSTETRICS & GYNECOLOGY

## 2023-12-15 PROCEDURE — 1160F RVW MEDS BY RX/DR IN RCRD: CPT | Mod: CPTII,S$GLB,, | Performed by: OBSTETRICS & GYNECOLOGY

## 2023-12-15 PROCEDURE — 84443 ASSAY THYROID STIM HORMONE: CPT | Performed by: PHYSICIAN ASSISTANT

## 2023-12-15 PROCEDURE — 3075F PR MOST RECENT SYSTOLIC BLOOD PRESS GE 130-139MM HG: ICD-10-PCS | Mod: CPTII,S$GLB,, | Performed by: PHYSICIAN ASSISTANT

## 2023-12-15 PROCEDURE — 3079F PR MOST RECENT DIASTOLIC BLOOD PRESSURE 80-89 MM HG: ICD-10-PCS | Mod: CPTII,S$GLB,, | Performed by: PHYSICIAN ASSISTANT

## 2023-12-15 PROCEDURE — 4010F PR ACE/ARB THEARPY RXD/TAKEN: ICD-10-PCS | Mod: CPTII,S$GLB,, | Performed by: OBSTETRICS & GYNECOLOGY

## 2023-12-15 PROCEDURE — 99999 PR PBB SHADOW E&M-EST. PATIENT-LVL IV: CPT | Mod: PBBFAC,,, | Performed by: PHYSICIAN ASSISTANT

## 2023-12-15 PROCEDURE — 99395 PR PREVENTIVE VISIT,EST,18-39: ICD-10-PCS | Mod: S$GLB,,, | Performed by: OBSTETRICS & GYNECOLOGY

## 2023-12-15 PROCEDURE — 1159F MED LIST DOCD IN RCRD: CPT | Mod: CPTII,S$GLB,, | Performed by: OBSTETRICS & GYNECOLOGY

## 2023-12-15 PROCEDURE — 3075F SYST BP GE 130 - 139MM HG: CPT | Mod: CPTII,S$GLB,, | Performed by: PHYSICIAN ASSISTANT

## 2023-12-15 PROCEDURE — 4010F ACE/ARB THERAPY RXD/TAKEN: CPT | Mod: CPTII,S$GLB,, | Performed by: PHYSICIAN ASSISTANT

## 2023-12-15 PROCEDURE — 86140 C-REACTIVE PROTEIN: CPT | Performed by: PHYSICIAN ASSISTANT

## 2023-12-15 PROCEDURE — 99395 PREV VISIT EST AGE 18-39: CPT | Mod: S$GLB,,, | Performed by: OBSTETRICS & GYNECOLOGY

## 2023-12-15 PROCEDURE — 4010F ACE/ARB THERAPY RXD/TAKEN: CPT | Mod: CPTII,S$GLB,, | Performed by: OBSTETRICS & GYNECOLOGY

## 2023-12-15 PROCEDURE — 85652 RBC SED RATE AUTOMATED: CPT | Performed by: PHYSICIAN ASSISTANT

## 2023-12-15 PROCEDURE — 85025 COMPLETE CBC W/AUTO DIFF WBC: CPT | Performed by: PHYSICIAN ASSISTANT

## 2023-12-15 PROCEDURE — 86038 ANTINUCLEAR ANTIBODIES: CPT | Performed by: PHYSICIAN ASSISTANT

## 2023-12-15 RX ORDER — LEVONORGESTREL / ETHINYL ESTRADIOL AND ETHINYL ESTRADIOL 150-30(84)
1 KIT ORAL DAILY
Qty: 91 EACH | Refills: 3 | Status: SHIPPED | OUTPATIENT
Start: 2023-12-15 | End: 2024-12-14

## 2023-12-15 RX ORDER — PROPRANOLOL HYDROCHLORIDE 40 MG/1
40 TABLET ORAL 2 TIMES DAILY
Qty: 180 TABLET | Refills: 4 | Status: SHIPPED | OUTPATIENT
Start: 2023-12-15 | End: 2025-03-09

## 2023-12-15 RX ORDER — OMEPRAZOLE 20 MG/1
20 CAPSULE, DELAYED RELEASE ORAL DAILY
Qty: 90 CAPSULE | Refills: 4 | Status: SHIPPED | OUTPATIENT
Start: 2023-12-15

## 2023-12-15 RX ORDER — ONDANSETRON 4 MG/1
TABLET, ORALLY DISINTEGRATING ORAL
Qty: 30 TABLET | Refills: 3 | Status: SHIPPED | OUTPATIENT
Start: 2023-12-15

## 2023-12-15 RX ORDER — RIZATRIPTAN BENZOATE 10 MG/1
10 TABLET ORAL
Qty: 9 TABLET | Refills: 3 | Status: SHIPPED | OUTPATIENT
Start: 2023-12-15 | End: 2024-04-21

## 2023-12-15 RX ORDER — SERTRALINE HYDROCHLORIDE 100 MG/1
100 TABLET, FILM COATED ORAL DAILY
Qty: 90 TABLET | Refills: 4 | Status: SHIPPED | OUTPATIENT
Start: 2023-12-15 | End: 2025-03-09

## 2023-12-15 RX ORDER — TIRZEPATIDE 10 MG/.5ML
10 INJECTION, SOLUTION SUBCUTANEOUS
COMMUNITY

## 2023-12-15 RX ORDER — ALPRAZOLAM 0.25 MG/1
TABLET ORAL
Qty: 30 TABLET | Refills: 0 | Status: SHIPPED | OUTPATIENT
Start: 2023-12-15

## 2023-12-15 NOTE — PROGRESS NOTES
Subjective:      Patient ID: Samanta Espinoza is a 38 y.o. female.    Chief Complaint: Establish Care    HPI  Here today to establish care and have her annual exam.   GERD stable on omeprazole.   BP: stopped taking losartan bc of hypotension. She is on propranolol for migraine prevention.   Migraines: once a month. Resolves with maxalt. Improved with propranolol.   Anxiety stable on sertraline. Needs xanax intermittently for flights. Requesting a refill for upcoming flights.  will be checked.     Fatigue and generalized joint pains. Worse after sitting for a long time. Severe joint pains after exercise. Can only tolerate walking. Back, knees, hips, neck. No swelling or rashes. No known fam hx of rheumatologic problems.     Lost 40 lbs on Mounjaro. Started in May.  DOing well on the medications. Takes zofran prn nausea.   Memory loss after covid. Symptoms have pretty much resolved.     Patient Active Problem List   Diagnosis    Autosomal recessive polycystic kidneys    GERD (gastroesophageal reflux disease)    Liver cyst    Migraine headache    Allergic rhinitis    Pineal gland cyst    Desmoplastic malignant melanoma    Hypertriglyceridemia    Trigger finger of right thumb    De Quervain's tenosynovitis, right    Anxiety    Essential hypertension    Allergy, unspecified, initial encounter    Memory loss    Anemia         Current Outpatient Medications:     cetirizine (ZYRTEC) 10 MG tablet, , Disp: , Rfl:     ferrous sulfate (IRON ORAL), Take by mouth., Disp: , Rfl:     ALPRAZolam (XANAX) 0.25 MG tablet, TAKE 1-2 TABLETS AS NEEDED FOR FLIGHT ANXIETY, Disp: 30 tablet, Rfl: 0    L norgest/e.estradioL-e.estrad (CAMRESE) 0.15 mg-30 mcg (84)/10 mcg (7) 3MPk, Take 1 tablet by mouth once daily. (Patient not taking: Reported on 6/9/2023), Disp: 90 each, Rfl: 3    omeprazole (PRILOSEC) 20 MG capsule, Take 1 capsule (20 mg total) by mouth once daily., Disp: 90 capsule, Rfl: 4    ondansetron (ZOFRAN-ODT) 4 MG TbDL,  PLACE 1 TO 2 TABLETS UNDER TONGUE EVERY 8 HOURS AS NEEDED FOR NAUSEA, Disp: 30 tablet, Rfl: 3    propranoloL (INDERAL) 40 MG tablet, Take 1 tablet (40 mg total) by mouth 2 (two) times daily., Disp: 180 tablet, Rfl: 4    rizatriptan (MAXALT) 10 MG tablet, Take 1 tablet (10 mg total) by mouth as needed for Migraine (max 2 times a week)., Disp: 9 tablet, Rfl: 3    sertraline (ZOLOFT) 100 MG tablet, Take 1 tablet (100 mg total) by mouth once daily., Disp: 90 tablet, Rfl: 4    tirzepatide (MOUNJARO) 10 mg/0.5 mL PnIj, Inject 10 mg into the skin every 7 days., Disp: , Rfl:     Review of Systems   Constitutional:  Negative for activity change, appetite change, chills, diaphoresis, fatigue, fever and unexpected weight change.   HENT: Negative.  Negative for congestion, hearing loss, postnasal drip, rhinorrhea, sore throat, trouble swallowing and voice change.    Eyes: Negative.  Negative for visual disturbance.   Respiratory: Negative.  Negative for cough, choking, chest tightness and shortness of breath.    Cardiovascular:  Negative for chest pain, palpitations and leg swelling.   Gastrointestinal:  Negative for abdominal distention, abdominal pain, blood in stool, constipation, diarrhea, nausea and vomiting.   Endocrine: Negative for cold intolerance, heat intolerance, polydipsia and polyuria.   Genitourinary: Negative.  Negative for difficulty urinating and frequency.   Musculoskeletal:  Negative for arthralgias, back pain, gait problem, joint swelling and myalgias.   Skin:  Negative for color change, pallor, rash and wound.   Neurological:  Negative for dizziness, tremors, weakness, light-headedness, numbness and headaches.   Hematological:  Negative for adenopathy.   Psychiatric/Behavioral:  Negative for behavioral problems, confusion, self-injury, sleep disturbance and suicidal ideas. The patient is not nervous/anxious.      Objective:   /80 (BP Location: Right arm, Patient Position: Sitting, BP Method: Large  "(Manual))   Pulse 86   Temp 97.8 °F (36.6 °C) (Tympanic)   Ht 5' 2" (1.575 m)   Wt 76.1 kg (167 lb 12.3 oz)   LMP 11/17/2023 (Exact Date)   SpO2 100%   BMI 30.69 kg/m²     Physical Exam  Vitals reviewed.   Constitutional:       General: She is not in acute distress.     Appearance: Normal appearance. She is well-developed. She is not ill-appearing, toxic-appearing or diaphoretic.   HENT:      Head: Normocephalic and atraumatic.      Right Ear: External ear normal.      Left Ear: External ear normal.      Nose: Nose normal.   Eyes:      Conjunctiva/sclera: Conjunctivae normal.      Pupils: Pupils are equal, round, and reactive to light.   Cardiovascular:      Rate and Rhythm: Normal rate and regular rhythm.      Heart sounds: Normal heart sounds. No murmur heard.     No friction rub. No gallop.   Pulmonary:      Effort: Pulmonary effort is normal. No respiratory distress.      Breath sounds: Normal breath sounds. No wheezing or rales.   Chest:      Chest wall: No tenderness.   Abdominal:      General: There is no distension.      Palpations: Abdomen is soft.      Tenderness: There is no abdominal tenderness.   Musculoskeletal:         General: Normal range of motion.      Cervical back: Normal range of motion and neck supple.   Lymphadenopathy:      Cervical: No cervical adenopathy.   Skin:     General: Skin is warm and dry.      Capillary Refill: Capillary refill takes less than 2 seconds.      Findings: No rash.   Neurological:      Mental Status: She is alert and oriented to person, place, and time.      Motor: No weakness.      Coordination: Coordination normal.      Gait: Gait normal.   Psychiatric:         Mood and Affect: Mood normal.         Behavior: Behavior normal.         Thought Content: Thought content normal.         Judgment: Judgment normal.         Assessment:     1. Autosomal recessive polycystic kidneys    2. Gastroesophageal reflux disease    3. Gastroesophageal reflux disease, unspecified " whether esophagitis present    4. Migraine without aura and without status migrainosus, not intractable    5. Desmoplastic malignant melanoma    6. Hypertriglyceridemia    7. Anxiety    8. Essential hypertension    9. Anemia, unspecified type    10. Nausea    11. Anxiety with flying    12. Polyarthralgia    13. Routine health maintenance    14. Class 1 obesity due to excess calories with body mass index (BMI) of 30.0 to 30.9 in adult, unspecified whether serious comorbidity present    15. Polycystic kidney disease      Plan:   Autosomal recessive polycystic kidneys  -schedule follow up with nephrology    Gastroesophageal reflux disease  -     omeprazole (PRILOSEC) 20 MG capsule; Take 1 capsule (20 mg total) by mouth once daily.  Dispense: 90 capsule; Refill: 4    Gastroesophageal reflux disease, unspecified whether esophagitis present    Migraine without aura and without status migrainosus, not intractable  -     rizatriptan (MAXALT) 10 MG tablet; Take 1 tablet (10 mg total) by mouth as needed for Migraine (max 2 times a week).  Dispense: 9 tablet; Refill: 3  -     propranoloL (INDERAL) 40 MG tablet; Take 1 tablet (40 mg total) by mouth 2 (two) times daily.  Dispense: 180 tablet; Refill: 4    Desmoplastic malignant melanoma  -up to date with derm    Hypertriglyceridemia  -recheck on labs today    Anxiety  -     sertraline (ZOLOFT) 100 MG tablet; Take 1 tablet (100 mg total) by mouth once daily.  Dispense: 90 tablet; Refill: 4  -     TSH; Future; Expected date: 12/15/2023    Essential hypertension  -     CBC Auto Differential; Future; Expected date: 12/15/2023  -     Comprehensive Metabolic Panel; Future; Expected date: 12/15/2023  -     Lipid Panel; Future; Expected date: 12/15/2023    Anemia, unspecified type  -     Vitamin B12; Future; Expected date: 12/15/2023    Nausea  -     ondansetron (ZOFRAN-ODT) 4 MG TbDL; PLACE 1 TO 2 TABLETS UNDER TONGUE EVERY 8 HOURS AS NEEDED FOR NAUSEA  Dispense: 30 tablet; Refill:  3  -     Hemoglobin A1C; Future; Expected date: 12/15/2023  -     Vitamin B12; Future; Expected date: 12/15/2023    Anxiety with flying  -     ALPRAZolam (XANAX) 0.25 MG tablet; TAKE 1-2 TABLETS AS NEEDED FOR FLIGHT ANXIETY  Dispense: 30 tablet; Refill: 0  - checked and appropriate  -do not take with etoh  -risks discussed    Polyarthralgia  -     PEYTON Screen w/Reflex; Future; Expected date: 12/15/2023  -     CK; Future; Expected date: 12/15/2023  -     Sedimentation rate; Future; Expected date: 12/15/2023  -     C-REACTIVE PROTEIN; Future; Expected date: 12/15/2023  -     Vitamin B12; Future; Expected date: 12/15/2023    Routine health maintenance  -     Hemoglobin A1C; Future; Expected date: 12/15/2023    Class 1 obesity due to excess calories with body mass index (BMI) of 30.0 to 30.9 in adult, unspecified whether serious comorbidity present  -     Hemoglobin A1C; Future; Expected date: 12/15/2023  -     Vitamin D; Future; Expected date: 12/15/2023    Advise to maintain lifestyle changes with low carbohydrate, low sugar diet and exercise 30 minutes daily  -anti-inflammatory foods. Reduce foods that cause inflammation  -tumeric and glucosamine chondr    Follow up in about 6 months (around 6/15/2024), or if symptoms worsen or fail to improve.

## 2023-12-15 NOTE — PROGRESS NOTES
Subjective:      Patient ID: Samanta Espinoza is a 38 y.o. female.    Chief Complaint:  Annual Exam      History of Present Illness  Annual Exam-Premenopausal  Patient presents for annual exam. The patient is sexually active with female partner. GYN screening history: last pap: approximate date 2021 and was normal and last mammogram: patient has never had a mammogram. The patient wears seatbelts: yes. The patient participates in regular exercise: yes. Has the patient ever been transfused or tattooed?: no. The patient reports that there is not domestic violence in her life.    Pt reports that her menses have become heavier despite OCP and would like to discuss options.  Pt has had issues with iron deficiency anemia due to her periods in the past.  Is most interested in surgical options as she is done with her fertility.    GYN & OB History  Patient's last menstrual period was 2023 (exact date).   Date of Last Pap: No result found    OB History    Para Term  AB Living   2 2 1 1 0 2   SAB IAB Ectopic Multiple Live Births   0 0 0 0 2      # Outcome Date GA Lbr Epi/2nd Weight Sex Delivery Anes PTL Lv   2 Term 21 38w0d  4.02 kg (8 lb 13.8 oz) M CS-LTranv Spinal N KRIS   1  19 35w0d  2.65 kg (5 lb 13.5 oz) F CS-LTranv EPI Y KRIS      Birth Comments: nicu attended       Review of Systems  Review of Systems   Constitutional:  Negative for activity change, appetite change, chills, fatigue, fever and unexpected weight change.   Respiratory:  Negative for shortness of breath.    Cardiovascular:  Negative for chest pain, palpitations and leg swelling.   Gastrointestinal:  Negative for abdominal pain, bloating, blood in stool, constipation, diarrhea, nausea and vomiting.   Genitourinary:  Positive for flank pain, menorrhagia and menstrual problem. Negative for dysmenorrhea, dyspareunia, dysuria, frequency, genital sores, hematuria, pelvic pain, urgency, vaginal bleeding, vaginal  discharge, vaginal pain, urinary incontinence, postcoital bleeding, vaginal dryness and vaginal odor.   Musculoskeletal:  Positive for back pain.   Integumentary:  Negative for rash, breast mass, nipple discharge, breast skin changes and breast tenderness.   Neurological:  Positive for headaches. Negative for syncope.   Breast: Negative for asymmetry, lump, mass, mastodynia, nipple discharge, skin changes and tenderness         Objective:     Physical Exam:   Constitutional: She is oriented to person, place, and time. She appears well-developed and well-nourished. No distress.    HENT:   Head: Normocephalic and atraumatic.    Eyes: Pupils are equal, round, and reactive to light. EOM are normal.     Cardiovascular:  Normal rate, regular rhythm and normal heart sounds.             Pulmonary/Chest: Effort normal and breath sounds normal.        Abdominal: Soft. Bowel sounds are normal. She exhibits no distension. There is no abdominal tenderness.     Genitourinary:    Vagina, uterus, right adnexa and left adnexa normal.      Pelvic exam was performed with patient supine.   There is no rash, tenderness, lesion or injury on the right labia. There is no rash, tenderness, lesion or injury on the left labia. Cervix is normal. Right adnexum displays no mass, no tenderness and no fullness. Left adnexum displays no mass, no tenderness and no fullness. No erythema, vaginal discharge, tenderness or bleeding in the vagina.    No foreign body in the vagina.      No signs of injury in the vagina.   Cervix exhibits no motion tenderness, no discharge and no friability. Uterus is not deviated, not enlarged and not tender.           Musculoskeletal: Normal range of motion and moves all extremeties. No tenderness or edema.       Neurological: She is alert and oriented to person, place, and time.    Skin: Skin is warm and dry.    Psychiatric: She has a normal mood and affect. Her behavior is normal. Thought content normal.          Assessment:     1. Well woman exam with routine gynecological exam    2. Menorrhagia with regular cycle             Plan:     Well woman exam with routine gynecological exam  -     Pt was counseled on cervical/vaginal screening guidelines and recommendations.  Last pap NILM on 2021.  As per current ASCCP guidelines, next pap is due 2024.  -     Pt was advised on current breast cancer screening recommendations.  Begin screening MMG next year.  -     Follow up with PCP for routine health maintenance needs.    Menorrhagia with regular cycle  -     CBC Auto Differential; Future; Expected date: 12/15/2023  -     TSH; Future; Expected date: 12/15/2023  -     US Pelvis Comp with Transvag NON-OB (xpd; Future; Expected date: 12/15/2023  -     L norgest/e.estradioL-e.estrad (CAMRESE) 0.15 mg-30 mcg (84)/10 mcg (7) 3MPk; Take 1 tablet by mouth once daily.  Dispense: 90 each; Refill: 3  -     Pt was counseled on AUB, including common signs and symptoms.  Symptoms are highly disruptive and have failed trials of conservative therapy/contributed to anemia.  Pt is done with her fertility and has no desire for future childbearing.  Treatment options were reviewed with pt, including expectant vs medical vs Mirena vs ablation vs hysterectomy.  Pt voiced understanding and desires to think about options some more.  Will continue with OCP for now.  Is most interested in ablation.  Pt advised on need for pre-operative EMB if desires ablation.  Pt will call back once she has made a decision.      Follow up in about 1 year (around 12/15/2024).

## 2023-12-18 LAB — ANA SER QL IF: NORMAL

## 2023-12-21 DIAGNOSIS — R79.82 ELEVATED C-REACTIVE PROTEIN (CRP): Primary | ICD-10-CM

## 2024-02-14 DIAGNOSIS — Z12.31 OTHER SCREENING MAMMOGRAM: ICD-10-CM

## 2024-02-19 NOTE — ASSESSMENT & PLAN NOTE
Conceived with wife's egg and donor sperm.  Fetal anatomy and well-being reassuring   I was asked to see this patient for a nonhealing foot wound after TMA, but in reviewing his chart this AM, I see that he is already scheduled for a BKA today, so I'm not sure that I'll have anything to add. Was not planning on seeing the patient at this time, but please call if I can be of any help.    Electronically signed by Bennie Hussein MD on 2/19/2024 at 6:57 AM

## 2024-04-04 ENCOUNTER — PATIENT MESSAGE (OUTPATIENT)
Dept: OBSTETRICS AND GYNECOLOGY | Facility: CLINIC | Age: 39
End: 2024-04-04
Payer: COMMERCIAL

## 2024-04-22 ENCOUNTER — PATIENT MESSAGE (OUTPATIENT)
Dept: OBSTETRICS AND GYNECOLOGY | Facility: CLINIC | Age: 39
End: 2024-04-22
Payer: COMMERCIAL

## 2024-04-22 ENCOUNTER — HOSPITAL ENCOUNTER (OUTPATIENT)
Dept: RADIOLOGY | Facility: HOSPITAL | Age: 39
Discharge: HOME | End: 2024-04-22
Attending: OBSTETRICS & GYNECOLOGY
Payer: COMMERCIAL

## 2024-04-22 DIAGNOSIS — N92.0 MENORRHAGIA WITH REGULAR CYCLE: Primary | ICD-10-CM

## 2024-04-22 DIAGNOSIS — N92.0 MENORRHAGIA WITH REGULAR CYCLE: ICD-10-CM

## 2024-04-22 PROCEDURE — 76830 TRANSVAGINAL US NON-OB: CPT | Mod: TC

## 2024-04-22 PROCEDURE — 76830 TRANSVAGINAL US NON-OB: CPT | Mod: 26,,, | Performed by: RADIOLOGY

## 2024-04-22 PROCEDURE — 76856 US EXAM PELVIC COMPLETE: CPT | Mod: 26,,, | Performed by: RADIOLOGY

## 2024-04-22 PROCEDURE — 76856 US EXAM PELVIC COMPLETE: CPT | Mod: TC

## 2024-04-24 NOTE — TELEPHONE ENCOUNTER
US Pelvis Comp with Transvag NON-OB (xpd  Narrative: EXAM: US PELVIS COMP WITH TRANSVAGINAL NON-OB (XPD)    CLINICAL INDICATION: Excessive and frequent menstruation    FINDINGS: No comparison studies are available.  Transabdominal and transvaginal images were provided for review.    The uterus measures 7.2 x 4.3 x 3.6 cm. There are multiple uterine fibroids measuring up to 1.1 cm.  Endometrial stripe measures 4 mm. Negative for endometrial or myometrial abnormalities. The visualized portions of the cervix, vagina and urinary bladder are normal.  Negative for free fluid in the cul-de-sac.    The right ovary measures 3.2 x 1.6 x 1.5 cm, and the left ovary measures 3.1 x 1.7 x 1.0 cm.      The flow velocity in the right ovary is 11 cm/s.  The flow velocity in the left ovary is 7 cm/s.  There is a dilated serpiginous tubular structure within the right adnexa measuring 1 cm in maximum diameter.  Negative for other ovary and or adnexal masses.  Impression: 1.  Dilated serpiginous tubular structure within the right adnexa, most likely a dilated fallopian tube. Hydrosalpinx and pyosalpinx could have this appearance.  Clinical correlation is advised.  2.  Multiple uterine fibroids measuring up to 1.1 cm.  3.  Otherwise, normal study.  Uterus and ovaries are otherwise normal.  Negative for torsion.    O-Rads Score - 1-Normal Ovary    Finalized on: 4/22/2024 10:18 AM By:  rBandon Larios MD  [facility]# [id number]      2024-04-22 10:21:00.[facility]    BRTIFFANY      Pt has opted to proceed with hysterectomy.  Message forwarded to PA to schedule RALH.

## 2024-05-07 ENCOUNTER — PATIENT MESSAGE (OUTPATIENT)
Dept: OBSTETRICS AND GYNECOLOGY | Facility: CLINIC | Age: 39
End: 2024-05-07
Payer: COMMERCIAL

## 2024-05-30 ENCOUNTER — PATIENT MESSAGE (OUTPATIENT)
Dept: OBSTETRICS AND GYNECOLOGY | Facility: CLINIC | Age: 39
End: 2024-05-30
Payer: COMMERCIAL

## 2024-06-06 ENCOUNTER — TELEPHONE (OUTPATIENT)
Dept: OBSTETRICS AND GYNECOLOGY | Facility: CLINIC | Age: 39
End: 2024-06-06
Payer: COMMERCIAL

## 2024-06-10 ENCOUNTER — PATIENT MESSAGE (OUTPATIENT)
Dept: OBSTETRICS AND GYNECOLOGY | Facility: CLINIC | Age: 39
End: 2024-06-10
Payer: COMMERCIAL

## 2024-06-24 ENCOUNTER — OFFICE VISIT (OUTPATIENT)
Dept: OBSTETRICS AND GYNECOLOGY | Facility: CLINIC | Age: 39
End: 2024-06-24
Payer: COMMERCIAL

## 2024-06-24 ENCOUNTER — LAB VISIT (OUTPATIENT)
Dept: LAB | Facility: HOSPITAL | Age: 39
End: 2024-06-24
Attending: OBSTETRICS & GYNECOLOGY
Payer: COMMERCIAL

## 2024-06-24 VITALS — WEIGHT: 150.38 LBS | BODY MASS INDEX: 27.67 KG/M2 | HEIGHT: 62 IN

## 2024-06-24 DIAGNOSIS — N92.0 MENORRHAGIA WITH REGULAR CYCLE: ICD-10-CM

## 2024-06-24 DIAGNOSIS — N92.0 MENORRHAGIA WITH REGULAR CYCLE: Primary | ICD-10-CM

## 2024-06-24 PROCEDURE — 85025 COMPLETE CBC W/AUTO DIFF WBC: CPT | Performed by: OBSTETRICS & GYNECOLOGY

## 2024-06-24 PROCEDURE — 99999 PR PBB SHADOW E&M-EST. PATIENT-LVL III: CPT | Mod: PBBFAC,,, | Performed by: OBSTETRICS & GYNECOLOGY

## 2024-06-24 PROCEDURE — 99499 UNLISTED E&M SERVICE: CPT | Mod: S$GLB,,, | Performed by: OBSTETRICS & GYNECOLOGY

## 2024-06-24 PROCEDURE — 84702 CHORIONIC GONADOTROPIN TEST: CPT | Performed by: OBSTETRICS & GYNECOLOGY

## 2024-06-24 PROCEDURE — 80048 BASIC METABOLIC PNL TOTAL CA: CPT | Performed by: OBSTETRICS & GYNECOLOGY

## 2024-06-24 PROCEDURE — 36415 COLL VENOUS BLD VENIPUNCTURE: CPT | Performed by: OBSTETRICS & GYNECOLOGY

## 2024-06-24 RX ORDER — FAMOTIDINE 20 MG/1
20 TABLET, FILM COATED ORAL
OUTPATIENT
Start: 2024-06-24

## 2024-06-24 RX ORDER — MUPIROCIN 20 MG/G
OINTMENT TOPICAL
OUTPATIENT
Start: 2024-06-24

## 2024-06-24 RX ORDER — CEFAZOLIN SODIUM 2 G/50ML
2 SOLUTION INTRAVENOUS
OUTPATIENT
Start: 2024-06-24

## 2024-06-24 RX ORDER — SODIUM CHLORIDE 9 MG/ML
INJECTION, SOLUTION INTRAVENOUS CONTINUOUS
OUTPATIENT
Start: 2024-06-24

## 2024-06-24 NOTE — H&P (VIEW-ONLY)
[facility] GYN [facility]  Obstetrics & Gynecology  History & Physical    Patient Name: Samanta Espinoza  MRN: [id number]  Admission Date: (Not on file)  Primary Care Provider: Jamel Meier MD    Subjective:     Chief Complaint/Reason for Admission: surgery    History of Present Illness:   38 yo  with history of symptomatic fibroids is here for scheduled hysterectomy.  Pt reports no new complaints and is ready for surgery.    Current Outpatient Medications on File Prior to Visit   Medication Sig    ALPRAZolam (XANAX) 0.25 MG tablet TAKE 1-2 TABLETS AS NEEDED FOR FLIGHT ANXIETY    cetirizine (ZYRTEC) 10 MG tablet     L norgest/e.estradioL-e.estrad (CAMRESE) 0.15 mg-30 mcg (84)/10 mcg (7) 3MPk Take 1 tablet by mouth once daily.    omeprazole (PRILOSEC) 20 MG capsule Take 1 capsule (20 mg total) by mouth once daily.    ondansetron (ZOFRAN-ODT) 4 MG TbDL PLACE 1 TO 2 TABLETS UNDER TONGUE EVERY 8 HOURS AS NEEDED FOR NAUSEA    propranoloL (INDERAL) 40 MG tablet Take 1 tablet (40 mg total) by mouth 2 (two) times daily.    rizatriptan (MAXALT) 10 MG tablet Take 1 tablet (10 mg total) by mouth as needed for Migraine (max 2 times a week).    sertraline (ZOLOFT) 100 MG tablet Take 1 tablet (100 mg total) by mouth once daily.    tirzepatide (MOUNJARO) 10 mg/0.5 mL PnIj Inject 10 mg into the skin every 7 days.    ferrous sulfate (IRON ORAL) Take by mouth. (Patient not taking: Reported on 2024)     No current facility-administered medications on file prior to visit.       Review of patient's allergies indicates:   Allergen Reactions    Singulair [montelukast]      Itching all over       Past Medical History:   Diagnosis Date    Acute cough 2022    Anemia     Autosomal recessive polycystic kidneys     Chronic hypertension 2022    Labetalol started 19    GERD (gastroesophageal reflux disease)     High risk pregnancy, multigravida of advanced maternal age in third trimester 2022    History  of uterine fibroid     Hyperlipidemia     Hypertension     Liver disease     Mastitis 10/29/2019    Melanoma 2016    left superior helix (breslow dept 0.67mm)    Mental disorder     Migraine headache     PONV (postoperative nausea and vomiting)     post op nausea    Postpartum depression     Pregnancy resulting from in vitro fertilization in third trimester 2022    [facility] IVF with [facility], Dr STACEY Ivy     OB History    Para Term  AB Living   2 2 1 1 0 2   SAB IAB Ectopic Multiple Live Births   0 0 0 0 2      # Outcome Date GA Lbr Epi/2nd Weight Sex Type Anes PTL Lv   2 Term 22 38w0d  4.02 kg (8 lb 13.8 oz) M CS-LTranv Spinal N KRIS   1  19 35w0d  2.65 kg (5 lb 13.5 oz) F CS-LTranv EPI Y KRIS      Birth Comments: nicu attended     Past Surgical History:   Procedure Laterality Date     SECTION N/A 2019    Procedure:  SECTION;  Surgeon: Grazyna Price MD;  Location: [facility] L&D;  Service: OB/GYN;  Laterality: N/A;     SECTION N/A 2022    Procedure:  SECTION;  Surgeon: Grazyna Price MD;  Location: [facility] L&D;  Service: OB/GYN;  Laterality: N/A;     SECTION  19,22    HYSTEROSCOPIC POLYPECTOMY OF UTERUS N/A 01/10/2019    Procedure: POLYPECTOMY, UTERUS, HYSTEROSCOPIC;  Surgeon: Jolanta Sifuentes MD;  Location: [facility];  Service: OB/GYN;  Laterality: N/A;    LYMPH NODE BIOPSY      MALIGNANT SKIN LESION EXCISION  06/10/2016    NASAL SEPTUM SURGERY      RENAL EXPLORATION       Family History       Problem Relation (Age of Onset)    Cancer Maternal Grandmother, Maternal Uncle    Diabetes Paternal Grandmother    Hypertension Father    Kidney disease Father          Tobacco Use    Smoking status: Never    Smokeless tobacco: Never   Substance and Sexual Activity    Alcohol use: Yes     Alcohol/week: 2.0 standard drinks of alcohol     Types: 2 Cans of beer per week     Comment: socially    Drug use: Never     Sexual activity: Yes     Partners: Female     Birth control/protection: None     Review of Systems   Constitutional:  Negative for activity change, appetite change, chills, fatigue, fever and unexpected weight change.   Respiratory:  Negative for shortness of breath.    Cardiovascular:  Negative for chest pain, palpitations and leg swelling.   Gastrointestinal:  Negative for abdominal pain, bloating, blood in stool, constipation, diarrhea, nausea and vomiting.   Genitourinary:  Positive for flank pain, menorrhagia and menstrual problem. Negative for dysmenorrhea, dyspareunia, dysuria, frequency, genital sores, hematuria, pelvic pain, urgency, vaginal bleeding, vaginal discharge, vaginal pain, urinary incontinence, postcoital bleeding, vaginal dryness and vaginal odor.   Musculoskeletal:  Positive for back pain.   Neurological:  Positive for headaches. Negative for syncope.     Objective:     Vital Signs (Most Recent):    Vital Signs (24h Range):  [unfilled]     Weight: 68.2 kg (150 lb 5.7 oz)  Body mass index is 27.5 kg/m².  Patient's last menstrual period was 05/13/2024 (exact date).    Physical Exam:   Constitutional: She is oriented to person, place, and time. She appears well-developed and well-nourished. No distress.    HENT:   Head: Normocephalic and atraumatic.    Eyes: Pupils are equal, round, and reactive to light. EOM are normal.     Cardiovascular:  Normal rate, regular rhythm and normal heart sounds.             Pulmonary/Chest: Effort normal and breath sounds normal.        Abdominal: Soft. Bowel sounds are normal. She exhibits no distension. There is no abdominal tenderness.             Musculoskeletal: Normal range of motion and moves all extremeties. No tenderness or edema.       Neurological: She is alert and oriented to person, place, and time.    Skin: Skin is warm and dry.    Psychiatric: She has a normal mood and affect. Her behavior is normal. Thought content normal.       Laboratory:  I have  personallly reviewed all pertinent lab results from the last 24 hours.    Diagnostic Results:  Labs: Reviewed  US: Reviewed  Pap Reviewed    US Pelvis Comp with Transvag NON-OB (xpd  Narrative: EXAM: US PELVIS COMP WITH TRANSVAGINAL NON-OB (XPD)    CLINICAL INDICATION: Excessive and frequent menstruation    FINDINGS: No comparison studies are available.  Transabdominal and transvaginal images were provided for review.    The uterus measures 7.2 x 4.3 x 3.6 cm. There are multiple uterine fibroids measuring up to 1.1 cm.  Endometrial stripe measures 4 mm. Negative for endometrial or myometrial abnormalities. The visualized portions of the cervix, vagina and urinary bladder are normal.  Negative for free fluid in the cul-de-sac.    The right ovary measures 3.2 x 1.6 x 1.5 cm, and the left ovary measures 3.1 x 1.7 x 1.0 cm.      The flow velocity in the right ovary is 11 cm/s.  The flow velocity in the left ovary is 7 cm/s.  There is a dilated serpiginous tubular structure within the right adnexa measuring 1 cm in maximum diameter.  Negative for other ovary and or adnexal masses.  Impression: 1.  Dilated serpiginous tubular structure within the right adnexa, most likely a dilated fallopian tube. Hydrosalpinx and pyosalpinx could have this appearance.  Clinical correlation is advised.  2.  Multiple uterine fibroids measuring up to 1.1 cm.  3.  Otherwise, normal study.  Uterus and ovaries are otherwise normal.  Negative for torsion.    O-Rads Score - 1-Normal Ovary    Finalized on: 4/22/2024 10:18 AM By:  Brandon Larios MD  [facility]# [id number]      2024-04-22 10:21:00.363    [facility]      Assessment/Plan:     There are no hospital problems to display for this patient.      Menorrhagia with regular cycle  -     Procedure risks, benefits, and alternatives were discussed.  Pt voiced understanding and expressed consent for RALH/Bilateral Salpingectomy.  Pt desires ovarian preservation but is OK with ovarian removal if deemed necessary.   Hospital forms were reviewed with pt and signed.  Pre-operative instructions were given.      Vikas Park MD  Obstetrics & Gynecology  [facility] OB GYN 2nd Fl

## 2024-06-24 NOTE — H&P
[facility]YN [facility]  Obstetrics & Gynecology  History & Physical    Patient Name: Samanta Espinoza  MRN: [id number]  Admission Date: (Not on file)  Primary Care Provider: Jamel Meier MD    Subjective:     Chief Complaint/Reason for Admission: surgery    History of Present Illness:   40 yo  with history of symptomatic fibroids is here for scheduled hysterectomy.  Pt reports no new complaints and is ready for surgery.    Current Outpatient Medications on File Prior to Visit   Medication Sig    ALPRAZolam (XANAX) 0.25 MG tablet TAKE 1-2 TABLETS AS NEEDED FOR FLIGHT ANXIETY    cetirizine (ZYRTEC) 10 MG tablet     L norgest/e.estradioL-e.estrad (CAMRESE) 0.15 mg-30 mcg (84)/10 mcg (7) 3MPk Take 1 tablet by mouth once daily.    omeprazole (PRILOSEC) 20 MG capsule Take 1 capsule (20 mg total) by mouth once daily.    ondansetron (ZOFRAN-ODT) 4 MG TbDL PLACE 1 TO 2 TABLETS UNDER TONGUE EVERY 8 HOURS AS NEEDED FOR NAUSEA    propranoloL (INDERAL) 40 MG tablet Take 1 tablet (40 mg total) by mouth 2 (two) times daily.    rizatriptan (MAXALT) 10 MG tablet Take 1 tablet (10 mg total) by mouth as needed for Migraine (max 2 times a week).    sertraline (ZOLOFT) 100 MG tablet Take 1 tablet (100 mg total) by mouth once daily.    tirzepatide (MOUNJARO) 10 mg/0.5 mL PnIj Inject 10 mg into the skin every 7 days.    ferrous sulfate (IRON ORAL) Take by mouth. (Patient not taking: Reported on 2024)     No current facility-administered medications on file prior to visit.       Review of patient's allergies indicates:   Allergen Reactions    Singulair [montelukast]      Itching all over       Past Medical History:   Diagnosis Date    Acute cough 2022    Anemia     Autosomal recessive polycystic kidneys     Chronic hypertension 2022    Labetalol started 19    GERD (gastroesophageal reflux disease)     High risk pregnancy, multigravida of advanced maternal age in third trimester 2022    History  of uterine fibroid     Hyperlipidemia     Hypertension     Liver disease     Mastitis 10/29/2019    Melanoma 2016    left superior helix (breslow dept 0.67mm)    Mental disorder     Migraine headache     PONV (postoperative nausea and vomiting)     post op nausea    Postpartum depression     Pregnancy resulting from in vitro fertilization in third trimester 2022    [facility] IVF with [facility], Dr STACEY Ivy     OB History    Para Term  AB Living   2 2 1 1 0 2   SAB IAB Ectopic Multiple Live Births   0 0 0 0 2      # Outcome Date GA Lbr Epi/2nd Weight Sex Type Anes PTL Lv   2 Term 22 38w0d  4.02 kg (8 lb 13.8 oz) M CS-LTranv Spinal N KRIS   1  19 35w0d  2.65 kg (5 lb 13.5 oz) F CS-LTranv EPI Y KRIS      Birth Comments: nicu attended     Past Surgical History:   Procedure Laterality Date     SECTION N/A 2019    Procedure:  SECTION;  Surgeon: Grazyna Price MD;  Location: [facility] L&D;  Service: OB/GYN;  Laterality: N/A;     SECTION N/A 2022    Procedure:  SECTION;  Surgeon: Grazyna Price MD;  Location: [facility] L&D;  Service: OB/GYN;  Laterality: N/A;     SECTION  19,22    HYSTEROSCOPIC POLYPECTOMY OF UTERUS N/A 01/10/2019    Procedure: POLYPECTOMY, UTERUS, HYSTEROSCOPIC;  Surgeon: Jolanta Sifuentes MD;  Location: [facility];  Service: OB/GYN;  Laterality: N/A;    LYMPH NODE BIOPSY      MALIGNANT SKIN LESION EXCISION  06/10/2016    NASAL SEPTUM SURGERY      RENAL EXPLORATION       Family History       Problem Relation (Age of Onset)    Cancer Maternal Grandmother, Maternal Uncle    Diabetes Paternal Grandmother    Hypertension Father    Kidney disease Father          Tobacco Use    Smoking status: Never    Smokeless tobacco: Never   Substance and Sexual Activity    Alcohol use: Yes     Alcohol/week: 2.0 standard drinks of alcohol     Types: 2 Cans of beer per week     Comment: socially    Drug use: Never     Sexual activity: Yes     Partners: Female     Birth control/protection: None     Review of Systems   Constitutional:  Negative for activity change, appetite change, chills, fatigue, fever and unexpected weight change.   Respiratory:  Negative for shortness of breath.    Cardiovascular:  Negative for chest pain, palpitations and leg swelling.   Gastrointestinal:  Negative for abdominal pain, bloating, blood in stool, constipation, diarrhea, nausea and vomiting.   Genitourinary:  Positive for flank pain, menorrhagia and menstrual problem. Negative for dysmenorrhea, dyspareunia, dysuria, frequency, genital sores, hematuria, pelvic pain, urgency, vaginal bleeding, vaginal discharge, vaginal pain, urinary incontinence, postcoital bleeding, vaginal dryness and vaginal odor.   Musculoskeletal:  Positive for back pain.   Neurological:  Positive for headaches. Negative for syncope.     Objective:     Vital Signs (Most Recent):    Vital Signs (24h Range):  [unfilled]     Weight: 68.2 kg (150 lb 5.7 oz)  Body mass index is 27.5 kg/m².  Patient's last menstrual period was 05/13/2024 (exact date).    Physical Exam:   Constitutional: She is oriented to person, place, and time. She appears well-developed and well-nourished. No distress.    HENT:   Head: Normocephalic and atraumatic.    Eyes: Pupils are equal, round, and reactive to light. EOM are normal.     Cardiovascular:  Normal rate, regular rhythm and normal heart sounds.             Pulmonary/Chest: Effort normal and breath sounds normal.        Abdominal: Soft. Bowel sounds are normal. She exhibits no distension. There is no abdominal tenderness.             Musculoskeletal: Normal range of motion and moves all extremeties. No tenderness or edema.       Neurological: She is alert and oriented to person, place, and time.    Skin: Skin is warm and dry.    Psychiatric: She has a normal mood and affect. Her behavior is normal. Thought content normal.       Laboratory:  I have  personallly reviewed all pertinent lab results from the last 24 hours.    Diagnostic Results:  Labs: Reviewed  US: Reviewed  Pap Reviewed    US Pelvis Comp with Transvag NON-OB (xpd  Narrative: EXAM: US PELVIS COMP WITH TRANSVAGINAL NON-OB (XPD)    CLINICAL INDICATION: Excessive and frequent menstruation    FINDINGS: No comparison studies are available.  Transabdominal and transvaginal images were provided for review.    The uterus measures 7.2 x 4.3 x 3.6 cm. There are multiple uterine fibroids measuring up to 1.1 cm.  Endometrial stripe measures 4 mm. Negative for endometrial or myometrial abnormalities. The visualized portions of the cervix, vagina and urinary bladder are normal.  Negative for free fluid in the cul-de-sac.    The right ovary measures 3.2 x 1.6 x 1.5 cm, and the left ovary measures 3.1 x 1.7 x 1.0 cm.      The flow velocity in the right ovary is 11 cm/s.  The flow velocity in the left ovary is 7 cm/s.  There is a dilated serpiginous tubular structure within the right adnexa measuring 1 cm in maximum diameter.  Negative for other ovary and or adnexal masses.  Impression: 1.  Dilated serpiginous tubular structure within the right adnexa, most likely a dilated fallopian tube. Hydrosalpinx and pyosalpinx could have this appearance.  Clinical correlation is advised.  2.  Multiple uterine fibroids measuring up to 1.1 cm.  3.  Otherwise, normal study.  Uterus and ovaries are otherwise normal.  Negative for torsion.    O-Rads Score - 1-Normal Ovary    Finalized on: 4/22/2024 10:18 AM By:  Brandon Larios MD  [facility]# [id number]      2024-04-22 10:21:00.363    [facility]      Assessment/Plan:     There are no hospital problems to display for this patient.      Menorrhagia with regular cycle  -     Procedure risks, benefits, and alternatives were discussed.  Pt voiced understanding and expressed consent for RALH/Bilateral Salpingectomy.  Pt desires ovarian preservation but is OK with ovarian removal if deemed necessary.   Hospital forms were reviewed with pt and signed.  Pre-operative instructions were given.      Vikas Park MD  Obstetrics & Gynecology  The [facility] OB GYN 2nd Fl

## 2024-06-24 NOTE — PROGRESS NOTES
Pt is here for pre-operative visit.  Pt reports no complaints.  Ready for surgery.    ROS Negative     Physical Exam:  See H+P    A/P:  Menorrhagia with regular cycle  -     Procedure risks, benefits, and alternatives were discussed.  Pt voiced understanding and expressed consent for RALH/Bilateral Salpingectomy.  Pt desires ovarian preservation but is OK with ovarian removal if deemed necessary.  Hospital forms were reviewed with pt and signed.  Pre-operative instructions were given.

## 2024-06-25 LAB
ANION GAP SERPL CALC-SCNC: 11 MMOL/L (ref 8–16)
BASOPHILS # BLD AUTO: 0.05 K/UL (ref 0–0.2)
BASOPHILS NFR BLD: 0.6 % (ref 0–1.9)
BUN SERPL-MCNC: 15 MG/DL (ref 6–20)
CALCIUM SERPL-MCNC: 9.9 MG/DL (ref 8.7–10.5)
CHLORIDE SERPL-SCNC: 103 MMOL/L (ref 95–110)
CO2 SERPL-SCNC: 24 MMOL/L (ref 23–29)
CREAT SERPL-MCNC: 0.8 MG/DL (ref 0.5–1.4)
DIFFERENTIAL METHOD BLD: ABNORMAL
EOSINOPHIL # BLD AUTO: 0.1 K/UL (ref 0–0.5)
EOSINOPHIL NFR BLD: 1 % (ref 0–8)
ERYTHROCYTE [DISTWIDTH] IN BLOOD BY AUTOMATED COUNT: 12.3 % (ref 11.5–14.5)
EST. GFR  (NO RACE VARIABLE): >60 ML/MIN/1.73 M^2
GLUCOSE SERPL-MCNC: 73 MG/DL (ref 70–110)
HCG INTACT+B SERPL-ACNC: <2.4 MIU/ML
HCT VFR BLD AUTO: 41.1 % (ref 37–48.5)
HGB BLD-MCNC: 13.7 G/DL (ref 12–16)
IMM GRANULOCYTES # BLD AUTO: 0.01 K/UL (ref 0–0.04)
IMM GRANULOCYTES NFR BLD AUTO: 0.1 % (ref 0–0.5)
LYMPHOCYTES # BLD AUTO: 2.9 K/UL (ref 1–4.8)
LYMPHOCYTES NFR BLD: 36.6 % (ref 18–48)
MCH RBC QN AUTO: 28.9 PG (ref 27–31)
MCHC RBC AUTO-ENTMCNC: 33.3 G/DL (ref 32–36)
MCV RBC AUTO: 87 FL (ref 82–98)
MONOCYTES # BLD AUTO: 0.3 K/UL (ref 0.3–1)
MONOCYTES NFR BLD: 3.4 % (ref 4–15)
NEUTROPHILS # BLD AUTO: 4.6 K/UL (ref 1.8–7.7)
NEUTROPHILS NFR BLD: 58.3 % (ref 38–73)
NRBC BLD-RTO: 0 /100 WBC
PLATELET # BLD AUTO: 287 K/UL (ref 150–450)
PMV BLD AUTO: 11.5 FL (ref 9.2–12.9)
POTASSIUM SERPL-SCNC: 4.3 MMOL/L (ref 3.5–5.1)
RBC # BLD AUTO: 4.74 M/UL (ref 4–5.4)
SODIUM SERPL-SCNC: 138 MMOL/L (ref 136–145)
WBC # BLD AUTO: 7.84 K/UL (ref 3.9–12.7)

## 2024-06-26 ENCOUNTER — PATIENT MESSAGE (OUTPATIENT)
Dept: PREADMISSION TESTING | Facility: HOSPITAL | Age: 39
End: 2024-06-26
Payer: COMMERCIAL

## 2024-06-26 NOTE — PRE-PROCEDURE INSTRUCTIONS
Pre op instructions reviewed with patient over telephone, verbalized understanding.    To confirm, Surgery is scheduled on 7/01/24. We will call you late afternoon the business day prior to surgery with your arrival time.    *Please report to the [facility] Lobby (1st Floor) located off of [facility] (2nd Entrance/Building on the left, in front of the flag pole).  Address: [address] [place] [address] [place]. [address]     INSTRUCTIONS IMPORTANT!!!  DO NOT Eat, Drink, or Smoke after 12 midnight unless instructed otherwise by your Surgeon. OK to brush teeth, no gum, candy or mints!    >>>MEDICATION INSTRUCTIONS<<<: Morning of Surgery, take small sip of water with ONLY these medications:  Prilosec  Propranolol    PLEASE CONTINUE TO HOLD YOUR MOUNJARO PRIOR TO SURGERY    !!!STOP ALL Aspirins, NSAIDS, WEIGHT LOSS INJECTIONS/PILLS, Herbal supplements, & Vitamins 7 DAYS BEFORE SURGERY!!!    ____  Avoid Alcoholic beverages 3 days prior to surgery, as it can thin the blood.  ____  NO Acrylic/fake nails or nail polish worn day of surgery (specifically hand/arm & foot surgeries).  ____  NO powder, lotions, deodorants, oils or cream on body.  ____  Remove all jewelry & piercings & foreign objects before arrival & leave at home.  ____  Remove Dentures, Hearing Aids & Contact Lens prior to surgery.  ____  Bring photo ID and insurance information to hospital (Leave Valuables at Home).  ____  If going home the same day, arrange for a ride home. You will not be able to drive for 24 hrs if Anesthesia was used.   ____  Females (ages 11-60): may need to give a urine sample the morning of surgery; please see Pre op Nurse prior to using the restroom.  ____  Wear clean, loose fitting clothing to allow for dressings/ bandages.      Bathing Instructions:    -Shower with anti-bacterial Soap (Hibiclens or Dial) the night before surgery and the morning of.   -Do not use Hibiclens on your face or genitals.   -Apply clean clothes after  shower.  -Do not shave your face or body 3 days prior to surgery unless instructed otherwise by your Surgeon.    [facility]/Ride Policy:  Only 2 adults allowed in pre op/recovery area during your procedure. You MUST HAVE A RIDE HOME from a responsible adult that you know and trust. Medical Transport, Uber or Lyft can ONLY be used if patient has a responsible adult to accompany them during ride home.       *Signs and symptoms of Infection Before or After Surgery:               !!!If you experience any fever, chills, nausea/ vomiting, foul odor/ excessive drainage from surgical site, flu-like symptoms, new wounds or cuts, PLEASE CALL THE SURGEON OFFICE at [phone] or SEND MESSAGE THROUGH [employer]!!!     *If you are running late the morning of surgery, please call the [facility] Surgery Dept @ [phone].     *Billing questions:  [phone]     Thank you,  -[facility] Pre Admit Dept.  [phone] Bren  or [phone]  M-F 7:30 am-4:00 pm (Closed Major Holidays)

## 2024-06-28 ENCOUNTER — TELEPHONE (OUTPATIENT)
Dept: PREADMISSION TESTING | Facility: HOSPITAL | Age: 39
End: 2024-06-28
Payer: COMMERCIAL

## 2024-06-28 NOTE — TELEPHONE ENCOUNTER
Called and lvm about the following:     Please arrive to [facility] ([facility] at 6:00 am on 7/01/2024 for your scheduled procedure.  Address: [address] [place] [address] [place]. [address] (2nd Building on left, 1st Floor Lobby)  >>>NO eating or drinking after midnight unless instructed otherwise by your Surgeon<<<

## 2024-07-01 ENCOUNTER — ANESTHESIA (OUTPATIENT)
Dept: SURGERY | Facility: HOSPITAL | Age: 39
End: 2024-07-01
Payer: COMMERCIAL

## 2024-07-01 ENCOUNTER — ANESTHESIA EVENT (OUTPATIENT)
Dept: SURGERY | Facility: HOSPITAL | Age: 39
End: 2024-07-01
Payer: COMMERCIAL

## 2024-07-01 ENCOUNTER — HOSPITAL ENCOUNTER (OUTPATIENT)
Facility: HOSPITAL | Age: 39
Discharge: HOME | End: 2024-07-01
Attending: OBSTETRICS & GYNECOLOGY | Admitting: OBSTETRICS & GYNECOLOGY
Payer: COMMERCIAL

## 2024-07-01 VITALS
TEMPERATURE: 97 F | OXYGEN SATURATION: 96 % | DIASTOLIC BLOOD PRESSURE: 76 MMHG | HEIGHT: 62 IN | RESPIRATION RATE: 14 BRPM | SYSTOLIC BLOOD PRESSURE: 121 MMHG | BODY MASS INDEX: 27.26 KG/M2 | WEIGHT: 148.13 LBS | HEART RATE: 82 BPM

## 2024-07-01 DIAGNOSIS — N92.0 MENORRHAGIA WITH REGULAR CYCLE: ICD-10-CM

## 2024-07-01 DIAGNOSIS — N13.4: ICD-10-CM

## 2024-07-01 DIAGNOSIS — Z90.710 STATUS POST LAPAROSCOPIC HYSTERECTOMY: Primary | ICD-10-CM

## 2024-07-01 LAB
B-HCG UR QL: NEGATIVE
CTP QC/QA: YES

## 2024-07-01 PROCEDURE — 71000033 HC RECOVERY, INTIAL HOUR: Performed by: OBSTETRICS & GYNECOLOGY

## 2024-07-01 PROCEDURE — 63600175 PHARM REV CODE 636 W HCPCS: Performed by: ANESTHESIOLOGY

## 2024-07-01 PROCEDURE — 71000039 HC RECOVERY, EACH ADD'L HOUR: Performed by: OBSTETRICS & GYNECOLOGY

## 2024-07-01 PROCEDURE — 36000713 HC OR TIME LEV V EA ADD 15 MIN: Performed by: OBSTETRICS & GYNECOLOGY

## 2024-07-01 PROCEDURE — 25000003 PHARM REV CODE 250: Performed by: OBSTETRICS & GYNECOLOGY

## 2024-07-01 PROCEDURE — 88307 TISSUE EXAM BY PATHOLOGIST: CPT | Performed by: PATHOLOGY

## 2024-07-01 PROCEDURE — 71000015 HC POSTOP RECOV 1ST HR: Performed by: OBSTETRICS & GYNECOLOGY

## 2024-07-01 PROCEDURE — 25000003 PHARM REV CODE 250: Performed by: NURSE ANESTHETIST, CERTIFIED REGISTERED

## 2024-07-01 PROCEDURE — 37000008 HC ANESTHESIA 1ST 15 MINUTES: Performed by: OBSTETRICS & GYNECOLOGY

## 2024-07-01 PROCEDURE — 63600175 PHARM REV CODE 636 W HCPCS: Performed by: NURSE ANESTHETIST, CERTIFIED REGISTERED

## 2024-07-01 PROCEDURE — 81025 URINE PREGNANCY TEST: CPT | Performed by: ANESTHESIOLOGY

## 2024-07-01 PROCEDURE — 58571 TLH W/T/O 250 G OR LESS: CPT | Mod: ,,, | Performed by: OBSTETRICS & GYNECOLOGY

## 2024-07-01 PROCEDURE — 37000009 HC ANESTHESIA EA ADD 15 MINS: Performed by: OBSTETRICS & GYNECOLOGY

## 2024-07-01 PROCEDURE — 27201423 OPTIME MED/SURG SUP & DEVICES STERILE SUPPLY: Performed by: OBSTETRICS & GYNECOLOGY

## 2024-07-01 PROCEDURE — 25000003 PHARM REV CODE 250: Performed by: ANESTHESIOLOGY

## 2024-07-01 PROCEDURE — 36000712 HC OR TIME LEV V 1ST 15 MIN: Performed by: OBSTETRICS & GYNECOLOGY

## 2024-07-01 PROCEDURE — C2628 CATHETER, OCCLUSION: HCPCS | Performed by: OBSTETRICS & GYNECOLOGY

## 2024-07-01 RX ORDER — MIDAZOLAM HYDROCHLORIDE 1 MG/ML
INJECTION INTRAMUSCULAR; INTRAVENOUS
Status: DISCONTINUED | OUTPATIENT
Start: 2024-07-01 | End: 2024-07-01

## 2024-07-01 RX ORDER — CEFAZOLIN SODIUM 1 G/3ML
INJECTION, POWDER, FOR SOLUTION INTRAMUSCULAR; INTRAVENOUS
Status: DISCONTINUED | OUTPATIENT
Start: 2024-07-01 | End: 2024-07-01

## 2024-07-01 RX ORDER — SODIUM CHLORIDE 9 MG/ML
INJECTION, SOLUTION INTRAVENOUS CONTINUOUS
Status: DISCONTINUED | OUTPATIENT
Start: 2024-07-01 | End: 2024-07-01 | Stop reason: HOSPADM

## 2024-07-01 RX ORDER — FAMOTIDINE 20 MG/1
20 TABLET, FILM COATED ORAL
Status: COMPLETED | OUTPATIENT
Start: 2024-07-01 | End: 2024-07-01

## 2024-07-01 RX ORDER — OXYCODONE AND ACETAMINOPHEN 10; 325 MG/1; MG/1
1 TABLET ORAL EVERY 4 HOURS PRN
Status: DISCONTINUED | OUTPATIENT
Start: 2024-07-01 | End: 2024-07-01 | Stop reason: HOSPADM

## 2024-07-01 RX ORDER — OXYCODONE AND ACETAMINOPHEN 5; 325 MG/1; MG/1
1 TABLET ORAL
Status: DISCONTINUED | OUTPATIENT
Start: 2024-07-01 | End: 2024-07-01 | Stop reason: HOSPADM

## 2024-07-01 RX ORDER — KETOROLAC TROMETHAMINE 30 MG/ML
30 INJECTION, SOLUTION INTRAMUSCULAR; INTRAVENOUS
Status: DISCONTINUED | OUTPATIENT
Start: 2024-07-01 | End: 2024-07-01 | Stop reason: HOSPADM

## 2024-07-01 RX ORDER — PROPOFOL 10 MG/ML
VIAL (ML) INTRAVENOUS
Status: DISCONTINUED | OUTPATIENT
Start: 2024-07-01 | End: 2024-07-01

## 2024-07-01 RX ORDER — OXYCODONE AND ACETAMINOPHEN 5; 325 MG/1; MG/1
1 TABLET ORAL EVERY 4 HOURS PRN
Status: DISCONTINUED | OUTPATIENT
Start: 2024-07-01 | End: 2024-07-01 | Stop reason: HOSPADM

## 2024-07-01 RX ORDER — HYDROMORPHONE HYDROCHLORIDE 2 MG/ML
0.2 INJECTION, SOLUTION INTRAMUSCULAR; INTRAVENOUS; SUBCUTANEOUS EVERY 5 MIN PRN
Status: COMPLETED | OUTPATIENT
Start: 2024-07-01 | End: 2024-07-01

## 2024-07-01 RX ORDER — KETAMINE HCL IN 0.9 % NACL 50 MG/5 ML
SYRINGE (ML) INTRAVENOUS
Status: DISCONTINUED | OUTPATIENT
Start: 2024-07-01 | End: 2024-07-01

## 2024-07-01 RX ORDER — SCOLOPAMINE TRANSDERMAL SYSTEM 1 MG/1
1 PATCH, EXTENDED RELEASE TRANSDERMAL
Status: DISCONTINUED | OUTPATIENT
Start: 2024-07-01 | End: 2024-07-01 | Stop reason: HOSPADM

## 2024-07-01 RX ORDER — ROCURONIUM BROMIDE 10 MG/ML
INJECTION, SOLUTION INTRAVENOUS
Status: DISCONTINUED | OUTPATIENT
Start: 2024-07-01 | End: 2024-07-01

## 2024-07-01 RX ORDER — PHENYLEPHRINE HYDROCHLORIDE 10 MG/ML
INJECTION INTRAVENOUS
Status: DISCONTINUED | OUTPATIENT
Start: 2024-07-01 | End: 2024-07-01

## 2024-07-01 RX ORDER — LIDOCAINE HYDROCHLORIDE 20 MG/ML
INJECTION, SOLUTION EPIDURAL; INFILTRATION; INTRACAUDAL; PERINEURAL
Status: DISCONTINUED | OUTPATIENT
Start: 2024-07-01 | End: 2024-07-01

## 2024-07-01 RX ORDER — DEXMEDETOMIDINE HYDROCHLORIDE 100 UG/ML
INJECTION, SOLUTION INTRAVENOUS
Status: DISCONTINUED | OUTPATIENT
Start: 2024-07-01 | End: 2024-07-01

## 2024-07-01 RX ORDER — MUPIROCIN 20 MG/G
OINTMENT TOPICAL
Status: DISCONTINUED | OUTPATIENT
Start: 2024-07-01 | End: 2024-07-01 | Stop reason: HOSPADM

## 2024-07-01 RX ORDER — OXYCODONE AND ACETAMINOPHEN 5; 325 MG/1; MG/1
1 TABLET ORAL EVERY 4 HOURS PRN
Qty: 20 TABLET | Refills: 0 | Status: SHIPPED | OUTPATIENT
Start: 2024-07-01

## 2024-07-01 RX ORDER — HYDROMORPHONE HYDROCHLORIDE 2 MG/ML
1 INJECTION, SOLUTION INTRAMUSCULAR; INTRAVENOUS; SUBCUTANEOUS EVERY 6 HOURS PRN
Status: DISCONTINUED | OUTPATIENT
Start: 2024-07-01 | End: 2024-07-01 | Stop reason: HOSPADM

## 2024-07-01 RX ORDER — ONDANSETRON 8 MG/1
8 TABLET, ORALLY DISINTEGRATING ORAL EVERY 8 HOURS PRN
Status: DISCONTINUED | OUTPATIENT
Start: 2024-07-01 | End: 2024-07-01 | Stop reason: HOSPADM

## 2024-07-01 RX ORDER — DEXAMETHASONE SODIUM PHOSPHATE 4 MG/ML
INJECTION, SOLUTION INTRA-ARTICULAR; INTRALESIONAL; INTRAMUSCULAR; INTRAVENOUS; SOFT TISSUE
Status: DISCONTINUED | OUTPATIENT
Start: 2024-07-01 | End: 2024-07-01

## 2024-07-01 RX ORDER — SIMETHICONE 80 MG
80 TABLET,CHEWABLE ORAL EVERY 4 HOURS PRN
Status: DISCONTINUED | OUTPATIENT
Start: 2024-07-01 | End: 2024-07-01 | Stop reason: HOSPADM

## 2024-07-01 RX ORDER — KETOROLAC TROMETHAMINE 30 MG/ML
INJECTION, SOLUTION INTRAMUSCULAR; INTRAVENOUS
Status: DISCONTINUED | OUTPATIENT
Start: 2024-07-01 | End: 2024-07-01

## 2024-07-01 RX ORDER — ONDANSETRON HYDROCHLORIDE 2 MG/ML
INJECTION, SOLUTION INTRAVENOUS
Status: DISCONTINUED | OUTPATIENT
Start: 2024-07-01 | End: 2024-07-01

## 2024-07-01 RX ORDER — CHLORHEXIDINE GLUCONATE ORAL RINSE 1.2 MG/ML
10 SOLUTION DENTAL 2 TIMES DAILY
Status: DISCONTINUED | OUTPATIENT
Start: 2024-07-01 | End: 2024-07-01 | Stop reason: HOSPADM

## 2024-07-01 RX ORDER — DIPHENHYDRAMINE HCL 25 MG
25 CAPSULE ORAL EVERY 4 HOURS PRN
Status: DISCONTINUED | OUTPATIENT
Start: 2024-07-01 | End: 2024-07-01 | Stop reason: HOSPADM

## 2024-07-01 RX ORDER — ONDANSETRON HYDROCHLORIDE 2 MG/ML
4 INJECTION, SOLUTION INTRAVENOUS DAILY PRN
Status: DISCONTINUED | OUTPATIENT
Start: 2024-07-01 | End: 2024-07-01 | Stop reason: HOSPADM

## 2024-07-01 RX ORDER — FENTANYL CITRATE 50 UG/ML
INJECTION, SOLUTION INTRAMUSCULAR; INTRAVENOUS
Status: DISCONTINUED | OUTPATIENT
Start: 2024-07-01 | End: 2024-07-01

## 2024-07-01 RX ADMIN — Medication 150 MG: at 07:07

## 2024-07-01 RX ADMIN — LIDOCAINE HYDROCHLORIDE 100 MG: 20 INJECTION, SOLUTION EPIDURAL; INFILTRATION; INTRACAUDAL; PERINEURAL at 07:07

## 2024-07-01 RX ADMIN — HYDROMORPHONE HYDROCHLORIDE 0.2 MG: 2 INJECTION INTRAMUSCULAR; INTRAVENOUS; SUBCUTANEOUS at 11:07

## 2024-07-01 RX ADMIN — DEXMEDETOMIDINE 4 MCG: 200 INJECTION, SOLUTION INTRAVENOUS at 08:07

## 2024-07-01 RX ADMIN — CEFAZOLIN 2 G: 330 INJECTION, POWDER, FOR SOLUTION INTRAMUSCULAR; INTRAVENOUS at 07:07

## 2024-07-01 RX ADMIN — Medication 50 MG: at 07:07

## 2024-07-01 RX ADMIN — PHENYLEPHRINE HYDROCHLORIDE 200 MCG: 10 INJECTION INTRAVENOUS at 08:07

## 2024-07-01 RX ADMIN — SUGAMMADEX 100 MG: 100 INJECTION, SOLUTION INTRAVENOUS at 09:07

## 2024-07-01 RX ADMIN — ROCURONIUM BROMIDE 30 MG: 10 SOLUTION INTRAVENOUS at 07:07

## 2024-07-01 RX ADMIN — GLYCOPYRROLATE 0.2 MG: 0.2 INJECTION, SOLUTION INTRAMUSCULAR; INTRAVENOUS at 09:07

## 2024-07-01 RX ADMIN — HYDROMORPHONE HYDROCHLORIDE 0.2 MG: 2 INJECTION INTRAMUSCULAR; INTRAVENOUS; SUBCUTANEOUS at 10:07

## 2024-07-01 RX ADMIN — ONDANSETRON 4 MG: 2 INJECTION INTRAMUSCULAR; INTRAVENOUS at 09:07

## 2024-07-01 RX ADMIN — SODIUM CHLORIDE, POTASSIUM CHLORIDE, SODIUM LACTATE AND CALCIUM CHLORIDE: 600; 310; 30; 20 INJECTION, SOLUTION INTRAVENOUS at 07:07

## 2024-07-01 RX ADMIN — DEXMEDETOMIDINE 6 MCG: 200 INJECTION, SOLUTION INTRAVENOUS at 08:07

## 2024-07-01 RX ADMIN — PHENYLEPHRINE HYDROCHLORIDE 100 MCG: 10 INJECTION INTRAVENOUS at 09:07

## 2024-07-01 RX ADMIN — FENTANYL CITRATE 50 MCG: 50 INJECTION, SOLUTION INTRAMUSCULAR; INTRAVENOUS at 08:07

## 2024-07-01 RX ADMIN — Medication 20 MG: at 08:07

## 2024-07-01 RX ADMIN — SCOPOLAMINE 1 PATCH: 1.5 PATCH, EXTENDED RELEASE TRANSDERMAL at 06:07

## 2024-07-01 RX ADMIN — KETOROLAC TROMETHAMINE 30 MG: 30 INJECTION, SOLUTION INTRAMUSCULAR; INTRAVENOUS at 09:07

## 2024-07-01 RX ADMIN — SODIUM CHLORIDE, POTASSIUM CHLORIDE, SODIUM LACTATE AND CALCIUM CHLORIDE: 600; 310; 30; 20 INJECTION, SOLUTION INTRAVENOUS at 08:07

## 2024-07-01 RX ADMIN — MIDAZOLAM HYDROCHLORIDE 2 MG: 1 INJECTION, SOLUTION INTRAMUSCULAR; INTRAVENOUS at 07:07

## 2024-07-01 RX ADMIN — DEXAMETHASONE SODIUM PHOSPHATE 8 MG: 4 INJECTION, SOLUTION INTRA-ARTICULAR; INTRALESIONAL; INTRAMUSCULAR; INTRAVENOUS; SOFT TISSUE at 07:07

## 2024-07-01 RX ADMIN — FAMOTIDINE 20 MG: 20 TABLET ORAL at 06:07

## 2024-07-01 RX ADMIN — DEXMEDETOMIDINE 6 MCG: 200 INJECTION, SOLUTION INTRAVENOUS at 07:07

## 2024-07-01 RX ADMIN — FENTANYL CITRATE 50 MCG: 50 INJECTION, SOLUTION INTRAMUSCULAR; INTRAVENOUS at 07:07

## 2024-07-01 RX ADMIN — ROCURONIUM BROMIDE 20 MG: 10 SOLUTION INTRAVENOUS at 08:07

## 2024-07-01 NOTE — ANESTHESIA PREPROCEDURE EVALUATION
2024  Samanta Espinoza is a 39 y.o., female.  Patient Active Problem List   Diagnosis    Autosomal recessive polycystic kidneys    GERD (gastroesophageal reflux disease)    Liver cyst    Migraine headache    Allergic rhinitis    Pineal gland cyst    Desmoplastic malignant melanoma    Hypertriglyceridemia    Trigger finger of right thumb    De Quervain's tenosynovitis, right    Anxiety    Essential hypertension    Allergy, unspecified, initial encounter    Memory loss    Anemia     Past Surgical History:   Procedure Laterality Date     SECTION N/A 2019    Procedure:  SECTION;  Surgeon: Grazyna Price MD;  Location: [facility] L&D;  Service: OB/GYN;  Laterality: N/A;     SECTION N/A 2022    Procedure:  SECTION;  Surgeon: Grazyna Price MD;  Location: [facility] L&D;  Service: OB/GYN;  Laterality: N/A;     SECTION  19,22    HYSTEROSCOPIC POLYPECTOMY OF UTERUS N/A 01/10/2019    Procedure: POLYPECTOMY, UTERUS, HYSTEROSCOPIC;  Surgeon: Jolanta Sifuentes MD;  Location: [facility];  Service: OB/GYN;  Laterality: N/A;    LYMPH NODE BIOPSY      MALIGNANT SKIN LESION EXCISION  06/10/2016    NASAL SEPTUM SURGERY      RENAL EXPLORATION           Pre-op Assessment    I have reviewed the Patient Summary Reports.    I have reviewed the NPO Status.   I have reviewed the Medications.     Review of Systems  Anesthesia Hx:  No problems with previous Anesthesia                Social:  Non-Smoker       Hematology/Oncology:       -- Anemia:                                  Cardiovascular:     Hypertension                                        Pulmonary:  Pulmonary Normal                       Renal/:  Renal/ Normal                 Hepatic/GI:     GERD             OB/GYN/PEDS:  Menorrhagia with regular cycle           Neurological:      Headaches                                  Endocrine:  Endocrine Normal                Physical Exam  General: Well nourished    Airway:  Mallampati: II   Mouth Opening: Normal  TM Distance: Normal  Neck ROM: Normal ROM    Dental:  Intact        Anesthesia Plan  Type of Anesthesia, risks & benefits discussed:    Anesthesia Type: Gen ETT  Intra-op Monitoring Plan: Standard ASA Monitors  Post Op Pain Control Plan: multimodal analgesia  Induction:  IV  Airway Plan: , Post-Induction  Informed Consent: Informed consent signed with the Patient and all parties understand the risks and agree with anesthesia plan.  All questions answered.   ASA Score: 2    Ready For Surgery From Anesthesia Perspective.     .      Chemistry        Component Value Date/Time     06/24/2024 1642    K 4.3 06/24/2024 1642     06/24/2024 1642    CO2 24 06/24/2024 1642    BUN 15 06/24/2024 1642    CREATININE 0.8 06/24/2024 1642    GLU 73 06/24/2024 1642        Component Value Date/Time    CALCIUM 9.9 06/24/2024 1642    ALKPHOS 59 12/15/2023 1037    AST 15 12/15/2023 1037    ALT 14 12/15/2023 1037    BILITOT 0.3 12/15/2023 1037    ESTGFRAFRICA >60.0 07/28/2022 1250    EGFRNONAA >60.0 07/28/2022 1250        Lab Results   Component Value Date    WBC 7.84 06/24/2024    HGB 13.7 06/24/2024    HCT 41.1 06/24/2024    MCV 87 06/24/2024     06/24/2024

## 2024-07-01 NOTE — DISCHARGE SUMMARY
Discharge Note  Short Stay      SUMMARY     Admit Date: 7/1/2024    Attending Physician: Vikas Park MD     Discharge Physician: Vikas Park MD    Discharge Date: 7/1/2024 9:57 AM    Final Diagnosis:   1. Status post laparoscopic hysterectomy    2. Menorrhagia with regular cycle    3. Hydroureter without obstruction        Disposition: Home or Self Care    Condition:  Stable    Hospital Course:  Pt was admitted for scheduled hysterectomy.  RALH/Bilateral Salpingectomy/Cystoscopy was performed without complications.  Post-operative course was unremarkable and pt recovered well.  Pt was discharged on request and upon meeting all discharge criteria.  Pt was counseled on post-operative care and warning sign instructions prior to her discharge.      Patient Instructions:   Current Discharge Medication List        START taking these medications    Details   oxyCODONE-acetaminophen (PERCOCET) 5-325 mg per tablet Take 1 tablet by mouth every 4 (four) hours as needed for Pain.  Qty: 20 tablet, Refills: 0    Comments: Quantity prescribed more than 7 day supply? No  Associated Diagnoses: Status post laparoscopic hysterectomy           CONTINUE these medications which have NOT CHANGED    Details   ALPRAZolam (XANAX) 0.25 MG tablet TAKE 1-2 TABLETS AS NEEDED FOR FLIGHT ANXIETY  Qty: 30 tablet, Refills: 0    Associated Diagnoses: Anxiety with flying      cetirizine (ZYRTEC) 10 MG tablet       ferrous sulfate (IRON ORAL) Take by mouth.      omeprazole (PRILOSEC) 20 MG capsule Take 1 capsule (20 mg total) by mouth once daily.  Qty: 90 capsule, Refills: 4    Associated Diagnoses: Gastroesophageal reflux disease without esophagitis      propranoloL (INDERAL) 40 MG tablet Take 1 tablet (40 mg total) by mouth 2 (two) times daily.  Qty: 180 tablet, Refills: 4    Comments: .  Associated Diagnoses: Migraine without aura and without status migrainosus, not intractable      rizatriptan (MAXALT) 10 MG tablet Take 1 tablet (10 mg  total) by mouth as needed for Migraine (max 2 times a week).  Qty: 9 tablet, Refills: 3    Associated Diagnoses: Migraine without aura and without status migrainosus, not intractable      sertraline (ZOLOFT) 100 MG tablet Take 1 tablet (100 mg total) by mouth once daily.  Qty: 90 tablet, Refills: 4    Associated Diagnoses: Anxiety      tirzepatide (MOUNJARO) 10 mg/0.5 mL PnIj Inject 10 mg into the skin every 7 days.      ondansetron (ZOFRAN-ODT) 4 MG TbDL PLACE 1 TO 2 TABLETS UNDER TONGUE EVERY 8 HOURS AS NEEDED FOR NAUSEA  Qty: 30 tablet, Refills: 3    Associated Diagnoses: Nausea           STOP taking these medications       L norgest/e.estradioL-e.estrad (CAMRESE) 0.15 mg-30 mcg (84)/10 mcg (7) 3MPk Comments:   Reason for Stopping:               Discharge Procedure Orders (must include Diet, Follow-up, Activity)   Discharge Procedure Orders (must include Diet, Follow-up, Activity)   Ambulatory referral/consult to Urology   Standing Status: Future   Referral Priority: Routine Referral Type: Consultation   Referral Reason: Specialty Services Required   Referred to Provider: GRETCHEN MONTEMAYOR Requested Specialty: Urology   Number of Visits Requested: 1     Diet general     Other restrictions (specify):   Order Comments: Light activity x 4 weeks and pelvic rest x 12 weeks     Call MD for:  extreme fatigue     Call MD for:  persistent dizziness or light-headedness     Call MD for:  hives     Call MD for:  redness, tenderness, or signs of infection (pain, swelling, redness, odor or green/yellow discharge around incision site)     Call MD for:  difficulty breathing, headache or visual disturbances     Call MD for:  severe uncontrolled pain     Call MD for:  persistent nausea and vomiting     Call MD for:  temperature >100.4     No dressing needed         Follow-up Information       Vikas Park MD Follow up in 4 week(s).    Specialty: Obstetrics and Gynecology  Why: Post-operative visit  Contact  information:  [address] [facility] [place] [address]  [phone]               Sam Munoz MD Follow up in 2 week(s).    Specialty: Urology  Contact information:  [address]  [facility]  [place] [address]  [phone]

## 2024-07-01 NOTE — ANESTHESIA PROCEDURE NOTES
Intubation    Date/Time: 7/1/2024 7:47 AM    Performed by: Deena Vanessa CRNA  Authorized by: Fabian Aleman II, MD    Intubation:     Induction:  Intravenous    Intubated:  Postinduction    Mask Ventilation:  Easy mask    Attempts:  1    Attempted By:  CRNA    Method of Intubation:  Direct    Blade:  Tyson 2    Laryngeal View Grade: Grade I - full view of cords      Difficult Airway Encountered?: No      Complications:  None    Airway Device:  Oral endotracheal tube    Airway Device Size:  7.5    Style/Cuff Inflation:  Cuffed (inflated to minimal occlusive pressure)    Tube secured:  21    Secured at:  The lips    Placement Verified By:  Capnometry    Complicating Factors:  None    Findings Post-Intubation:  BS equal bilateral and atraumatic/condition of teeth unchanged

## 2024-07-01 NOTE — OP NOTE
OPERATIVE NOTE    DATE:  07/01/2024    PRE-PROCEDURE COUNSELING:  Patient counseled on the risks, benefits, and alternatives to procedure.  Please see preoperative consents.   SCDs were applied and working prior to anesthesia induction.                                                                                    PREOPERATIVE DIAGNOSES:   Menorrhagia  History of Polycystic Kidney Disease                                                                               POSTOPERATIVE DIAGNOSES:    Menorrhagia  History of Polycystic Kidney Disease  Right hydroureter                                                                               ANESTHESIA:  General Endotracheal Anesthesia    PROCEDURE:    Robot-Assisted Laparoscopic Hysterectomy  Bilateral Salpingectomy   Cystoscopy    SURGEON:  Vikas Park M.D.                                                                                               ASSISTANT:  DIMAS Mccollum                                                                                                                                    FINDINGS: Small uterus with no descent; normal appearing tubes and ovaries; significantly dilated right pelvic ureter with normal appearing left ureter; normal appearing bladder without lesions or evidence of injury and bilateral ureteral openings with adequate efflux visualized from both ureters    SPECIMEN: Uterus, Cervix, Tubes    EBL:  25 mL    COMPLICATIONS:  None    IMPLANTS:  None                                                                               PROCEDURE IN DETAIL:    After discussion of the risks, benefits and alternatives, the patient understood the potential risks and complications and signed consents were reviewed. Patient was given preoperative sequential compression devices and antibiotics and was taken to the Operating Room where the general endotracheal anesthesia was administered and found to be adequate.  She was prepped and  draped in routine fashion. The YAHIR manipulator was applied in routine fashion.  Attention was turned to the abdomen where the anterior abdominal wall was grasped with towel clamps and elevated.  Veress needle was introduced through the umbilicus and abdomen was insufflated with CO2 gas to 15 mmHg.  An 8 mm incision was made LUQ and a 8 mm trocar was placed into the abdomen. Under direct visualization, ancillary ports were placed.  This included a total of four 8 mm lateral ports (two in the LUQ and two in the RUQ).  The patient was noted to be in correct anatomical position, placed in Trendelenburg, and robotic operating system was advanced towards the patient. Robotic arms were attached to trocar ends and the operating instruments were placed into the abdomen under direct visualization from the console.This included Bipolar [place] for cautery and EndoShears for cold-cut transection.  A survey of the pelvis was made. All pedicles were surgically transected in a similar fashion: with bipolar cautery followed by a cold cut transection.      The path of the ureter was visualized bilaterally.  The left ureter appeared normal, however, the right pelvic ureter was noted to be significantly dilated between the IP and the uterine artery.  Normal appearing peristalsis was noted from both ureters.     Next, the utero-ovarian ligament, salpinx, mesosalpinx, round ligament were cauterized and transected followed by successive pedicles of the posterior broad ligament, round ligament and carried through the anterior broad ligament to create a bladder flap. Same was performed on the other side and the uterine arteries were skeletonized on both sides. Anterior followed by posterior colpotomies were made.  Next, the left uterine artery was then cauterized and transected followed by successive pedicles of the cardinal ligament to reach the colpotomy site. The same was performed on the right side taking care to avoid the distended  ureter. The uterus and cervix were removed through the vagina and sent to pathology.  The vaginal incision was then approximated with 0 Vicryl in a running locked fashion.  Lapra-Tys were applied at the ends of the incision, and an intracorporal knot was tied in the center of the incision.  Irrigation, desufflation and reinsufflation confirmed hemostasis.      Attention was then turned to Cystoscopy.  Jolley was removed and 30 degree Cystoscopy was gently inserted through the urethra.  Bladder was distended with normal saline.  Survey of the bladder revealed a normal appearance without evidence of lesions or sites of injury.  Both ureteral openings were visualized and adequate efflux noted from both ureters.  Cystoscope was removed and bladder drained.  Discussed case with Urology (Dr. Munoz) who was not able to officially consult secondary to being scrubbed into a different case.  Recommends outpatient follow up with CT Urogram.      All instruments were removed and the abdomen deflated.  All skin wounds were closed with 4-0 Moncryl and supported with Dermabond.  Lap, needle and instrument counts were correct x 2.       Patient was sent to the recovery room in stable condition.

## 2024-07-01 NOTE — TRANSFER OF CARE
"Anesthesia Transfer of Care Note    Patient: Samanta Espinoza    Procedure(s) Performed: Procedure(s) (LRB):  XI ROBOTIC HYSTERECTOMY,WITH SALPINGECTOMY (Bilateral)  CYSTOSCOPY (N/A)    Patient location: PACU    Anesthesia Type: general    Transport from OR: Transported from OR on room air with adequate spontaneous ventilation    Post pain: adequate analgesia    Post assessment: no apparent anesthetic complications    Post vital signs: stable    Level of consciousness: sedated    Nausea/Vomiting: no nausea/vomiting    Complications: none    Transfer of care protocol was followed      Last vitals: Visit Vitals  /87 (BP Location: Right arm, Patient Position: Sitting)   Pulse 80   Temp 36.7 °C (98.1 °F) (Temporal)   Resp 18   Ht 5' 2" (1.575 m)   Wt 67.2 kg (148 lb 2.4 oz)   LMP 05/13/2024   SpO2 100%   Breastfeeding No   BMI 27.10 kg/m²     "

## 2024-07-02 NOTE — ANESTHESIA POSTPROCEDURE EVALUATION
Anesthesia Post Evaluation    Patient: Samanta Espinoza    Procedure(s) Performed: Procedure(s) (LRB):  XI ROBOTIC HYSTERECTOMY,WITH SALPINGECTOMY (Bilateral)  CYSTOSCOPY (N/A)    Final Anesthesia Type: general      Patient location during evaluation: PACU  Patient participation: Yes- Able to Participate  Level of consciousness: awake and alert  Post-procedure vital signs: reviewed and stable  Pain management: adequate  Airway patency: patent  JOSE mitigation strategies: Verification of full reversal of neuromuscular block  PONV status at discharge: No PONV  Anesthetic complications: no      Cardiovascular status: hemodynamically stable  Respiratory status: spontaneous ventilation  Hydration status: euvolemic  Follow-up not needed.              Vitals Value Taken Time   /76 07/01/24 1115   Temp 36.2 °C (97.1 °F) 07/01/24 1000   Pulse 85 07/01/24 1119   Resp 7 07/01/24 1118   SpO2 98 % 07/01/24 1119   Vitals shown include unfiled device data.      Event Time   Out of Recovery 11:21:17         Pain/Annette Score: Pain Rating Prior to Med Admin: 6 (7/1/2024 11:20 AM)  Annette Score: 10 (7/1/2024 11:25 AM)

## 2024-07-03 LAB
FINAL PATHOLOGIC DIAGNOSIS: NORMAL
GROSS: NORMAL
Lab: NORMAL

## 2024-07-10 ENCOUNTER — OFFICE VISIT (OUTPATIENT)
Dept: UROLOGY | Facility: CLINIC | Age: 39
End: 2024-07-10
Payer: COMMERCIAL

## 2024-07-10 ENCOUNTER — PATIENT MESSAGE (OUTPATIENT)
Dept: UROLOGY | Facility: CLINIC | Age: 39
End: 2024-07-10

## 2024-07-10 VITALS
BODY MASS INDEX: 27.14 KG/M2 | DIASTOLIC BLOOD PRESSURE: 86 MMHG | HEIGHT: 62 IN | WEIGHT: 147.5 LBS | SYSTOLIC BLOOD PRESSURE: 125 MMHG | HEART RATE: 88 BPM

## 2024-07-10 DIAGNOSIS — N13.4: ICD-10-CM

## 2024-07-10 DIAGNOSIS — N22 CALCULUS OF URINARY TRACT IN DISEASES CLASSIFIED ELSEWHERE: Primary | ICD-10-CM

## 2024-07-10 LAB
BILIRUB UR QL STRIP: NEGATIVE
GLUCOSE UR QL STRIP: NEGATIVE
KETONES UR QL STRIP: NEGATIVE
LEUKOCYTE ESTERASE UR QL STRIP: NEGATIVE
PH, POC UA: 7
POC BLOOD, URINE: NEGATIVE
POC NITRATES, URINE: NEGATIVE
PROT UR QL STRIP: NEGATIVE
SP GR UR STRIP: 1.02 (ref 1–1.03)
UROBILINOGEN UR STRIP-ACNC: 0.2 (ref 0.1–1.1)

## 2024-07-10 PROCEDURE — 99204 OFFICE O/P NEW MOD 45 MIN: CPT | Mod: S$GLB,,, | Performed by: UROLOGY

## 2024-07-10 PROCEDURE — 3074F SYST BP LT 130 MM HG: CPT | Mod: CPTII,S$GLB,, | Performed by: UROLOGY

## 2024-07-10 PROCEDURE — 3008F BODY MASS INDEX DOCD: CPT | Mod: CPTII,S$GLB,, | Performed by: UROLOGY

## 2024-07-10 PROCEDURE — 3079F DIAST BP 80-89 MM HG: CPT | Mod: CPTII,S$GLB,, | Performed by: UROLOGY

## 2024-07-10 PROCEDURE — 1160F RVW MEDS BY RX/DR IN RCRD: CPT | Mod: CPTII,S$GLB,, | Performed by: UROLOGY

## 2024-07-10 PROCEDURE — 1159F MED LIST DOCD IN RCRD: CPT | Mod: CPTII,S$GLB,, | Performed by: UROLOGY

## 2024-07-10 PROCEDURE — 99999 PR PBB SHADOW E&M-EST. PATIENT-LVL IV: CPT | Mod: PBBFAC,,, | Performed by: UROLOGY

## 2024-07-10 PROCEDURE — 81003 URINALYSIS AUTO W/O SCOPE: CPT | Mod: QW,S$GLB,, | Performed by: UROLOGY

## 2024-07-10 RX ORDER — SULFAMETHOXAZOLE AND TRIMETHOPRIM 800; 160 MG/1; MG/1
1 TABLET ORAL 2 TIMES DAILY
Qty: 10 TABLET | Refills: 0 | Status: SHIPPED | OUTPATIENT
Start: 2024-07-10 | End: 2024-07-15

## 2024-07-10 NOTE — PROGRESS NOTES
Chief Complaint: right hydroureter    HPI:   Samanta Espinoza is a 39 y.o. female with a history of autosomal recessive polycystic kidney disease that presents today as a referral from Dr. Park for right hydroureter.  Patient underwent robotic hysterectomy 7/1 for menorrhagia and during surgery it was noted that she had significantly dilated right ureter in the pelvis.  Normal peristalsis was noted bilaterally.  The procedure was completed and cystoscopy was performed by Dr. Park with efflux of urine bilaterally.  I recommended follow up as an outpatient to discuss further evaluation.  She presents today for discussion.  No issues since surgery, pain is well-controlled, voiding normally.  She has a good flow and feels like she empties.  She denies gross hematuria or dysuria.  Denies UTIs or pyelonephritis.  Does have a history significant for stones, has had 10 over the last 20 years.  Has not had imaging in quite some time.  Feels like she passed a stone a few weeks ago.  No prior stone interventions but did undergo cystoscopy and prior ureteroscopy possibly for the same right hydroureter.  No family history of  cancers.      PMH:  Past Medical History:   Diagnosis Date    Acute cough 11/29/2022    Anemia     Autosomal recessive polycystic kidneys     Chronic hypertension 5/11/2022    Labetalol started 6/20/20    GERD (gastroesophageal reflux disease)     High risk pregnancy, multigravida of advanced maternal age in third trimester 5/8/2022    History of uterine fibroid     Hyperlipidemia     Hypertension     Liver disease     Mastitis 10/29/2019    Melanoma 04/25/2016    left superior helix (breslow dept 0.67mm)    Mental disorder     Migraine headache     PONV (postoperative nausea and vomiting)     post op nausea    Postpartum depression     Pregnancy resulting from in vitro fertilization in third trimester 5/8/2022    [facility] IVF with [facility], Dr STACEY Ivy       PSH:  Past Surgical  History:   Procedure Laterality Date     SECTION N/A 2019    Procedure:  SECTION;  Surgeon: Grazyna Price MD;  Location: [facility] L&D;  Service: OB/GYN;  Laterality: N/A;     SECTION N/A 2022    Procedure:  SECTION;  Surgeon: Graznya Price MD;  Location: [facility] L&D;  Service: OB/GYN;  Laterality: N/A;     SECTION  19,22    CYSTOSCOPY N/A 2024    Procedure: CYSTOSCOPY;  Surgeon: Vikas Park MD;  Location: [facility] OR;  Service: OB/GYN;  Laterality: N/A;    HYSTEROSCOPIC POLYPECTOMY OF UTERUS N/A 01/10/2019    Procedure: POLYPECTOMY, UTERUS, HYSTEROSCOPIC;  Surgeon: Jolanta Sifuentes MD;  Location: [facility] OR;  Service: OB/GYN;  Laterality: N/A;    LYMPH NODE BIOPSY      MALIGNANT SKIN LESION EXCISION  06/10/2016    NASAL SEPTUM SURGERY      RENAL EXPLORATION  2011 ROBOTIC HYSTERECTOMY, WITH SALPINGECTOMY Bilateral 2024    Procedure: XI ROBOTIC HYSTERECTOMY,WITH SALPINGECTOMY;  Surgeon: Vikas Park MD;  Location: [facility] OR;  Service: OB/GYN;  Laterality: Bilateral;       Family History:  Family History   Problem Relation Name Age of Onset    Hypertension Father Fabian robles     Kidney disease Father Fabian robles     Diabetes Paternal Grandmother Donna robles     Cancer Maternal Grandmother Yamileth parker     Cancer Maternal Uncle Bethel hooper     Thrombophilia Neg Hx      Breast cancer Neg Hx      Colon cancer Neg Hx      Ovarian cancer Neg Hx      Eczema Neg Hx      Lupus Neg Hx      Psoriasis Neg Hx      Melanoma Neg Hx      Uterine cancer Neg Hx      Cervical cancer Neg Hx         Social History:  Social History     Tobacco Use    Smoking status: Never    Smokeless tobacco: Never   Substance Use Topics    Alcohol use: Yes     Alcohol/week: 2.0 standard drinks of alcohol     Types: 2 Cans of beer per week     Comment: socially; hold today    Drug use: Never        Review of Systems:  General: No fever, chills  Skin: No rashes  Chest:   Denies cough and sputum production  Heart: Denies chest pain  Resp: Denies dyspnea  Abdomen: Denies diarrhea, abdominal pain, hematemesis, or blood in stool.  Musculoskeletal: No joint stiffness or swelling. Denies back pain.  : see HPI  Neuro: no dizziness or weakness    Allergies:  Singulair [montelukast]    Medications:    Current Outpatient Medications:     ALPRAZolam (XANAX) 0.25 MG tablet, TAKE 1-2 TABLETS AS NEEDED FOR FLIGHT ANXIETY, Disp: 30 tablet, Rfl: 0    cetirizine (ZYRTEC) 10 MG tablet, , Disp: , Rfl:     ferrous sulfate (IRON ORAL), Take by mouth., Disp: , Rfl:     omeprazole (PRILOSEC) 20 MG capsule, Take 1 capsule (20 mg total) by mouth once daily., Disp: 90 capsule, Rfl: 4    ondansetron (ZOFRAN-ODT) 4 MG TbDL, PLACE 1 TO 2 TABLETS UNDER TONGUE EVERY 8 HOURS AS NEEDED FOR NAUSEA, Disp: 30 tablet, Rfl: 3    oxyCODONE-acetaminophen (PERCOCET) 5-325 mg per tablet, Take 1 tablet by mouth every 4 (four) hours as needed for Pain., Disp: 20 tablet, Rfl: 0    propranoloL (INDERAL) 40 MG tablet, Take 1 tablet (40 mg total) by mouth 2 (two) times daily., Disp: 180 tablet, Rfl: 4    rizatriptan (MAXALT) 10 MG tablet, Take 1 tablet (10 mg total) by mouth as needed for Migraine (max 2 times a week)., Disp: 9 tablet, Rfl: 3    sertraline (ZOLOFT) 100 MG tablet, Take 1 tablet (100 mg total) by mouth once daily. (Patient taking differently: Take 100 mg by mouth every evening.), Disp: 90 tablet, Rfl: 4    tirzepatide (MOUNJARO) 10 mg/0.5 mL PnIj, Inject 10 mg into the skin every 7 days., Disp: , Rfl:     sulfamethoxazole-trimethoprim 800-160mg (BACTRIM DS) 800-160 mg Tab, Take 1 tablet by mouth 2 (two) times daily for 5 days, Disp: 10 tablet, Rfl: 0    Physical Exam:  Vitals:    07/10/24 1019   BP: 125/86   Pulse: 88     Body mass index is 26.98 kg/m².  General: awake, alert, cooperative  Head: NC/AT  Ears: external ears normal  Eyes: sclera normal  Lungs: normal inspiration, NAD  Heart: well-perfused  Skin: The  skin is warm and dry  Ext: No c/c/e.  Neuro: grossly intact, AOx3    RADIOLOGY:  No recent relevant imaging available for review.    LABS:  I personally reviewed the following lab values:  Lab Results   Component Value Date    WBC 7.84 06/24/2024    HGB 13.7 06/24/2024    HCT 41.1 06/24/2024     06/24/2024     06/24/2024    K 4.3 06/24/2024     06/24/2024    CREATININE 0.8 06/24/2024    BUN 15 06/24/2024    CO2 24 06/24/2024    TSH 2.717 12/15/2023    TSH 2.717 12/15/2023    HGBA1C 5.0 12/15/2023    CHOL 168 12/15/2023    TRIG 156 (H) 12/15/2023    HDL 37 (L) 12/15/2023    ALT 14 12/15/2023    AST 15 12/15/2023       URINALYSIS:  Urinalysis obtained in clinic today specific gravity 1.020 pH seven, negative for all parameters    Assessment/Plan:   Samanta Espinoza is a 39 y.o. female with:    Right hydroureter - obtain CT urogram, follow-up for discussion, if this is present, we will likely obtain Lasix renogram prior to her next visit    Kidney stones - CT urogram will also fully assess stone burden, can discuss operative interventions if necessary at next visit    Thank you for allowing me the opportunity to participate in this patient's care.     Sam Munoz MD  Urology

## 2024-07-15 ENCOUNTER — PATIENT MESSAGE (OUTPATIENT)
Dept: OBSTETRICS AND GYNECOLOGY | Facility: CLINIC | Age: 39
End: 2024-07-15
Payer: COMMERCIAL

## 2024-07-15 NOTE — TELEPHONE ENCOUNTER
Called and spoke with pt.  Pt states she was given Bactrim when she saw Dr. Munoz to cover for a possible wound infection. She has completed the course of Bactrim today and she wanted to discuss options.  Denies fever or drainage.  Pt advised that pictures she provided are non-specific and that it is hard to determine if they are actively infected vs healing associated with separation and infection that has already been treated.  Recommend keeping the wound clean and dry and to monitor progress.  Will monitor progress over coming days and pt advised to contact office if symptoms worsen or fail to improve.

## 2024-08-05 ENCOUNTER — OFFICE VISIT (OUTPATIENT)
Dept: OBSTETRICS AND GYNECOLOGY | Facility: CLINIC | Age: 39
End: 2024-08-05
Payer: COMMERCIAL

## 2024-08-05 VITALS
BODY MASS INDEX: 27.67 KG/M2 | WEIGHT: 150.38 LBS | SYSTOLIC BLOOD PRESSURE: 110 MMHG | DIASTOLIC BLOOD PRESSURE: 80 MMHG | HEIGHT: 62 IN

## 2024-08-05 DIAGNOSIS — N13.4: ICD-10-CM

## 2024-08-05 DIAGNOSIS — Z90.710 STATUS POST LAPAROSCOPIC HYSTERECTOMY: Primary | ICD-10-CM

## 2024-08-05 PROBLEM — N92.0 MENORRHAGIA WITH REGULAR CYCLE: Status: RESOLVED | Noted: 2024-07-01 | Resolved: 2024-08-05

## 2024-08-05 PROCEDURE — 3079F DIAST BP 80-89 MM HG: CPT | Mod: CPTII,S$GLB,, | Performed by: OBSTETRICS & GYNECOLOGY

## 2024-08-05 PROCEDURE — 1159F MED LIST DOCD IN RCRD: CPT | Mod: CPTII,S$GLB,, | Performed by: OBSTETRICS & GYNECOLOGY

## 2024-08-05 PROCEDURE — 99999 PR PBB SHADOW E&M-EST. PATIENT-LVL III: CPT | Mod: PBBFAC,,, | Performed by: OBSTETRICS & GYNECOLOGY

## 2024-08-05 PROCEDURE — 3074F SYST BP LT 130 MM HG: CPT | Mod: CPTII,S$GLB,, | Performed by: OBSTETRICS & GYNECOLOGY

## 2024-08-05 PROCEDURE — 99024 POSTOP FOLLOW-UP VISIT: CPT | Mod: S$GLB,,, | Performed by: OBSTETRICS & GYNECOLOGY

## 2024-08-05 PROCEDURE — 1160F RVW MEDS BY RX/DR IN RCRD: CPT | Mod: CPTII,S$GLB,, | Performed by: OBSTETRICS & GYNECOLOGY

## 2024-08-07 ENCOUNTER — HOSPITAL ENCOUNTER (OUTPATIENT)
Dept: RADIOLOGY | Facility: HOSPITAL | Age: 39
Discharge: HOME | End: 2024-08-07
Attending: UROLOGY
Payer: COMMERCIAL

## 2024-08-07 DIAGNOSIS — N22 CALCULUS OF URINARY TRACT IN DISEASES CLASSIFIED ELSEWHERE: ICD-10-CM

## 2024-08-07 PROCEDURE — 25500020 PHARM REV CODE 255: Performed by: UROLOGY

## 2024-08-07 PROCEDURE — 74178 CT ABD&PLV WO CNTR FLWD CNTR: CPT | Mod: TC

## 2024-08-07 RX ADMIN — IOHEXOL 125 ML: 350 INJECTION, SOLUTION INTRAVENOUS at 02:08

## 2024-08-30 ENCOUNTER — OFFICE VISIT (OUTPATIENT)
Dept: UROLOGY | Facility: CLINIC | Age: 39
End: 2024-08-30
Payer: COMMERCIAL

## 2024-08-30 VITALS — BODY MASS INDEX: 27.59 KG/M2 | HEIGHT: 62 IN | WEIGHT: 149.94 LBS

## 2024-08-30 DIAGNOSIS — N13.4 HYDROURETER ON RIGHT: Primary | ICD-10-CM

## 2024-08-30 PROCEDURE — 99999 PR PBB SHADOW E&M-EST. PATIENT-LVL III: CPT | Mod: PBBFAC,,, | Performed by: UROLOGY

## 2024-08-30 NOTE — PROGRESS NOTES
Chief Complaint: right hydroureter    HPI:   08/30/2024 - returns today for follow-up and review of her CT was a duplicated right collecting system with dilation and atrophy of the upper pole moiety, no voiding issues, no gross hematuria or dysuria    07/10/2024 - 38 yo female with a history of autosomal recessive polycystic kidney disease that presents today for right hydroureter.  Patient underwent robotic hysterectomy 7/1 for menorrhagia and during surgery it was noted that she had significantly dilated right ureter in the pelvis.  Normal peristalsis was noted bilaterally.  The procedure was completed and cystoscopy was performed by Dr. Park with efflux of urine bilaterally.  I recommended follow up as an outpatient to discuss further evaluation.  She presents today for discussion.  No issues since surgery, pain is well-controlled, voiding normally.  She has a good flow and feels like she empties.  She denies gross hematuria or dysuria.  Denies UTIs or pyelonephritis.  Does have a history significant for stones, has had 10 over the last 20 years.  Has not had imaging in quite some time.  Feels like she passed a stone a few weeks ago.  No prior stone interventions but did undergo cystoscopy and prior ureteroscopy possibly for the same right hydroureter.  No family history of  cancers.      PMH:  Past Medical History:   Diagnosis Date    Acute cough 11/29/2022    Anemia     Autosomal recessive polycystic kidneys     Chronic hypertension 5/11/2022    Labetalol started 6/20/20    GERD (gastroesophageal reflux disease)     High risk pregnancy, multigravida of advanced maternal age in third trimester 5/8/2022    History of uterine fibroid     Hyperlipidemia     Hypertension     Liver disease     Mastitis 10/29/2019    Melanoma 04/25/2016    left superior helix (breslow dept 0.67mm)    Mental disorder     Migraine headache     PONV (postoperative nausea and vomiting)     post op nausea    Postpartum depression      Pregnancy resulting from in vitro fertilization in third trimester 2022    [facility] IVF with [facility], Dr STACEY Ivy       H:  Past Surgical History:   Procedure Laterality Date     SECTION N/A 2019    Procedure:  SECTION;  Surgeon: Grazyna Price MD;  Location: [facility] L&D;  Service: OB/GYN;  Laterality: N/A;     SECTION N/A 2022    Procedure:  SECTION;  Surgeon: Grazyna Price MD;  Location: [facility] L&D;  Service: OB/GYN;  Laterality: N/A;     SECTION  19,22    CYSTOSCOPY N/A 2024    Procedure: CYSTOSCOPY;  Surgeon: Vikas Park MD;  Location: [facility] OR;  Service: OB/GYN;  Laterality: N/A;    HYSTEROSCOPIC POLYPECTOMY OF UTERUS N/A 01/10/2019    Procedure: POLYPECTOMY, UTERUS, HYSTEROSCOPIC;  Surgeon: Jolanta Sifuentes MD;  Location: [facility];  Service: OB/GYN;  Laterality: N/A;    LYMPH NODE BIOPSY      MALIGNANT SKIN LESION EXCISION  06/10/2016    NASAL SEPTUM SURGERY      RENAL EXPLORATION  2011 ROBOTIC HYSTERECTOMY, WITH SALPINGECTOMY Bilateral 2024    Procedure: XI ROBOTIC HYSTERECTOMY,WITH SALPINGECTOMY;  Surgeon: Vikas Park MD;  Location: [facility];  Service: OB/GYN;  Laterality: Bilateral;       Family History:  Family History   Problem Relation Name Age of Onset    Hypertension Father Fabian robles     Kidney disease Father Fabian robles     Diabetes Paternal Grandmother Donna robles     Cancer Maternal Grandmother Yamileth parker     Cancer Maternal Uncle Bethel hooper     Thrombophilia Neg Hx      Breast cancer Neg Hx      Colon cancer Neg Hx      Ovarian cancer Neg Hx      Eczema Neg Hx      Lupus Neg Hx      Psoriasis Neg Hx      Melanoma Neg Hx      Uterine cancer Neg Hx      Cervical cancer Neg Hx         Social History:  Social History     Tobacco Use    Smoking status: Never    Smokeless tobacco: Never   Substance Use Topics    Alcohol use: Yes     Alcohol/week: 2.0 standard drinks of alcohol     Types: 2  Cans of beer per week     Comment: socially; hold today    Drug use: Never        Review of Systems:  General: No fever, chills  Skin: No rashes  Chest:  Denies cough and sputum production  Heart: Denies chest pain  Resp: Denies dyspnea  Abdomen: Denies diarrhea, abdominal pain, hematemesis, or blood in stool.  Musculoskeletal: No joint stiffness or swelling. Denies back pain.  : see HPI  Neuro: no dizziness or weakness    Allergies:  Singulair [montelukast]    Medications:    Current Outpatient Medications:     ALPRAZolam (XANAX) 0.25 MG tablet, TAKE 1-2 TABLETS AS NEEDED FOR FLIGHT ANXIETY, Disp: 30 tablet, Rfl: 0    cetirizine (ZYRTEC) 10 MG tablet, , Disp: , Rfl:     omeprazole (PRILOSEC) 20 MG capsule, Take 1 capsule (20 mg total) by mouth once daily., Disp: 90 capsule, Rfl: 4    ondansetron (ZOFRAN-ODT) 4 MG TbDL, PLACE 1 TO 2 TABLETS UNDER TONGUE EVERY 8 HOURS AS NEEDED FOR NAUSEA, Disp: 30 tablet, Rfl: 3    propranoloL (INDERAL) 40 MG tablet, Take 1 tablet (40 mg total) by mouth 2 (two) times daily., Disp: 180 tablet, Rfl: 4    sertraline (ZOLOFT) 100 MG tablet, Take 1 tablet (100 mg total) by mouth once daily. (Patient taking differently: Take 100 mg by mouth every evening.), Disp: 90 tablet, Rfl: 4    tirzepatide (MOUNJARO) 10 mg/0.5 mL PnIj, Inject 10 mg into the skin every 7 days., Disp: , Rfl:     rizatriptan (MAXALT) 10 MG tablet, Take 1 tablet (10 mg total) by mouth as needed for Migraine (max 2 times a week)., Disp: 9 tablet, Rfl: 3    Physical Exam:  There were no vitals filed for this visit.    Body mass index is 27.42 kg/m².  General: awake, alert, cooperative  Head: NC/AT  Ears: external ears normal  Eyes: sclera normal  Lungs: normal inspiration, NAD  Heart: well-perfused  Skin: The skin is warm and dry  Ext: No c/c/e.  Neuro: grossly intact, AOx3    RADIOLOGY:  CT UROGRAM ABD PELVIS W WO     CLINICAL HISTORY:  Nephrolithiasis, symptomatic/complicated;Polycystic kidneys; Calculus of urinary  tract in diseases classified elsewhere     TECHNIQUE:  Low dose axial, sagittal and coronal reformations were obtained from the lung bases to the pubic symphysis before and following the IV administration of 125 mL of Omnipaque 350.  Timing was optimized for nephrogram and excretory renal phases.     COMPARISON:  Prior ultrasounds     FINDINGS:  Visualized lower chest is unremarkable.     Within the abdomen, the liver demonstrates multiple simple appearing cyst.  Spleen unremarkable..  Pancreas is normal. Adrenal glands are unremarkable. Gallbladder normal. No biliary dilatation.     Kidneys demonstrate punctate nonobstructive calculi bilaterally.  No distal urolithiasis appreciated.  Innumerable bilateral renal cysts present, most simple and some with thin septations.  No concerning enhancing renal mass appreciated.  No hydronephrosis.  Duplicated collecting system noted on the right with dilatation of the upper pole moiety ureter throughout its course     Stomach is normal. Small bowel is nonobstructive. Colon is unremarkable. No CT evidence of appendicitis.     No free fluid or adenopathy present.     Within the pelvis, urinary bladder is unremarkable. No pelvic mass or adenopathy present. No free fluid.  Hysterectomy.     Osseous structures are unremarkable.     Impression:     Innumerable bilateral renal and hepatic cysts consistent with polycystic kidney disease.  No concerning solid enhancing renal mass appreciated.     Duplicated right renal collecting system with dilatation of the upper pole moiety ureter throughout its course.    LABS:  I personally reviewed the following lab values:  Lab Results   Component Value Date    WBC 7.84 06/24/2024    HGB 13.7 06/24/2024    HCT 41.1 06/24/2024     06/24/2024     06/24/2024    K 4.3 06/24/2024     06/24/2024    CREATININE 0.8 06/24/2024    BUN 15 06/24/2024    CO2 24 06/24/2024    TSH 2.717 12/15/2023    TSH 2.717 12/15/2023    HGBA1C 5.0  12/15/2023    CHOL 168 12/15/2023    TRIG 156 (H) 12/15/2023    HDL 37 (L) 12/15/2023    ALT 14 12/15/2023    AST 15 12/15/2023       URINALYSIS:  Urinalysis obtained in clinic today specific gravity 1.020 pH seven, negative for all parameters    Assessment/Plan:   Samanta Espinoza is a 39 y.o. female with:    Right hydroureter - duplicated system with UVJ obstruction versus reflux, the upper pole moiety is atrophic and nonfunctional, no further workup indicated but would recommend intermittent imaging for her cysts    Kidney stones - no large stones noted, would recommend continued observation     Sam Munoz MD  Urology

## 2024-09-09 RX ORDER — LOTEPREDNOL ETABONATE 5 MG/G
1 GEL OPHTHALMIC 4 TIMES DAILY
Qty: 5 G | Refills: 3 | Status: SHIPPED | OUTPATIENT
Start: 2024-09-09 | End: 2024-09-16

## 2024-09-20 DIAGNOSIS — I10 ESSENTIAL HYPERTENSION: Primary | ICD-10-CM

## 2024-12-10 DIAGNOSIS — G43.009 MIGRAINE WITHOUT AURA AND WITHOUT STATUS MIGRAINOSUS, NOT INTRACTABLE: ICD-10-CM

## 2024-12-12 RX ORDER — RIZATRIPTAN BENZOATE 10 MG/1
10 TABLET ORAL
Qty: 9 TABLET | Refills: 0 | Status: SHIPPED | OUTPATIENT
Start: 2024-12-12 | End: 2025-01-11

## 2024-12-19 ENCOUNTER — HOSPITAL ENCOUNTER (OUTPATIENT)
Dept: RADIOLOGY | Facility: HOSPITAL | Age: 39
Discharge: HOME | End: 2024-12-19
Attending: INTERNAL MEDICINE
Payer: COMMERCIAL

## 2024-12-19 DIAGNOSIS — Z12.31 ENCOUNTER FOR SCREENING MAMMOGRAM FOR BREAST CANCER: ICD-10-CM

## 2024-12-19 PROCEDURE — 77063 BREAST TOMOSYNTHESIS BI: CPT | Mod: TC

## 2024-12-29 DIAGNOSIS — F41.9 ANXIETY: ICD-10-CM

## 2024-12-29 DIAGNOSIS — K21.9 GASTROESOPHAGEAL REFLUX DISEASE WITHOUT ESOPHAGITIS: ICD-10-CM

## 2025-01-02 ENCOUNTER — PATIENT MESSAGE (OUTPATIENT)
Dept: INTERNAL MEDICINE | Facility: CLINIC | Age: 40
End: 2025-01-02
Payer: COMMERCIAL

## 2025-01-02 DIAGNOSIS — F41.9 ANXIETY: ICD-10-CM

## 2025-01-02 RX ORDER — SERTRALINE HYDROCHLORIDE 100 MG/1
100 TABLET, FILM COATED ORAL DAILY
Qty: 90 TABLET | Refills: 0 | Status: SHIPPED | OUTPATIENT
Start: 2025-01-02 | End: 2026-03-28

## 2025-01-02 RX ORDER — SERTRALINE HYDROCHLORIDE 100 MG/1
100 TABLET, FILM COATED ORAL DAILY
Qty: 90 TABLET | Refills: 4 | Status: CANCELLED | OUTPATIENT
Start: 2025-01-02 | End: 2026-03-28

## 2025-01-06 ENCOUNTER — OFFICE VISIT (OUTPATIENT)
Dept: INTERNAL MEDICINE | Facility: CLINIC | Age: 40
End: 2025-01-06
Payer: COMMERCIAL

## 2025-01-06 ENCOUNTER — LAB VISIT (OUTPATIENT)
Dept: LAB | Facility: HOSPITAL | Age: 40
End: 2025-01-06
Attending: FAMILY MEDICINE
Payer: COMMERCIAL

## 2025-01-06 VITALS
BODY MASS INDEX: 28.16 KG/M2 | HEART RATE: 83 BPM | HEIGHT: 62 IN | DIASTOLIC BLOOD PRESSURE: 88 MMHG | TEMPERATURE: 96 F | SYSTOLIC BLOOD PRESSURE: 110 MMHG | WEIGHT: 153 LBS | OXYGEN SATURATION: 99 %

## 2025-01-06 DIAGNOSIS — Z79.899 ENCOUNTER FOR LONG-TERM (CURRENT) USE OF MEDICATIONS: ICD-10-CM

## 2025-01-06 DIAGNOSIS — F41.9 ANXIETY: ICD-10-CM

## 2025-01-06 DIAGNOSIS — K21.9 GASTROESOPHAGEAL REFLUX DISEASE, UNSPECIFIED WHETHER ESOPHAGITIS PRESENT: ICD-10-CM

## 2025-01-06 DIAGNOSIS — J30.89 NON-SEASONAL ALLERGIC RHINITIS, UNSPECIFIED TRIGGER: ICD-10-CM

## 2025-01-06 DIAGNOSIS — C43.9 DESMOPLASTIC MALIGNANT MELANOMA: ICD-10-CM

## 2025-01-06 DIAGNOSIS — Z79.899 ENCOUNTER FOR LONG-TERM (CURRENT) USE OF MEDICATIONS: Primary | ICD-10-CM

## 2025-01-06 DIAGNOSIS — K21.9 GASTROESOPHAGEAL REFLUX DISEASE WITHOUT ESOPHAGITIS: ICD-10-CM

## 2025-01-06 PROBLEM — M65.311 TRIGGER FINGER OF RIGHT THUMB: Status: RESOLVED | Noted: 2019-08-09 | Resolved: 2025-01-06

## 2025-01-06 PROBLEM — R41.3 MEMORY LOSS: Status: RESOLVED | Noted: 2021-10-20 | Resolved: 2025-01-06

## 2025-01-06 PROBLEM — M65.4 DE QUERVAIN'S TENOSYNOVITIS, RIGHT: Status: RESOLVED | Noted: 2020-05-20 | Resolved: 2025-01-06

## 2025-01-06 PROBLEM — T78.40XA ALLERGY, UNSPECIFIED, INITIAL ENCOUNTER: Status: RESOLVED | Noted: 2020-04-02 | Resolved: 2025-01-06

## 2025-01-06 LAB
ALBUMIN SERPL BCP-MCNC: 4.4 G/DL (ref 3.5–5.2)
ALP SERPL-CCNC: 64 U/L (ref 40–150)
ALT SERPL W/O P-5'-P-CCNC: 10 U/L (ref 10–44)
ANION GAP SERPL CALC-SCNC: 10 MMOL/L (ref 8–16)
AST SERPL-CCNC: 17 U/L (ref 10–40)
BASOPHILS # BLD AUTO: 0.05 K/UL (ref 0–0.2)
BASOPHILS NFR BLD: 0.6 % (ref 0–1.9)
BILIRUB SERPL-MCNC: 0.3 MG/DL (ref 0.1–1)
BUN SERPL-MCNC: 21 MG/DL (ref 6–20)
CALCIUM SERPL-MCNC: 10.1 MG/DL (ref 8.7–10.5)
CHLORIDE SERPL-SCNC: 104 MMOL/L (ref 95–110)
CHOLEST SERPL-MCNC: 235 MG/DL (ref 120–199)
CHOLEST/HDLC SERPL: 4.5 {RATIO} (ref 2–5)
CO2 SERPL-SCNC: 25 MMOL/L (ref 23–29)
CREAT SERPL-MCNC: 0.7 MG/DL (ref 0.5–1.4)
DIFFERENTIAL METHOD BLD: NORMAL
EOSINOPHIL # BLD AUTO: 0.1 K/UL (ref 0–0.5)
EOSINOPHIL NFR BLD: 1.5 % (ref 0–8)
ERYTHROCYTE [DISTWIDTH] IN BLOOD BY AUTOMATED COUNT: 12.4 % (ref 11.5–14.5)
EST. GFR  (NO RACE VARIABLE): >60 ML/MIN/1.73 M^2
ESTIMATED AVG GLUCOSE: 88 MG/DL (ref 68–131)
GLUCOSE SERPL-MCNC: 84 MG/DL (ref 70–110)
HBA1C MFR BLD: 4.7 % (ref 4–5.6)
HCT VFR BLD AUTO: 45 % (ref 37–48.5)
HDLC SERPL-MCNC: 52 MG/DL (ref 40–75)
HDLC SERPL: 22.1 % (ref 20–50)
HGB BLD-MCNC: 14.4 G/DL (ref 12–16)
IMM GRANULOCYTES # BLD AUTO: 0.03 K/UL (ref 0–0.04)
IMM GRANULOCYTES NFR BLD AUTO: 0.4 % (ref 0–0.5)
LDLC SERPL CALC-MCNC: 160.8 MG/DL (ref 63–159)
LYMPHOCYTES # BLD AUTO: 2.2 K/UL (ref 1–4.8)
LYMPHOCYTES NFR BLD: 27.2 % (ref 18–48)
MAGNESIUM SERPL-MCNC: 2 MG/DL (ref 1.6–2.6)
MCH RBC QN AUTO: 27.2 PG (ref 27–31)
MCHC RBC AUTO-ENTMCNC: 32 G/DL (ref 32–36)
MCV RBC AUTO: 85 FL (ref 82–98)
MONOCYTES # BLD AUTO: 0.4 K/UL (ref 0.3–1)
MONOCYTES NFR BLD: 4.7 % (ref 4–15)
NEUTROPHILS # BLD AUTO: 5.3 K/UL (ref 1.8–7.7)
NEUTROPHILS NFR BLD: 65.6 % (ref 38–73)
NONHDLC SERPL-MCNC: 183 MG/DL
NRBC BLD-RTO: 0 /100 WBC
PLATELET # BLD AUTO: 298 K/UL (ref 150–450)
PMV BLD AUTO: 10.3 FL (ref 9.2–12.9)
POTASSIUM SERPL-SCNC: 3.7 MMOL/L (ref 3.5–5.1)
PROT SERPL-MCNC: 8.4 G/DL (ref 6–8.4)
RBC # BLD AUTO: 5.29 M/UL (ref 4–5.4)
SODIUM SERPL-SCNC: 139 MMOL/L (ref 136–145)
TRIGL SERPL-MCNC: 111 MG/DL (ref 30–150)
TSH SERPL DL<=0.005 MIU/L-ACNC: 2.1 UIU/ML (ref 0.4–4)
VIT B12 SERPL-MCNC: 930 PG/ML (ref 210–950)
WBC # BLD AUTO: 8.08 K/UL (ref 3.9–12.7)

## 2025-01-06 PROCEDURE — 80053 COMPREHEN METABOLIC PANEL: CPT | Performed by: FAMILY MEDICINE

## 2025-01-06 PROCEDURE — 85025 COMPLETE CBC W/AUTO DIFF WBC: CPT | Performed by: FAMILY MEDICINE

## 2025-01-06 PROCEDURE — 99204 OFFICE O/P NEW MOD 45 MIN: CPT | Mod: S$GLB,,, | Performed by: FAMILY MEDICINE

## 2025-01-06 PROCEDURE — 99999 PR PBB SHADOW E&M-EST. PATIENT-LVL IV: CPT | Mod: PBBFAC,,, | Performed by: FAMILY MEDICINE

## 2025-01-06 PROCEDURE — 1159F MED LIST DOCD IN RCRD: CPT | Mod: CPTII,S$GLB,, | Performed by: FAMILY MEDICINE

## 2025-01-06 PROCEDURE — 3074F SYST BP LT 130 MM HG: CPT | Mod: CPTII,S$GLB,, | Performed by: FAMILY MEDICINE

## 2025-01-06 PROCEDURE — 84443 ASSAY THYROID STIM HORMONE: CPT | Performed by: FAMILY MEDICINE

## 2025-01-06 PROCEDURE — 83036 HEMOGLOBIN GLYCOSYLATED A1C: CPT | Performed by: FAMILY MEDICINE

## 2025-01-06 PROCEDURE — 3008F BODY MASS INDEX DOCD: CPT | Mod: CPTII,S$GLB,, | Performed by: FAMILY MEDICINE

## 2025-01-06 PROCEDURE — 80061 LIPID PANEL: CPT | Performed by: FAMILY MEDICINE

## 2025-01-06 PROCEDURE — 3044F HG A1C LEVEL LT 7.0%: CPT | Mod: CPTII,S$GLB,, | Performed by: FAMILY MEDICINE

## 2025-01-06 PROCEDURE — G2211 COMPLEX E/M VISIT ADD ON: HCPCS | Mod: S$GLB,,, | Performed by: FAMILY MEDICINE

## 2025-01-06 PROCEDURE — 83735 ASSAY OF MAGNESIUM: CPT | Performed by: FAMILY MEDICINE

## 2025-01-06 PROCEDURE — 36415 COLL VENOUS BLD VENIPUNCTURE: CPT | Performed by: FAMILY MEDICINE

## 2025-01-06 PROCEDURE — 82607 VITAMIN B-12: CPT | Performed by: FAMILY MEDICINE

## 2025-01-06 PROCEDURE — 3079F DIAST BP 80-89 MM HG: CPT | Mod: CPTII,S$GLB,, | Performed by: FAMILY MEDICINE

## 2025-01-06 RX ORDER — VENLAFAXINE HYDROCHLORIDE 75 MG/1
75 CAPSULE, EXTENDED RELEASE ORAL DAILY
Qty: 90 CAPSULE | Refills: 1 | Status: SHIPPED | OUTPATIENT
Start: 2025-01-06 | End: 2026-01-06

## 2025-01-06 RX ORDER — LEVOCETIRIZINE DIHYDROCHLORIDE 5 MG/1
5 TABLET, FILM COATED ORAL NIGHTLY
COMMUNITY

## 2025-01-06 RX ORDER — SERTRALINE HYDROCHLORIDE 25 MG/1
TABLET, FILM COATED ORAL
Qty: 90 TABLET | Refills: 1 | Status: SHIPPED | OUTPATIENT
Start: 2025-01-06 | End: 2025-01-06

## 2025-01-06 RX ORDER — OMEPRAZOLE 20 MG/1
20 CAPSULE, DELAYED RELEASE ORAL DAILY
Qty: 90 CAPSULE | Refills: 4 | Status: SHIPPED | OUTPATIENT
Start: 2025-01-06

## 2025-01-06 RX ORDER — SERTRALINE HYDROCHLORIDE 50 MG/1
TABLET, FILM COATED ORAL
Qty: 90 TABLET | Refills: 1 | Status: SHIPPED | OUTPATIENT
Start: 2025-01-06 | End: 2025-01-06

## 2025-01-06 NOTE — PROGRESS NOTES
Subjective:       Patient ID: Samanta Espinoza is a 39 y.o. female.    Chief Complaint: Establish Care    HPI    Patient Active Problem List   Diagnosis    Autosomal recessive polycystic kidneys    GERD (gastroesophageal reflux disease)    Liver cyst    Migraine headache    Allergic rhinitis    Pineal gland cyst    Hypertriglyceridemia    Anxiety    Essential hypertension    Anemia    Hydroureter without obstruction    Status post laparoscopic hysterectomy       Past Medical History:   Diagnosis Date    Acute cough 2022    Anemia     Autosomal recessive polycystic kidneys     Chronic hypertension 2022    Labetalol started 19    GERD (gastroesophageal reflux disease)     High risk pregnancy, multigravida of advanced maternal age in third trimester 2022    History of uterine fibroid     Hyperlipidemia     Hypertension     Liver disease     cyst    Mastitis 10/29/2019    Melanoma 2016    left superior helix (breslow dept 0.67mm)    Mental disorder     anxiety    Migraine headache     PONV (postoperative nausea and vomiting)     post op nausea    Postpartum depression     Pregnancy resulting from in vitro fertilization in third trimester 2022    [facility] IVF with [facility], Dr STACEY Ivy       Past Surgical History:   Procedure Laterality Date     SECTION N/A 2019    Procedure:  SECTION;  Surgeon: Grazyna Price MD;  Location: [facility] L&D;  Service: OB/GYN;  Laterality: N/A;     SECTION N/A 2022    Procedure:  SECTION;  Surgeon: Grazyna Price MD;  Location: [facility] L&D;  Service: OB/GYN;  Laterality: N/A;     SECTION  19,22    CYSTOSCOPY N/A 2024    Procedure: CYSTOSCOPY;  Surgeon: Vikas Park MD;  Location: [facility] OR;  Service: OB/GYN;  Laterality: N/A;    HYSTERECTOMY      partial    HYSTEROSCOPIC POLYPECTOMY OF UTERUS N/A 01/10/2019    Procedure: POLYPECTOMY, UTERUS, HYSTEROSCOPIC;  Surgeon:  Jolanta Sifuentes MD;  Location: [facility] OR;  Service: OB/GYN;  Laterality: N/A;    LYMPH NODE BIOPSY      MALIGNANT SKIN LESION EXCISION  06/10/2016    NASAL SEPTUM SURGERY      RENAL EXPLORATION      XI ROBOTIC HYSTERECTOMY, WITH SALPINGECTOMY Bilateral 2024    Procedure: XI ROBOTIC HYSTERECTOMY,WITH SALPINGECTOMY;  Surgeon: Vikas Park MD;  Location: [facility] OR;  Service: OB/GYN;  Laterality: Bilateral;       Family History   Problem Relation Name Age of Onset    Other (motor vehicle) Mother           of MVA    Hypertension Father Fabian robles     Kidney disease Father Fabian robles         cystic    Cancer Maternal Grandmother Yamileth parker         melanoma    Diabetes Paternal Grandmother Donna robles     Cancer Maternal Uncle Bethel hooper         uncertain - but suspects liver    Thrombophilia Neg Hx      Breast cancer Neg Hx      Colon cancer Neg Hx      Ovarian cancer Neg Hx      Eczema Neg Hx      Lupus Neg Hx      Psoriasis Neg Hx      Melanoma Neg Hx      Uterine cancer Neg Hx      Cervical cancer Neg Hx         Social History     Tobacco Use   Smoking Status Never   Smokeless Tobacco Never       Wt Readings from Last 5 Encounters:   25 69.4 kg (153 lb)   24 68 kg (149 lb 14.6 oz)   24 68.2 kg (150 lb 5.7 oz)   07/10/24 66.9 kg (147 lb 7.8 oz)   24 67.2 kg (148 lb 2.4 oz)       History of Present Illness    CHIEF COMPLAINT:  Patient presents today to establish care and discuss ongoing health concerns.    HEADACHES AND MIGRAINES:  She experiences daily headaches, with some progressing to migraines. She uses Maxalt 2-3 times per week during symptomatic periods for acute management, though can go weeks without needing it. Previously used Propranolol for prevention but discontinued due to loss of efficacy after 1-2 years.    ANXIETY:  She takes Zoloft 100 mg daily with good compliance but expresses desire to lower the dosage due to feeling emotionally blunted. She uses  Xanax only for flights, with noted improvement in flight anxiety since starting Zoloft. She is considering transitioning to Venlafaxine for dual purpose of anxiety management and migraine prevention.    MEDICAL HISTORY:  She has autosomal recessive polycystic kidney disease with associated liver cysts and hydroureter. She has a pineal gland cyst that is monitored with periodic MRIs. She has history of desmoplastic malignant melanoma -treated/monitored. Her blood pressure has been well controlled for the past 1-2 years following weight loss. She reports one cholesterol level remains slightly elevated. She has chronic GERD managed with Omeprazole. Her anemia has improved following recent hysterectomy. Previous memory loss issues have resolved.    SURGICAL HISTORY:  Kidney transplant (), hysterectomy (Summer), two  sections, melanoma excision with lymph node biopsy, nasal septum surgery, and cystoscopy.    FAMILY HISTORY:  Father has hypertension and kidney disease with kidney transplant (). Maternal grandmother had melanoma. Paternal grandmother had diabetes. Maternal uncle had possible liver cancer.    CURRENT MEDICATIONS:  Zoloft 100 mg daily, Maxalt as needed, Zyzol for allergic rhinitis, Prilosec 20 mg daily, Zofran as needed for nausea (rarely used), Xanax for flights only.    SOCIAL HISTORY:  She has one sexual partner (wife). Reports occasional alcohol use and denies tobacco or illicit drug use.    SPECIALISTS:  Under care of nephrology and urology for kidney disease. Follows with Dr. Dangelo every 6-12 months for melanoma monitoring.         Objective:      Vitals:    25 1035   BP: 110/88   Pulse:    Temp:      BP Readings from Last 3 Encounters:   25 110/88   24 110/80   07/10/24 125/86         Physical Exam  Constitutional:       General: She is not in acute distress.     Appearance: She is not ill-appearing.   Pulmonary:      Effort: Pulmonary effort is normal. No respiratory  distress.   Neurological:      General: No focal deficit present.      Mental Status: She is alert.   Psychiatric:         Mood and Affect: Mood normal.         Behavior: Behavior normal.         Assessment:       1. Encounter for long-term (current) use of medications    2. Gastroesophageal reflux disease without esophagitis    3. Anxiety    4. Gastroesophageal reflux disease, unspecified whether esophagitis present    5. Desmoplastic malignant melanoma    6. Non-seasonal allergic rhinitis, unspecified trigger        Plan:       Presents to establish care.  Updated medical record   Updating labs       Migraine   Discontinue propranolol due to lack of efficacy.  Continue p.r.n. Maxalt.  Trial transitioned from SSRI to venlafaxine.      Anxiety   Continue p.r.n. Xanax for flights only.  We will trial her on Effexor.  Discussed direct transitioned from SSRI to SNRI.  Discussed potential adverse effects.  Plan to monitor relatively closely.      Chronic GERD   Ten years plus   On Prilosec   Continue   Arrange GI consultation for consideration of endoscopy given duration of pathology.      Allergic rhinitis   P.r.n. Xyzal   Continue as needed.        MALIGNANT MELANOMA:  Monitor patient's history of desmoplastic malignant melanoma on the ear, diagnosed in 2016.  Although considered resolved, it remains on the problem list for monitoring purposes.  Continue follow-up visits with Dr. Dangelo every 6 to 12 months for ongoing surveillance.    REGIMEN:  Patient to resume exercise regimen, with emphasis on resistance training.  Discussed importance of this approach for overall health.       One month follow up to check in on mental health/medication transition and review lab work together.

## 2025-01-15 ENCOUNTER — TELEPHONE (OUTPATIENT)
Dept: NEPHROLOGY | Facility: CLINIC | Age: 40
End: 2025-01-15
Payer: COMMERCIAL

## 2025-01-16 DIAGNOSIS — I10 ESSENTIAL HYPERTENSION: Primary | ICD-10-CM

## 2025-02-01 ENCOUNTER — OFFICE VISIT (OUTPATIENT)
Dept: INTERNAL MEDICINE | Facility: CLINIC | Age: 40
End: 2025-02-01
Payer: COMMERCIAL

## 2025-02-01 VITALS
HEIGHT: 62 IN | OXYGEN SATURATION: 99 % | TEMPERATURE: 97 F | DIASTOLIC BLOOD PRESSURE: 94 MMHG | BODY MASS INDEX: 28.44 KG/M2 | HEART RATE: 81 BPM | SYSTOLIC BLOOD PRESSURE: 120 MMHG | WEIGHT: 154.56 LBS

## 2025-02-01 DIAGNOSIS — F41.9 ANXIETY: Primary | ICD-10-CM

## 2025-02-01 DIAGNOSIS — G43.009 MIGRAINE WITHOUT AURA AND WITHOUT STATUS MIGRAINOSUS, NOT INTRACTABLE: ICD-10-CM

## 2025-02-01 DIAGNOSIS — Z86.79 HISTORY OF HYPERTENSION: ICD-10-CM

## 2025-02-01 DIAGNOSIS — M62.08 DIASTASIS RECTI: ICD-10-CM

## 2025-02-01 DIAGNOSIS — Q61.3 POLYCYSTIC KIDNEY DISEASE: ICD-10-CM

## 2025-02-01 PROCEDURE — 3044F HG A1C LEVEL LT 7.0%: CPT | Mod: CPTII,S$GLB,, | Performed by: FAMILY MEDICINE

## 2025-02-01 PROCEDURE — 1159F MED LIST DOCD IN RCRD: CPT | Mod: CPTII,S$GLB,, | Performed by: FAMILY MEDICINE

## 2025-02-01 PROCEDURE — 99214 OFFICE O/P EST MOD 30 MIN: CPT | Mod: S$GLB,,, | Performed by: FAMILY MEDICINE

## 2025-02-01 PROCEDURE — 3008F BODY MASS INDEX DOCD: CPT | Mod: CPTII,S$GLB,, | Performed by: FAMILY MEDICINE

## 2025-02-01 PROCEDURE — G2211 COMPLEX E/M VISIT ADD ON: HCPCS | Mod: S$GLB,,, | Performed by: FAMILY MEDICINE

## 2025-02-01 PROCEDURE — 3080F DIAST BP >= 90 MM HG: CPT | Mod: CPTII,S$GLB,, | Performed by: FAMILY MEDICINE

## 2025-02-01 PROCEDURE — 3074F SYST BP LT 130 MM HG: CPT | Mod: CPTII,S$GLB,, | Performed by: FAMILY MEDICINE

## 2025-02-01 PROCEDURE — 99999 PR PBB SHADOW E&M-EST. PATIENT-LVL III: CPT | Mod: PBBFAC,,, | Performed by: FAMILY MEDICINE

## 2025-02-01 NOTE — PROGRESS NOTES
Subjective:       Patient ID: Samanta Espinoza is a 39 y.o. female.    Chief Complaint: Follow-up (med)    HPI    Patient Active Problem List   Diagnosis    Autosomal recessive polycystic kidneys    GERD (gastroesophageal reflux disease)    Liver cyst    Migraine headache    Allergic rhinitis    Pineal gland cyst    Hypertriglyceridemia    Anxiety    Essential hypertension    Anemia    Hydroureter without obstruction    Status post laparoscopic hysterectomy    Diastasis recti    Polycystic kidney disease    History of hypertension       Past Medical History:   Diagnosis Date    Acute cough 2022    Anemia     Autosomal recessive polycystic kidneys     Chronic hypertension 2022    Labetalol started 19    GERD (gastroesophageal reflux disease)     High risk pregnancy, multigravida of advanced maternal age in third trimester 2022    History of uterine fibroid     Hyperlipidemia     Hypertension     Liver disease     cyst    Mastitis 10/29/2019    Melanoma 2016    left superior helix (breslow dept 0.67mm)    Mental disorder     anxiety    Migraine headache     PONV (postoperative nausea and vomiting)     post op nausea    Postpartum depression     Pregnancy resulting from in vitro fertilization in third trimester 2022    [facility] IVF with [facility], Dr STACEY Ivy       Past Surgical History:   Procedure Laterality Date     SECTION N/A 2019    Procedure:  SECTION;  Surgeon: Grazyna Price MD;  Location: [facility] L&D;  Service: OB/GYN;  Laterality: N/A;     SECTION N/A 2022    Procedure:  SECTION;  Surgeon: Grazyna Price MD;  Location: [facility] L&D;  Service: OB/GYN;  Laterality: N/A;     SECTION  19,22    CYSTOSCOPY N/A 2024    Procedure: CYSTOSCOPY;  Surgeon: Vikas Park MD;  Location: [facility] OR;  Service: OB/GYN;  Laterality: N/A;    HYSTERECTOMY      partial    HYSTEROSCOPIC POLYPECTOMY OF  UTERUS N/A 01/10/2019    Procedure: POLYPECTOMY, UTERUS, HYSTEROSCOPIC;  Surgeon: Jolanta Sifuentes MD;  Location: [facility] OR;  Service: OB/GYN;  Laterality: N/A;    LYMPH NODE BIOPSY      MALIGNANT SKIN LESION EXCISION  06/10/2016    NASAL SEPTUM SURGERY      RENAL EXPLORATION      XI ROBOTIC HYSTERECTOMY, WITH SALPINGECTOMY Bilateral 2024    Procedure: XI ROBOTIC HYSTERECTOMY,WITH SALPINGECTOMY;  Surgeon: Vikas Park MD;  Location: [facility] OR;  Service: OB/GYN;  Laterality: Bilateral;       Family History   Problem Relation Name Age of Onset    Other (motor vehicle) Mother           of MVA    Hypertension Father Fabian robles     Kidney disease Father Fabian robles         cystic    Cancer Maternal Grandmother Yamileth parker         melanoma    Diabetes Paternal Grandmother Donna robles     Cancer Maternal Uncle Bethel hooper         uncertain - but suspects liver    Thrombophilia Neg Hx      Breast cancer Neg Hx      Colon cancer Neg Hx      Ovarian cancer Neg Hx      Eczema Neg Hx      Lupus Neg Hx      Psoriasis Neg Hx      Melanoma Neg Hx      Uterine cancer Neg Hx      Cervical cancer Neg Hx         Social History     Tobacco Use   Smoking Status Never   Smokeless Tobacco Never       Wt Readings from Last 5 Encounters:   25 70.1 kg (154 lb 8.7 oz)   25 69.4 kg (153 lb)   24 68 kg (149 lb 14.6 oz)   24 68.2 kg (150 lb 5.7 oz)   07/10/24 66.9 kg (147 lb 7.8 oz)     History of Present Illness    CHIEF COMPLAINT:  Patient presents today for follow up of anxiety, migraines, and blood pressure check.    ANXIETY AND MIGRAINES:  She reports improvement in both migraines and anxiety with Venlafaxine after being on the medication for just under one month. She has not required any rescue medications, including Tylenol or Maxalt, since starting the medication.    HYPERTENSION:  She has a history of preeclampsia during her first pregnancy, which initiated her blood pressure concerns.  Her blood pressure remained elevated following the pregnancy, requiring medication. After losing approximately 50 lbs last year, her blood pressure normalized. She expresses a strong aversion to blood pressure medications.    MEDICAL HISTORY:  She has enlarged polycystic kidneys. She has a history of malignant melanoma, for which she follows up with Dr. Dangelo every 6-12 months. She also has a history of chronic GERD. She reports concerns about possible abdominal wall hernia following her second pregnancy.    LABS:  Cholesterol was substantially elevated. B12, thyroid function, glucose, liver function tests, renal function, electrolytes, and complete blood count were normal.         Objective:      Vitals:    02/01/25 1104   BP: (!) 120/94   Pulse: 81   Temp: 96.7 °F (35.9 °C)       Physical Exam  Constitutional:       General: She is not in acute distress.     Appearance: She is not ill-appearing.   Pulmonary:      Effort: Pulmonary effort is normal. No respiratory distress.   Neurological:      General: No focal deficit present.      Mental Status: She is alert.   Psychiatric:         Mood and Affect: Mood normal.         Behavior: Behavior normal.         Assessment:       1. Anxiety    2. Migraine without aura and without status migrainosus, not intractable    3. History of hypertension    4. Polycystic kidney disease    5. Diastasis recti        Plan:   Assessment & Plan    PLAN SUMMARY:  Implement DASH diet for blood pressure management and overall health  Continue venlafaxine for anxiety and migraine management  Continue alprazolam for as-needed use  Conservative management for diastasis recti, including weight loss and exercises  Follow-up nurse blood pressure check in 1 month; patient to bring home blood pressure machine and log  Follow up with provider in 6 months  Continue follow-up visits with Dr. Dangelo every 6 to 12 months  Potential future consideration of surgical intervention for diastasis recti if  conservative measures ineffective    H/O HYPERTENSION:  Monitored blood pressure due to mildly elevated reading of 120/94.  Evaluated current blood pressure, noting it as mildly elevated.  Deferred immediate medication changes for blood pressure management, opting for home monitoring first.  Instructed the patient to monitor and log blood pressure readings at home.  Recommend DASH diet for blood pressure management.  Discussed potential treatment options including continuing venlafaxine, starting blood pressure medication, or discontinuing venlafaxine based on future blood pressure readings.  Scheduled follow-up nurse blood pressure check in 1 month; patient to bring home blood pressure machine and log.  Monitored blood pressure due to mildly elevated reading of 120/94, considering patient's history of polycystic kidney disease.  Discussed DASH diet (Dietary Approaches to Stop Hypertension) as similar to Mediterranean diet with reduced salt intake.  Recommend DASH diet for blood pressure management and overall health.  Recommend implementing DASH diet: increase intake of fruits, vegetables, lean meats; avoid processed foods and sugar-sweetened beverages.    POLYCYSTIC KIDNEY DISEASE:  Assessed the patient's condition, considering polycystic kidney disease as a potential contributing factor to blood pressure problems.  Noted patient's condition of polycystic kidney disease with enlarged kidneys and innumerable bilateral renal and hepatic cysts.  Reviewed previous ultrasound results showing the largest cyst to be 7 centimeters.  Assessed that polycystic kidney disease could contribute to blood pressure problems. Continue specialist  f/u    MIGRAINE:  Evaluated the patient's report of improvement in migraines since starting venlafaxine.  Assessed the positive effects of venlafaxine on both anxiety and migraine.  Continued venlafaxine for migraine management, noting appreciable benefit.    ANXIETY:  Evaluated the  patient's report of improvement in anxiety since starting venlafaxine.  Assessed the positive effects of venlafaxine on both anxiety and migraine.  Continued venlafaxine for anxiety management, noting appreciable benefit.  Noted previous prescription of alprazolam for as-needed use.    MALIGNANT MELANOMA:  Noted the patient's history of malignant melanoma.  Advised the patient to continue follow-up visits with Dr. Dangelo every 6 to 12 months.        DIASTASIS RECTI:  Monitored patient's report of abdominal bulging after pregnancy.  Evaluated the abdominal area, observing a bulge effect during exam.  Assessed the condition as diastasis recti related to pregnancy.  Explained diastasis recti as a common post-pregnancy condition, typically managed conservatively with potential for surgical intervention if needed.  Recommend conservative management, including weight loss and exercises.  Discussed surgical intervention as a future option if conservative measures don't help.    WEIGHT MANAGEMENT:  Patient to continue weight loss efforts to improve blood pressure, overall health, and potentially address diastasis recti.    FOLLOW UP:  Follow up in 6 months.  Contact the office if need arises to be seen sooner based on discussed issues or any new concerns.

## 2025-02-03 PROBLEM — Z86.79 HISTORY OF HYPERTENSION: Status: ACTIVE | Noted: 2025-02-03

## 2025-02-03 PROBLEM — Q61.3 POLYCYSTIC KIDNEY DISEASE: Status: ACTIVE | Noted: 2025-02-03

## 2025-02-03 PROBLEM — M62.08 DIASTASIS RECTI: Status: ACTIVE | Noted: 2025-02-03

## 2025-02-20 ENCOUNTER — OFFICE VISIT (OUTPATIENT)
Dept: PRIMARY CARE CLINIC | Facility: CLINIC | Age: 40
End: 2025-02-20
Payer: COMMERCIAL

## 2025-02-20 DIAGNOSIS — R09.81 NASAL CONGESTION: ICD-10-CM

## 2025-02-20 DIAGNOSIS — J01.00 ACUTE NON-RECURRENT MAXILLARY SINUSITIS: Primary | ICD-10-CM

## 2025-02-20 DIAGNOSIS — G93.31 POST VIRAL SYNDROME: ICD-10-CM

## 2025-02-20 RX ORDER — DEXAMETHASONE SODIUM PHOSPHATE 4 MG/ML
10 INJECTION, SOLUTION INTRA-ARTICULAR; INTRALESIONAL; INTRAMUSCULAR; INTRAVENOUS; SOFT TISSUE
Status: DISCONTINUED | OUTPATIENT
Start: 2025-02-20 | End: 2025-02-20

## 2025-02-20 RX ORDER — DEXAMETHASONE SODIUM PHOSPHATE 10 MG/ML
10 INJECTION INTRAMUSCULAR; INTRAVENOUS
Status: COMPLETED | OUTPATIENT
Start: 2025-02-20 | End: 2025-02-20

## 2025-02-20 RX ORDER — FLUTICASONE PROPIONATE 50 MCG
1 SPRAY, SUSPENSION (ML) NASAL DAILY
Qty: 16 G | Refills: 11 | Status: SHIPPED | OUTPATIENT
Start: 2025-02-20

## 2025-02-20 RX ORDER — AZITHROMYCIN 250 MG/1
TABLET, FILM COATED ORAL
Qty: 6 TABLET | Refills: 0 | Status: SHIPPED | OUTPATIENT
Start: 2025-02-20 | End: 2025-02-26

## 2025-02-20 RX ORDER — DEXAMETHASONE SODIUM PHOSPHATE 10 MG/ML
10 INJECTION INTRAMUSCULAR; INTRAVENOUS
Status: DISCONTINUED | OUTPATIENT
Start: 2025-02-20 | End: 2025-02-20

## 2025-02-20 RX ORDER — AZELASTINE 1 MG/ML
1 SPRAY, METERED NASAL 2 TIMES DAILY
Qty: 90 ML | Refills: 0 | Status: SHIPPED | OUTPATIENT
Start: 2025-02-20 | End: 2026-02-20

## 2025-02-20 RX ADMIN — DEXAMETHASONE SODIUM PHOSPHATE 10 MG: 10 INJECTION INTRAMUSCULAR; INTRAVENOUS at 05:02

## 2025-02-20 NOTE — PROGRESS NOTES
Patient ID: Samanta Espinoza is a 40 y.o. female.    Chief Complaint: No chief complaint on file.    History of Present Illness    Ms. Erasmo espinoza presents today for follow-up of flu A and ongoing symptoms.    She contracted flu A during a cruise with family members. For the past 12-13 days, she has experienced nasal congestion with complete blockage particularly on one side, associated swelling and pain, post-nasal drip, and facial pressure. She also reports intermittent dizziness with sensation of being underwater and ear pressure.    She reports allergy to Singulair; denies any food or blood allergies.      ROS:  General: -fever, -chills, -fatigue, -weight gain, -weight loss  Eyes: -vision changes, -redness, -discharge  ENT: -ear pain, +nasal congestion, -sore throat, +post nasal drip  Cardiovascular: -chest pain, -palpitations, -lower extremity edema  Respiratory: -cough, -shortness of breath  Gastrointestinal: -abdominal pain, -nausea, -vomiting, -diarrhea, -constipation, -blood in stool  Genitourinary: -dysuria, -hematuria, -frequency  Musculoskeletal: -joint pain, -muscle pain  Skin: -rash, -lesion  Neurological: -headache, +dizziness, -numbness, -tingling  Psychiatric: -anxiety, -depression, -sleep difficulty         Wt Readings from Last 10 Encounters:   02/20/25 72.7 kg (160 lb 4.4 oz)   02/01/25 70.1 kg (154 lb 8.7 oz)   01/06/25 69.4 kg (153 lb)   08/30/24 68 kg (149 lb 14.6 oz)   08/05/24 68.2 kg (150 lb 5.7 oz)   07/10/24 66.9 kg (147 lb 7.8 oz)   07/01/24 67.2 kg (148 lb 2.4 oz)   06/24/24 68.2 kg (150 lb 5.7 oz)   04/16/24 67.1 kg (148 lb)   12/15/23 76 kg (167 lb 8.8 oz)       The 10-year ASCVD risk score (Michael PALM, et al., 2019) is: 0.6%    Values used to calculate the score:      Age: 40 years      Sex: Female      Is Non- : No      Diabetic: No      Tobacco smoker: No      Systolic Blood Pressure: 102 mmHg      Is BP treated: No      HDL Cholesterol: 52  mg/dL      Total Cholesterol: 235 mg/dL    PAST MEDICAL HISTORY:  Past Medical History:   Diagnosis Date    Acute cough 2022    Anemia     Autosomal recessive polycystic kidneys     Chronic hypertension 2022    Labetalol started 19    GERD (gastroesophageal reflux disease)     High risk pregnancy, multigravida of advanced maternal age in third trimester 2022    History of uterine fibroid     Hyperlipidemia     Hypertension     Liver disease     cyst    Mastitis 10/29/2019    Melanoma 2016    left superior helix (breslow dept 0.67mm)    Mental disorder     anxiety    Migraine headache     PONV (postoperative nausea and vomiting)     post op nausea    Postpartum depression     Pregnancy resulting from in vitro fertilization in third trimester 2022    [facility] IVF with [facility], Dr STACEY Ivy       PAST SURGICAL HISTORY:  Past Surgical History:   Procedure Laterality Date     SECTION N/A 2019    Procedure:  SECTION;  Surgeon: Grazyna Price MD;  Location: [facility] L&D;  Service: OB/GYN;  Laterality: N/A;     SECTION N/A 2022    Procedure:  SECTION;  Surgeon: Grazyna Price MD;  Location: [facility] L&D;  Service: OB/GYN;  Laterality: N/A;     SECTION  19,22    CYSTOSCOPY N/A 2024    Procedure: CYSTOSCOPY;  Surgeon: Vikas Park MD;  Location: [facility];  Service: OB/GYN;  Laterality: N/A;    HYSTERECTOMY      partial    HYSTEROSCOPIC POLYPECTOMY OF UTERUS N/A 01/10/2019    Procedure: POLYPECTOMY, UTERUS, HYSTEROSCOPIC;  Surgeon: Jolanta Sifuentes MD;  Location: [facility] OR;  Service: OB/GYN;  Laterality: N/A;    LYMPH NODE BIOPSY      MALIGNANT SKIN LESION EXCISION  06/10/2016    NASAL SEPTUM SURGERY      RENAL EXPLORATION      XI ROBOTIC HYSTERECTOMY, WITH SALPINGECTOMY Bilateral 2024    Procedure: XI ROBOTIC HYSTERECTOMY,WITH SALPINGECTOMY;  Surgeon: Vikas Park MD;  Location: [facility] OR;  Service:  OB/GYN;  Laterality: Bilateral;       SOCIAL HISTORY:  Social History[1]    FAMILY HISTORY:  Family History   Problem Relation Name Age of Onset    Other (motor vehicle) Mother           of MVA    Hypertension Father Fabian robles     Kidney disease Father Fabian robles         cystic    Cancer Maternal Grandmother Yamileth parker         melanoma    Diabetes Paternal Grandmother Donna robles     Cancer Maternal Uncle Bethel hooper         uncertain - but suspects liver    Thrombophilia Neg Hx      Breast cancer Neg Hx      Colon cancer Neg Hx      Ovarian cancer Neg Hx      Eczema Neg Hx      Lupus Neg Hx      Psoriasis Neg Hx      Melanoma Neg Hx      Uterine cancer Neg Hx      Cervical cancer Neg Hx         ALLERGIES AND MEDICATIONS: updated and reviewed.  Review of patient's allergies indicates:   Allergen Reactions    Singulair [montelukast]      Itching all over     Current Medications[2]      Physical Exam  Constitutional:       Appearance: Normal appearance.   HENT:      Head: Normocephalic and atraumatic.      Right Ear: Tympanic membrane normal.      Left Ear: Tympanic membrane normal.      Nose: Nasal tenderness and congestion present.      Right Sinus: Maxillary sinus tenderness present.      Left Sinus: Maxillary sinus tenderness present.      Mouth/Throat:      Pharynx: Posterior oropharyngeal erythema present.   Cardiovascular:      Rate and Rhythm: Normal rate.      Pulses: Normal pulses.   Pulmonary:      Effort: Pulmonary effort is normal.   Abdominal:      General: Bowel sounds are normal.   Neurological:      Mental Status: She is alert and oriented to person, place, and time.   Psychiatric:         Mood and Affect: Mood normal.          Assessment:   LABS:   Lab Results   Component Value Date    HGBA1C 4.7 2025    HGBA1C 5.0 12/15/2023    HGBA1C 5.1 2015      Lab Results   Component Value Date    CHOL 235 (H) 2025    CHOL 168 12/15/2023    CHOL 180 2022     Lab Results    Component Value Date    LDLCALC 160.8 (H) 01/06/2025    LDLCALC 99.8 12/15/2023    LDLCALC 94.2 05/05/2022     Lab Results   Component Value Date    WBC 8.08 01/06/2025    HGB 14.4 01/06/2025    HCT 45.0 01/06/2025     01/06/2025    CHOL 235 (H) 01/06/2025    TRIG 111 01/06/2025    HDL 52 01/06/2025    ALT 10 01/06/2025    AST 17 01/06/2025     01/06/2025    K 3.7 01/06/2025     01/06/2025    CREATININE 0.7 01/06/2025    BUN 21 (H) 01/06/2025    CO2 25 01/06/2025    TSH 2.099 01/06/2025    HGBA1C 4.7 01/06/2025       Plan:   Diagnoses and all orders for this visit:    Acute non-recurrent maxillary sinusitis  -     azithromycin (Z-JOSE LUIS) 250 MG tablet; Take 2 tablets by mouth on day 1; Take 1 tablet by mouth on days 2-5  -     Discontinue: dexAMETHasone injection 10 mg  -     dexAMETHasone injection 10 mg    Post viral syndrome    Nasal congestion  -     azithromycin (Z-JOSE LUIS) 250 MG tablet; Take 2 tablets by mouth on day 1; Take 1 tablet by mouth on days 2-5  -     Discontinue: dexAMETHasone injection 10 mg  -     dexAMETHasone injection 10 mg    Other orders  -     fluticasone propionate (FLONASE) 50 mcg/actuation nasal spray; Take 1 spray (50 mcg total) by Each Nostril route once daily.  -     azelastine (ASTELIN) 137 mcg (0.1 %) nasal spray; Take 1 spray (137 mcg total) by Nasal route 2 (two) times daily.  -     Discontinue: dexAMETHasone injection 10 mg        Assessment & Plan      IMPRESSION:  -Patient presents with post-viral symptoms following recent Flu A infection, including nasal congestion, facial pressure, ear pressure, and dizziness persisting for 12-13 days  -Assessment suggests secondary bacterial sinusitis  -Will treat with antibiotics and steroids    POST-VIRAL SYNDROME:  - Assessed the patient's condition, noting ongoing post-viral symptoms for 12-13 days following influenza A infection.    RESPIRATORY INFECTION:  - Evaluated the patient's reported symptoms of nasal congestion,  swelling, and pain.  - Determined that antibiotic treatment is likely necessary.  - Prescribed Z-Srinivas (azithromycin) to address the respiratory manifestations.  - Planned administration of Decadron 10 mg in conjunction with Z-Srinivas to alleviate symptoms.    NASAL CONGESTION:  - Noted the patient's report of nasal congestion and blockage.  - Planned administration of Decadron 10 mg and Z-Srinivas, which may help alleviate nasal congestion.    POSTNASAL DRIP:  - Confirmed the patient's experience of postnasal drip.  - Planned administration of Decadron 10 mg and Z-Srinivas, which may help reduce postnasal drip symptoms.    DIZZINESS:  - Evaluated the patient's report of dizziness and sensation of feeling underwater.  - Confirmed the patient's description of feeling as if on a boat.  - Planned administration of Decadron 10 mg and Z-Srinivas, which may help alleviate dizziness symptoms.    EAR PRESSURE:  - Confirmed the patient's experience of bilateral ear pressure.  - Planned administration of Decadron 10 mg and Z-Srinivas, which may help relieve ear pressure.    ALLERGIES:  - Inquired about the patient's allergies to food, bugs, or medications other than Singulair.  - Noted the patient's allergy status for future reference and treatment considerations.       FOLLOW-UP:  -RTC for ongoing or worsening symptoms.    I spent a total of 27 minutes face to face and non-face to face on the date of this visit.This includes time preparing to see the patient (eg, review of tests, notes), obtaining and/or reviewing additional history from an independent historian and/or outside medical records, documenting clinical information in the electronic health record, independently interpreting results and/or communicating results to the patient/family/caregiver, or care coordinator.  Visit today included increased complexity associated with the care of the episodic problem addressed and managing the longitudinal care of the patient due to the serious and/or  complex managed problem(s).      This note was generated with the assistance of ambient listening technology. Verbal consent was obtained by the patient and accompanying visitor(s) for the recording of patient appointment to facilitate this note. I attest to having reviewed and edited the generated note for accuracy, though some syntax or spelling errors may persist. Please contact the author of this note for any clarification.        Sincerely,  Douglas Heath NP          [1]   Social History  Socioeconomic History    Marital status:     Number of children: 1   Occupational History    Occupation: Optometry      Employer: [employer]     Comment: [employer]- [place]   Tobacco Use    Smoking status: Never    Smokeless tobacco: Never   Substance and Sexual Activity    Alcohol use: Yes     Alcohol/week: 2.0 standard drinks of alcohol     Types: 2 Cans of beer per week     Comment: socially; hold today    Drug use: Never    Sexual activity: Yes     Partners: Female     Birth control/protection: None   Other Topics Concern    Are you pregnant or think you may be? No    Breast-feeding No     Social Drivers of Health     Financial Resource Strain: Low Risk  (1/3/2025)    Overall Financial Resource Strain (CARDIA)     Difficulty of Paying Living Expenses: Not hard at all   Food Insecurity: No Food Insecurity (1/3/2025)    Hunger Vital Sign     Worried About Running Out of Food in the Last Year: Never true     Ran Out of Food in the Last Year: Never true   Transportation Needs: No Transportation Needs (12/13/2023)    PRAPARE - Transportation     Lack of Transportation (Medical): No     Lack of Transportation (Non-Medical): No   Physical Activity: Insufficiently Active (1/3/2025)    Exercise Vital Sign     Days of Exercise per Week: 2 days     Minutes of Exercise per Session: 20 min   Stress: No Stress Concern Present (1/3/2025)    [employer] - Occupational Stress Questionnaire     Feeling of  Stress : Only a little   Housing Stability: Low Risk  (12/13/2023)    Housing Stability Vital Sign     Unable to Pay for Housing in the Last Year: No     Number of Places Lived in the Last Year: 1     Unstable Housing in the Last Year: No   [2]   Current Outpatient Medications   Medication Sig Dispense Refill    ALPRAZolam (XANAX) 0.25 MG tablet TAKE 1-2 TABLETS AS NEEDED FOR FLIGHT ANXIETY 30 tablet 0    levocetirizine (XYZAL) 5 MG tablet Take 5 mg by mouth every evening.      omeprazole (PRILOSEC) 20 MG capsule Take 1 capsule (20 mg total) by mouth once daily. 90 capsule 4    ondansetron (ZOFRAN-ODT) 4 MG TbDL PLACE 1 TO 2 TABLETS UNDER TONGUE EVERY 8 HOURS AS NEEDED FOR NAUSEA 30 tablet 3    venlafaxine (EFFEXOR XR) 75 MG 24 hr capsule Take 1 capsule (75 mg total) by mouth once daily. 90 capsule 1    azelastine (ASTELIN) 137 mcg (0.1 %) nasal spray Take 1 spray (137 mcg total) by Nasal route 2 (two) times daily. 90 mL 0    azithromycin (Z-JOSE LUIS) 250 MG tablet Take 2 tablets by mouth on day 1; Take 1 tablet by mouth on days 2-5 6 tablet 0    fluticasone propionate (FLONASE) 50 mcg/actuation nasal spray Take 1 spray (50 mcg total) by Each Nostril route once daily. 16 g 11    rizatriptan (MAXALT) 10 MG tablet Take 1 tablet (10 mg total) by mouth as needed for Migraine (max 2 times a week). 9 tablet 0     No current facility-administered medications for this visit.

## 2025-02-21 VITALS
DIASTOLIC BLOOD PRESSURE: 86 MMHG | WEIGHT: 160.25 LBS | RESPIRATION RATE: 18 BRPM | HEART RATE: 88 BPM | BODY MASS INDEX: 29.31 KG/M2 | SYSTOLIC BLOOD PRESSURE: 102 MMHG | OXYGEN SATURATION: 98 % | TEMPERATURE: 98 F

## 2025-03-03 ENCOUNTER — PATIENT MESSAGE (OUTPATIENT)
Dept: INTERNAL MEDICINE | Facility: CLINIC | Age: 40
End: 2025-03-03
Payer: COMMERCIAL

## 2025-03-10 ENCOUNTER — LAB VISIT (OUTPATIENT)
Dept: LAB | Facility: HOSPITAL | Age: 40
End: 2025-03-10
Attending: INTERNAL MEDICINE
Payer: COMMERCIAL

## 2025-03-10 DIAGNOSIS — I10 ESSENTIAL HYPERTENSION: ICD-10-CM

## 2025-03-10 PROCEDURE — 36415 COLL VENOUS BLD VENIPUNCTURE: CPT | Performed by: INTERNAL MEDICINE

## 2025-03-10 PROCEDURE — 80069 RENAL FUNCTION PANEL: CPT | Performed by: INTERNAL MEDICINE

## 2025-03-11 LAB
ALBUMIN SERPL BCP-MCNC: 4.1 G/DL (ref 3.5–5.2)
ANION GAP SERPL CALC-SCNC: 9 MMOL/L (ref 8–16)
BUN SERPL-MCNC: 15 MG/DL (ref 6–20)
CALCIUM SERPL-MCNC: 9 MG/DL (ref 8.7–10.5)
CHLORIDE SERPL-SCNC: 104 MMOL/L (ref 95–110)
CO2 SERPL-SCNC: 25 MMOL/L (ref 23–29)
CREAT SERPL-MCNC: 0.8 MG/DL (ref 0.5–1.4)
EST. GFR  (NO RACE VARIABLE): >60 ML/MIN/1.73 M^2
GLUCOSE SERPL-MCNC: 81 MG/DL (ref 70–110)
PHOSPHATE SERPL-MCNC: 3.6 MG/DL (ref 2.7–4.5)
POTASSIUM SERPL-SCNC: 4 MMOL/L (ref 3.5–5.1)
SODIUM SERPL-SCNC: 138 MMOL/L (ref 136–145)

## 2025-03-12 ENCOUNTER — OFFICE VISIT (OUTPATIENT)
Dept: NEPHROLOGY | Facility: CLINIC | Age: 40
End: 2025-03-12
Payer: COMMERCIAL

## 2025-03-12 VITALS
DIASTOLIC BLOOD PRESSURE: 90 MMHG | SYSTOLIC BLOOD PRESSURE: 128 MMHG | BODY MASS INDEX: 28.56 KG/M2 | HEIGHT: 62 IN | HEART RATE: 74 BPM | WEIGHT: 155.19 LBS

## 2025-03-12 DIAGNOSIS — Q61.3 POLYCYSTIC KIDNEY DISEASE: Primary | ICD-10-CM

## 2025-03-12 PROCEDURE — 99999 PR PBB SHADOW E&M-EST. PATIENT-LVL III: CPT | Mod: PBBFAC,,, | Performed by: INTERNAL MEDICINE

## 2025-03-12 NOTE — PROGRESS NOTES
Samanta Espinoza is a 40 y.o. female     HPI:    She has not been seen in Nephrology Clinic in about 3 years.  She has history of autosomal dominant polycystic kidney disease.  She presents to clinic today to reestablish care.  She is particularly interested in Jynarque.  She and her wife had multiple questions regarding the medication and potential uses and side effects.  All were answered to their satisfaction.  Fortunately over the past several years her creatinine has remained stable and is normal at 0.8.  Her urinalysis have all been bland without evidence of proteinuria.    PAST MEDICAL HISTORY:  She  has a past medical history of Acute cough (2022), Anemia, Autosomal recessive polycystic kidneys, Chronic hypertension (2022), GERD (gastroesophageal reflux disease), High risk pregnancy, multigravida of advanced maternal age in third trimester (2022), History of uterine fibroid, Hyperlipidemia, Hypertension, Liver disease, Mastitis (10/29/2019), Melanoma (2016), Mental disorder, Migraine headache, PONV (postoperative nausea and vomiting), Postpartum depression, and Pregnancy resulting from in vitro fertilization in third trimester (2022).    PAST SURGICAL HISTORY:  She  has a past surgical history that includes Renal exploration (); Nasal septum surgery; Lymph node biopsy; Malignant skin lesion excision (06/10/2016); Hysteroscopic polypectomy of uterus (N/A, 01/10/2019);  section (N/A, 2019);  section (N/A, 2022);  section (19,22); xi robotic hysterectomy, with salpingectomy (Bilateral, 2024); Cystoscopy (N/A, 2024); and Hysterectomy.    SOCIAL HISTORY:  She  reports that she has never smoked. She has never used smokeless tobacco. She reports current alcohol use of about 2.0 standard drinks of alcohol per week. She reports that she does not use drugs.      FAMILY MEDICAL HISTORY:  Her family history includes  Cancer in her maternal grandmother and maternal uncle; Diabetes in her paternal grandmother; Hypertension in her father; Kidney disease in her father; motor vehicle in her mother.    Review of patient's allergies indicates:   Allergen Reactions    Singulair [montelukast]      Itching all over           Prior to Admission medications    Medication Sig Start Date End Date Taking? Authorizing Provider   ALPRAZolam (XANAX) 0.25 MG tablet TAKE 1-2 TABLETS AS NEEDED FOR FLIGHT ANXIETY 12/15/23  Yes Aleida Wooten PA-C   azelastine (ASTELIN) 137 mcg (0.1 %) nasal spray Take 1 spray (137 mcg total) by Nasal route 2 (two) times daily. 2/20/25 2/20/26 Yes Douglas Heath NP   fluticasone propionate (FLONASE) 50 mcg/actuation nasal spray Take 1 spray (50 mcg total) by Each Nostril route once daily. 2/20/25  Yes Douglas Heath NP   levocetirizine (XYZAL) 5 MG tablet Take 5 mg by mouth every evening.   Yes Provider, Historical   omeprazole (PRILOSEC) 20 MG capsule Take 1 capsule (20 mg total) by mouth once daily. 1/6/25  Yes Oliver Porter MD   ondansetron (ZOFRAN-ODT) 4 MG TbDL PLACE 1 TO 2 TABLETS UNDER TONGUE EVERY 8 HOURS AS NEEDED FOR NAUSEA 12/15/23  Yes Aleida Wooten PA-C   venlafaxine (EFFEXOR XR) 75 MG 24 hr capsule Take 1 capsule (75 mg total) by mouth once daily. 1/6/25 1/6/26 Yes Oliver Porter MD   rizatriptan (MAXALT) 10 MG tablet Take 1 tablet (10 mg total) by mouth as needed for Migraine (max 2 times a week). 12/12/24 1/11/25  Aleida Wooten PA-C      Sodium   Date Value Ref Range Status   03/10/2025 138 136 - 145 mmol/L Final   01/06/2025 139 136 - 145 mmol/L Final   06/24/2024 138 136 - 145 mmol/L Final     Potassium   Date Value Ref Range Status   03/10/2025 4.0 3.5 - 5.1 mmol/L Final   01/06/2025 3.7 3.5 - 5.1 mmol/L Final   06/24/2024 4.3 3.5 - 5.1 mmol/L Final     Chloride   Date Value Ref Range Status   03/10/2025 104 95 - 110 mmol/L Final   01/06/2025 104 95 - 110  mmol/L Final   06/24/2024 103 95 - 110 mmol/L Final     CO2   Date Value Ref Range Status   03/10/2025 25 23 - 29 mmol/L Final   01/06/2025 25 23 - 29 mmol/L Final   06/24/2024 24 23 - 29 mmol/L Final     Glucose   Date Value Ref Range Status   03/10/2025 81 70 - 110 mg/dL Final   01/06/2025 84 70 - 110 mg/dL Final   06/24/2024 73 70 - 110 mg/dL Final     BUN   Date Value Ref Range Status   03/10/2025 15 6 - 20 mg/dL Final   01/06/2025 21 (H) 6 - 20 mg/dL Final   06/24/2024 15 6 - 20 mg/dL Final     Creatinine   Date Value Ref Range Status   03/10/2025 0.8 0.5 - 1.4 mg/dL Final   01/06/2025 0.7 0.5 - 1.4 mg/dL Final   06/24/2024 0.8 0.5 - 1.4 mg/dL Final     Calcium   Date Value Ref Range Status   03/10/2025 9.0 8.7 - 10.5 mg/dL Final   01/06/2025 10.1 8.7 - 10.5 mg/dL Final   06/24/2024 9.9 8.7 - 10.5 mg/dL Final     Total Protein   Date Value Ref Range Status   01/06/2025 8.4 6.0 - 8.4 g/dL Final   12/15/2023 7.6 6.0 - 8.4 g/dL Final   09/19/2022 6.3 6.0 - 8.4 g/dL Final     Albumin   Date Value Ref Range Status   03/10/2025 4.1 3.5 - 5.2 g/dL Final   01/06/2025 4.4 3.5 - 5.2 g/dL Final   12/15/2023 3.8 3.5 - 5.2 g/dL Final     Total Bilirubin   Date Value Ref Range Status   01/06/2025 0.3 0.1 - 1.0 mg/dL Final     Comment:     For infants and newborns, interpretation of results should be based  on gestational age, weight and in agreement with clinical  observations.    Premature Infant recommended reference ranges:  Up to 24 hours.............<8.0 mg/dL  Up to 48 hours............<12.0 mg/dL  3-5 days..................<15.0 mg/dL  6-29 days.................<15.0 mg/dL     12/15/2023 0.3 0.1 - 1.0 mg/dL Final     Comment:     For infants and newborns, interpretation of results should be based  on gestational age, weight and in agreement with clinical  observations.    Premature Infant recommended reference ranges:  Up to 24 hours.............<8.0 mg/dL  Up to 48 hours............<12.0 mg/dL  3-5  days..................<15.0 mg/dL  6-29 days.................<15.0 mg/dL     09/19/2022 0.2 0.1 - 1.0 mg/dL Final     Comment:     For infants and newborns, interpretation of results should be based  on gestational age, weight and in agreement with clinical  observations.    Premature Infant recommended reference ranges:  Up to 24 hours.............<8.0 mg/dL  Up to 48 hours............<12.0 mg/dL  3-5 days..................<15.0 mg/dL  6-29 days.................<15.0 mg/dL       Alkaline Phosphatase   Date Value Ref Range Status   01/06/2025 64 40 - 150 U/L Final   12/15/2023 59 55 - 135 U/L Final   09/19/2022 85 55 - 135 U/L Final     AST   Date Value Ref Range Status   01/06/2025 17 10 - 40 U/L Final   12/15/2023 15 10 - 40 U/L Final   09/19/2022 10 10 - 40 U/L Final     ALT   Date Value Ref Range Status   01/06/2025 10 10 - 44 U/L Final   12/15/2023 14 10 - 44 U/L Final   09/19/2022 8 (L) 10 - 44 U/L Final     Anion Gap   Date Value Ref Range Status   03/10/2025 9 8 - 16 mmol/L Final   01/06/2025 10 8 - 16 mmol/L Final   06/24/2024 11 8 - 16 mmol/L Final     eGFR if    Date Value Ref Range Status   07/28/2022 >60.0 >60 mL/min/1.73 m^2 Final   03/30/2021 >60.0 >60 mL/min/1.73 m^2 Final   08/26/2020 >60.0 >60 mL/min/1.73 m^2 Final   08/26/2020 >60.0 >60 mL/min/1.73 m^2 Final     eGFR if non    Date Value Ref Range Status   07/28/2022 >60.0 >60 mL/min/1.73 m^2 Final     Comment:     Calculation used to obtain the estimated glomerular filtration  rate (eGFR) is the CKD-EPI equation.      03/30/2021 >60.0 >60 mL/min/1.73 m^2 Final     Comment:     Calculation used to obtain the estimated glomerular filtration  rate (eGFR) is the CKD-EPI equation.      08/26/2020 >60.0 >60 mL/min/1.73 m^2 Final     Comment:     Calculation used to obtain the estimated glomerular filtration  rate (eGFR) is the CKD-EPI equation.      08/26/2020 >60.0 >60 mL/min/1.73 m^2 Final     Comment:     Calculation  "used to obtain the estimated glomerular filtration  rate (eGFR) is the CKD-EPI equation.        Protein, Urine Random   Date Value Ref Range Status   03/10/2025 <7 0 - 15 mg/dL Final   09/19/2022 14 0 - 15 mg/dL Final   07/28/2022 <7 0 - 15 mg/dL Final     Creatinine, Urine   Date Value Ref Range Status   03/10/2025 36.0 15.0 - 325.0 mg/dL Final   09/19/2022 103.0 15.0 - 325.0 mg/dL Final   07/28/2022 109.0 15.0 - 325.0 mg/dL Final     Prot/Creat Ratio, Urine   Date Value Ref Range Status   03/10/2025 Unable to calculate 0.00 - 0.20 Final   09/19/2022 0.14 0.00 - 0.20 Final   07/28/2022 Unable to calculate 0.00 - 0.20 Final         REVIEW OF SYSTEMS:  Patient has no fever, fatigue, visual changes, chest pain, edema, cough, dyspnea, nausea, vomiting, constipation, diarrhea, arthralgias, pruritis, dizziness, weakness, depression, confusion.        PHYSICAL EXAM:   height is 5' 2" (1.575 m) and weight is 70.4 kg (155 lb 3.3 oz). Her blood pressure is 128/90 (abnormal) and her pulse is 74.   Gen: WDWN female in no apparent distress  Psych: Normal mood and affect  Skin: No rashes or ulcers  Eyes: Normal conjunctiva and lids, PERRLA  ENT: Normal hearing with no oropharyngeal lesions  Neck: No JVD  Chest: Clear with no rales, rhonchi, wheezing with normal effort  CV: Regular with no murmurs, gallops or rubs  Abd: Soft, nontender, no distension, positive bowel sounds  Ext: No cyanosis, clubbing or edema          IMPRESSION AND RECOMMENDATIONS:    1. Autosomal dominant polycystic kidney disease:  She has a strong family history of ADPKD.  From a functional standpoint her creatinine has remained normal.  Electrolytes and acid-base are also normal.  Her blood pressure is within acceptable range with only a mildly elevated diastolic in 90.    She is interested in Jynarque.  She has not had an imaging study in several years.  I discussed imaging with radiology in the radiologist recommended a noncontrast CT of the abdomen for " calculation of renal volume.  Depending upon renal volume we will see whether or not she qualifies for Jynarque therapy.    Otherwise I think that she would be an excellent candidate for the medication.  Her most recent transaminase levels were all normal.      At least 45 minutes was spent in face-to-face conversation answering questions, review of hospital records and coordination of care with other providers.

## 2025-03-13 ENCOUNTER — PATIENT MESSAGE (OUTPATIENT)
Dept: NEPHROLOGY | Facility: CLINIC | Age: 40
End: 2025-03-13
Payer: COMMERCIAL

## 2025-03-24 ENCOUNTER — HOSPITAL ENCOUNTER (OUTPATIENT)
Dept: RADIOLOGY | Facility: HOSPITAL | Age: 40
Discharge: HOME | End: 2025-03-24
Attending: INTERNAL MEDICINE
Payer: COMMERCIAL

## 2025-03-24 DIAGNOSIS — Q61.3 POLYCYSTIC KIDNEY DISEASE: ICD-10-CM

## 2025-03-24 PROCEDURE — 74176 CT ABD & PELVIS W/O CONTRAST: CPT | Mod: TC

## 2025-03-24 PROCEDURE — 74176 CT ABD & PELVIS W/O CONTRAST: CPT | Mod: 26,,, | Performed by: STUDENT IN AN ORGANIZED HEALTH CARE EDUCATION/TRAINING PROGRAM

## 2025-04-14 ENCOUNTER — TELEPHONE (OUTPATIENT)
Dept: HEMATOLOGY/ONCOLOGY | Facility: CLINIC | Age: 40
End: 2025-04-14
Payer: COMMERCIAL

## 2025-04-14 DIAGNOSIS — F41.9 ANXIETY: Primary | ICD-10-CM

## 2025-04-18 NOTE — ASSESSMENT & PLAN NOTE
Will schedule follow-up with her nephrologist; stable renal function on recent labs. On low dose lisinopril   no

## 2025-05-14 ENCOUNTER — RESULTS FOLLOW-UP (OUTPATIENT)
Dept: OTHER | Facility: CLINIC | Age: 40
End: 2025-05-14

## 2025-05-28 ENCOUNTER — OFFICE VISIT (OUTPATIENT)
Dept: DERMATOLOGY | Facility: CLINIC | Age: 40
End: 2025-05-28
Payer: COMMERCIAL

## 2025-05-28 DIAGNOSIS — Z12.83 SCREENING, MALIGNANT NEOPLASM, SKIN: ICD-10-CM

## 2025-05-28 DIAGNOSIS — D18.01 HEMANGIOMA OF SKIN: ICD-10-CM

## 2025-05-28 DIAGNOSIS — Z85.820 HISTORY OF MELANOMA: ICD-10-CM

## 2025-05-28 DIAGNOSIS — L90.5 SCAR CONDITIONS/SKIN FIBROSIS: Primary | ICD-10-CM

## 2025-05-28 DIAGNOSIS — D22.9 MULTIPLE NEVI: ICD-10-CM

## 2025-05-28 DIAGNOSIS — Z86.018 HISTORY OF DYSPLASTIC NEVUS: ICD-10-CM

## 2025-05-28 PROCEDURE — 99999 PR PBB SHADOW E&M-EST. PATIENT-LVL III: CPT | Mod: PBBFAC,,, | Performed by: DERMATOLOGY

## 2025-05-28 PROCEDURE — 1159F MED LIST DOCD IN RCRD: CPT | Mod: CPTII,S$GLB,, | Performed by: DERMATOLOGY

## 2025-05-28 PROCEDURE — G2211 COMPLEX E/M VISIT ADD ON: HCPCS | Mod: S$GLB,,, | Performed by: DERMATOLOGY

## 2025-05-28 PROCEDURE — 1160F RVW MEDS BY RX/DR IN RCRD: CPT | Mod: CPTII,S$GLB,, | Performed by: DERMATOLOGY

## 2025-05-28 PROCEDURE — 99214 OFFICE O/P EST MOD 30 MIN: CPT | Mod: S$GLB,,, | Performed by: DERMATOLOGY

## 2025-05-28 PROCEDURE — 3044F HG A1C LEVEL LT 7.0%: CPT | Mod: CPTII,S$GLB,, | Performed by: DERMATOLOGY

## 2025-05-28 PROCEDURE — 3066F NEPHROPATHY DOC TX: CPT | Mod: CPTII,S$GLB,, | Performed by: DERMATOLOGY

## 2025-06-12 ENCOUNTER — PATIENT MESSAGE (OUTPATIENT)
Dept: HEMATOLOGY/ONCOLOGY | Facility: CLINIC | Age: 40
End: 2025-06-12
Payer: COMMERCIAL

## 2025-06-12 ENCOUNTER — DOCUMENTATION ONLY (OUTPATIENT)
Dept: HEMATOLOGY/ONCOLOGY | Facility: CLINIC | Age: 40
End: 2025-06-12
Payer: COMMERCIAL

## 2025-06-12 NOTE — Clinical Note
Hello,   Just wanted to bring to your awareness that Samanta Espinoza  had pharmacogenomics (PGx) testing done through a  initiative [facility] is currently offering for select patients who are taking medications that may be impacted by genetic results.   As part of this , I have reviewed their genetic results and current medications to help provide insight into current medication optimization options, as well as future medication choices as appropriate.   For any future medications prescribed by any [facility] provider, if there is an interaction with the medication and the patient's genetic results, a clinical alert will fire for both providers and pharmacists. These interactions are reviewed by the PGx team monthly and updated as new data becomes available.   If you have any questions about the results or PGx at [facility] in general, please don't hesitate to reach out to me directly.   Stephanie Bhat, PharmD  Clinical Pharmacogenomics Pharmacist

## 2025-06-12 NOTE — PROGRESS NOTES
"Pharmacogenomics (PGx) Consult     Chief Complaint: Samanta Espinoza  has undergone pharmacogenomic testing via self-enrollment in the [employer] PGx . Patient completed sample collection on 4/22/2025 and results were provided on 5/2/2025.     Test results: Pharmacogenomic results can be found in GENOMIC INDICATORS    Past Medical History:  -------------------------------------    Acute cough    Anemia    Autosomal recessive polycystic kidneys    Chronic hypertension    Labetalol started 6/20/20    GERD (gastroesophageal reflux disease)    High risk pregnancy, multigravida of advanced maternal age in third trimester    History of uterine fibroid    Hyperlipidemia    Hypertension    Liver disease    cyst    Mastitis    Melanoma    left superior helix (breslow dept 0.67mm)    Mental disorder    anxiety    Migraine headache    PONV (postoperative nausea and vomiting)    post op nausea    Postpartum depression    Pregnancy resulting from in vitro fertilization in third trimester    [facility] IVF with [facility], Dr STACEY Ivy        Allergies:   Review of patient's allergies indicates:   Allergen Reactions    Singulair [montelukast]      Itching all over        Medications: Current Medications[1]      Clinical Recommendations based on PGx    -The patient is not currently on any medications that need adjustment based on pharmacogenomic results.      -Patient has 2 PGx results that can impact future medication selection- please see "Clinically Actionable PGx Results" for full list of medication interactions    Clinically Actionable PGx Results   *Note, actionable recommendations may change based on updates to existing PGx literature. For the most up to date medication recommendations based on patients' results, please view the GENOMIC INDICATORS module in Epic.    Genomic Indicators        CYP2B6 Intermediate Metabolizer Updated 5/2/2025 by Security, Standard Interface User      Genomic " results predict that this patient may metabolize CYP2B6 substrates at a rate that falls below average metabolic capacity. Thus, this patient may have an increased risk of adverse or poor response to medications metabolized by CYP2B6. To avoid these types of responses, dose adjustments or alternative therapeutic agents may be necessary when medications metabolized by CYP2B6 are being considered. For questions, call [phone] ([phone]) or order PGx Consult [[id number]].         Intermediate Metabolizer of Efavirenz          Genetic results for this patient indicate an increased risk of CNS adverse events with a standard dose of efavirenz based on CYP2B6 genotype. Consider reducing the starting dose to 400 mg/day.    See CPIC clinical guidelines for more information.              Reference Links    CPIC® Guideline for Efavirenz based on CYP2B6 genotype    CPIC® Guideline for Serotonin Reuptake Inhibitor Antidepressants and CYP2D6, TAU6O88, CYP2B6, SLC6A4, and HTR2A                      TFSC6Z2 Decreased Function Updated 5/2/2025 by Security, Standard Interface User      Genomic results predict that this patient likely has decreased GQGL0M3 function. Thus, the patient may be at increased risk for adverse response to medications that are transported by ALXX3Y1. To avoid this type of response, dose adjustments or alternative therapeutic agents may be necessary for medications affected by XMVI2V4, including statins. If a statin is prescribed for a patient with decreased MRAX5Y3 function, there is an increased risk of statin-associated myopathy; such patients may need a lower starting dose or an alternative statin agent. For questions, call [phone] ([phone]) or order PGx Consult [[id number]].         Risk of Negative Reaction with Statins        This patient has a MNYP2N8 genomic indicator which indicates increased risk of myopathy when treated with statin medications. If a high-intensity statin is indicated consider using  "rosuvastatin 20 mg or atorvastatin 40 mg or combination therapy with a non-statin therapy.     See CPIC clinical guidelines for all other statin dose recommendations.              Reference Links    CPIC® guideline for statins and TJSK3K0, ABCG2, and CYP2C9                    -If there is an interaction with any future medications and the patient's genetic results, a clinical alert will fire for both providers and pharmacists. These interactions are reviewed by the PGx team and updated as new data becomes available.      -PGx information can be accessed by patients within [employer] [facility]- results with "actionable result DETECTED" should be communicated to providers outside of [facility] as our clinical alerts will NOT fire    -For any additional questions, please don't hesitate to reach out to me or contact [phone] (GENE).    Stephanie Grubbs, PharmD   Clinical Pharmacogenomics Pharmacist          [1]   ALPRAZolam (XANAX) 0.25 MG tablet    azelastine (ASTELIN) 137 mcg (0.1 %) nasal spray    fluticasone propionate (FLONASE) 50 mcg/actuation nasal spray    levocetirizine (XYZAL) 5 MG tablet    omeprazole (PRILOSEC) 20 MG capsule    ondansetron (ZOFRAN-ODT) 4 MG TbDL    rizatriptan (MAXALT) 10 MG tablet    venlafaxine (EFFEXOR XR) 75 MG 24 hr capsule    "

## 2025-06-19 ENCOUNTER — LAB VISIT (OUTPATIENT)
Dept: LAB | Facility: HOSPITAL | Age: 40
End: 2025-06-19
Attending: INTERNAL MEDICINE
Payer: COMMERCIAL

## 2025-06-19 DIAGNOSIS — Q61.3 POLYCYSTIC KIDNEY DISEASE: ICD-10-CM

## 2025-06-19 LAB
ALBUMIN SERPL BCP-MCNC: 4.7 G/DL (ref 3.5–5.2)
ANION GAP (OHS): 7 MMOL/L (ref 8–16)
BUN SERPL-MCNC: 18 MG/DL (ref 6–20)
CALCIUM SERPL-MCNC: 9.5 MG/DL (ref 8.7–10.5)
CHLORIDE SERPL-SCNC: 102 MMOL/L (ref 95–110)
CO2 SERPL-SCNC: 28 MMOL/L (ref 23–29)
CREAT SERPL-MCNC: 0.7 MG/DL (ref 0.5–1.4)
CREAT UR-MCNC: 128 MG/DL (ref 15–325)
GFR SERPLBLD CREATININE-BSD FMLA CKD-EPI: >60 ML/MIN/1.73/M2
GLUCOSE SERPL-MCNC: 76 MG/DL (ref 70–110)
PHOSPHATE SERPL-MCNC: 3.1 MG/DL (ref 2.7–4.5)
POTASSIUM SERPL-SCNC: 4.1 MMOL/L (ref 3.5–5.1)
PROT UR-MCNC: 13 MG/DL
PROT/CREAT UR: 0.1 MG/G{CREAT}
SODIUM SERPL-SCNC: 137 MMOL/L (ref 136–145)

## 2025-06-19 PROCEDURE — 80069 RENAL FUNCTION PANEL: CPT

## 2025-06-19 PROCEDURE — 36415 COLL VENOUS BLD VENIPUNCTURE: CPT

## 2025-06-19 PROCEDURE — 82570 ASSAY OF URINE CREATININE: CPT

## 2025-06-24 ENCOUNTER — LAB VISIT (OUTPATIENT)
Dept: LAB | Facility: HOSPITAL | Age: 40
End: 2025-06-24
Attending: INTERNAL MEDICINE
Payer: COMMERCIAL

## 2025-06-24 ENCOUNTER — OFFICE VISIT (OUTPATIENT)
Dept: NEPHROLOGY | Facility: CLINIC | Age: 40
End: 2025-06-24
Payer: COMMERCIAL

## 2025-06-24 VITALS
HEIGHT: 62 IN | DIASTOLIC BLOOD PRESSURE: 90 MMHG | SYSTOLIC BLOOD PRESSURE: 126 MMHG | WEIGHT: 149.69 LBS | HEART RATE: 78 BPM | BODY MASS INDEX: 27.55 KG/M2

## 2025-06-24 DIAGNOSIS — Q61.3 POLYCYSTIC KIDNEY DISEASE: Primary | ICD-10-CM

## 2025-06-24 DIAGNOSIS — I10 ESSENTIAL HYPERTENSION: ICD-10-CM

## 2025-06-24 DIAGNOSIS — Q61.3 POLYCYSTIC KIDNEY DISEASE: ICD-10-CM

## 2025-06-24 PROCEDURE — 99215 OFFICE O/P EST HI 40 MIN: CPT | Mod: S$GLB,,, | Performed by: INTERNAL MEDICINE

## 2025-06-24 PROCEDURE — 3080F DIAST BP >= 90 MM HG: CPT | Mod: CPTII,S$GLB,, | Performed by: INTERNAL MEDICINE

## 2025-06-24 PROCEDURE — 3074F SYST BP LT 130 MM HG: CPT | Mod: CPTII,S$GLB,, | Performed by: INTERNAL MEDICINE

## 2025-06-24 PROCEDURE — 1160F RVW MEDS BY RX/DR IN RCRD: CPT | Mod: CPTII,S$GLB,, | Performed by: INTERNAL MEDICINE

## 2025-06-24 PROCEDURE — 80053 COMPREHEN METABOLIC PANEL: CPT

## 2025-06-24 PROCEDURE — 3066F NEPHROPATHY DOC TX: CPT | Mod: CPTII,S$GLB,, | Performed by: INTERNAL MEDICINE

## 2025-06-24 PROCEDURE — 99999 PR PBB SHADOW E&M-EST. PATIENT-LVL III: CPT | Mod: PBBFAC,,, | Performed by: INTERNAL MEDICINE

## 2025-06-24 PROCEDURE — 3044F HG A1C LEVEL LT 7.0%: CPT | Mod: CPTII,S$GLB,, | Performed by: INTERNAL MEDICINE

## 2025-06-24 PROCEDURE — 1159F MED LIST DOCD IN RCRD: CPT | Mod: CPTII,S$GLB,, | Performed by: INTERNAL MEDICINE

## 2025-06-24 PROCEDURE — 3008F BODY MASS INDEX DOCD: CPT | Mod: CPTII,S$GLB,, | Performed by: INTERNAL MEDICINE

## 2025-06-24 PROCEDURE — 36415 COLL VENOUS BLD VENIPUNCTURE: CPT

## 2025-06-24 RX ORDER — TOLVAPTAN 30 MG-15MG
KIT ORAL
Qty: 180 TABLET | Refills: 3 | Status: SHIPPED | OUTPATIENT
Start: 2025-06-24

## 2025-06-24 NOTE — PROGRESS NOTES
"Subjective:       Patient ID: Samanta Espinoza is a 40 y.o. female.    Chief Complaint: Polycystic Kidney Disease    HPI    She presents to clinic today for routine follow-up.  Since her last office visit she has been doing well and has no specific or new complaints.  Her laboratory studies and medications were reviewed.  All Nephrology related questions were answered to her satisfaction.    Review of Systems   Constitutional: Negative.    HENT: Negative.     Respiratory: Negative.     Cardiovascular: Negative.    Gastrointestinal: Negative.    Genitourinary: Negative.    Musculoskeletal: Negative.    Skin: Negative.        BP (!) 126/90   Pulse 78   Ht 5' 2" (1.575 m)   Wt 67.9 kg (149 lb 11.1 oz)   LMP 05/13/2024   BMI 27.38 kg/m²     Lab Results   Component Value Date    WBC 8.08 01/06/2025    HGB 14.4 01/06/2025    HCT 45.0 01/06/2025    MCV 85 01/06/2025     01/06/2025      BMP  Lab Results   Component Value Date     06/19/2025    K 4.1 06/19/2025     06/19/2025    CO2 28 06/19/2025    BUN 18 06/19/2025    CREATININE 0.7 06/19/2025    CALCIUM 9.5 06/19/2025    ANIONGAP 7 (L) 06/19/2025    ESTGFRAFRICA >60.0 07/28/2022    EGFRNONAA >60.0 07/28/2022     CMP  Sodium   Date Value Ref Range Status   06/19/2025 137 136 - 145 mmol/L Final   03/10/2025 138 136 - 145 mmol/L Final     Potassium   Date Value Ref Range Status   06/19/2025 4.1 3.5 - 5.1 mmol/L Final   03/10/2025 4.0 3.5 - 5.1 mmol/L Final     Chloride   Date Value Ref Range Status   06/19/2025 102 95 - 110 mmol/L Final   03/10/2025 104 95 - 110 mmol/L Final     CO2   Date Value Ref Range Status   06/19/2025 28 23 - 29 mmol/L Final   03/10/2025 25 23 - 29 mmol/L Final     Glucose   Date Value Ref Range Status   06/19/2025 76 70 - 110 mg/dL Final   03/10/2025 81 70 - 110 mg/dL Final     BUN   Date Value Ref Range Status   06/19/2025 18 6 - 20 mg/dL Final     Creatinine   Date Value Ref Range Status   06/19/2025 0.7 0.5 - " 1.4 mg/dL Final     Calcium   Date Value Ref Range Status   06/19/2025 9.5 8.7 - 10.5 mg/dL Final   03/10/2025 9.0 8.7 - 10.5 mg/dL Final     Total Protein   Date Value Ref Range Status   01/06/2025 8.4 6.0 - 8.4 g/dL Final     Albumin   Date Value Ref Range Status   06/19/2025 4.7 3.5 - 5.2 g/dL Final   03/10/2025 4.1 3.5 - 5.2 g/dL Final     Total Bilirubin   Date Value Ref Range Status   01/06/2025 0.3 0.1 - 1.0 mg/dL Final     Comment:     For infants and newborns, interpretation of results should be based  on gestational age, weight and in agreement with clinical  observations.    Premature Infant recommended reference ranges:  Up to 24 hours.............<8.0 mg/dL  Up to 48 hours............<12.0 mg/dL  3-5 days..................<15.0 mg/dL  6-29 days.................<15.0 mg/dL       Alkaline Phosphatase   Date Value Ref Range Status   01/06/2025 64 40 - 150 U/L Final     AST   Date Value Ref Range Status   01/06/2025 17 10 - 40 U/L Final     ALT   Date Value Ref Range Status   01/06/2025 10 10 - 44 U/L Final     Anion Gap   Date Value Ref Range Status   06/19/2025 7 (L) 8 - 16 mmol/L Final     eGFR if    Date Value Ref Range Status   07/28/2022 >60.0 >60 mL/min/1.73 m^2 Final     eGFR if non    Date Value Ref Range Status   07/28/2022 >60.0 >60 mL/min/1.73 m^2 Final     Comment:     Calculation used to obtain the estimated glomerular filtration  rate (eGFR) is the CKD-EPI equation.        Medications Ordered Prior to Encounter[1]         Objective:            Physical Exam  Constitutional:       Appearance: Normal appearance.   HENT:      Head: Normocephalic and atraumatic.   Eyes:      General: No scleral icterus.     Extraocular Movements: Extraocular movements intact.      Pupils: Pupils are equal, round, and reactive to light.   Pulmonary:      Effort: Pulmonary effort is normal.      Breath sounds: No stridor.   Musculoskeletal:      Right lower leg: No edema.      Left  lower leg: No edema.   Skin:     General: Skin is warm and dry.   Neurological:      General: No focal deficit present.      Mental Status: She is alert and oriented to person, place, and time.   Psychiatric:         Mood and Affect: Mood normal.         Behavior: Behavior normal.       Assessment:       1. Polycystic kidney disease    2. Essential hypertension        Plan:       1. Autosomal dominant polycystic kidney disease.  CT scan of the abdomen reveal [place] renal volume of 48063 cc.  Based on a height of 1.575 m her kidney volume to height ratio places her at classification 1 E which would qualify her for Jynarque therapy.  We can discussed potential side effects of Jynarque to include increased thirst, increased urination and increased transaminases.    She is interested in starting Jynarque.  Will send in a script today.  We discussed that it may take a few days or week to get it cleared through her insurance company.    Her renal function has remained stable with a creatinine of 0.7.  Her urinalysis is bland without evidence of proteinuria.    2. Blood pressure is adequately controlled at this time with a systolic of 126 and a diastolic of 90.  Will continue to monitor.  If her systolic blood pressure consistently runs greater than 130-140 will plan to start high potency ARB.    Follow-up labs: Comprehensive metabolic panel, PC ratio.    A minimum of 40 minutes was spent in face-to-face conversation, chart review and coordination of care with other providers.      Jai James MD         [1]   Current Outpatient Medications on File Prior to Visit   Medication Sig Dispense Refill    ALPRAZolam (XANAX) 0.25 MG tablet TAKE 1-2 TABLETS AS NEEDED FOR FLIGHT ANXIETY 30 tablet 0    azelastine (ASTELIN) 137 mcg (0.1 %) nasal spray Take 1 spray (137 mcg total) by Nasal route 2 (two) times daily. 90 mL 0    fluticasone propionate (FLONASE) 50 mcg/actuation nasal spray Take 1 spray (50 mcg total) by Each Nostril  route once daily. 16 g 11    levocetirizine (XYZAL) 5 MG tablet Take 5 mg by mouth every evening.      omeprazole (PRILOSEC) 20 MG capsule Take 1 capsule (20 mg total) by mouth once daily. 90 capsule 4    ondansetron (ZOFRAN-ODT) 4 MG TbDL PLACE 1 TO 2 TABLETS UNDER TONGUE EVERY 8 HOURS AS NEEDED FOR NAUSEA 30 tablet 3    rizatriptan (MAXALT) 10 MG tablet Take 1 tablet (10 mg total) by mouth as needed for Migraine (max 2 times a week). 9 tablet 0    tirzepatide 7.5 mg/0.5 mL PnIj Inject 7.5 mg into the skin every 7 days.      venlafaxine (EFFEXOR XR) 75 MG 24 hr capsule Take 1 capsule (75 mg total) by mouth once daily. 90 capsule 1     No current facility-administered medications on file prior to visit.

## 2025-06-25 LAB
ALBUMIN SERPL BCP-MCNC: 4.3 G/DL (ref 3.5–5.2)
ALP SERPL-CCNC: 42 UNIT/L (ref 40–150)
ALT SERPL W/O P-5'-P-CCNC: 10 UNIT/L (ref 10–44)
ANION GAP (OHS): 8 MMOL/L (ref 8–16)
AST SERPL-CCNC: 15 UNIT/L (ref 11–45)
BILIRUB SERPL-MCNC: 0.4 MG/DL (ref 0.1–1)
BUN SERPL-MCNC: 16 MG/DL (ref 6–20)
CALCIUM SERPL-MCNC: 9.2 MG/DL (ref 8.7–10.5)
CHLORIDE SERPL-SCNC: 102 MMOL/L (ref 95–110)
CO2 SERPL-SCNC: 27 MMOL/L (ref 23–29)
CREAT SERPL-MCNC: 0.6 MG/DL (ref 0.5–1.4)
GFR SERPLBLD CREATININE-BSD FMLA CKD-EPI: >60 ML/MIN/1.73/M2
GLUCOSE SERPL-MCNC: 76 MG/DL (ref 70–110)
POTASSIUM SERPL-SCNC: 4.1 MMOL/L (ref 3.5–5.1)
PROT SERPL-MCNC: 7 GM/DL (ref 6–8.4)
SODIUM SERPL-SCNC: 137 MMOL/L (ref 136–145)

## 2025-07-03 DIAGNOSIS — G43.009 MIGRAINE WITHOUT AURA AND WITHOUT STATUS MIGRAINOSUS, NOT INTRACTABLE: ICD-10-CM

## 2025-07-03 RX ORDER — RIZATRIPTAN BENZOATE 10 MG/1
10 TABLET ORAL
Qty: 9 TABLET | Refills: 0 | Status: SHIPPED | OUTPATIENT
Start: 2025-07-03 | End: 2025-08-02

## 2025-07-03 RX ORDER — VENLAFAXINE HYDROCHLORIDE 75 MG/1
75 CAPSULE, EXTENDED RELEASE ORAL DAILY
Qty: 90 CAPSULE | Refills: 1 | Status: SHIPPED | OUTPATIENT
Start: 2025-07-03 | End: 2026-07-03

## 2025-07-03 NOTE — TELEPHONE ENCOUNTER
No care due was identified.  Health [facility] Embedded Care Due Messages. Reference number: [id number].   7/03/2025 9:11:44 AM CDT

## 2025-07-03 NOTE — TELEPHONE ENCOUNTER
Refill Routing Note   Medication(s) are not appropriate for processing by [facility] for the following reason(s):        Required vitals abnormal    [facility] action(s):  Defer               Appointments  past 12m or future 3m with PCP    Date Provider   Last Visit   2/1/2025 Oliver Porter MD   Next Visit   9/3/2025 Oliver Porter MD   ED visits in past 90 days: 0        Note composed:5:18 PM 07/03/2025

## 2025-07-15 ENCOUNTER — PATIENT MESSAGE (OUTPATIENT)
Dept: NEPHROLOGY | Facility: CLINIC | Age: 40
End: 2025-07-15
Payer: COMMERCIAL

## 2025-07-29 ENCOUNTER — PATIENT MESSAGE (OUTPATIENT)
Dept: INTERNAL MEDICINE | Facility: CLINIC | Age: 40
End: 2025-07-29
Payer: COMMERCIAL

## 2025-08-12 ENCOUNTER — PATIENT MESSAGE (OUTPATIENT)
Dept: NEPHROLOGY | Facility: CLINIC | Age: 40
End: 2025-08-12
Payer: COMMERCIAL

## 2025-08-12 DIAGNOSIS — Q61.3 POLYCYSTIC KIDNEY DISEASE: Primary | ICD-10-CM

## 2025-08-29 ENCOUNTER — PATIENT MESSAGE (OUTPATIENT)
Dept: ADMINISTRATIVE | Facility: HOSPITAL | Age: 40
End: 2025-08-29
Payer: COMMERCIAL

## (undated) DEVICE — PAD ABD 8X10 STERILE

## (undated) DEVICE — SOL 9P NACL IRR PIC IL

## (undated) DEVICE — ELECTRODE REM PLYHSV RETURN 9

## (undated) DEVICE — DRESSING AQUACEL AG SILVER 4X4

## (undated) DEVICE — SYR LUER-LOCK STERILE 3ML

## (undated) DEVICE — TAPE SILK 3IN

## (undated) DEVICE — GOWN POLY REINF BRTH SLV XL

## (undated) DEVICE — TAPE MEDIPORE 3 X 10YD

## (undated) DEVICE — TAPE CLOTH SOFT MEDIPORE 4IN

## (undated) DEVICE — SUT MONOCRYL 4-0 PS-1 UND

## (undated) DEVICE — COVER LIGHT HANDLE 80/CA

## (undated) DEVICE — GLOVE SENSICARE PI GRN 8

## (undated) DEVICE — TRAY SKIN SCRUB WET 4 COMPART

## (undated) DEVICE — SOL NACL IRR 1000ML BTL

## (undated) DEVICE — TIP RUMI BLUE DISPOSABLE 5/BX

## (undated) DEVICE — DRAPE STERI LONG

## (undated) DEVICE — SET INFLOW TUBE HYSTER

## (undated) DEVICE — DRESSING COVER AQUACEL AG SURG

## (undated) DEVICE — TOWEL OR DISP STRL BLUE 4/PK

## (undated) DEVICE — SET PNEUMOCLEAR HEAT HUM SE HF

## (undated) DEVICE — COVER TIP CURVED SCISSORS XI

## (undated) DEVICE — OCCLUDER COLPO-PNEUMO STERILE

## (undated) DEVICE — DRAPE THREE-QTR REINF 53X77IN

## (undated) DEVICE — SOL IRR NACL .9% 3000ML

## (undated) DEVICE — GOWN NONREINF SET-IN SLV 2XL

## (undated) DEVICE — OBTURATOR BLADELESS 8MM XI CLR

## (undated) DEVICE — GLOVE BIOGEL SKINSENSE PI 6.5

## (undated) DEVICE — KIT ANTIFOG W/SPONG & FLUID

## (undated) DEVICE — ADHESIVE DERMABOND ADVANCED

## (undated) DEVICE — DRAPE LAVH SURG 109X109X100IN

## (undated) DEVICE — NDL PNEUMO INSUFFLATI 120MM

## (undated) DEVICE — SUT ABS CLIP LAPRA-TY CTD

## (undated) DEVICE — COVER PROXIMA MAYO STAND

## (undated) DEVICE — PACK BASIC SETUP SC BR

## (undated) DEVICE — SUT VICRYL ANTIMICRO VIL BR

## (undated) DEVICE — SYR LUER LOCK STERILE 10ML

## (undated) DEVICE — GLOVE SIGNATURE ESSNTL LTX 6

## (undated) DEVICE — Device

## (undated) DEVICE — SEE MEDLINE ITEM 146313

## (undated) DEVICE — SOL ELECTROLUBE ANTI-STIC

## (undated) DEVICE — HEADREST ROUND DISP FOAM 9IN

## (undated) DEVICE — HYSTEROSCOPE TRUCLEAR ELITE

## (undated) DEVICE — SOL NORMAL USPCA 0.9%

## (undated) DEVICE — SUT VICRYL PLUS 0 CT1 36IN

## (undated) DEVICE — DRAPE UINDERBUT GRAD PCH

## (undated) DEVICE — SET BASIN 48X48IN 6000ML RING

## (undated) DEVICE — SEAL UNIVERSAL 5MM-8MM XI

## (undated) DEVICE — APPLICATOR CHLORAPREP ORN 26ML

## (undated) DEVICE — SYR 50CC LL

## (undated) DEVICE — DRAPE COLUMN DAVINCI XI

## (undated) DEVICE — TRAY CATH 1-LYR URIMTR 16FR

## (undated) DEVICE — DRAPE ARM DAVINCI XI